# Patient Record
Sex: MALE | Race: WHITE | Employment: OTHER | URBAN - METROPOLITAN AREA
[De-identification: names, ages, dates, MRNs, and addresses within clinical notes are randomized per-mention and may not be internally consistent; named-entity substitution may affect disease eponyms.]

---

## 2017-02-05 ENCOUNTER — APPOINTMENT (OUTPATIENT)
Dept: GENERAL RADIOLOGY | Age: 62
DRG: 310 | End: 2017-02-05
Attending: EMERGENCY MEDICINE
Payer: COMMERCIAL

## 2017-02-05 ENCOUNTER — HOSPITAL ENCOUNTER (INPATIENT)
Age: 62
LOS: 1 days | Discharge: HOME | DRG: 310 | End: 2017-02-07
Attending: EMERGENCY MEDICINE | Admitting: INTERNAL MEDICINE
Payer: COMMERCIAL

## 2017-02-05 DIAGNOSIS — I48.91 ATRIAL FIBRILLATION WITH RVR (HCC): Primary | ICD-10-CM

## 2017-02-05 LAB
ALBUMIN SERPL BCP-MCNC: 3.9 G/DL (ref 3.5–5)
ALBUMIN/GLOB SERPL: 1 {RATIO} (ref 1.1–2.2)
ALP SERPL-CCNC: 106 U/L (ref 45–117)
ALT SERPL-CCNC: 58 U/L (ref 12–78)
ANION GAP BLD CALC-SCNC: 8 MMOL/L (ref 5–15)
AST SERPL W P-5'-P-CCNC: 41 U/L (ref 15–37)
BASOPHILS # BLD AUTO: 0 K/UL (ref 0–0.1)
BASOPHILS # BLD: 0 % (ref 0–1)
BILIRUB SERPL-MCNC: 0.5 MG/DL (ref 0.2–1)
BUN SERPL-MCNC: 15 MG/DL (ref 6–20)
BUN/CREAT SERPL: 16 (ref 12–20)
CALCIUM SERPL-MCNC: 8.6 MG/DL (ref 8.5–10.1)
CHLORIDE SERPL-SCNC: 106 MMOL/L (ref 97–108)
CK SERPL-CCNC: 188 U/L (ref 39–308)
CO2 SERPL-SCNC: 28 MMOL/L (ref 21–32)
CREAT SERPL-MCNC: 0.93 MG/DL (ref 0.7–1.3)
EOSINOPHIL # BLD: 0.2 K/UL (ref 0–0.4)
EOSINOPHIL NFR BLD: 4 % (ref 0–7)
ERYTHROCYTE [DISTWIDTH] IN BLOOD BY AUTOMATED COUNT: 13.5 % (ref 11.5–14.5)
GLOBULIN SER CALC-MCNC: 3.8 G/DL (ref 2–4)
GLUCOSE SERPL-MCNC: 186 MG/DL (ref 65–100)
HCT VFR BLD AUTO: 46.8 % (ref 36.6–50.3)
HGB BLD-MCNC: 16.3 G/DL (ref 12.1–17)
LYMPHOCYTES # BLD AUTO: 46 % (ref 12–49)
LYMPHOCYTES # BLD: 2.5 K/UL (ref 0.8–3.5)
MAGNESIUM SERPL-MCNC: 2.1 MG/DL (ref 1.6–2.4)
MCH RBC QN AUTO: 31.8 PG (ref 26–34)
MCHC RBC AUTO-ENTMCNC: 34.8 G/DL (ref 30–36.5)
MCV RBC AUTO: 91.4 FL (ref 80–99)
MONOCYTES # BLD: 0.3 K/UL (ref 0–1)
MONOCYTES NFR BLD AUTO: 6 % (ref 5–13)
NEUTS SEG # BLD: 2.4 K/UL (ref 1.8–8)
NEUTS SEG NFR BLD AUTO: 44 % (ref 32–75)
PLATELET # BLD AUTO: 177 K/UL (ref 150–400)
POTASSIUM SERPL-SCNC: 4 MMOL/L (ref 3.5–5.1)
PROT SERPL-MCNC: 7.7 G/DL (ref 6.4–8.2)
RBC # BLD AUTO: 5.12 M/UL (ref 4.1–5.7)
SODIUM SERPL-SCNC: 142 MMOL/L (ref 136–145)
TROPONIN I SERPL-MCNC: <0.04 NG/ML
WBC # BLD AUTO: 5.5 K/UL (ref 4.1–11.1)

## 2017-02-05 PROCEDURE — 93005 ELECTROCARDIOGRAM TRACING: CPT

## 2017-02-05 PROCEDURE — 36415 COLL VENOUS BLD VENIPUNCTURE: CPT | Performed by: EMERGENCY MEDICINE

## 2017-02-05 PROCEDURE — 96365 THER/PROPH/DIAG IV INF INIT: CPT

## 2017-02-05 PROCEDURE — 74011250636 HC RX REV CODE- 250/636: Performed by: EMERGENCY MEDICINE

## 2017-02-05 PROCEDURE — 84484 ASSAY OF TROPONIN QUANT: CPT | Performed by: EMERGENCY MEDICINE

## 2017-02-05 PROCEDURE — 77030022643 HC PAD DEFIB AD HRTSTRT PHL -B

## 2017-02-05 PROCEDURE — 85025 COMPLETE CBC W/AUTO DIFF WBC: CPT | Performed by: EMERGENCY MEDICINE

## 2017-02-05 PROCEDURE — 82550 ASSAY OF CK (CPK): CPT | Performed by: EMERGENCY MEDICINE

## 2017-02-05 PROCEDURE — 96367 TX/PROPH/DG ADDL SEQ IV INF: CPT

## 2017-02-05 PROCEDURE — 74011250637 HC RX REV CODE- 250/637: Performed by: INTERNAL MEDICINE

## 2017-02-05 PROCEDURE — 80053 COMPREHEN METABOLIC PANEL: CPT | Performed by: EMERGENCY MEDICINE

## 2017-02-05 PROCEDURE — 99218 HC RM OBSERVATION: CPT

## 2017-02-05 PROCEDURE — 99285 EMERGENCY DEPT VISIT HI MDM: CPT

## 2017-02-05 PROCEDURE — 74011000250 HC RX REV CODE- 250: Performed by: EMERGENCY MEDICINE

## 2017-02-05 PROCEDURE — 96376 TX/PRO/DX INJ SAME DRUG ADON: CPT

## 2017-02-05 PROCEDURE — 74011000258 HC RX REV CODE- 258: Performed by: EMERGENCY MEDICINE

## 2017-02-05 PROCEDURE — 96366 THER/PROPH/DIAG IV INF ADDON: CPT

## 2017-02-05 PROCEDURE — 96368 THER/DIAG CONCURRENT INF: CPT

## 2017-02-05 PROCEDURE — 83735 ASSAY OF MAGNESIUM: CPT | Performed by: EMERGENCY MEDICINE

## 2017-02-05 PROCEDURE — 71010 XR CHEST PORT: CPT

## 2017-02-05 RX ORDER — SIMVASTATIN 10 MG/1
20 TABLET, FILM COATED ORAL
Status: DISCONTINUED | OUTPATIENT
Start: 2017-02-06 | End: 2017-02-07 | Stop reason: HOSPADM

## 2017-02-05 RX ORDER — MAGNESIUM SULFATE HEPTAHYDRATE 40 MG/ML
2 INJECTION, SOLUTION INTRAVENOUS
Status: COMPLETED | OUTPATIENT
Start: 2017-02-05 | End: 2017-02-05

## 2017-02-05 RX ORDER — SODIUM CHLORIDE 0.9 % (FLUSH) 0.9 %
5-10 SYRINGE (ML) INJECTION EVERY 8 HOURS
Status: DISCONTINUED | OUTPATIENT
Start: 2017-02-06 | End: 2017-02-07 | Stop reason: HOSPADM

## 2017-02-05 RX ORDER — ASPIRIN 81 MG/1
81 TABLET ORAL DAILY
Status: DISCONTINUED | OUTPATIENT
Start: 2017-02-06 | End: 2017-02-07 | Stop reason: HOSPADM

## 2017-02-05 RX ORDER — IBUTILIDE FUMARATE 0.1 MG/ML
1 INJECTION, SOLUTION INTRAVENOUS
Status: DISCONTINUED | OUTPATIENT
Start: 2017-02-05 | End: 2017-02-05 | Stop reason: SDUPTHER

## 2017-02-05 RX ORDER — SODIUM CHLORIDE 0.9 % (FLUSH) 0.9 %
5-10 SYRINGE (ML) INJECTION AS NEEDED
Status: DISCONTINUED | OUTPATIENT
Start: 2017-02-05 | End: 2017-02-07 | Stop reason: HOSPADM

## 2017-02-05 RX ORDER — DILTIAZEM HYDROCHLORIDE 5 MG/ML
20 INJECTION INTRAVENOUS
Status: COMPLETED | OUTPATIENT
Start: 2017-02-05 | End: 2017-02-05

## 2017-02-05 RX ADMIN — DILTIAZEM HYDROCHLORIDE 20 MG: 5 INJECTION INTRAVENOUS at 16:31

## 2017-02-05 RX ADMIN — SODIUM CHLORIDE 10 MG/HR: 900 INJECTION, SOLUTION INTRAVENOUS at 16:38

## 2017-02-05 RX ADMIN — Medication 10 ML: at 23:57

## 2017-02-05 RX ADMIN — APIXABAN 5 MG: 5 TABLET, FILM COATED ORAL at 23:57

## 2017-02-05 RX ADMIN — MAGNESIUM SULFATE HEPTAHYDRATE 2 G: 40 INJECTION, SOLUTION INTRAVENOUS at 18:33

## 2017-02-05 RX ADMIN — SODIUM CHLORIDE 1 MG: 9 INJECTION, SOLUTION INTRAVENOUS at 21:46

## 2017-02-05 RX ADMIN — SODIUM CHLORIDE 1 MG: 9 INJECTION, SOLUTION INTRAVENOUS at 20:52

## 2017-02-05 NOTE — ED PROVIDER NOTES
HPI Comments: Nicole Osorio is a 59 y.o. male, pmhx significant for a-fib with ablation x2, GERD, PUD, HTN, who presents ambulatory to the ED c/o sudden onset palpitations with associated SOB x 90 minutes PTA. He states that he had L CP at initial onset of sxs but is no longer experiencing it. Pt reports that his sxs feel like a typical episode of his a-fib. He states that he usually experiences an episode of a-fib once a year, but his most recent episode was 4-5 months ago and it required electrocardioversion to reverse the rhythm, as did the 2 most recent episodes. He endorses taking ASA daily, but is not currently on any other blood thinners. Pt specifically denies nausea or diaphoresis. PCP: Mita Gutierrez MD  Cardiologist: Umair Hutchins MD       Social Hx: -tobacco (former, quit date:10/1/2012), +EtOH Jannifer Proud), -Illicit Drugs   FHx: no pertinent family hx   Medication Allergies: percocet      There are no other complaints, changes, or physical findings at this time. The history is provided by the patient.         Past Medical History:   Diagnosis Date    A-fib [facility])     Arrhythmia      AFIB, ABLATION X2    Arthritis      back    Chronic pain      back    Gastrointestinal disorder     GERD (gastroesophageal reflux disease)     High cholesterol     Hypertension     PUD (peptic ulcer disease)      \"years ago\"    SOB (shortness of breath)      R/T AFIB, NO LONGER HAVING ISSUES       Past Surgical History:   Procedure Laterality Date    Cardioversion external      Cardiac catheterization      Pr cardiac surg procedure unlist  2010, 2011     ,ABLATION     Hx heent  6/6/13     THYROID BIOPSY    Hx cervical fusion  2013    Hx orthopaedic       shoulder surgery on left    Hx orthopaedic       wrist surgery, LEFT    Hx orthopaedic       elbow surgery, RIGHT         Family History:   Problem Relation Age of Onset    Cancer Mother      BREAST    Heart Disease Father     Lung Disease Father        Social History     Social History    Marital status:      Spouse name: N/A    Number of children: N/A    Years of education: N/A     Occupational History    Not on file. Social History Main Topics    Smoking status: Former Smoker     Packs/day: 0.25     Years: 40.00     Quit date: 10/1/2012    Smokeless tobacco: Never Used    Alcohol use Yes      Comment: occassionally 3-4 cans of beer    Drug use: No    Sexual activity: Yes     Other Topics Concern    Not on file     Social History Narrative         ALLERGIES: Percocet [oxycodone-acetaminophen]    Review of Systems   Constitutional: Negative for appetite change, chills, diaphoresis and fever. HENT: Negative for congestion. Eyes: Negative for visual disturbance. Respiratory: Positive for stridor. Negative for cough, shortness of breath and wheezing. Cardiovascular: Positive for chest pain (L sided, not experiencing currently) and palpitations. Negative for leg swelling. Gastrointestinal: Negative for abdominal pain and nausea. Genitourinary: Negative for dysuria, frequency and urgency. Musculoskeletal: Negative for back pain, joint swelling, myalgias and neck stiffness. Skin: Negative for rash. Neurological: Negative for dizziness, syncope, weakness and headaches. Hematological: Negative for adenopathy. Psychiatric/Behavioral: Negative for behavioral problems and dysphoric mood. All other systems reviewed and are negative.     Patient Vitals for the past 12 hrs:   Temp Pulse Resp BP SpO2   02/05/17 2200 - 80 14 128/83 97 %   02/05/17 2130 - 75 15 116/88 96 %   02/05/17 2100 - 78 14 126/62 96 %   02/05/17 2030 - 80 15 116/75 95 %   02/05/17 2014 - (!) 104 12 128/82 95 %   02/05/17 2000 - 80 14 105/79 95 %   02/05/17 1930 - (!) 103 18 109/75 93 %   02/05/17 1900 - 85 14 125/73 95 %   02/05/17 1833 - 93 - 123/73 -   02/05/17 1830 - 93 12 123/73 96 %   02/05/17 1800 - 93 12 119/77 95 %   02/05/17 1730 - (!) 102 14 109/64 95 %   02/05/17 1725 - (!) 104 12 114/61 94 %   02/05/17 1700 - (!) 134 13 - 96 %   02/05/17 1631 - (!) 164 - 121/80 -   02/05/17 1630 - (!) 159 16 121/80 96 %   02/05/17 1604 - - 15 - -   02/05/17 1600 - (!) 151 - 143/78 96 %   02/05/17 1552 - (!) 163 12 - 97 %   02/05/17 1548 - - - 148/81 -   02/05/17 1537 97.7 °F (36.5 °C) (!) 130 16 145/84 96 %       Physical Exam   Constitutional: He is oriented to person, place, and time. He appears well-developed and well-nourished. No distress. HENT:   Head: Normocephalic and atraumatic. Mouth/Throat: Oropharynx is clear and moist.   Eyes: Conjunctivae and EOM are normal. Pupils are equal, round, and reactive to light. No scleral icterus. Neck: Normal range of motion. Neck supple. Cardiovascular: Exam reveals no gallop. No murmur heard. Tachycardic  Irregularly irregular    Pulmonary/Chest: Effort normal. No stridor. No respiratory distress. He has no wheezes. He has no rales. Abdominal: Soft. Bowel sounds are normal. He exhibits no distension and no mass. There is no tenderness. There is no rebound and no guarding. Musculoskeletal: Normal range of motion. He exhibits no edema. Lymphadenopathy:     He has no cervical adenopathy. Neurological: He is alert and oriented to person, place, and time. No cranial nerve deficit. Coordination normal.   Skin: Skin is warm and dry. No rash noted. No erythema. Psychiatric: He has a normal mood and affect. Nursing note and vitals reviewed.        MDM  Number of Diagnoses or Management Options  Diagnosis management comments: DDx: a-fib, ACS, CHF, electrolyte abnormality        Amount and/or Complexity of Data Reviewed  Clinical lab tests: ordered and reviewed  Tests in the radiology section of CPT®: reviewed and ordered  Tests in the medicine section of CPT®: ordered and reviewed  Review and summarize past medical records: yes  Discuss the patient with other providers: yes (Cardiology )  Independent visualization of images, tracings, or specimens: yes    Critical Care  Total time providing critical care: 30-74 minutes      Procedures    EKG interpretation: (Preliminary)  Rhythm: atrial fib with RVR; and irregular. Rate (approx.): 169; Axis: normal; P wave: absent; QRS interval: normal ; ST/T wave: non-specific changes; Other findings: abnormal ekg. 5:35 PM  Pt reevaluated. His HR has come down to the 90's and his BP has come down to 458 systolic after his Cardizem bolus and current continuous bolus. Pt is still symptomatic. Written by Vic Carvalho ED Scribe as dictated by Live Fong MD    EKG interpretation: (Subsequent)  Rhythm: atrial fib; and irregular. Rate (approx.): 96; Axis: normal; P wave: absent; QRS interval: normal ; ST/T wave: non-specific changes; Other findings: abnormal ekg.      5:39 PM  Upon standing to use a urinal, the pt's HR went up to the 160's, however, he did not feel it. Written by Vic Carvalho ED Scribe as dictated by Live Fong MD    CONSULT NOTE:   5:40 PM  Live Fong MD spoke with Dr. Albert Richmond,   Specialty: Cardiology  Discussed pt's hx, disposition, and available diagnostic and imaging results. Reviewed care plans. Consultant recommends 2 doses of Corvert and if he converts he can go home, if not, he would like a call back so he can admit the pt.    Written by Vic Carvalho ED Scribe, as dictated by Live Fong MD.    CRITICAL CARE NOTE :    5:48 PM      IMPENDING DETERIORATION -Cardiovascular    ASSOCIATED RISK FACTORS - Hypotension, Dysrhythmia and Vascular Compromise    MANAGEMENT- Bedside Assessment    INTERPRETATION -  Xrays, ECG, Blood Pressure and Cardiac Output Measures     INTERVENTIONS - hemodynamic mngmt and defibrillation    CASE REVIEW - Medical Sub-Specialist, Nursing and Family    TREATMENT RESPONSE -Stable    PERFORMED BY - Self        NOTES   :      I have spent 45 minutes of critical care time involved in lab review, consultations with specialist, family decision- making, bedside attention and documentation. During this entire length of time I was immediately available to the patient . Delbert Chanel MD    CONSULT NOTE:   10:30 PM  Delbert Chanel MD spoke with Dr. Belinda Rivera,   Specialty: Cardiology  Discussed pt's hx, disposition, and available diagnostic and imaging results. Reviewed care plans. Consultant will admit. Written by Lisa White ED Scribe, as dictated by Delbert Chanel MD.    LABORATORY TESTS:  Recent Results (from the past 12 hour(s))   EKG, 12 LEAD, INITIAL    Collection Time: 02/05/17  3:46 PM   Result Value Ref Range    Ventricular Rate 169 BPM    Atrial Rate 166 BPM    QRS Duration 86 ms    Q-T Interval 268 ms    QTC Calculation (Bezet) 449 ms    Calculated R Axis 39 degrees    Calculated T Axis 69 degrees    Diagnosis       Atrial fibrillation with rapid ventricular response  Nonspecific ST abnormality , probably digitalis effect  Abnormal ECG  When compared with ECG of 24-JUL-2016 11:50,  No significant change was found     CBC WITH AUTOMATED DIFF    Collection Time: 02/05/17  4:00 PM   Result Value Ref Range    WBC 5.5 4.1 - 11.1 K/uL    RBC 5.12 4.10 - 5.70 M/uL    HGB 16.3 12.1 - 17.0 g/dL    HCT 46.8 36.6 - 50.3 %    MCV 91.4 80.0 - 99.0 FL    MCH 31.8 26.0 - 34.0 PG    MCHC 34.8 30.0 - 36.5 g/dL    RDW 13.5 11.5 - 14.5 %    PLATELET 249 436 - 477 K/uL    NEUTROPHILS 44 32 - 75 %    LYMPHOCYTES 46 12 - 49 %    MONOCYTES 6 5 - 13 %    EOSINOPHILS 4 0 - 7 %    BASOPHILS 0 0 - 1 %    ABS. NEUTROPHILS 2.4 1.8 - 8.0 K/UL    ABS. LYMPHOCYTES 2.5 0.8 - 3.5 K/UL    ABS. MONOCYTES 0.3 0.0 - 1.0 K/UL    ABS. EOSINOPHILS 0.2 0.0 - 0.4 K/UL    ABS.  BASOPHILS 0.0 0.0 - 0.1 K/UL   METABOLIC PANEL, COMPREHENSIVE    Collection Time: 02/05/17  4:00 PM   Result Value Ref Range    Sodium 142 136 - 145 mmol/L    Potassium 4.0 3.5 - 5.1 mmol/L    Chloride 106 97 - 108 mmol/L    CO2 28 21 - 32 mmol/L    Anion gap 8 5 - 15 mmol/L    Glucose 186 (H) 65 - 100 mg/dL    BUN 15 6 - 20 MG/DL    Creatinine 0.93 0.70 - 1.30 MG/DL    BUN/Creatinine ratio 16 12 - 20      GFR est AA >60 >60 ml/min/1.73m2    GFR est non-AA >60 >60 ml/min/1.73m2    Calcium 8.6 8.5 - 10.1 MG/DL    Bilirubin, total 0.5 0.2 - 1.0 MG/DL    ALT (SGPT) 58 12 - 78 U/L    AST (SGOT) 41 (H) 15 - 37 U/L    Alk. phosphatase 106 45 - 117 U/L    Protein, total 7.7 6.4 - 8.2 g/dL    Albumin 3.9 3.5 - 5.0 g/dL    Globulin 3.8 2.0 - 4.0 g/dL    A-G Ratio 1.0 (L) 1.1 - 2.2     TROPONIN I    Collection Time: 02/05/17  4:00 PM   Result Value Ref Range    Troponin-I, Qt. <0.04 <0.05 ng/mL   CK W/ REFLX CKMB    Collection Time: 02/05/17  4:00 PM   Result Value Ref Range     39 - 308 U/L   MAGNESIUM    Collection Time: 02/05/17  4:00 PM   Result Value Ref Range    Magnesium 2.1 1.6 - 2.4 mg/dL   EKG, 12 LEAD, SUBSEQUENT    Collection Time: 02/05/17  5:50 PM   Result Value Ref Range    Ventricular Rate 96 BPM    Atrial Rate 108 BPM    QRS Duration 92 ms    Q-T Interval 318 ms    QTC Calculation (Bezet) 401 ms    Calculated R Axis 28 degrees    Calculated T Axis 38 degrees    Diagnosis       Atrial fibrillation  Abnormal ECG  When compared with ECG of 05-FEB-2017 15:46,  MANUAL COMPARISON REQUIRED, DATA IS UNCONFIRMED         IMAGING RESULTS:  CXR Results  (Last 48 hours)               02/05/17 1628  XR CHEST PORT Final result    Impression:  IMPRESSION: No evidence of acute cardiopulmonary process. Narrative:  INDICATION: Atrial fibrillation. Chest pain. COMPARISON: July 24, 2016       FINDINGS: AP portable imaging of the chest performed at 4:19 PM demonstrates a   stable cardiomediastinal silhouette. The lungs are clear bilaterally. No   significant osseous abnormalities are seen. Fusion hardware is present in the   cervical spine.                MEDICATIONS GIVEN:  Medications   dilTIAZem (CARDIZEM) 100 mg in 0.9% sodium chloride (MBP/ADV) 100 mL infusion (0 mg/hr IntraVENous Stopped 2/5/17 2050)   dilTIAZem (CARDIZEM) injection 20 mg (20 mg IntraVENous Given 2/5/17 1631)   magnesium sulfate 2 g/50 ml IVPB (premix or compounded) (0 g IntraVENous IV Completed 2/5/17 1933)   ibutilide (CORVERT) 1 mg in 0.9% sodium chloride infusion (1 mg IntraVENous New Bag 2/5/17 2052)   ibutilide (CORVERT) 1 mg in 0.9% sodium chloride infusion (0 mg IntraVENous IV Completed 2/5/17 2202)       IMPRESSION:  1. Atrial fibrillation with RVR (HCC)        PLAN: Admit to cardiology  10:31 PM  Patient is being admitted to the hospital by Dr. Bishnu Summers.  The results of their tests and reasons for their admission have been discussed with them and/or available family. They convey agreement and understanding for the need to be admitted and for their admission diagnosis. Written by Jeremiah Yoon ED Scribe, as dictated by Laura Golden MD.    This note is prepared by Jose Cruz Minaya acting as scribe for MD Laura Hugo MD : The scribe's documentation has been prepared under my direction and personally reviewed by me in its entirety. I confirm that the note above accurately reflects all work, treatment, procedures, and medical decision making performed by me.

## 2017-02-05 NOTE — IP AVS SNAPSHOT
[address] [facility] 
[phone] Patient: Cruzito Lawson MRN: [id number] MZB:[id number]/[id number] You are allergic to the following Allergen Reactions Percocet (Oxycodone-Acetaminophen) Anxiety Patient takes Tylenol without problems. Recent Documentation Height Weight BMI Smoking Status 1.829 m 125.9 kg 37.64 kg/m2 Former Smoker Emergency Contacts Name Discharge Info Relation Home Work Mobile Gabriela Mcintyre DISCHARGE CAREGIVER [3] Spouse [3] [phone] About your hospitalization You were admitted on:  February 5, 2017 You last received care in the:  [facility] 2 CARDIOPULMONARY CARE You were discharged on:  February 7, 2017 Unit phone number:  [phone] Why you were hospitalized Your primary diagnosis was:  Dyslipidemia Your diagnoses also included:  Atrial Fibrillation (Hcc), A-Fib (Hcc), Encounter For Cardioversion Procedure, Hypertension Providers Seen During Your Hospitalizations Provider Role Specialty Primary office phone Thom Fernández MD Attending Provider Emergency Medicine [phone] Catrachito Jansen MD Attending Provider Cardiology [phone] Your Primary Care Physician (PCP) Primary Care Physician Office Phone Office Fax Mariola Claudio [phone] Follow-up Information Follow up With Details Comments Contact Info MD Melissa Parekhilla [address] 
[phone] Catrachito Jansen MD Schedule an appointment as soon as possible for a visit in 1 week  [address] [place] [address] [facility] 
[phone] Current Discharge Medication List  
  
START taking these medications Dose & Instructions Dispensing Information Comments Morning Noon Evening Bedtime  
 amiodarone 200 mg tablet Commonly known as:  CORDARONE Your next dose is: Today Dose:  200 mg Take 1 Tab by mouth two (2) times a day. Quantity:  60 Tab Refills:  11  
     
   
   
   
  
  
 apixaban 5 mg tablet Commonly known as:  [employer] Your next dose is: Today Dose:  5 mg Take 1 Tab by mouth two (2) times a day. Quantity:  60 Tab Refills:  11 CONTINUE these medications which have NOT CHANGED Dose & Instructions Dispensing Information Comments Morning Noon Evening Bedtime  
 diclofenac EC 75 mg EC tablet Commonly known as:  VOLTAREN Your next dose is: Today Dose:  75 mg Take 75 mg by mouth as needed. Refills:  0  
     
   
   
  
   
  
 dilTIAZem 120 mg SR capsule Commonly known as:  [facility] Your next dose is:  Tomorrow TAKE ONE CAPSULE BY MOUTH EVERY DAY ([address]) Quantity:  30 Cap Refills:  3  
     
  
   
   
   
  
 furosemide 20 mg tablet Commonly known as:  LASIX Your next dose is:  Tomorrow Dose:  20 mg Take 1 Tab by mouth daily as needed. Quantity:  90 Tab Refills:  3  
     
  
   
   
   
  
 simvastatin 20 mg tablet Commonly known as:  ZOCOR Your next dose is: Today TAKE 1 TABLET AT BEDTIME Quantity:  90 Tab Refills:  3 VITAMIN B-12 1,000 mcg tablet Generic drug:  cyanocobalamin Your next dose is:  Tomorrow Dose:  1000 mcg Take 1,000 mcg by mouth daily. Refills:  0 STOP taking these medications   
 aspirin delayed-release 81 mg tablet  
   
  
 propafenone 150 mg tablet Commonly known as:  RYTHMOL Where to Get Your Medications These medications were sent to [address], [address] [place] [address] Phone:  [phone]  
  amiodarone 200 mg tablet apixaban 5 mg tablet Discharge Instructions [address]  [phone] Cardioversion Discharge Instructions Patient ID: Kj Flores [id number] 
72 y.o. 
1955 Date: 2/5/2017 Attending Physician: Beverly Lombardo MD  
 
Admission Diagnoses:  
afib A-fib ([address] Discharge Diagnoses:  
Principal Problem: 
  Dyslipidemia (7/27/2010) Active Problems: 
  Atrial fibrillation ([address] (4/17/2012) A-fib ([address] (2/6/2017) Encounter for cardioversion procedure (2/7/2017) Discharge Condition: Good Cardiology Procedures this Admission:  Cardioversion Disposition: home Reference discharge instructions provided by nursing for diet and activity. Signed: 
Sapna Tai NP 
2/7/2017 12:47 PM 
 
 
Cardioversion: After Your Visit Your Care Instructions Cardioversion is a treatment for an abnormal heartbeat, such as atrial fibrillation. In cardioversion, a brief electric shock is given to the heart to reset its rhythm. The shock comes through metal paddles or patches placed on the outside of your chest. Cardioversion usually restores the heartbeat to normal. 
 
After the procedure, you may have redness, like a sunburn, where the paddles were. Do not drive until the day after a cardioversion. You can eat and drink when you feel ready to. Your doctor may have you take medicines daily to help the heart beat normally and to prevent blood clots. Your doctor may prescribe medicine before and after cardioversion to help keep your heart in a normal rhythm after the procedure. Instead of electric cardioversion, your doctor may try to convert your heart to a normal rhythm using medicine given through a vein. This is usually done in a clinic or hospital. 
 
Follow-up care is a key part of your treatment and safety.  Be sure to make and go to all appointments, and call your doctor if you are having problems. Its also a good idea to know your test results and keep a list of the medicines you take. How can you care for yourself at home? Medicines Take your medicines exactly as prescribed. Call your doctor if you think you are having a problem with your medicine. You may take some of the following medicines: 
Beta-blockers such as propranolol (Inderal), metoprolol (Lopressor), or carvedilol (Coreg). Calcium channel blockers such as diltiazem (Cardizem) or verapamil (Calan). Digoxin (Lanoxin). Antiarrhythmic medicines such as amiodarone (Cordarone), procainamide, or flecainide (Tambocor). You will get more details on the specific medicines your doctor prescribes. If your doctor has given you blood thinners such as warfarin (Coumadin), enoxaparin (Lovenox), or aspirin to prevent blood clots, be sure to: Take your medicine at the same time each day. Talk with your doctor before you make any major changes to your diet or start a weight loss plan. The foods that you eat can affect how well blood thinners work for you. Tell your dentist, pharmacist, and other health professionals that you take blood thinners. Watch for unusual bruising or bleeding, such as blood in urine, maroon or black stools, or bleeding from the nose or gums. Get regular blood tests to check how fast your blood clots, if you are taking Coumadin. Wear medical alert jewelry that says you take blood thinners. You can buy this at most drugstores. Do not take any vitamins, over-the-counter medicines, or herbal products without talking to your doctor first. 
 
Lifestyle changes Do not smoke. Smoking increases your risk of stroke and heart attack. If you need help quitting, talk to your doctor about stop-smoking programs and medicines. These can increase your chances of quitting for good. Limit alcohol to 2 drinks a day for men and 1 drink a day for women. Alcohol may interfere with blood thinners.  It also increases your risk of falls, which can cause bruising and bleeding. Limit or avoid caffeine (coffee, tea, and cola drinks) and other stimulants (decongestants, over-the-counter cold and flu medicines, and illegal substances) if your doctor says to. They can trigger heart problems. If you are taking blood thinners, avoid sports or activities that could lead to injury. Make your home safe and take other measures to reduce your risk of falling. Always wear a seat belt while in a car. Activity If your doctor recommends it, get more exercise. Walking is a good choice. Bit by bit, increase the amount you walk every day. Try for 30 minutes on most days of the week. You also may want to swim, bike, or do other activities. When you exercise, watch for signs that your heart is working too hard. You are pushing too hard if you cannot talk while you are exercising. If you become short of breath or dizzy or have chest pain, sit down and rest immediately. If your doctor has not set you up with a cardiac rehabilitation program, talk to him or her about whether that is right for you. Cardiac rehab includes supervised exercise, help with diet and lifestyle changes, and emotional support. It may reduce your risk of future heart problems. Check your pulse regularly. Place two fingers on the artery at the palm side of your wrist in line with your thumb. If your heartbeat seems uneven or fast, talk to your doctor. When should you call for help? Call 911 anytime you think you may need emergency care. For example, call if: 
You have severe trouble breathing. You cough up pink, foamy mucus and you have trouble breathing. You have symptoms of a heart attack such as: 
Chest pain or pressure. Sweating. Shortness of breath. Nausea or vomiting. Pain that spreads from the chest to the neck, jaw, or one or both shoulders or arms. Dizziness or lightheadedness. A fast or uneven pulse. After calling 911, chew 1 adult-strength aspirin. Wait for an ambulance. Do not try to drive yourself. You have signs of a stroke. These may include: 
Sudden numbness, paralysis, or weakness in your face, arm, or leg, especially on only one side of your body. New problems with walking or balance. Sudden vision changes. Drooling or slurred speech. New problems speaking or understanding simple statements, or feeling confused. A sudden, severe headache that is different from past headaches. You cough up blood. You vomit blood or what looks like coffee grounds. You pass maroon or very bloody stools. You passed out (lost consciousness). Call your doctor now or seek immediate medical care if: 
You have new or increased shortness of breath. You are dizzy or lightheaded, or you feel like you may faint. You have sudden weight gain, such as 3 pounds or more in 2 to 3 days. You have increased swelling in your legs, ankles, or feet. You have new bruises or blood spots under your skin. You have a nosebleed. Your gums bleed when you brush your teeth. You have blood in your urine. Your stools are black and tarlike or have streaks of blood. You have vaginal bleeding when you are not having your period, or heavy period bleeding. Watch closely for changes in your health, and be sure to contact your doctor if you have any problems. Where can you learn more? Go to [employer]. Enter M216 in the search box to learn more about \"Cardioversion: After Your Visit. \" 
 
© 6080-8871 [employer]wise, Incorporated. Care instructions adapted under license by [employer] (which disclaims liability or warranty for this information). This care instruction is for use with your licensed healthcare professional. If you have questions about a medical condition or this instruction, always ask your healthcare professional. Johnathan Menjivar any warranty or liability for your use of this information. Discharge Orders None Introducing \[facility] [place] [facility]\"" & HEALTH SERVICES! Dear Huy Triana: Thank you for requesting a [employer] account. Our records indicate that you already have an active [employer] account. You can access your account anytime at [email or web address] Did you know that you can access your hospital and ER discharge instructions at any time in [employer]? You can also review all of your test results from your hospital stay or ER visit. Additional Information If you have questions, please visit the Frequently Asked Questions section of the [employer] website at [email or web address] Remember, [employer] is NOT to be used for urgent needs. For medical emergencies, dial 911. Now available from your iPhone and Android! General Information Please provide this summary of care documentation to your next provider. Patient Signature:  ____________________________________________________________ Date:  ____________________________________________________________  
  
Marlee Garza Provider Signature:  ____________________________________________________________ Date:  ____________________________________________________________

## 2017-02-05 NOTE — ED NOTES
Per Dr. Abelino Dash, pt to continue receiving Diltiazem while Magnesium infusing. Diltiazem to STOP prior to Corvert infusion. 3 attempts for  second PIV access were unsuccessful; Dr. Abelino Dash notified, and per Dr. Abelino Dash 1 PIV sufficient.

## 2017-02-06 PROBLEM — I48.91 A-FIB (HCC): Status: ACTIVE | Noted: 2017-02-06

## 2017-02-06 LAB
ANION GAP BLD CALC-SCNC: 11 MMOL/L (ref 5–15)
ATRIAL RATE: 108 BPM
ATRIAL RATE: 166 BPM
ATRIAL RATE: 357 BPM
BASOPHILS # BLD AUTO: 0 K/UL (ref 0–0.1)
BASOPHILS # BLD: 0 % (ref 0–1)
BUN SERPL-MCNC: 14 MG/DL (ref 6–20)
BUN/CREAT SERPL: 16 (ref 12–20)
CALCIUM SERPL-MCNC: 8.7 MG/DL (ref 8.5–10.1)
CALCULATED R AXIS, ECG10: 28 DEGREES
CALCULATED R AXIS, ECG10: 39 DEGREES
CALCULATED R AXIS, ECG10: 61 DEGREES
CALCULATED T AXIS, ECG11: 37 DEGREES
CALCULATED T AXIS, ECG11: 38 DEGREES
CALCULATED T AXIS, ECG11: 69 DEGREES
CHLORIDE SERPL-SCNC: 107 MMOL/L (ref 97–108)
CO2 SERPL-SCNC: 24 MMOL/L (ref 21–32)
CREAT SERPL-MCNC: 0.9 MG/DL (ref 0.7–1.3)
DIAGNOSIS, 93000: NORMAL
EOSINOPHIL # BLD: 0.2 K/UL (ref 0–0.4)
EOSINOPHIL NFR BLD: 3 % (ref 0–7)
ERYTHROCYTE [DISTWIDTH] IN BLOOD BY AUTOMATED COUNT: 13.7 % (ref 11.5–14.5)
GLUCOSE SERPL-MCNC: 124 MG/DL (ref 65–100)
HCT VFR BLD AUTO: 48.3 % (ref 36.6–50.3)
HGB BLD-MCNC: 16.3 G/DL (ref 12.1–17)
LYMPHOCYTES # BLD AUTO: 44 % (ref 12–49)
LYMPHOCYTES # BLD: 3.3 K/UL (ref 0.8–3.5)
MAGNESIUM SERPL-MCNC: 2.2 MG/DL (ref 1.6–2.4)
MCH RBC QN AUTO: 30.8 PG (ref 26–34)
MCHC RBC AUTO-ENTMCNC: 33.7 G/DL (ref 30–36.5)
MCV RBC AUTO: 91.1 FL (ref 80–99)
MONOCYTES # BLD: 0.6 K/UL (ref 0–1)
MONOCYTES NFR BLD AUTO: 8 % (ref 5–13)
NEUTS SEG # BLD: 3.4 K/UL (ref 1.8–8)
NEUTS SEG NFR BLD AUTO: 45 % (ref 32–75)
PLATELET # BLD AUTO: 184 K/UL (ref 150–400)
POTASSIUM SERPL-SCNC: 3.8 MMOL/L (ref 3.5–5.1)
Q-T INTERVAL, ECG07: 268 MS
Q-T INTERVAL, ECG07: 318 MS
Q-T INTERVAL, ECG07: 364 MS
QRS DURATION, ECG06: 100 MS
QRS DURATION, ECG06: 86 MS
QRS DURATION, ECG06: 92 MS
QTC CALCULATION (BEZET), ECG08: 401 MS
QTC CALCULATION (BEZET), ECG08: 417 MS
QTC CALCULATION (BEZET), ECG08: 449 MS
RBC # BLD AUTO: 5.3 M/UL (ref 4.1–5.7)
SODIUM SERPL-SCNC: 142 MMOL/L (ref 136–145)
VENTRICULAR RATE, ECG03: 169 BPM
VENTRICULAR RATE, ECG03: 79 BPM
VENTRICULAR RATE, ECG03: 96 BPM
WBC # BLD AUTO: 7.6 K/UL (ref 4.1–11.1)

## 2017-02-06 PROCEDURE — 99152 MOD SED SAME PHYS/QHP 5/>YRS: CPT

## 2017-02-06 PROCEDURE — 74011250637 HC RX REV CODE- 250/637: Performed by: INTERNAL MEDICINE

## 2017-02-06 PROCEDURE — 77010033678 HC OXYGEN DAILY

## 2017-02-06 PROCEDURE — 36415 COLL VENOUS BLD VENIPUNCTURE: CPT | Performed by: INTERNAL MEDICINE

## 2017-02-06 PROCEDURE — 99153 MOD SED SAME PHYS/QHP EA: CPT

## 2017-02-06 PROCEDURE — 93005 ELECTROCARDIOGRAM TRACING: CPT

## 2017-02-06 PROCEDURE — 80048 BASIC METABOLIC PNL TOTAL CA: CPT | Performed by: INTERNAL MEDICINE

## 2017-02-06 PROCEDURE — 74011000258 HC RX REV CODE- 258: Performed by: NURSE PRACTITIONER

## 2017-02-06 PROCEDURE — 74011250636 HC RX REV CODE- 250/636: Performed by: NURSE PRACTITIONER

## 2017-02-06 PROCEDURE — 74011250637 HC RX REV CODE- 250/637: Performed by: NURSE PRACTITIONER

## 2017-02-06 PROCEDURE — 85025 COMPLETE CBC W/AUTO DIFF WBC: CPT | Performed by: INTERNAL MEDICINE

## 2017-02-06 PROCEDURE — 77030018729 HC ELECTRD DEFIB PAD CARD -B

## 2017-02-06 PROCEDURE — 65660000000 HC RM CCU STEPDOWN

## 2017-02-06 PROCEDURE — 74011250636 HC RX REV CODE- 250/636

## 2017-02-06 PROCEDURE — 5A2204Z RESTORATION OF CARDIAC RHYTHM, SINGLE: ICD-10-PCS | Performed by: INTERNAL MEDICINE

## 2017-02-06 PROCEDURE — 83735 ASSAY OF MAGNESIUM: CPT | Performed by: INTERNAL MEDICINE

## 2017-02-06 PROCEDURE — 99218 HC RM OBSERVATION: CPT

## 2017-02-06 PROCEDURE — 92960 CARDIOVERSION ELECTRIC EXT: CPT | Performed by: NURSE PRACTITIONER

## 2017-02-06 RX ORDER — MIDAZOLAM HYDROCHLORIDE 1 MG/ML
.5-2 INJECTION, SOLUTION INTRAMUSCULAR; INTRAVENOUS
Status: DISCONTINUED | OUTPATIENT
Start: 2017-02-06 | End: 2017-02-06 | Stop reason: ALTCHOICE

## 2017-02-06 RX ORDER — FENTANYL CITRATE 50 UG/ML
INJECTION, SOLUTION INTRAMUSCULAR; INTRAVENOUS
Status: COMPLETED
Start: 2017-02-06 | End: 2017-02-06

## 2017-02-06 RX ORDER — MIDAZOLAM HYDROCHLORIDE 1 MG/ML
INJECTION, SOLUTION INTRAMUSCULAR; INTRAVENOUS
Status: COMPLETED
Start: 2017-02-06 | End: 2017-02-06

## 2017-02-06 RX ORDER — FENTANYL CITRATE 50 UG/ML
25-50 INJECTION, SOLUTION INTRAMUSCULAR; INTRAVENOUS
Status: DISCONTINUED | OUTPATIENT
Start: 2017-02-06 | End: 2017-02-06 | Stop reason: ALTCHOICE

## 2017-02-06 RX ORDER — ZOLPIDEM TARTRATE 5 MG/1
5 TABLET ORAL
Status: DISCONTINUED | OUTPATIENT
Start: 2017-02-06 | End: 2017-02-07 | Stop reason: HOSPADM

## 2017-02-06 RX ADMIN — SIMVASTATIN 20 MG: 10 TABLET, FILM COATED ORAL at 21:17

## 2017-02-06 RX ADMIN — Medication 10 ML: at 15:16

## 2017-02-06 RX ADMIN — FENTANYL CITRATE 50 MCG: 50 INJECTION, SOLUTION INTRAMUSCULAR; INTRAVENOUS at 09:11

## 2017-02-06 RX ADMIN — AMIODARONE HYDROCHLORIDE 150 MG: 50 INJECTION, SOLUTION INTRAVENOUS at 16:33

## 2017-02-06 RX ADMIN — MIDAZOLAM HYDROCHLORIDE 1 MG: 1 INJECTION, SOLUTION INTRAMUSCULAR; INTRAVENOUS at 08:47

## 2017-02-06 RX ADMIN — MIDAZOLAM HYDROCHLORIDE 1 MG: 1 INJECTION, SOLUTION INTRAMUSCULAR; INTRAVENOUS at 09:11

## 2017-02-06 RX ADMIN — APIXABAN 5 MG: 5 TABLET, FILM COATED ORAL at 21:16

## 2017-02-06 RX ADMIN — Medication 10 ML: at 19:06

## 2017-02-06 RX ADMIN — Medication 10 ML: at 03:55

## 2017-02-06 RX ADMIN — APIXABAN 5 MG: 5 TABLET, FILM COATED ORAL at 11:53

## 2017-02-06 RX ADMIN — MIDAZOLAM HYDROCHLORIDE 2 MG: 1 INJECTION, SOLUTION INTRAMUSCULAR; INTRAVENOUS at 08:43

## 2017-02-06 RX ADMIN — MIDAZOLAM HYDROCHLORIDE 1 MG: 1 INJECTION, SOLUTION INTRAMUSCULAR; INTRAVENOUS at 09:13

## 2017-02-06 RX ADMIN — AMIODARONE HYDROCHLORIDE 1 MG/MIN: 50 INJECTION, SOLUTION INTRAVENOUS at 19:15

## 2017-02-06 RX ADMIN — FENTANYL CITRATE 50 MCG: 50 INJECTION, SOLUTION INTRAMUSCULAR; INTRAVENOUS at 08:42

## 2017-02-06 RX ADMIN — ASPIRIN 81 MG: 81 TABLET, COATED ORAL at 11:53

## 2017-02-06 RX ADMIN — MIDAZOLAM HYDROCHLORIDE 2 MG: 1 INJECTION, SOLUTION INTRAMUSCULAR; INTRAVENOUS at 08:59

## 2017-02-06 RX ADMIN — Medication 10 ML: at 21:17

## 2017-02-06 RX ADMIN — ZOLPIDEM TARTRATE 5 MG: 5 TABLET, FILM COATED ORAL at 21:16

## 2017-02-06 NOTE — ED NOTES
Pt placed on pads, connected to portable monitor, pt on room 12 monitor x 3. RN at bedside. Code cart at door. Dr. Manuel Morales aware that Corvert to be administered. Diltiazem drip stopped.

## 2017-02-06 NOTE — PROCEDURES
[facility]      [address] [place] [address]  [phone]    Indications and Pre-Procedure Diagnosis:  Kev Alcaraz is a 59 y.o. male with atrial fibrillation is referred for DC cardioversion. Post Procedure Diagnosis  Atrial fibrillation    Cardioversion Procedure  After informed consent was obtained, the patient was brought back to the lab in fasting condition. The presenting rhythm was atrial fibrillation. AP and PA defib pads were placed in the appropriate regions on the chest wall. Patient was given Versed and Fentanyl for conscious sedation per nursing personnel. Continuous pulse oximetry and cuff BP monitoring was done. Once appropriately sedated, the patient was given three 360 J biphasic synchronized shocks that failed to convert him to sinus. The patient was observed until alert and oriented times 3. Total procedure times was 15 minutes. The following procedure related complication occurred: none. The following problems were encountered: none. Findings and Summary: This study demonstrates:  1. Unsuccessful cardioversion    Recommendations:    1. Admit for tikosyn load  2. reattempt cardioversion in 2 days      Thank you for allowing me to participate in this patients care.     Lalitha Posada MD, Herb Ramírez

## 2017-02-06 NOTE — ED NOTES
Pt's second dose of Corvert has finished. Pt remains in Afib. Per Dr. Schilling Tadeo, Diltiazem drip to NOT be started at this time. Dr. Shakir Dwyero to be notified if pt's HR rises to 110-120.

## 2017-02-06 NOTE — PROGRESS NOTES
TRANSFER - IN REPORT:    Verbal report received from Felicitas(name) on [address] [place] [address]  being received from ER(unit) for routine progression of care      Report consisted of patients Situation, Background, Assessment and   Recommendations(SBAR). Information from the following report(s) SBAR, Kardex, Intake/Output, MAR and Recent Results was reviewed with the receiving nurse. Opportunity for questions and clarification was provided. Assessment completed upon patients arrival to unit and care assumed. Primary Nurse Renetta Fuentes RN and Inna Sánchez RN performed a dual skin assessment on this patient No impairment noted  Candido score is 22        [facility] SHIFT NURSING NOTE    Bedside shift change report given to Marco Antonio luna (oncoming nurse) by Jamal Sharma (offgoing nurse). Report included the following information SBAR, Kardex, Intake/Output, MAR and Recent Results.     SHIFT SUMMARY:         Admission Date 2/5/2017   Admission Diagnosis afib   Consults IP CONSULT TO CARDIOLOGY        Consults   [] PT   [] OT   [] Speech   [] Palliative      [] Hospice    [x] Case Management   [] None   Cardiac Monitoring   [x] Yes   [] No     Antibiotics   [] Yes   [x] No   GI Prophylaxis  (Ex: Protonix, Pepcid, etc,.)   [] Yes   [x] No          DVT Prophylaxis   SCDs:             James stockings:         [x] Medication (Ex: Lovenox, Eliquis,  Heparin, etc..)   [] Contraindicated   [] None       Urinary Catheter             LDAs               Peripheral IV 02/05/17 Left Antecubital (Active)   Site Assessment Clean, dry, & intact 2/5/2017 11:51 PM   Phlebitis Assessment 0 2/5/2017 11:51 PM   Infiltration Assessment 0 2/5/2017 11:51 PM   Dressing Status Clean, dry, & intact 2/5/2017 11:51 PM   Dressing Type Transparent 2/5/2017 11:51 PM   Hub Color/Line Status Pink;Capped 2/5/2017 11:51 PM                      I/Os   Intake/Output Summary (Last 24 hours) at 02/05/17 7053  Last data filed at 02/05/17 3352   Gross per 24 hour   Intake 240 ml   Output              600 ml   Net             -360 ml         Activity Level           Diet Active Orders   Diet    DIET NPO      Purposeful Rounding every 1-2 hour? [x] Yes    Herman Score  Total Score: 1   Bed Alarm (If score 3 or >)   [] Yes    [] Refused (See signed refusal form in chart)   Candido Score  Candido Score: 22       Candido Score (if score 14 or less)   [] PMT consult   [] Nutrition consult   [] Wound Care consult      []  Specialty bed         Influenza Vaccine                 Needs prior to discharge:   Home O2 required:    [] Yes   [x] No     If yes, how much O2 required?     Other:             POST-OP SURGICAL VATS   [] Yes   [x] No     Incentive Spirometer:   [] Yes   [] Refused   Coughing and deep breathing:     [] Yes   [] Refused   Oral care:     [] Yes   [] Refused   Understanding (patient education):     [] Yes   [] Refused   Getting out of bed Number times ambulated in hallway past shift:    Number of times OOB to chair past shift:     Head of bed elevation:     [] Yes   [] Refused      Readmission Risk Assessment Tool Score Low Risk            9       Total Score        3 Relationship with PCP    2 Patient Living Status    4 More than 1 Admission in calendar year        Criteria that do not apply:    Patient Length of Stay > 5    Patient Insurance is Medicare, Medicaid or Self Pay    Charlson Comorbidity Score       Expected Length of Stay - - -   Actual Length of Stay 0

## 2017-02-06 NOTE — PROGRESS NOTES
Pt sedated with 100 mcg IV Fentanyl and 7 mg IV Versed for Elective Cardioversion. Pt given 3 synch shocks at 360 joules with pad position changed between first and second shock.  Rhythm remains atrial fibrillation

## 2017-02-06 NOTE — PROGRESS NOTES
Pt arrived to Non-Invasive Lab. Pt identified with 2 patient identifiers. Cardioversion procedure reviewed with patient and questions answered.   ASA score 2 per MD. Mallampati score documented on flowsheet

## 2017-02-06 NOTE — ED NOTES
Per Dr. Farrell Goldmann, after second dose of Corvert if pt hasn't converted to NSR; pt to be placed back on Diltiazem drip.

## 2017-02-06 NOTE — PROGRESS NOTES
Bedside shift change report given to Dani Carrera (oncoming nurse) by Renny Maldonado RN (offgoing nurse). Report included the following information SBAR.

## 2017-02-06 NOTE — PROGRESS NOTES
TRANSFER - OUT REPORT:    Verbal report given to [facility] on [place] Katelin  being transferred back to [facility] for routine progression of care       Report consisted of patients Situation, Background, Assessment and   Recommendations(SBAR). Information from the following report(s) Procedure Summary was reviewed with the receiving nurse. Lines:   Peripheral IV 02/05/17 Left Antecubital (Active)   Site Assessment Clean, dry, & intact 2/6/2017  3:42 AM   Phlebitis Assessment 0 2/6/2017  3:42 AM   Infiltration Assessment 0 2/6/2017  3:42 AM   Dressing Status Clean, dry, & intact 2/6/2017  3:42 AM   Dressing Type Transparent 2/6/2017  3:42 AM   Hub Color/Line Status Pink;Flushed 2/6/2017  3:42 AM        Opportunity for questions and clarification was provided.       Patient transported with:   O2 @ 4 liters

## 2017-02-06 NOTE — H&P
[address] [place] [address] [facility]  [phone]          CARDIOLOGY HISTORY AND PHYSICAL    Date of  Admission: 2/5/2017  3:37 PM     Admission type:Emergency     Subjective:      Jorja Crigler is a 59 y.o. male admitted for afib. Previous hx of 2 PVI afib ablation and multiple cardioversions. [facility] [id number] [facility] went into afib with RVR, noticeable SOB, slight chest discomfort. In ER received IV Dilt with slowing, then Covert to hopefully cardiovert. Patient remained in Afib rate controlled this AM.  No c/o dizziness/lightheadedness. URI 10 days ago, but no other virus, cold, infection. Cardiac risk factors: dyslipidemia, obesity, male gender, hypertension.     Patient Active Problem List    Diagnosis Date Noted    SOB (shortness of breath) 10/02/2010     Priority: 1 - One    Atrial fibrillation with RVR ([address] 07/24/2016    Atrial fibrillation ([address] 04/17/2012    Mixed hyperlipidemia 04/17/2012    Other dyspnea and respiratory abnormality 04/17/2012    Long term (current) use of anticoagulants 03/12/2012    S/P ablation of atrial fibrillation 02/02/2012    Pleural effusion, bilateral 10/05/2010    Sinus bradycardia 10/05/2010    Paroxysmal atrial fibrillation ([address] 07/27/2010    Dyslipidemia 07/27/2010      Ros Pelaez MD  Past Medical History   Diagnosis Date    A-fib [facility])     Arrhythmia      AFIB, ABLATION X2    Arthritis      back    Chronic pain      back    Gastrointestinal disorder     GERD (gastroesophageal reflux disease)     High cholesterol     Hypertension     PUD (peptic ulcer disease)      \"years ago\"    SOB (shortness of breath)      R/T AFIB, NO LONGER HAVING ISSUES      Social History     Social History    Marital status:      Spouse name: N/A    Number of children: N/A    Years of education: N/A     Social History Main Topics    Smoking status: Former Smoker     Packs/day: 0.25     Years: 40.00     Quit date: 10/1/2012    Smokeless tobacco: Never Used    Alcohol use Yes      Comment: occassionally 3-4 cans of beer    Drug use: No    Sexual activity: Yes     Other Topics Concern    None     Social History Narrative     Allergies   Allergen Reactions    Percocet [Oxycodone-Acetaminophen] Anxiety     Patient takes Tylenol without problems.       Family History   Problem Relation Age of Onset    Cancer Mother      BREAST    Heart Disease Father     Lung Disease Father       Current Facility-Administered Medications   Medication Dose Route Frequency    dilTIAZem (CARDIZEM) 100 mg in 0.9% sodium chloride (MBP/ADV) 100 mL infusion  10 mg/hr IntraVENous CONTINUOUS    simvastatin (ZOCOR) tablet 20 mg  20 mg Oral QHS    aspirin delayed-release tablet 81 mg  81 mg Oral DAILY    sodium chloride (NS) flush 5-10 mL  5-10 mL IntraVENous Q8H    sodium chloride (NS) flush 5-10 mL  5-10 mL IntraVENous PRN    apixaban (ELIQUIS) tablet 5 mg  5 mg Oral BID         Review of Symptoms: PTA  Constitutional: negative  Eyes: negative  Ears, nose, mouth, throat, and face: negative  Respiratory: negative  Cardiovascular: negative  Gastrointestinal: negative  Genitourinary:negative  Musculoskeletal:negative  Neurological: negative  Behvioral/Psych: negative  Endocrine: negative     Objective:   Physical Exam:  General Appearance:  obese  male in no acute distress  Chest:   Clear  Cardiovascular:  irr, irr no murmur.   Abdomen:   Soft, non-tender, bowel sounds are active.   Extremities: no peripheral edema  Skin:  Warm and dry.     Visit Vitals    /62    Pulse 78    Temp 98.2 °F (36.8 °C)    Resp 18    Ht 6' (1.829 m)    Wt 125.9 kg (277 lb 9 oz)    SpO2 98%    BMI 37.64 kg/m2       Cardiographics    Telemetry: afib  ECG: afib with RVR  Echocardiogram:     Labs:  Recent Labs      02/06/17   0307  02/05/17   1600   WBC  7.6  5.5   HGB  16.3  16.3   HCT  48.3  46.8   PLT  184  177     Recent Labs      02/06/17   0307  02/05/17 1600   NA  142  142   K  3.8  4.0   CL  107  106   CO2  24  28   GLU  124*  186*   BUN  14  15   CREA  0.90  0.93   CA  8.7  8.6   MG  2.2  2.1   ALB   --   3.9   TBILI   --   0.5   SGOT   --   41*   ALT   --   58       Recent Labs      02/05/17   1600   TROIQ  <0.04          Assessment:       Principal Problem:    Dyslipidemia (7/27/2010)    Active Problems:    Atrial fibrillation ([address] (4/17/2012)         Plan:     Kj Flores is a pleasant gentleman with paroxysmal atrial fibrillation since last night. He is a candidate for a cardioversion. I discussed the risks/benefits/alternatives of the procedure with the patient. Risks include (but are not limited to) bleeding, heart block, infection, cva/mi/tamponade/death. The patient understands and agrees to proceed. Pt seen and examined. Agree with assessment and plan as documented above.     Beverly Lombardo MD, Luh Magallanes

## 2017-02-06 NOTE — ED NOTES
TRANSFER - OUT REPORT:    Verbal report given to Ilir Fernandes RN on [employer]  being transferred to [facility] for routine progression of care       Report consisted of patients Situation, Background, Assessment and   Recommendations(SBAR). Information from the following report(s) SBAR, Kardex, ED Summary, [facility] and Recent Results was reviewed with the receiving nurse. Lines:   Peripheral IV 02/05/17 Left Antecubital (Active)   Site Assessment Clean, dry, & intact 2/5/2017  8:45 PM   Phlebitis Assessment 0 2/5/2017  8:45 PM   Infiltration Assessment 0 2/5/2017  8:45 PM   Dressing Status Clean, dry, & intact 2/5/2017  8:45 PM   Dressing Type Transparent 2/5/2017  8:45 PM        Opportunity for questions and clarification was provided.       Patient transported with:   Monitor

## 2017-02-06 NOTE — ED NOTES
Corvert infusion completed. Pt remains in Afib. Dr. Justine Wyman notified, and verbal orders for second dose of Corvert given.

## 2017-02-06 NOTE — PROGRESS NOTES
Mr. Salazar Cruz failed cardioversion x 3. Initially planned to start on Tikosyn, but cost of medication even with coupon card $150. Discussed with Dr. You Alfaro, will start on amiodarone IV bolus and infusion x 48 hours then plan on cardioversion on Wednesday morning.

## 2017-02-07 ENCOUNTER — TELEPHONE (OUTPATIENT)
Dept: CARDIOLOGY CLINIC | Age: 62
End: 2017-02-07

## 2017-02-07 VITALS
HEART RATE: 76 BPM | SYSTOLIC BLOOD PRESSURE: 112 MMHG | HEIGHT: 72 IN | RESPIRATION RATE: 18 BRPM | TEMPERATURE: 97.4 F | WEIGHT: 277.56 LBS | BODY MASS INDEX: 37.59 KG/M2 | OXYGEN SATURATION: 95 % | DIASTOLIC BLOOD PRESSURE: 82 MMHG

## 2017-02-07 PROBLEM — I10 HYPERTENSION: Status: ACTIVE | Noted: 2017-02-07

## 2017-02-07 PROBLEM — Z01.89 ENCOUNTER FOR CARDIOVERSION PROCEDURE: Status: ACTIVE | Noted: 2017-02-07

## 2017-02-07 LAB
ANION GAP BLD CALC-SCNC: 8 MMOL/L (ref 5–15)
ATRIAL RATE: 127 BPM
BUN SERPL-MCNC: 19 MG/DL (ref 6–20)
BUN/CREAT SERPL: 18 (ref 12–20)
CALCIUM SERPL-MCNC: 8.1 MG/DL (ref 8.5–10.1)
CALCULATED R AXIS, ECG10: 58 DEGREES
CALCULATED T AXIS, ECG11: 47 DEGREES
CHLORIDE SERPL-SCNC: 106 MMOL/L (ref 97–108)
CO2 SERPL-SCNC: 25 MMOL/L (ref 21–32)
CREAT SERPL-MCNC: 1.08 MG/DL (ref 0.7–1.3)
DIAGNOSIS, 93000: NORMAL
GLUCOSE SERPL-MCNC: 121 MG/DL (ref 65–100)
POTASSIUM SERPL-SCNC: 4.2 MMOL/L (ref 3.5–5.1)
Q-T INTERVAL, ECG07: 334 MS
QRS DURATION, ECG06: 98 MS
QTC CALCULATION (BEZET), ECG08: 426 MS
SODIUM SERPL-SCNC: 139 MMOL/L (ref 136–145)
VENTRICULAR RATE, ECG03: 98 BPM

## 2017-02-07 PROCEDURE — 93005 ELECTROCARDIOGRAM TRACING: CPT

## 2017-02-07 PROCEDURE — 74011000258 HC RX REV CODE- 258: Performed by: INTERNAL MEDICINE

## 2017-02-07 PROCEDURE — 36415 COLL VENOUS BLD VENIPUNCTURE: CPT | Performed by: INTERNAL MEDICINE

## 2017-02-07 PROCEDURE — 80048 BASIC METABOLIC PNL TOTAL CA: CPT | Performed by: INTERNAL MEDICINE

## 2017-02-07 PROCEDURE — 74011250637 HC RX REV CODE- 250/637: Performed by: INTERNAL MEDICINE

## 2017-02-07 PROCEDURE — 74011250637 HC RX REV CODE- 250/637: Performed by: NURSE PRACTITIONER

## 2017-02-07 PROCEDURE — 74011250636 HC RX REV CODE- 250/636: Performed by: INTERNAL MEDICINE

## 2017-02-07 RX ORDER — AMIODARONE HYDROCHLORIDE 200 MG/1
200 TABLET ORAL 2 TIMES DAILY
Qty: 60 TAB | Refills: 11 | Status: SHIPPED | OUTPATIENT
Start: 2017-02-07 | End: 2017-05-03 | Stop reason: SDUPTHER

## 2017-02-07 RX ORDER — AMIODARONE HYDROCHLORIDE 200 MG/1
200 TABLET ORAL 2 TIMES DAILY
Status: DISCONTINUED | OUTPATIENT
Start: 2017-02-07 | End: 2017-02-07 | Stop reason: HOSPADM

## 2017-02-07 RX ADMIN — ASPIRIN 81 MG: 81 TABLET, COATED ORAL at 10:43

## 2017-02-07 RX ADMIN — APIXABAN 5 MG: 5 TABLET, FILM COATED ORAL at 10:43

## 2017-02-07 RX ADMIN — AMIODARONE HYDROCHLORIDE 0.5 MG/MIN: 50 INJECTION, SOLUTION INTRAVENOUS at 01:10

## 2017-02-07 RX ADMIN — AMIODARONE HYDROCHLORIDE 200 MG: 200 TABLET ORAL at 13:02

## 2017-02-07 RX ADMIN — Medication 10 ML: at 04:16

## 2017-02-07 NOTE — PROGRESS NOTES
Bedside shift change report given to Juan Allan (oncoming nurse) by Awais Ross RN (offgoing nurse). Report included the following information SBAR.     0930: Pt converted into NSR. Glenna Randhawa NP notified. 1100: Per Glenna Randhawa NP patient needs to walk the halls to make sure he maintains in NSR. Pt and Nurse walked halls together. Pt maintained NSR and a heart rate of 80. Pt did not experience and SOB, dizziness, or weakness. Glenna Randhawa NP notified. 1435: IV and tele removed. Discharge instructions reviewed with pt. Time allotted for questions and answers. Pt stable for discharge at this time.

## 2017-02-07 NOTE — LETTER
NOTIFICATION RETURN TO WORK / SCHOOL 
 
2/8/2017 9:19 AM 
 
Mr. Le Ramsay [address] [address] To Whom It May Concern: 
 
Le Ramsay is currently under the care of [address] [facility]. He will return to work on: Thursday, February 9, 2017. If there are questions or concerns please have the patient contact our office. Sincerely, Halle Nazario NP

## 2017-02-07 NOTE — TELEPHONE ENCOUNTER
Patient was released from hospital today and did not get a return to work note please write one for him to return to work on Thursday.  Patient would like to  tomorrow if he could call when ready  Herb Campos

## 2017-02-07 NOTE — DISCHARGE INSTRUCTIONS
[address] [place] [address]  [phone]       Cardioversion Discharge Instructions    Patient ID:  Le Ramsay  [id number]  89 y.o.  1955    Date: 2/5/2017    Attending Physician: Natalya Hernadez MD     Admission Diagnoses:   afib  A-fib [facility])    Discharge Diagnoses:   Principal Problem:    Dyslipidemia (7/27/2010)    Active Problems:    Atrial fibrillation ([address] (4/17/2012)      A-fib ([address] (2/6/2017)      Encounter for cardioversion procedure (2/7/2017)        Discharge Condition: Good    Cardiology Procedures this Admission:  Cardioversion    Disposition: home    Reference discharge instructions provided by nursing for diet and activity. Signed:  Carlos Luna NP  2/7/2017  12:47 PM      Cardioversion: After Your Visit     Your Care Instructions    Cardioversion is a treatment for an abnormal heartbeat, such as atrial fibrillation. In cardioversion, a brief electric shock is given to the heart to reset its rhythm. The shock comes through metal paddles or patches placed on the outside of your chest. Cardioversion usually restores the heartbeat to normal.    After the procedure, you may have redness, like a sunburn, where the paddles were. Do not drive until the day after a cardioversion. You can eat and drink when you feel ready to. Your doctor may have you take medicines daily to help the heart beat normally and to prevent blood clots. Your doctor may prescribe medicine before and after cardioversion to help keep your heart in a normal rhythm after the procedure. Instead of electric cardioversion, your doctor may try to convert your heart to a normal rhythm using medicine given through a vein. This is usually done in a clinic or hospital.    Follow-up care is a key part of your treatment and safety. Be sure to make and go to all appointments, and call your doctor if you are having problems.  Its also a good idea to know your test results and keep a list of the medicines you take. How can you care for yourself at home? Medicines  Take your medicines exactly as prescribed. Call your doctor if you think you are having a problem with your medicine. You may take some of the following medicines:  Beta-blockers such as propranolol (Inderal), metoprolol (Lopressor), or carvedilol (Coreg). Calcium channel blockers such as diltiazem (Cardizem) or verapamil (Calan). Digoxin (Lanoxin). Antiarrhythmic medicines such as amiodarone (Cordarone), procainamide, or flecainide (Tambocor). You will get more details on the specific medicines your doctor prescribes. If your doctor has given you blood thinners such as warfarin (Coumadin), enoxaparin (Lovenox), or aspirin to prevent blood clots, be sure to: Take your medicine at the same time each day. Talk with your doctor before you make any major changes to your diet or start a weight loss plan. The foods that you eat can affect how well blood thinners work for you. Tell your dentist, pharmacist, and other health professionals that you take blood thinners. Watch for unusual bruising or bleeding, such as blood in urine, maroon or black stools, or bleeding from the nose or gums. Get regular blood tests to check how fast your blood clots, if you are taking Coumadin. Wear medical alert jewelry that says you take blood thinners. You can buy this at most drugstores. Do not take any vitamins, over-the-counter medicines, or herbal products without talking to your doctor first.    Lifestyle changes  Do not smoke. Smoking increases your risk of stroke and heart attack. If you need help quitting, talk to your doctor about stop-smoking programs and medicines. These can increase your chances of quitting for good. Limit alcohol to 2 drinks a day for men and 1 drink a day for women. Alcohol may interfere with blood thinners. It also increases your risk of falls, which can cause bruising and bleeding.   Limit or avoid caffeine (coffee, tea, and cola drinks) and other stimulants (decongestants, over-the-counter cold and flu medicines, and illegal substances) if your doctor says to. They can trigger heart problems. If you are taking blood thinners, avoid sports or activities that could lead to injury. Make your home safe and take other measures to reduce your risk of falling. Always wear a seat belt while in a car. Activity  If your doctor recommends it, get more exercise. Walking is a good choice. Bit by bit, increase the amount you walk every day. Try for 30 minutes on most days of the week. You also may want to swim, bike, or do other activities. When you exercise, watch for signs that your heart is working too hard. You are pushing too hard if you cannot talk while you are exercising. If you become short of breath or dizzy or have chest pain, sit down and rest immediately. If your doctor has not set you up with a cardiac rehabilitation program, talk to him or her about whether that is right for you. Cardiac rehab includes supervised exercise, help with diet and lifestyle changes, and emotional support. It may reduce your risk of future heart problems. Check your pulse regularly. Place two fingers on the artery at the palm side of your wrist in line with your thumb. If your heartbeat seems uneven or fast, talk to your doctor. When should you call for help? Call 911 anytime you think you may need emergency care. For example, call if:  You have severe trouble breathing. You cough up pink, foamy mucus and you have trouble breathing. You have symptoms of a heart attack such as:  Chest pain or pressure. Sweating. Shortness of breath. Nausea or vomiting. Pain that spreads from the chest to the neck, jaw, or one or both shoulders or arms. Dizziness or lightheadedness. A fast or uneven pulse. After calling 911, chew 1 adult-strength aspirin. Wait for an ambulance. Do not try to drive yourself. You have signs of a stroke. These may include:  Sudden numbness, paralysis, or weakness in your face, arm, or leg, especially on only one side of your body. New problems with walking or balance. Sudden vision changes. Drooling or slurred speech. New problems speaking or understanding simple statements, or feeling confused. A sudden, severe headache that is different from past headaches. You cough up blood. You vomit blood or what looks like coffee grounds. You pass maroon or very bloody stools. You passed out (lost consciousness). Call your doctor now or seek immediate medical care if:  You have new or increased shortness of breath. You are dizzy or lightheaded, or you feel like you may faint. You have sudden weight gain, such as 3 pounds or more in 2 to 3 days. You have increased swelling in your legs, ankles, or feet. You have new bruises or blood spots under your skin. You have a nosebleed. Your gums bleed when you brush your teeth. You have blood in your urine. Your stools are black and tarlike or have streaks of blood. You have vaginal bleeding when you are not having your period, or heavy period bleeding. Watch closely for changes in your health, and be sure to contact your doctor if you have any problems. Where can you learn more? Go to [employer]. Enter Z336 in the search box to learn more about \"Cardioversion: After Your Visit. \"    © 6582-0036 [employer]wise, Incorporated. Care instructions adapted under license by [facility] (which disclaims liability or warranty for this information). This care instruction is for use with your licensed healthcare professional. If you have questions about a medical condition or this instruction, always ask your healthcare professional. [place] L[place] any warranty or liability for your use of this information.

## 2017-02-07 NOTE — PROGRESS NOTES
[address] SHIFT NURSING NOTE    Bedside shift change report given to Jamie (oncoming nurse) by [facility] (offgoing nurse). Report included the following information SBAR, Kardex, Intake/Output, MAR and Recent Results. SHIFT SUMMARY:         Admission Date 2/5/2017   Admission Diagnosis afib  A-fib ([address]   Consults IP CONSULT TO CARDIOLOGY        Consults   [] PT   [] OT   [] Speech   [] Palliative      [] Hospice    [x] Case Management   [] None   Cardiac Monitoring   [x] Yes   [] No     Antibiotics   [] Yes   [x] No   GI Prophylaxis  (Ex: Protonix, Pepcid, etc,.)   [] Yes   [x] No          DVT Prophylaxis   SCDs:             James stockings:         [x] Medication (Ex: Lovenox, Eliquis,  Heparin, etc..)   [] Contraindicated   [] None       Urinary Catheter             LDAs               Peripheral IV 02/05/17 Left Antecubital (Active)   Site Assessment Clean, dry, & intact 2/6/2017  8:05 PM   Phlebitis Assessment 0 2/6/2017  8:05 PM   Infiltration Assessment 0 2/6/2017  8:05 PM   Dressing Status Clean, dry, & intact 2/6/2017  8:05 PM   Dressing Type Transparent 2/6/2017  8:05 PM   Hub Color/Line Status Pink;Capped 2/6/2017  8:05 PM       Peripheral IV 02/06/17 Right Arm (Active)   Site Assessment Clean, dry, & intact 2/6/2017  8:05 PM   Phlebitis Assessment 0 2/6/2017  8:05 PM   Infiltration Assessment 0 2/6/2017  8:05 PM   Dressing Status Clean, dry, & intact 2/6/2017  8:05 PM   Dressing Type Transparent 2/6/2017  8:05 PM   Hub Color/Line Status Blue; Infusing 2/6/2017  8:05 PM   Action Taken Other (comment) 2/6/2017  7:07 PM   Alcohol Cap Used No 2/6/2017  7:07 PM                      I/Os   Intake/Output Summary (Last 24 hours) at 02/06/17 2007  Last data filed at 02/06/17 2006   Gross per 24 hour   Intake           2652.5 ml   Output              300 ml   Net           2352.5 ml         Activity Level Activity Level: Up ad tor     Activity Assistance: No assistance needed   Diet Active Orders   Diet    DIET CARDIAC Regular      Purposeful Rounding every 1-2 hour? [x] Yes    Herman Score  Total Score: 1   Bed Alarm (If score 3 or >)   [] Yes    [] Refused (See signed refusal form in chart)   Candido Score  Candido Score: 22       Candido Score (if score 14 or less)   [] PMT consult   [] Nutrition consult   [] Wound Care consult      []  Specialty bed         Influenza Vaccine Received Flu Vaccine for Current Season (usually Sept-March): No    Patient/Guardian Refused (Notify MD): Yes          Needs prior to discharge:   Home O2 required:    [] Yes   [x] No     If yes, how much O2 required?     Other:    Last Bowel Movement Date: 02/06/17        POST-OP SURGICAL VATS   [] Yes   [x] No     Incentive Spirometer:   [] Yes   [] Refused   Coughing and deep breathing:     [] Yes   [] Refused   Oral care:     [] Yes   [] Refused   Understanding (patient education):     [] Yes   [] Refused   Getting out of bed Number times ambulated in hallway past shift:    Number of times OOB to chair past shift:     Head of bed elevation:     [] Yes   [] Refused      Readmission Risk Assessment Tool Score Low Risk            9       Total Score        3 Relationship with PCP    2 Patient Living Status    4 More than 1 Admission in calendar year        Criteria that do not apply:    Patient Length of Stay > 5    Patient Insurance is Medicare, Medicaid or Self Pay    Charlson Comorbidity Score       Expected Length of Stay - - -   Actual Length of Stay 0

## 2017-02-07 NOTE — PROGRESS NOTES
[facility], [address] [place] [address]  [phone]      Cardiology Progress Note      2/7/2017 10:00AM    Admit Date: 2/5/2017    Admit Diagnosis:   afib  A-fib [facility])    Subjective:     Prudy Prophet has no complaints. Attempted cardioversion x 3 yesterday with no success.   Started amiodarone IV, converted to SR this AM.      Visit Vitals    /82 (BP 1 Location: Left arm, BP Patient Position: Sitting)    Pulse 76    Temp 97.4 °F (36.3 °C)    Resp 18    Ht 6' (1.829 m)    Wt 125.9 kg (277 lb 9 oz)    SpO2 95%    BMI 37.64 kg/m2       Current Facility-Administered Medications   Medication Dose Route Frequency    amiodarone (CORDARONE) tablet 200 mg  200 mg Oral BID    amiodarone (CORDARONE) 300 mg in dextrose 5% 250 mL infusion  0.5 mg/min IntraVENous CONTINUOUS    zolpidem (AMBIEN) tablet 5 mg  5 mg Oral QHS PRN    dilTIAZem (CARDIZEM) 100 mg in 0.9% sodium chloride (MBP/ADV) 100 mL infusion  10 mg/hr IntraVENous CONTINUOUS    simvastatin (ZOCOR) tablet 20 mg  20 mg Oral QHS    aspirin delayed-release tablet 81 mg  81 mg Oral DAILY    sodium chloride (NS) flush 5-10 mL  5-10 mL IntraVENous Q8H    sodium chloride (NS) flush 5-10 mL  5-10 mL IntraVENous PRN    apixaban (ELIQUIS) tablet 5 mg  5 mg Oral BID       Objective:      Physical Exam:  General Appearance:  morbidly obese  male in no acute distress  Chest:   Clear  Cardiovascular:  Regular rate and rhythm, no murmur.   Abdomen:   Soft, non-tender, bowel sounds are active.   Extremities: no peripheral edema  Skin:  Warm and dry.     Data Review:   Recent Labs      02/06/17   0307  02/05/17   1600   WBC  7.6  5.5   HGB  16.3  16.3   HCT  48.3  46.8   PLT  184  177     Recent Labs      02/07/17   0309  02/06/17   0307  02/05/17   1600   NA  139  142  142   K  4.2  3.8  4.0   CL  106  107  106   CO2  25  24  28   GLU  121*  124*  186*   BUN  19  14  15   CREA  1.08  0.90  0.93   CA  8.1*  8.7  8.6   MG   --   2.2 2. 1   ALB   --    --   3.9   TBILI   --    --   0.5   SGOT   --    --   41*   ALT   --    --   58       Recent Labs      02/05/17   1600   TROIQ  <0.04         Intake/Output Summary (Last 24 hours) at 02/07/17 1317  Last data filed at 02/07/17 0747   Gross per 24 hour   Intake          4854.59 ml   Output                0 ml   Net          4854.59 ml        Telemetry: SR    Assessment:     Principal Problem:    Dyslipidemia (7/27/2010)    Active Problems:    Atrial fibrillation ([facility] (4/17/2012)      A-fib ([facility] (2/6/2017)      Encounter for cardioversion procedure (2/7/2017)        Plan:     Atrial fib:    · S/p failed cardioversion, initiated IV amiodarone. Overnight cardioverted to SR. · Start amiodarone 200mg BID. · CHADS 2 vasc score 1, but as this patient has failed afib ablations and cardioversion, at higher risk for CVA. Starting Eliquis 5mg BID. · Continue Diltiazem for rate and BP control  · Patient is open for discussion reference Hybrid procedure in future    Follow up with Dr. Albert Richmond within 1-2 weeks      Pt seen and examined. Agree with assessment and plan as documented above.     Zenaida Chand MD, Amairani Freedman

## 2017-02-14 ENCOUNTER — OFFICE VISIT (OUTPATIENT)
Dept: CARDIOLOGY CLINIC | Age: 62
End: 2017-02-14

## 2017-02-14 VITALS
RESPIRATION RATE: 16 BRPM | SYSTOLIC BLOOD PRESSURE: 130 MMHG | OXYGEN SATURATION: 95 % | HEART RATE: 60 BPM | WEIGHT: 276.6 LBS | DIASTOLIC BLOOD PRESSURE: 72 MMHG | HEIGHT: 72 IN | BODY MASS INDEX: 37.46 KG/M2

## 2017-02-14 DIAGNOSIS — I10 ESSENTIAL HYPERTENSION: ICD-10-CM

## 2017-02-14 DIAGNOSIS — I48.20 CHRONIC ATRIAL FIBRILLATION (HCC): Primary | Chronic | ICD-10-CM

## 2017-02-14 DIAGNOSIS — E78.5 DYSLIPIDEMIA: Chronic | ICD-10-CM

## 2017-02-14 RX ORDER — VALACYCLOVIR HYDROCHLORIDE 500 MG/1
TABLET, FILM COATED ORAL
COMMUNITY
Start: 2016-12-19 | End: 2017-06-19

## 2017-02-14 RX ORDER — PROPAFENONE HYDROCHLORIDE 150 MG/1
TABLET, FILM COATED ORAL
COMMUNITY
Start: 2016-12-19 | End: 2017-05-03 | Stop reason: ALTCHOICE

## 2017-02-14 NOTE — PROGRESS NOTES
Subjective:      Diego Garcia is a 59 y.o. male is here for f/u after failed [facility] with initiation of Amiodarone and subsequent cardioversion to SR. The patient denies chest pain/ shortness of breath, orthopnea, PND, LE edema, palpitations, syncope, presyncope or fatigue. Patient Active Problem List    Diagnosis Date Noted    Encounter for cardioversion procedure 02/07/2017    Hypertension 02/07/2017    A-fib ([address] 02/06/2017    Atrial fibrillation with RVR ([address] 07/24/2016    Atrial fibrillation ([address] 04/17/2012    Mixed hyperlipidemia 04/17/2012    Other dyspnea and respiratory abnormality 04/17/2012    Long term (current) use of anticoagulants 03/12/2012    S/P ablation of atrial fibrillation 02/02/2012    Pleural effusion, bilateral 10/05/2010    Sinus bradycardia 10/05/2010    SOB (shortness of breath) 10/02/2010    Paroxysmal atrial fibrillation ([address] 07/27/2010    Dyslipidemia 07/27/2010      Quan Villalta MD  Past Medical History   Diagnosis Date    A-fib [facility])     Arrhythmia      AFIB, ABLATION X2    Arthritis      back    Chronic pain      back    Encounter for cardioversion procedure 2/7/2017    Gastrointestinal disorder     GERD (gastroesophageal reflux disease)     High cholesterol     Hypertension     Hypertension 2/7/2017    PUD (peptic ulcer disease)      \"years ago\"    SOB (shortness of breath)      R/T AFIB, NO LONGER HAVING ISSUES      Past Surgical History   Procedure Laterality Date    Cardioversion external      Cardiac catheterization      Pr cardiac surg procedure unlist  2010, 2011     ,ABLATION     Hx heent  6/6/13     THYROID BIOPSY    Hx cervical fusion  2013    Hx orthopaedic       shoulder surgery on left    Hx orthopaedic       wrist surgery, LEFT    Hx orthopaedic       elbow surgery, RIGHT     Allergies   Allergen Reactions    Percocet [Oxycodone-Acetaminophen] Anxiety     Patient takes Tylenol without problems.       Family History   Problem Relation Age of Onset    Cancer Mother      BREAST    Heart Disease Father     Lung Disease Father     negative for cardiac disease  Social History     Social History    Marital status:      Spouse name: N/A    Number of children: N/A    Years of education: N/A     Social History Main Topics    Smoking status: Former Smoker     Packs/day: 0.25     Years: 40.00     Quit date: 10/1/2012    Smokeless tobacco: Never Used    Alcohol use Yes      Comment: occassionally 3-4 cans of beer    Drug use: No    Sexual activity: Yes     Other Topics Concern    None     Social History Narrative     Current Outpatient Prescriptions   Medication Sig    amiodarone (CORDARONE) 200 mg tablet Take 1 Tab by mouth two (2) times a day.  apixaban (ELIQUIS) 5 mg tablet Take 1 Tab by mouth two (2) times a day.  diclofenac EC (VOLTAREN) 75 mg EC tablet Take 75 mg by mouth as needed.  furosemide (LASIX) 20 mg tablet Take 1 Tab by mouth daily as needed.  cyanocobalamin (VITAMIN B-12) 1,000 mcg tablet Take 1,000 mcg by mouth daily.  simvastatin (ZOCOR) 20 mg tablet TAKE 1 TABLET AT BEDTIME    propafenone (RYTHMOL) 150 mg tablet     valACYclovir (VALTREX) 500 mg tablet     dilTIAZem (TIAZAC) 120 mg SR capsule TAKE ONE CAPSULE BY MOUTH EVERY DAY (GENERIC FOR CARTIA)     No current facility-administered medications for this visit. Vitals:    02/14/17 0846   BP: 130/72   Pulse: 60   Resp: 16   SpO2: 95%   Weight: 276 lb 9.6 oz (125.5 kg)   Height: 6' (1.829 m)       I have reviewed the nurses notes, vitals, problem list, allergy list, medical history, family, social history and medications. Review of Symptoms:    General: Pt denies excessive weight gain or loss. Pt is able to conduct ADL's  HEENT: Denies blurred vision, headaches, epistaxis and difficulty swallowing. Respiratory: Denies shortness of breath, HENRY, wheezing or stridor.   Cardiovascular: Denies precordial pain, palpitations, edema or PND  Gastrointestinal: Denies poor appetite, indigestion, abdominal pain or blood in stool  Urinary: Denies dysuria, pyuria  Musculoskeletal: Denies pain or swelling from muscles or joints  Neurologic: Denies tremor, paresthesias, or sensory motor disturbance  Skin: Denies rash, itching or texture change. Psych: Denies depression      Physical Exam:      General: Well developed, in no acute distress. HEENT: Eyes - PERRL, no jvd  Heart:  Normal S1/S2 negative S3 or S4. Regular, no murmur, gallop or rub.   Respiratory: Clear bilaterally x 4, no wheezing or rales  Abdomen:   Soft, non-tender, bowel sounds are active.   Extremities:  No edema, normal cap refill, no cyanosis. Musculoskeletal: No clubbing  Neuro: A&Ox3, speech clear, gait stable. Skin: Skin color is normal. No rashes or lesions.  Non diaphoretic  Vascular: 2+ pulses symmetric in all extremities    Cardiographics    Ekg: SB, rate 56    Results for orders placed or performed during the hospital encounter of 02/05/17   EKG, 12 LEAD, INITIAL   Result Value Ref Range    Ventricular Rate 98 BPM    Atrial Rate 127 BPM    QRS Duration 98 ms    Q-T Interval 334 ms    QTC Calculation (Bezet) 426 ms    Calculated R Axis 58 degrees    Calculated T Axis 47 degrees    Diagnosis       Atrial fibrillation  Confirmed by Ange Srinivasan ([id number]) on 2/7/2017 10:36:53 AM     Results for orders placed or performed in visit on 08/01/12   HOLTER MONITOR, 24 HOURS    Narrative    ECG Monitor/24 hours, Complete    Reason for Holter Monitor   A-fib    Heartbeat    Slowest 45  Average 58  Fastest  108      Results:   Underlying Rhythm: Sinus bradycardia      Atrial Arrhythmias: premature atrial contractions; occasional             AV Conduction: normal    Ventricular Arrhythmias: premature ventricular contractions;  occasional     ST Segment Analysis:normal     Symptom Correlation:  none    Comment:   Sinus rhythm throughout     Wolf Salazar MD, [facility]                  Lab Results   Component Value Date/Time    WBC 7.6 02/06/2017 03:07 AM    HGB 16.3 02/06/2017 03:07 AM    HCT 48.3 02/06/2017 03:07 AM    PLATELET 269 05/86/4553 03:07 AM    MCV 91.1 02/06/2017 03:07 AM      Lab Results   Component Value Date/Time    Sodium 139 02/07/2017 03:09 AM    Potassium 4.2 02/07/2017 03:09 AM    Chloride 106 02/07/2017 03:09 AM    CO2 25 02/07/2017 03:09 AM    Anion gap 8 02/07/2017 03:09 AM    Glucose 121 02/07/2017 03:09 AM    BUN 19 02/07/2017 03:09 AM    Creatinine 1.08 02/07/2017 03:09 AM    BUN/Creatinine ratio 18 02/07/2017 03:09 AM    GFR est AA >60 02/07/2017 03:09 AM    GFR est non-AA >60 02/07/2017 03:09 AM    Calcium 8.1 02/07/2017 03:09 AM    Bilirubin, total 0.5 02/05/2017 04:00 PM    AST (SGOT) 41 02/05/2017 04:00 PM    Alk. phosphatase 106 02/05/2017 04:00 PM    Protein, total 7.7 02/05/2017 04:00 PM    Albumin 3.9 02/05/2017 04:00 PM    Globulin 3.8 02/05/2017 04:00 PM    A-G Ratio 1.0 02/05/2017 04:00 PM    ALT (SGPT) 58 02/05/2017 04:00 PM         Assessment:     Assessment:        ICD-10-CM ICD-9-CM    1. Chronic atrial fibrillation (HCC) I48.2 427.31 AMB POC EKG ROUTINE W/ 12 LEADS, INTER & REP      propafenone (RYTHMOL) 150 mg tablet      valACYclovir (VALTREX) 500 mg tablet      CTA CHEST W WO CONT   2. Dyslipidemia E78.5 272.4 AMB POC EKG ROUTINE W/ 12 LEADS, INTER & REP      propafenone (RYTHMOL) 150 mg tablet      valACYclovir (VALTREX) 500 mg tablet      CTA CHEST W WO CONT   3. Essential hypertension I10 401.9 AMB POC EKG ROUTINE W/ 12 LEADS, INTER & REP      propafenone (RYTHMOL) 150 mg tablet      valACYclovir (VALTREX) 500 mg tablet      CTA CHEST W WO CONT     Orders Placed This Encounter    CTA CHEST W WO CONT     Standing Status:   Future     Standing Expiration Date:   3/14/2018     Order Specific Question:   Is Patient Allergic to Contrast Dye?      Answer:   No    AMB POC EKG ROUTINE W/ 12 LEADS, INTER & REP     Order Specific Question:   Reason for Exam:     Answer:   Routine    propafenone (RYTHMOL) 150 mg tablet    valACYclovir (VALTREX) 500 mg tablet        Plan:   Mr Emelyn Mccain is here today for f/u after failed [facility] with initiation of Amiodarone and subsequent cardioversion to SR. He remains SB today and is without cardiac complaints. BP is normotensive. He stopped taking Diltiazem after pharmacist told him about interaction with Amiodarone. He wishes to pursue a hybrid afib ablation for his symptomatic persistent atrial fibrillation. I will refer for consultation regarding hybrid afib ablation. Continue medical management for afib, dyslipidemia and htn. Thank you for allowing me to participate in Dudley Steven 's care.     Zenaida Chand MD, [facility]

## 2017-02-17 NOTE — DISCHARGE SUMMARY
[id number] [phone]     Cardiology Discharge Summary     Patient ID:  Jorja Crigler  [id number]  89 y.o.  1955    Admit Date: 2/5/2017    Discharge Date: 2/17/2017     Admitting Physician: Alina Phelan MD     Discharge Physician: Alina Phelan MD    Admission Diagnoses:   afib;A-fib [facility])    Discharge Diagnoses:   Principal Problem:    Dyslipidemia (7/27/2010)    Active Problems:    Atrial fibrillation ([address] (4/17/2012)      A-fib ([address] (2/6/2017)      Encounter for cardioversion procedure (2/7/2017)      Hypertension (2/7/2017)        Discharge Condition: Good    Cardiology Procedures this Admission:  SVETA/Cardioversion    Patient Active Problem List    Diagnosis Date Noted    Encounter for cardioversion procedure 02/07/2017    Hypertension 02/07/2017    A-fib ([address] 02/06/2017    Atrial fibrillation with RVR ([address] 07/24/2016    Atrial fibrillation ([address] 04/17/2012    Mixed hyperlipidemia 04/17/2012    Other dyspnea and respiratory abnormality 04/17/2012    Long term (current) use of anticoagulants 03/12/2012    S/P ablation of atrial fibrillation 02/02/2012    Pleural effusion, bilateral 10/05/2010    Sinus bradycardia 10/05/2010    SOB (shortness of breath) 10/02/2010    Paroxysmal atrial fibrillation ([address] 07/27/2010    Dyslipidemia 07/27/2010      Past Medical History   Diagnosis Date    A-fib ([address]     Arrhythmia      AFIB, ABLATION X2    Arthritis      back    Chronic pain      back    Encounter for cardioversion procedure 2/7/2017    Gastrointestinal disorder     GERD (gastroesophageal reflux disease)     High cholesterol     Hypertension     Hypertension 2/7/2017    PUD (peptic ulcer disease)      \"years ago\"    SOB (shortness of breath)      R/T AFIB, NO LONGER HAVING ISSUES      Social History     Social History    Marital status:      Spouse name: N/A    Number of children: N/A    Years of education: N/A Social History Main Topics    Smoking status: Former Smoker     Packs/day: 0.25     Years: 40.00     Quit date: 10/1/2012    Smokeless tobacco: Never Used    Alcohol use Yes      Comment: occassionally 3-4 cans of beer    Drug use: No    Sexual activity: Yes     Other Topics Concern    None     Social History Narrative     Allergies   Allergen Reactions    Percocet [Oxycodone-Acetaminophen] Anxiety     Patient takes Tylenol without problems. Family History   Problem Relation Age of Onset    Cancer Mother      BREAST    Heart Disease Father     Lung Disease Father         Hospital Course: Mr Shannan Davey presented with sob and was found to be in afib with rvr. He had a cardioversion but that was not successful. He was loaded with iv amiodarone and converted to sinus rhythm overnight. He was discharged to home on oral amiodarone and oac. Consults: None    Visit Vitals    /82 (BP 1 Location: Left arm, BP Patient Position: Sitting)    Pulse 76    Temp 97.4 °F (36.3 °C)    Resp 18    Ht 6' (1.829 m)    Wt 277 lb 9 oz (125.9 kg)    SpO2 95%    BMI 37.64 kg/m2       Physical Exam  Abdomen: soft, non-tender. Bowel sounds normal.   Extremities: extremities normal, no edema, pulses   Heart: regular rate and rhythm, no murmur, S3/JVD  Lungs: clear to auscultation bilaterally  Neck: supple, symmetrical, trachea midline,   Neurologic: Grossly normal    Labs: No results for input(s): WBC, HGB, HCT, PLT, HGBEXT, HCTEXT, PLTEXT in the last 72 hours. No results for input(s): NA, K, CL, CO2, GLU, BUN, CREA, CA, MG, PHOS, ALB, TBIL, TBILI, SGOT, ALT, INR in the last 72 hours. No lab exists for component: INREXT    No results for input(s): TROIQ, CPK, CKMB in the last 72 hours.     EKG: afib with rvr      Disposition: home    Patient Instructions:   Discharge Medication List as of 2/7/2017  2:06 PM      START taking these medications    Details   amiodarone (CORDARONE) 200 mg tablet Take 1 Tab by mouth two (2) times a day., Normal, Disp-60 Tab, R-11      apixaban (ELIQUIS) 5 mg tablet Take 1 Tab by mouth two (2) times a day., Normal, Disp-60 Tab, R-11         CONTINUE these medications which have NOT CHANGED    Details   diclofenac EC (VOLTAREN) 75 mg EC tablet Take 75 mg by mouth as needed., Historical Med      furosemide (LASIX) 20 mg tablet Take 1 Tab by mouth daily as needed., Normal, Disp-90 Tab, R-3      cyanocobalamin (VITAMIN B-12) 1,000 mcg tablet Take 1,000 mcg by mouth daily. , Historical Med      simvastatin (ZOCOR) 20 mg tablet TAKE 1 TABLET AT BEDTIME, Normal, Disp-90 Tab, R-3      dilTIAZem (TIAZAC) 120 mg SR capsule TAKE ONE CAPSULE BY MOUTH EVERY DAY (GENERIC FOR CARTIA), Normal, Disp-30 Cap, R-3         STOP taking these medications       propafenone (RYTHMOL) 150 mg tablet Comments:   Reason for Stopping:         aspirin delayed-release 81 mg tablet Comments:   Reason for Stopping:               Referenced discharge instructions provided by nursing for diet and activity.     Follow up with: Dr You Alfaro in one week    Signed:  Erika Sterling MD  2/17/2017  9:32 AM

## 2017-05-03 ENCOUNTER — OFFICE VISIT (OUTPATIENT)
Dept: CARDIOLOGY CLINIC | Age: 62
End: 2017-05-03

## 2017-05-03 VITALS
HEART RATE: 54 BPM | RESPIRATION RATE: 16 BRPM | BODY MASS INDEX: 35.91 KG/M2 | SYSTOLIC BLOOD PRESSURE: 130 MMHG | WEIGHT: 265.1 LBS | DIASTOLIC BLOOD PRESSURE: 70 MMHG | OXYGEN SATURATION: 94 % | HEIGHT: 72 IN

## 2017-05-03 DIAGNOSIS — Z86.79 S/P ABLATION OF ATRIAL FIBRILLATION: ICD-10-CM

## 2017-05-03 DIAGNOSIS — R53.83 FATIGUE, UNSPECIFIED TYPE: ICD-10-CM

## 2017-05-03 DIAGNOSIS — I48.0 PAROXYSMAL ATRIAL FIBRILLATION (HCC): Primary | ICD-10-CM

## 2017-05-03 DIAGNOSIS — Z98.890 S/P ABLATION OF ATRIAL FIBRILLATION: ICD-10-CM

## 2017-05-03 DIAGNOSIS — R00.1 SINUS BRADYCARDIA: ICD-10-CM

## 2017-05-03 DIAGNOSIS — R26.9 GAIT DISTURBANCE: ICD-10-CM

## 2017-05-03 DIAGNOSIS — Z79.01 LONG TERM (CURRENT) USE OF ANTICOAGULANTS: ICD-10-CM

## 2017-05-03 DIAGNOSIS — E78.2 MIXED HYPERLIPIDEMIA: ICD-10-CM

## 2017-05-03 RX ORDER — AMIODARONE HYDROCHLORIDE 200 MG/1
200 TABLET ORAL DAILY
Qty: 30 TAB | Refills: 12
Start: 2017-05-03 | End: 2017-08-29

## 2017-05-03 NOTE — MR AVS SNAPSHOT
Visit Information Date & Time Provider Department Dept. Phone Encounter #  
 5/3/2017  9:00 AM Imani Robles MD [facility] [phone] [id number] Upcoming Health Maintenance Date Due Hepatitis C Screening 1955 DTaP/Tdap/Td series (1 - Tdap) 6/29/1974 FOBT Q 1 YEAR AGE 50-75 6/29/2003 ZOSTER VACCINE AGE 60> 6/29/2013 INFLUENZA AGE 9 TO ADULT 8/1/2017 Allergies as of 5/3/2017  Review Complete On: 5/3/2017 By: Humera Trinidad LPN Severity Noted Reaction Type Reactions Percocet [Oxycodone-acetaminophen] Medium 10/02/2010   Side Effect Anxiety Patient takes Tylenol without problems. Current Immunizations  Reviewed on 12/31/2015 Name Date Influenza Vaccine Split 2/8/2017 Not reviewed this visit You Were Diagnosed With   
  
 Codes Comments Paroxysmal atrial fibrillation (HCC)    -  Primary ICD-10-CM: I48.0 ICD-9-CM: 427.31 Mixed hyperlipidemia     ICD-10-CM: E78.2 ICD-9-CM: 272.2 Sinus bradycardia     ICD-10-CM: R00.1 ICD-9-CM: 427.89 S/P ablation of atrial fibrillation     ICD-10-CM: Z98.890, Z86.79 
ICD-9-CM: V45.89 Long term (current) use of anticoagulants     ICD-10-CM: Z79.01 
ICD-9-CM: V58.61 Gait disturbance     ICD-10-CM: R26.9 ICD-9-CM: 724. 2 Fatigue, unspecified type     ICD-10-CM: R53.83 ICD-9-CM: 780.79 Vitals BP Pulse Resp Height(growth percentile) Weight(growth percentile) SpO2  
 130/70 (BP 1 Location: Right arm, BP Patient Position: Sitting) (!) 54 16 6' (1.829 m) 265 lb 1.6 oz (120.2 kg) 94% BMI Smoking Status 35.95 kg/m2 Former Smoker Vitals History BMI and BSA Data Body Mass Index Body Surface Area 35.95 kg/m 2 2.47 m 2 Preferred Pharmacy Pharmacy Name Phone [facility] [address] [place] [address] [phone] Your Updated Medication List  
  
   
 This list is accurate as of: 5/3/17 10:02 AM.  Always use your most recent med list.  
  
  
  
  
 amiodarone 200 mg tablet Commonly known as:  CORDARONE Take 1 Tab by mouth daily. Lowered 05/03/17  
  
 apixaban 5 mg tablet Commonly known as:  Genny Leander Take 1 Tab by mouth two (2) times a day. diclofenac EC 75 mg EC tablet Commonly known as:  VOLTAREN Take 75 mg by mouth as needed. furosemide 20 mg tablet Commonly known as:  LASIX Take 1 Tab by mouth daily as needed. simvastatin 20 mg tablet Commonly known as:  ZOCOR  
TAKE 1 TABLET AT BEDTIME  
  
 valACYclovir 500 mg tablet Commonly known as:  VALTREX  
  
 VITAMIN B-12 1,000 mcg tablet Generic drug:  cyanocobalamin Take 1,000 mcg by mouth daily. We Performed the Following AMB POC EKG ROUTINE W/ 12 LEADS, INTER & REP [23096 CPT(R)] CBC W/O DIFF [66303 CPT(R)] THYROID PANEL W/TSH [16459 CPT(R)] Introducing [facility]! Dear Grace Tomas: Thank you for requesting a eSpace account. Our records indicate that you already have an active eSpace account. You can access your account anytime at [email or web address] Did you know that you can access your hospital and ER discharge instructions at any time in eSpace? You can also review all of your test results from your hospital stay or ER visit. Additional Information If you have questions, please visit the Frequently Asked Questions section of the eSpace website at [email or web address] Remember, eSpace is NOT to be used for urgent needs. For medical emergencies, dial 911. Now available from your iPhone and Android! Please provide this summary of care documentation to your next provider. Your primary care clinician is listed as Fabby Taylor. If you have any questions after today's visit, please call [phone].

## 2017-05-03 NOTE — PROGRESS NOTES
NAME:  Beecher Favre   :   1955   MRN:   [id number]   PCP:  Noemí Bearden MD           Subjective: The patient is a 59y.o. year old male  who returns for a routine follow-up. Since the last visit, patient reports no palpitations or atrial fibrillation. He complains of fatigue despite sleeping well at night. He reports feeling better on Rythmol and dilt. 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  Was asked to lose 40 lbs prior to his hybrid ablation at [facility]. He is actively trying to lose weight and is down 10 lbs. Past Medical History:   Diagnosis Date    A-fib [facility])     Arrhythmia     AFIB, ABLATION X2    Arthritis     back    Chronic pain     back    Encounter for cardioversion procedure 2017    Gastrointestinal disorder     GERD (gastroesophageal reflux disease)     High cholesterol     Hypertension     Hypertension 2017    PUD (peptic ulcer disease)     \"years ago\"    SOB (shortness of breath)     R/T AFIB, NO LONGER HAVING ISSUES       Social History   Substance Use Topics    Smoking status: Former Smoker     Packs/day: 0.25     Years: 40.00     Quit date: 10/1/2012    Smokeless tobacco: Never Used    Alcohol use Yes      Comment: occassionally 3-4 cans of beer      Family History   Problem Relation Age of Onset    Cancer Mother      BREAST    Heart Disease Father     Lung Disease Father         Review of Systems  Constitutional: Negative for fever, chills, and diaphoresis. Respiratory: Negative for cough, hemoptysis, sputum production, shortness of breath and wheezing. Cardiovascular: Negative for chest pain, palpitations, orthopnea, claudication, leg swelling and PND. Gastrointestinal: Negative for heartburn, nausea, vomiting, blood in stool and melena. Genitourinary: Negative for dysuria and flank pain. Musculoskeletal: Negative for joint pain and back pain. Skin: Negative for rash.    Neurological: Negative for focal weakness, seizures, loss of consciousness, weakness and headaches. Endo/Heme/Allergies: Does not bruise/bleed easily. Psychiatric/Behavioral: Negative for memory loss. The patient does not have insomnia. Objective:       Vitals:    05/03/17 0907 05/03/17 0908   BP: 124/70 130/70   Pulse: (!) 54    Resp: 16    SpO2: 94%    Weight: 265 lb 1.6 oz (120.2 kg)    Height: 6' (1.829 m)     Body mass index is 35.95 kg/(m^2). General PE    Gen: NAD     Mental Status - Alert. General Appearance - Not in acute distress. Neck - no JVD     Chest and Lung Exam     Inspection: Accessory muscles - No use of accessory muscles in breathing. Auscultation:   Breath sounds: - Normal.     Cardiovascular   Inspection: Jugular vein - Bilateral - Inspection Normal.   Palpation/Percussion:   Apical Impulse: - Normal.   Auscultation: Rhythm - Regular. Heart Sounds - S1 WNL and S2 WNL. No S3 or S4. Murmurs & Other Heart Sounds: Auscultation of the heart reveals - No Murmurs. Peripheral Vascular   Upper Extremity: Inspection - Bilateral - No Cyanotic nailbeds or Digital clubbing. Lower Extremity:   Palpation: Edema - Bilateral - No edema. Abdomen: Soft, non-tender, bowel sounds are active. Neuro: A&O times 3, CN and motor grossly WNL      Data Review:     EKG -  Sinus  Bradycardia   Low voltage in limb leads. Ventricular rate 53      Allergies reviewed  Allergies   Allergen Reactions    Percocet [Oxycodone-Acetaminophen] Anxiety     Patient takes Tylenol without problems. Medications reviewed  Current Outpatient Prescriptions   Medication Sig    amiodarone (CORDARONE) 200 mg tablet Take 1 Tab by mouth daily. Lowered 05/03/17    apixaban (ELIQUIS) 5 mg tablet Take 1 Tab by mouth two (2) times a day.  diclofenac EC (VOLTAREN) 75 mg EC tablet Take 75 mg by mouth as needed.  furosemide (LASIX) 20 mg tablet Take 1 Tab by mouth daily as needed.     cyanocobalamin (VITAMIN B-12) 1,000 mcg tablet Take 1,000 mcg by mouth daily.  simvastatin (ZOCOR) 20 mg tablet TAKE 1 TABLET AT BEDTIME    valACYclovir (VALTREX) 500 mg tablet      No current facility-administered medications for this visit. Assessment:       ICD-10-CM ICD-9-CM    1. Paroxysmal atrial fibrillation (HCC) I48.0 427.31 AMB POC EKG ROUTINE W/ 12 LEADS, INTER & REP      THYROID PANEL W/TSH      CBC W/O DIFF   2. Mixed hyperlipidemia E78.2 272.2 THYROID PANEL W/TSH      CBC W/O DIFF   3. Sinus bradycardia R00.1 427.89 THYROID PANEL W/TSH      CBC W/O DIFF   4. S/P ablation of atrial fibrillation Z98.890 V45.89 THYROID PANEL W/TSH    Z86.79  CBC W/O DIFF   5. Long term (current) use of anticoagulants Z79.01 V58.61 THYROID PANEL W/TSH      CBC W/O DIFF   6. Gait disturbance R26.9 781.2 THYROID PANEL W/TSH      CBC W/O DIFF   7. Fatigue, unspecified type R53.83 780.79 THYROID PANEL W/TSH      CBC W/O DIFF        Orders Placed This Encounter    THYROID PANEL W/TSH    CBC W/O DIFF    AMB POC EKG ROUTINE W/ 12 LEADS, INTER & REP     Order Specific Question:   Reason for Exam:     Answer:   routine    amiodarone (CORDARONE) 200 mg tablet     Sig: Take 1 Tab by mouth daily. Lowered 05/03/17     Dispense:  30 Tab     Refill:  12       Patient Active Problem List   Diagnosis Code    Paroxysmal atrial fibrillation (HCC) I48.0    Dyslipidemia E78.5    SOB (shortness of breath) R06.02    Pleural effusion, bilateral J90    Sinus bradycardia R00.1    S/P ablation of atrial fibrillation Z98.890, Z86.79    Long term (current) use of anticoagulants Z79.01    Atrial fibrillation (HCC) I48.91    Mixed hyperlipidemia E78.2    Other dyspnea and respiratory abnormality R06.09, R09.89    Atrial fibrillation with RVR (HCC) I48.91    A-fib (HCC) I48.91    Encounter for cardioversion procedure Z01.89    Hypertension I10       Plan:     Patient presents for follow up. He remains in sinus rhythm.  However due to ataxia and fatigue, will lower his amiodarone to 200mg daily. Continue other medications and continue with weight loss. Will check thyroid and CBC. Discuss with EP - is he a  Candidate for [employer]. Venu Elmore, [address] Cardiology    5/3/2017         Agree with note as outlined by  NP. I confirm findings in history and physical exam. No additional findings noted. Agree with plan as outlined above.      Silvia Robbins MD

## 2017-05-04 ENCOUNTER — TELEPHONE (OUTPATIENT)
Dept: CARDIOLOGY CLINIC | Age: 62
End: 2017-05-04

## 2017-05-04 LAB
ERYTHROCYTE [DISTWIDTH] IN BLOOD BY AUTOMATED COUNT: 14.2 % (ref 12.3–15.4)
FT4I SERPL CALC-MCNC: 2.3 (ref 1.2–4.9)
HCT VFR BLD AUTO: 46.8 % (ref 37.5–51)
HGB BLD-MCNC: 15.8 G/DL (ref 12.6–17.7)
MCH RBC QN AUTO: 31.4 PG (ref 26.6–33)
MCHC RBC AUTO-ENTMCNC: 33.8 G/DL (ref 31.5–35.7)
MCV RBC AUTO: 93 FL (ref 79–97)
PLATELET # BLD AUTO: 175 X10E3/UL (ref 150–379)
RBC # BLD AUTO: 5.03 X10E6/UL (ref 4.14–5.8)
T3RU NFR SERPL: 37 % (ref 24–39)
T4 SERPL-MCNC: 6.2 UG/DL (ref 4.5–12)
TSH SERPL DL<=0.005 MIU/L-ACNC: 1.75 UIU/ML (ref 0.45–4.5)
WBC # BLD AUTO: 5.1 X10E3/UL (ref 3.4–10.8)

## 2017-05-04 NOTE — TELEPHONE ENCOUNTER
----- Message from ROSALINDA Mckay sent at 5/4/2017  9:51 AM EDT -----  His thyroid levels are normal as is his CBC. pls notify. Thanks.

## 2017-06-01 ENCOUNTER — OFFICE VISIT (OUTPATIENT)
Dept: CARDIOLOGY CLINIC | Age: 62
End: 2017-06-01

## 2017-06-01 VITALS
HEIGHT: 73 IN | OXYGEN SATURATION: 96 % | HEART RATE: 62 BPM | SYSTOLIC BLOOD PRESSURE: 110 MMHG | BODY MASS INDEX: 34.06 KG/M2 | RESPIRATION RATE: 16 BRPM | DIASTOLIC BLOOD PRESSURE: 70 MMHG | WEIGHT: 257 LBS

## 2017-06-01 DIAGNOSIS — I48.19 PERSISTENT ATRIAL FIBRILLATION (HCC): ICD-10-CM

## 2017-06-01 DIAGNOSIS — I15.9 SECONDARY HYPERTENSION: Primary | ICD-10-CM

## 2017-06-01 RX ORDER — DICLOFENAC SODIUM 75 MG/1
TABLET, DELAYED RELEASE ORAL
COMMUNITY
End: 2017-06-01 | Stop reason: SDUPTHER

## 2017-06-01 RX ORDER — SIMVASTATIN 20 MG/1
TABLET, FILM COATED ORAL
COMMUNITY
End: 2017-06-01 | Stop reason: SDUPTHER

## 2017-06-01 RX ORDER — VALACYCLOVIR HYDROCHLORIDE 500 MG/1
TABLET, FILM COATED ORAL
COMMUNITY
End: 2017-06-01 | Stop reason: SDUPTHER

## 2017-06-01 RX ORDER — AMIODARONE HYDROCHLORIDE 200 MG/1
TABLET ORAL
COMMUNITY
End: 2017-06-01 | Stop reason: SDUPTHER

## 2017-06-01 RX ORDER — FUROSEMIDE 20 MG/1
TABLET ORAL
COMMUNITY
End: 2017-06-01 | Stop reason: SDUPTHER

## 2017-06-01 NOTE — PROGRESS NOTES
Franchesca Ken is a 59 y.o. male  Chief Complaint   Patient presents with    Follow-up     Denies  chest pain or SOB

## 2017-06-01 NOTE — PROGRESS NOTES
Subjective:      Mary Cristina is a 59 y.o. male is here for follow up of AF. He reported ataxia and fatigue last month and Amiodarone was decreased to 200mg daily. He reports improvement in these symptoms since his dose was decreased. He was seen at [facility] for hybrid work up and has decided not to proceed with this at this time. He has been working on weight loss and reports a 20lb weight loss. The patient denies chest pain/ shortness of breath, orthopnea, PND, LE edema, palpitations, syncope, presyncope.         Patient Active Problem List    Diagnosis Date Noted    Encounter for cardioversion procedure 02/07/2017    Hypertension 02/07/2017    A-fib ([facility] 02/06/2017    Atrial fibrillation with RVR ([facility] 07/24/2016    [facility] 04/17/2012    Mixed hyperlipidemia 04/17/2012    Other dyspnea and respiratory abnormality 04/17/2012    Long term (current) use of anticoagulants 03/12/2012    S/P ablation of atrial fibrillation 02/02/2012    Pleural effusion, bilateral 10/05/2010    Sinus bradycardia 10/05/2010    SOB (shortness of breath) 10/02/2010    Paroxysmal atrial fibrillation ([facility] 07/27/2010    Dyslipidemia 07/27/2010      Fatemeh Glass MD  Past Medical History:   Diagnosis Date    A-fib [facility])     Arrhythmia     AFIB, ABLATION X2    Arthritis     back    Chronic pain     back    Encounter for cardioversion procedure 2/7/2017    Gastrointestinal disorder     GERD (gastroesophageal reflux disease)     High cholesterol     Hypertension     Hypertension 2/7/2017    PUD (peptic ulcer disease)     \"years ago\"    SOB (shortness of breath)     R/T AFIB, NO LONGER HAVING ISSUES      Past Surgical History:   Procedure Laterality Date    CARDIAC CATHETERIZATION      CARDIAC SURG PROCEDURE UNLIST  2010, 2011    ,ABLATION     CARDIOVERSION EXTERNAL      HX CERVICAL FUSION  2013    HX HEENT  6/6/13    THYROID BIOPSY    HX ORTHOPAEDIC      shoulder surgery on left    HX ORTHOPAEDIC      wrist surgery, LEFT    HX ORTHOPAEDIC      elbow surgery, RIGHT     Allergies   Allergen Reactions    Percocet [Oxycodone-Acetaminophen] Anxiety     Patient takes Tylenol without problems. Family History   Problem Relation Age of Onset    Cancer Mother      BREAST    Heart Disease Father     Lung Disease Father     negative for cardiac disease  Social History     Social History    Marital status:      Spouse name: N/A    Number of children: N/A    Years of education: N/A     Social History Main Topics    Smoking status: Former Smoker     Packs/day: 0.25     Years: 40.00     Quit date: 10/1/2012    Smokeless tobacco: Never Used    Alcohol use Yes      Comment: occassionally 3-4 cans of beer    Drug use: No    Sexual activity: Yes     Other Topics Concern    None     Social History Narrative     Current Outpatient Prescriptions   Medication Sig    amiodarone (CORDARONE) 200 mg tablet Take 1 Tab by mouth daily. Lowered 05/03/17    valACYclovir (VALTREX) 500 mg tablet     apixaban (ELIQUIS) 5 mg tablet Take 1 Tab by mouth two (2) times a day.  diclofenac EC (VOLTAREN) 75 mg EC tablet Take 75 mg by mouth as needed.  furosemide (LASIX) 20 mg tablet Take 1 Tab by mouth daily as needed.  cyanocobalamin (VITAMIN B-12) 1,000 mcg tablet Take 1,000 mcg by mouth daily.  simvastatin (ZOCOR) 20 mg tablet TAKE 1 TABLET AT BEDTIME     No current facility-administered medications for this visit. Vitals:    06/01/17 0910 06/01/17 0923   BP: 108/72 110/70   Pulse: 62    Resp: 16    SpO2: 96%    Weight: 257 lb (116.6 kg)    Height: 6' 1\" (1.854 m)        I have reviewed the nurses notes, vitals, problem list, allergy list, medical history, family, social history and medications. Review of Symptoms:    General: +fatigue, Pt denies excessive weight gain or loss.  Pt is able to conduct ADL's  HEENT: Denies blurred vision, headaches, epistaxis and difficulty swallowing. Respiratory: Denies shortness of breath, HENRY, wheezing or stridor. Cardiovascular: Denies precordial pain, palpitations, edema or PND  Gastrointestinal: Denies poor appetite, indigestion, abdominal pain or blood in stool  Urinary: Denies dysuria, pyuria  Musculoskeletal: Denies pain or swelling from muscles or joints  Neurologic: Denies tremor, paresthesias, or sensory motor disturbance  Skin: Denies rash, itching or texture change. Psych: Denies depression      Physical Exam:      General: Well developed, in no acute distress. HEENT: Eyes - PERRL, no jvd  Heart:  Normal S1/S2 negative S3 or S4. Regular, no murmur, gallop or rub.   Respiratory: Clear bilaterally x 4, no wheezing or rales  Abdomen:   Soft, non-tender, bowel sounds are active.   Extremities:  No edema, normal cap refill, no cyanosis. Musculoskeletal: No clubbing  Neuro: A&Ox3, speech clear, gait stable. Skin: Skin color is normal. No rashes or lesions.  Non diaphoretic  Vascular: 2+ pulses symmetric in all extremities    Cardiographics    Ekg: sinus bradycardia       Results for orders placed or performed during the hospital encounter of 02/05/17   EKG, 12 LEAD, INITIAL   Result Value Ref Range    Ventricular Rate 98 BPM    Atrial Rate 127 BPM    QRS Duration 98 ms    Q-T Interval 334 ms    QTC Calculation (Bezet) 426 ms    Calculated R Axis 58 degrees    Calculated T Axis 47 degrees    Diagnosis       Atrial fibrillation  Confirmed by Hank Oneill ([id number]) on 2/7/2017 10:36:53 AM     Results for orders placed or performed in visit on 08/01/12   HOLTER MONITOR, 24 HOURS    Narrative    ECG Monitor/24 hours, Complete    Reason for Holter Monitor   A-fib    Heartbeat    Slowest 45  Average 58  Fastest  108      Results:   Underlying Rhythm: Sinus bradycardia      Atrial Arrhythmias: premature atrial contractions; occasional             AV Conduction: normal    Ventricular Arrhythmias: premature ventricular contractions;  occasional ST Segment Analysis:normal     Symptom Correlation:  none    Comment:   Sinus rhythm throughout     Nasim Garcia MD, [facility]                  Lab Results   Component Value Date/Time    WBC 5.1 05/03/2017 10:19 AM    HGB 15.8 05/03/2017 10:19 AM    HCT 46.8 05/03/2017 10:19 AM    PLATELET 073 58/27/3399 10:19 AM    MCV 93 05/03/2017 10:19 AM      Lab Results   Component Value Date/Time    Sodium 139 02/07/2017 03:09 AM    Potassium 4.2 02/07/2017 03:09 AM    Chloride 106 02/07/2017 03:09 AM    CO2 25 02/07/2017 03:09 AM    Anion gap 8 02/07/2017 03:09 AM    Glucose 121 02/07/2017 03:09 AM    BUN 19 02/07/2017 03:09 AM    Creatinine 1.08 02/07/2017 03:09 AM    BUN/Creatinine ratio 18 02/07/2017 03:09 AM    GFR est AA >60 02/07/2017 03:09 AM    GFR est non-AA >60 02/07/2017 03:09 AM    Calcium 8.1 02/07/2017 03:09 AM    Bilirubin, total 0.5 02/05/2017 04:00 PM    AST (SGOT) 41 02/05/2017 04:00 PM    Alk. phosphatase 106 02/05/2017 04:00 PM    Protein, total 7.7 02/05/2017 04:00 PM    Albumin 3.9 02/05/2017 04:00 PM    Globulin 3.8 02/05/2017 04:00 PM    A-G Ratio 1.0 02/05/2017 04:00 PM    ALT (SGPT) 58 02/05/2017 04:00 PM         Assessment:     Assessment:        ICD-10-CM ICD-9-CM    1. Secondary hypertension I15.9 405.99 AMB POC EKG ROUTINE W/ 12 LEADS, INTER & REP     Orders Placed This Encounter    AMB POC EKG ROUTINE W/ 12 LEADS, INTER & REP     Order Specific Question:   Reason for Exam:     Answer:   routine    DISCONTD: amiodarone (CORDARONE) 200 mg tablet     Sig: Take 200 mg by mouth daily.  DISCONTD: apixaban (ELIQUIS) 5 mg tablet     Sig: Take 5 mg by mouth 2 times daily.  DISCONTD: diclofenac EC (VOLTAREN) 75 mg EC tablet     Sig: Take 75 mg by mouth 2 times daily.  DISCONTD: furosemide (LASIX) 20 mg tablet     Sig: Take 20 mg by mouth 2 times daily.  DISCONTD: simvastatin (ZOCOR) 20 mg tablet     Sig: Take 20 mg by mouth nightly.     DISCONTD: valACYclovir (VALTREX) 500 mg tablet     Sig: Take 500 mg by mouth 2 times daily as needed.  DISCONTD: cyanocobalamin, vitamin B-12, 1,000 mcg/mL drop     Sig: Take by mouth. Plan:   Mr. Mari Lamar is here for follow up of AF. EKG today demonstrates sinus bradycardia. He was having ataxia last month due to side effects of Amiodarone, these have resolved with decreased dose of Amiodarone. He is a candidate for Tikosyn loading but his insurance does not cover it. Continue Amiodarone and will set him up to discuss hybrid option further with Dr. Jony Tobin. He has tried flecainide, propafenone and sotalol in the past with no success. He has had two pvis and still has AF. He is a candidate for a hybrid afib ablation. I discussed the risks/benefits/alternatives of the procedure with the patient. Risks include (but are not limited to) bleeding, heart block, infection, cva/mi/tamponade/esophageal perforation/pv stenosis/death. The patient understands that there is a 0-5% major complication rate and agrees to proceed. Thank you for this interesting consultation. Follow up with Dr. Marielena Cooney after he sees Dr. Jony oTbin. Continue medical management for HTN. Thank you for allowing me to participate in [facility] 's care.     STANLEY Uribe MD, Jud Lam

## 2017-06-01 NOTE — MR AVS SNAPSHOT
Visit Information Date & Time Provider Department Dept. Phone Encounter #  
 6/1/2017  9:30 AM Alana Courser Maria Del Rosario [place], [address] [place] [address] [facility] [phone] [id number] Your Appointments 11/29/2017  9:00 AM  
6 MONTH with 1700 MD [facility] Yum[facility] Bravo) Appt Note: 6 month per Dr. Marii Xiao,  
 [address]  
[phone] [address] Upcoming Health Maintenance Date Due Hepatitis C Screening 1955 DTaP/Tdap/Td series (1 - Tdap) 6/29/1974 FOBT Q 1 YEAR AGE 50-75 6/29/2003 ZOSTER VACCINE AGE 60> 6/29/2013 INFLUENZA AGE 9 TO ADULT 8/1/2017 Allergies as of 6/1/2017  Review Complete On: 6/1/2017 By: Usha Vasquez NP Severity Noted Reaction Type Reactions Percocet [Oxycodone-acetaminophen] Medium 10/02/2010   Side Effect Anxiety Patient takes Tylenol without problems. Current Immunizations  Reviewed on 12/31/2015 Name Date Influenza Vaccine Split 2/8/2017 Not reviewed this visit You Were Diagnosed With   
  
 Codes Comments Secondary hypertension    -  Primary ICD-10-CM: I15.9 ICD-9-CM: 405.99 Vitals BP Pulse Resp Height(growth percentile) Weight(growth percentile) SpO2  
 110/70 (BP 1 Location: Left arm, BP Patient Position: Sitting) 62 16 6' 1\" (1.854 m) 257 lb (116.6 kg) 96% BMI Smoking Status 33.91 kg/m2 Former Smoker Vitals History BMI and BSA Data Body Mass Index Body Surface Area  
 33.91 kg/m 2 2.45 m 2 Preferred Pharmacy Pharmacy Name Phone Yoni Crump, [facility] [phone] Your Updated Medication List  
  
   
This list is accurate as of: 6/1/17 10:10 AM.  Always use your most recent med list.  
  
  
  
  
 amiodarone 200 mg tablet Commonly known as:  CORDARONE  
 Take 1 Tab by mouth daily. Lowered 05/03/17  
  
 apixaban 5 mg tablet Commonly known as:  Radha Edmonds Take 1 Tab by mouth two (2) times a day. diclofenac EC 75 mg EC tablet Commonly known as:  VOLTAREN Take 75 mg by mouth as needed. furosemide 20 mg tablet Commonly known as:  LASIX Take 1 Tab by mouth daily as needed. simvastatin 20 mg tablet Commonly known as:  ZOCOR  
TAKE 1 TABLET AT BEDTIME  
  
 valACYclovir 500 mg tablet Commonly known as:  VALTREX  
  
 VITAMIN B-12 1,000 mcg tablet Generic drug:  cyanocobalamin Take 1,000 mcg by mouth daily. We Performed the Following AMB POC EKG ROUTINE W/ 12 LEADS, INTER & REP [55145 CPT(R)] Introducing [facility] SERVICES! Dear Nahun Bates: Thank you for requesting a 360Guan[employer] account. Our records indicate that you already have an active [email or web address] account. You can access your account anytime at [email or web address] Did you know that you can access your hospital and ER discharge instructions at any time in [email or web address]? You can also review all of your test results from your hospital stay or ER visit. Additional Information If you have questions, please visit the Frequently Asked Questions section of the 360Guan[employer] website at [email or web address] Remember, 360Guanxi is NOT to be used for urgent needs. For medical emergencies, dial 911. Now available from your iPhone and Android! Please provide this summary of care documentation to your next provider. Your primary care clinician is listed as Belen Paulino. If you have any questions after today's visit, please call [phone].

## 2017-06-19 ENCOUNTER — OFFICE VISIT (OUTPATIENT)
Dept: CARDIOTHORACIC SURGERY | Age: 62
End: 2017-06-19

## 2017-06-19 VITALS
HEIGHT: 73 IN | TEMPERATURE: 97.8 F | RESPIRATION RATE: 16 BRPM | HEART RATE: 57 BPM | OXYGEN SATURATION: 94 % | WEIGHT: 250.5 LBS | BODY MASS INDEX: 33.2 KG/M2 | SYSTOLIC BLOOD PRESSURE: 118 MMHG | DIASTOLIC BLOOD PRESSURE: 68 MMHG

## 2017-06-19 DIAGNOSIS — I48.0 PAROXYSMAL ATRIAL FIBRILLATION (HCC): Primary | ICD-10-CM

## 2017-06-19 NOTE — MR AVS SNAPSHOT
Visit Information Date & Time Provider Department Dept. Phone Encounter #  
 6/19/2017  3:00 PM Zachary Stoner MD [facility] [phone] [id number] Your Appointments 11/29/2017  9:00 AM  
6 MONTH with MD Ruben Liu [facility] Appt Note: 6 month per Dr. Gloria Webster,  
 [address] [place] [address]  
[phone] [address] [place] [address] Upcoming Health Maintenance Date Due Hepatitis C Screening 1955 DTaP/Tdap/Td series (1 - Tdap) 6/29/1974 FOBT Q 1 YEAR AGE 50-75 6/29/2003 ZOSTER VACCINE AGE 60> 6/29/2013 INFLUENZA AGE 9 TO ADULT 8/1/2017 Allergies as of 6/19/2017  Review Complete On: 6/19/2017 By: Jono Gray NP Severity Noted Reaction Type Reactions Percocet [Oxycodone-acetaminophen] Medium 10/02/2010   Side Effect Anxiety Patient takes Tylenol without problems. Current Immunizations  Reviewed on 12/31/2015 Name Date Influenza Vaccine Split 2/8/2017 Not reviewed this visit You Were Diagnosed With   
  
 Codes Comments Paroxysmal atrial fibrillation (HCC)    -  Primary ICD-10-CM: I48.0 ICD-9-CM: 427.31 Vitals BP Pulse Temp Resp Height(growth percentile) Weight(growth percentile)  
 118/68 (BP 1 Location: Left arm, BP Patient Position: Sitting) (!) 57 97.8 °F (36.6 °C) (Oral) 16 6' 1\" (1.854 m) 250 lb 8 oz (113.6 kg) SpO2 BMI Smoking Status 94% 33.05 kg/m2 Former Smoker Vitals History BMI and BSA Data Body Mass Index Body Surface Area 33.05 kg/m 2 2.42 m 2 Preferred Pharmacy Pharmacy Name Phone Yoni Crump, [facility] [place] [phone] Your Updated Medication List  
  
   
This list is accurate as of: 6/19/17  3:57 PM.  Always use your most recent med list.  
  
  
  
  
 amiodarone 200 mg tablet Commonly known as:  CORDARONE Take 1 Tab by mouth daily. Lowered 05/03/17  
  
 apixaban 5 mg tablet Commonly known as:  Cecilia Seat Take 1 Tab by mouth two (2) times a day. diclofenac EC 75 mg EC tablet Commonly known as:  VOLTAREN Take 75 mg by mouth as needed. furosemide 20 mg tablet Commonly known as:  LASIX Take 1 Tab by mouth daily as needed. simvastatin 20 mg tablet Commonly known as:  ZOCOR  
TAKE 1 TABLET AT BEDTIME  
  
 VITAMIN B COMP WITH VIT C NO.6 PO Take 1 Tab by mouth daily. VITAMIN B-12 1,000 mcg tablet Generic drug:  cyanocobalamin Take 1,000 mcg by mouth daily. To-Do List   
 07/28/2017 10:30 AM  
  Appointment with [address] [place] [address] at [address] [place] [address] ([phone]) NPO AFTER MIDNIGHT! ROUTINE CASES:  Please arrive 2 hour prior to your scheduled appointment time. If your scheduled appointment is for 0730, 0800, 0815, please arrive by 0645. NON ROUTINE CASES:  PATIENTS WHO REQUIRE LABS, X-RAY, EKG, or MEDS:  PLEASE ARRIVE 3 HOURS PRIOR TO YOUR SCHEDULED APPOINTMENT. If you require hydration prior to your procedure, PLEASE ARRIVE 4 HOURS PRIOR TO YOUR APPOINTMENT  **** IT IS THE OFFICE SCHEDULARS RESPONSBILITY TO NOTIFY THE CATH LAB SCHEDULAR IF THE PATIENT WILL REQUIRE ANY ADDITIONAL TIME FOR PREP FROM ROUTINE CASE ***** Introducing [facility] & HEALTH SERVICES! Dear Joy Valverde: Thank you for requesting a ThousandEyes account. Our records indicate that you already have an active ThousandEyes account. You can access your account anytime at [email or web address] Did you know that you can access your hospital and ER discharge instructions at any time in ThousandEyes? You can also review all of your test results from your hospital stay or ER visit. Additional Information If you have questions, please visit the Frequently Asked Questions section of the New Relic website at [email or web address] Remember, [employer] Rel[employer] is NOT to be used for urgent needs. For medical emergencies, dial 911. Now available from your iPhone and Android! Please provide this summary of care documentation to your next provider. Your primary care clinician is listed as Hedy Hunt. If you have any questions after today's visit, please call [phone].

## 2017-06-19 NOTE — PROGRESS NOTES
CSS   Consult    Subjective:      Aristeo Epps is a 59 y.o.  male who was referred for cardiac evaluation by  [facility]. Pt with PMH of paroxysmal a-fib, benign thyroid nodules (denies hypo- or hyperthyroidism), high cholesterol, HTN, GERD, arthritis and chronic back pain. Pt reports about an 18 year history of PAF, has had multiple ablation procedures, cardioversions and different medications to treat without success. Pt reports he was told all medical options have been exhausted and that he needs surgery to help with his a-fib. Pt reports he can feel when a-fib coming on, symptoms associated with this are SOB, palpitations, PND and edema. He was recently placed on amiodarone twice daily but had ataxia and dose decreased to once a day, symptoms resolved. He continues to have significant fatigue which he attributes to his lower HR since starting amiodarone. Of note, he was referred to a surgeon at [facility] but was told to lose 40 pounds which angered him. He has lost about 20 pounds recently due to diet change with his wife since she was diagnosed with diabetes. He has not had any recent cardiac cath or echo, previous reports below. He denies any dizziness, syncopal episodes, CP, orthopnea or claudication. Cardiac Testing    Cardiac catheterization 8/21/15:  VENTRICLES: Global left ventricular function was normal. EF calculated by  contrast ventriculography was 60 %. CORONARY CIRCULATION: The coronary circulation is right dominant. Left  main: Normal. LAD: Normal. 1st diagonal: Normal. Circumflex: Normal. 1st  obtuse marginal: Normal. RCA: Normal.    ECHO 8/16/16:  LEFT VENTRICLE: Size was normal. Systolic function was normal. Ejection  fraction was estimated in the range of 55 % to 60 %. Suboptimal  endocardial visualization limits wall motion analysis. Wall thickness was  mildly increased. DOPPLER: The study was not technically sufficient to  allow evaluation of LV diastolic function.     RIGHT VENTRICLE: The size was normal. Systolic function was normal.    LEFT ATRIUM: Size was normal.    ATRIAL SEPTUM: The atrial septum appeared intact. RIGHT ATRIUM: Size was normal.    MITRAL VALVE: Normal valve structure. DOPPLER: There was no regurgitation. AORTIC VALVE: Normal valve structure. DOPPLER: Transaortic velocity was  within the normal range. There was no stenosis. There was no regurgitation. TRICUSPID VALVE: Normal valve structure. DOPPLER: There was no significant  regurgitation. Pulmonary artery systolic pressure was within the normal  range. PULMONIC VALVE: Not well visualized, but normal Doppler findings. AORTA: The root exhibited normal size. PERICARDIUM: There was no pericardial effusion. Past Medical History:   Diagnosis Date    A-fib [facility])     Arthritis     back    Chronic pain     back    Encounter for cardioversion procedure 2/7/2017    GERD (gastroesophageal reflux disease)     High cholesterol     Hypertension 2/7/2017    Multiple thyroid nodules     biopsied and benign    PUD (peptic ulcer disease)     \"years ago\"     Past Surgical History:   Procedure Laterality Date    CARDIAC CATHETERIZATION      CARDIAC SURG PROCEDURE UNLIST  2010, 2011    ,ABLATION     CARDIOVERSION EXTERNAL      HX CERVICAL FUSION  2013    HX HEENT  6/6/13    THYROID BIOPSY    HX ORTHOPAEDIC      shoulder surgery on left    HX ORTHOPAEDIC      wrist surgery, LEFT    HX ORTHOPAEDIC      elbow surgery, RIGHT      Social History   Substance Use Topics    Smoking status: Former Smoker     Packs/day: 0.25     Years: 40.00     Quit date: 10/1/2012    Smokeless tobacco: Never Used    Alcohol use Yes      Comment: occassionally 3-4 cans of beer      Family History   Problem Relation Age of Onset    Cancer Mother      BREAST    Heart Disease Father     Lung Disease Father      Prior to Admission medications    Medication Sig Start Date End Date Taking?  Authorizing Provider   VITAMIN B COMP AND VIT C NO.6 (VITAMIN B COMP WITH VIT C NO.6 PO) Take 1 Tab by mouth daily. Yes Historical Provider   amiodarone (CORDARONE) 200 mg tablet Take 1 Tab by mouth daily. Lowered 05/03/17 5/3/17  Yes ROSALINDA Stone   apixaban (ELIQUIS) 5 mg tablet Take 1 Tab by mouth two (2) times a day. 2/7/17  Yes Kev Patel NP   diclofenac EC (VOLTAREN) 75 mg EC tablet Take 75 mg by mouth as needed. 10/17/16  Yes Historical Provider   furosemide (LASIX) 20 mg tablet Take 1 Tab by mouth daily as needed. 9/1/16  Yes Nicolasa Ramirez NP   cyanocobalamin (VITAMIN B-12) 1,000 mcg tablet Take 1,000 mcg by mouth daily. Yes Historical Provider   simvastatin (ZOCOR) 20 mg tablet TAKE 1 TABLET AT BEDTIME 5/21/13  Yes Randa Cohen MD       Allergies   Allergen Reactions    Percocet [Oxycodone-Acetaminophen] Anxiety     Patient takes Tylenol without problems. Review of Systems:   Consititutional: Denies fever or chills. Eyes:  Denies vision problems (cataracts). +Wears glasses  ENT:  Denies hearing or swallowing difficulty. CV: Denies CP, claudication  Resp: Denies productive cough. : Denies dialysis or kidney problems. GI: Denies ulcers, esophageal strictures, liver problems. M/S: Denies joint or bone problems, or implanted artificial hardware. Skin: Denies varicose veins, edema. +intermittent edema when in afib  Neuro: Denies strokes, or TIAs. Psych: Denies anxiety or depression. Endocrine: Denies thyroid problems or diabetes. Heme/Lymphatic: Denies easy bruising or lymphedema.      Objective:     Physical Exam:    Visit Vitals    /68 (BP 1 Location: Left arm, BP Patient Position: Sitting)    Pulse (!) 57    Temp 97.8 °F (36.6 °C) (Oral)    Resp 16    Ht 6' 1\" (1.854 m)    Wt 250 lb 8 oz (113.6 kg)    SpO2 94%    BMI 33.05 kg/m2     General appearance: alert, cooperative, no distress, appears stated age  Head: Normocephalic, without obvious abnormality, atraumatic  Eyes: conjunctivae/corneas clear. PERRL, EOM's intact. Neck: supple, symmetrical, trachea midline, no adenopathy, thyroid: not enlarged, symmetric, no tenderness/mass/nodules, no carotid bruit and no JVD  Lungs: clear to auscultation bilaterally  Heart: regular rate and rhythm, S1, S2 normal, no murmur, click, rub or gallop  Abdomen: soft, non-tender. Bowel sounds normal. No masses,  no organomegaly  Extremities: extremities normal, atraumatic, no cyanosis or edema  Skin: Skin color, texture, turgor normal. No rashes or lesions  Neurologic: Grossly normal    Assessment:     Atrial fibrillation    Plan:   1. PAF: Dr. Jonatan Hutchinson to discuss timing of surgery, will need repeat cardiac testing most likely. Pt is on Eliquis and will need to stop this at least 5 days in advance    2. Hyperlipidemia: cont statin therapy    3. HTN: on lower side with amio per pt report.      4. Chronic back pain: on diclofenac, will need to stop prior to surgery    Signed By: Aminta Almanza NP     June 19, 2017

## 2017-06-19 NOTE — PROGRESS NOTES
Pt seen and examined  Echo and hx reviewed and discussed with pt and wife  Has long standing AF with symx and failed endo ablations twice  Discussed indications and risks of trans pericardial ablation  Will schedule MRI and plan to proceed

## 2017-06-27 DIAGNOSIS — Z98.890 S/P ABLATION OF ATRIAL FIBRILLATION: Primary | ICD-10-CM

## 2017-06-27 DIAGNOSIS — I48.0 PAROXYSMAL ATRIAL FIBRILLATION (HCC): ICD-10-CM

## 2017-06-27 DIAGNOSIS — Z86.79 S/P ABLATION OF ATRIAL FIBRILLATION: Primary | ICD-10-CM

## 2017-07-20 ENCOUNTER — ANESTHESIA EVENT (OUTPATIENT)
Dept: CARDIOTHORACIC SURGERY | Age: 62
DRG: 274 | End: 2017-07-20
Payer: COMMERCIAL

## 2017-07-20 ENCOUNTER — HOSPITAL ENCOUNTER (OUTPATIENT)
Dept: PREADMISSION TESTING | Age: 62
Discharge: HOME | End: 2017-07-20
Payer: COMMERCIAL

## 2017-07-20 ENCOUNTER — HOSPITAL ENCOUNTER (OUTPATIENT)
Dept: CT IMAGING | Age: 62
Discharge: HOME | End: 2017-07-20
Attending: NURSE PRACTITIONER
Payer: COMMERCIAL

## 2017-07-20 VITALS
DIASTOLIC BLOOD PRESSURE: 66 MMHG | BODY MASS INDEX: 33.86 KG/M2 | RESPIRATION RATE: 18 BRPM | HEIGHT: 72 IN | WEIGHT: 250 LBS | OXYGEN SATURATION: 98 % | SYSTOLIC BLOOD PRESSURE: 149 MMHG | TEMPERATURE: 98.1 F | HEART RATE: 55 BPM

## 2017-07-20 DIAGNOSIS — Z98.890 S/P ABLATION OF ATRIAL FIBRILLATION: ICD-10-CM

## 2017-07-20 DIAGNOSIS — I48.0 PAROXYSMAL ATRIAL FIBRILLATION (HCC): Primary | ICD-10-CM

## 2017-07-20 DIAGNOSIS — Z86.79 S/P ABLATION OF ATRIAL FIBRILLATION: ICD-10-CM

## 2017-07-20 DIAGNOSIS — I48.0 PAROXYSMAL ATRIAL FIBRILLATION (HCC): ICD-10-CM

## 2017-07-20 LAB
ALBUMIN SERPL BCP-MCNC: 4 G/DL (ref 3.5–5)
ALBUMIN/GLOB SERPL: 1.1 {RATIO} (ref 1.1–2.2)
ALP SERPL-CCNC: 88 U/L (ref 45–117)
ALT SERPL-CCNC: 44 U/L (ref 12–78)
ANION GAP BLD CALC-SCNC: 8 MMOL/L (ref 5–15)
APPEARANCE UR: CLEAR
APTT PPP: 28.1 SEC (ref 22.1–32.5)
ARTERIAL PATENCY WRIST A: YES
AST SERPL W P-5'-P-CCNC: 25 U/L (ref 15–37)
BACTERIA URNS QL MICRO: NEGATIVE /HPF
BASE DEFICIT BLDA-SCNC: 2.3 MMOL/L
BASOPHILS # BLD AUTO: 0 K/UL (ref 0–0.1)
BASOPHILS # BLD: 1 % (ref 0–1)
BDY SITE: ABNORMAL
BILIRUB SERPL-MCNC: 0.7 MG/DL (ref 0.2–1)
BILIRUB UR QL: NEGATIVE
BUN SERPL-MCNC: 24 MG/DL (ref 6–20)
BUN/CREAT SERPL: 26 (ref 12–20)
CALCIUM SERPL-MCNC: 8.9 MG/DL (ref 8.5–10.1)
CHLORIDE SERPL-SCNC: 102 MMOL/L (ref 97–108)
CO2 SERPL-SCNC: 26 MMOL/L (ref 21–32)
COLOR UR: ABNORMAL
CREAT BLD-MCNC: 1 MG/DL (ref 0.6–1.3)
CREAT SERPL-MCNC: 0.94 MG/DL (ref 0.7–1.3)
EOSINOPHIL # BLD: 0.2 K/UL (ref 0–0.4)
EOSINOPHIL NFR BLD: 4 % (ref 0–7)
EPITH CASTS URNS QL MICRO: ABNORMAL /LPF
ERYTHROCYTE [DISTWIDTH] IN BLOOD BY AUTOMATED COUNT: 13.9 % (ref 11.5–14.5)
EST. AVERAGE GLUCOSE BLD GHB EST-MCNC: 103 MG/DL
GLOBULIN SER CALC-MCNC: 3.7 G/DL (ref 2–4)
GLUCOSE SERPL-MCNC: 85 MG/DL (ref 65–100)
GLUCOSE UR STRIP.AUTO-MCNC: NEGATIVE MG/DL
HBA1C MFR BLD: 5.2 % (ref 4.2–6.3)
HCO3 BLDA-SCNC: 21 MMOL/L (ref 22–26)
HCT VFR BLD AUTO: 48.9 % (ref 36.6–50.3)
HGB BLD-MCNC: 16.2 G/DL (ref 12.1–17)
HGB UR QL STRIP: NEGATIVE
HYALINE CASTS URNS QL MICRO: ABNORMAL /LPF (ref 0–5)
INR PPP: 1.1 (ref 0.9–1.1)
KETONES UR QL STRIP.AUTO: NEGATIVE MG/DL
LEUKOCYTE ESTERASE UR QL STRIP.AUTO: NEGATIVE
LYMPHOCYTES # BLD AUTO: 42 % (ref 12–49)
LYMPHOCYTES # BLD: 2.4 K/UL (ref 0.8–3.5)
MCH RBC QN AUTO: 31.3 PG (ref 26–34)
MCHC RBC AUTO-ENTMCNC: 33.1 G/DL (ref 30–36.5)
MCV RBC AUTO: 94.6 FL (ref 80–99)
MONOCYTES # BLD: 0.2 K/UL (ref 0–1)
MONOCYTES NFR BLD AUTO: 3 % (ref 5–13)
NEUTS SEG # BLD: 2.9 K/UL (ref 1.8–8)
NEUTS SEG NFR BLD AUTO: 50 % (ref 32–75)
NITRITE UR QL STRIP.AUTO: NEGATIVE
PCO2 BLDA: 32 MMHG (ref 35–45)
PH BLDA: 7.44 [PH] (ref 7.35–7.45)
PH UR STRIP: 5 [PH] (ref 5–8)
PLATELET # BLD AUTO: 167 K/UL (ref 150–400)
PO2 BLDA: 142 MMHG (ref 80–100)
POTASSIUM SERPL-SCNC: 4.2 MMOL/L (ref 3.5–5.1)
PROT SERPL-MCNC: 7.7 G/DL (ref 6.4–8.2)
PROT UR STRIP-MCNC: NEGATIVE MG/DL
PROTHROMBIN TIME: 10.8 SEC (ref 9–11.1)
RBC # BLD AUTO: 5.17 M/UL (ref 4.1–5.7)
RBC #/AREA URNS HPF: ABNORMAL /HPF (ref 0–5)
SAO2 % BLD: 99 % (ref 92–97)
SAO2% DEVICE SAO2% SENSOR NAME: ABNORMAL
SODIUM SERPL-SCNC: 136 MMOL/L (ref 136–145)
SP GR UR REFRACTOMETRY: >1.03 (ref 1–1.03)
SPECIMEN SITE: ABNORMAL
THERAPEUTIC RANGE,PTTT: NORMAL SECS (ref 58–77)
UA: UC IF INDICATED,UAUC: ABNORMAL
UROBILINOGEN UR QL STRIP.AUTO: 0.2 EU/DL (ref 0.2–1)
WBC # BLD AUTO: 5.7 K/UL (ref 4.1–11.1)
WBC URNS QL MICRO: ABNORMAL /HPF (ref 0–4)

## 2017-07-20 PROCEDURE — 74011636320 HC RX REV CODE- 636/320: Performed by: NURSE PRACTITIONER

## 2017-07-20 PROCEDURE — 82565 ASSAY OF CREATININE: CPT

## 2017-07-20 PROCEDURE — 74011250636 HC RX REV CODE- 250/636: Performed by: NURSE PRACTITIONER

## 2017-07-20 PROCEDURE — 71275 CT ANGIOGRAPHY CHEST: CPT

## 2017-07-20 RX ORDER — SODIUM CHLORIDE 9 MG/ML
50 INJECTION, SOLUTION INTRAVENOUS
Status: COMPLETED | OUTPATIENT
Start: 2017-07-20 | End: 2017-07-20

## 2017-07-20 RX ORDER — SODIUM CHLORIDE 0.9 % (FLUSH) 0.9 %
10 SYRINGE (ML) INJECTION
Status: COMPLETED | OUTPATIENT
Start: 2017-07-20 | End: 2017-07-20

## 2017-07-20 RX ORDER — CHLORHEXIDINE GLUCONATE 1.2 MG/ML
15 RINSE ORAL 2 TIMES DAILY
Qty: 1 BOTTLE | Refills: 0 | Status: SHIPPED | OUTPATIENT
Start: 2017-07-24 | End: 2017-07-31

## 2017-07-20 RX ORDER — MUPIROCIN 20 MG/G
OINTMENT TOPICAL 2 TIMES DAILY
Qty: 22 G | Refills: 0 | Status: SHIPPED | OUTPATIENT
Start: 2017-07-24 | End: 2017-07-31

## 2017-07-20 RX ORDER — PYRIDOXINE HCL (VITAMIN B6) 100 MG
100 TABLET ORAL DAILY
Status: ON HOLD | COMMUNITY
End: 2018-10-19

## 2017-07-20 RX ADMIN — SODIUM CHLORIDE 50 ML/HR: 900 INJECTION, SOLUTION INTRAVENOUS at 14:03

## 2017-07-20 RX ADMIN — IOPAMIDOL 100 ML: 755 INJECTION, SOLUTION INTRAVENOUS at 14:03

## 2017-07-20 RX ADMIN — Medication 10 ML: at 14:03

## 2017-07-20 NOTE — PROGRESS NOTES
Anesthesia  Consult note     Anesthesia consult requested by the surgeon for:  suitability of patient for General anesthesia for  minimal  Invasive surgery   contraindications for  One lung ventilation  Contraindication for SVETA  Suitability for invasive lines    Subjective:      Patient:  Jordan Jamesr     Procedure: Procedure(s):  TRANSPERICARDIAL HYBRID ABLATION WITH FLUORO       Allergies   Allergen Reactions    Percocet [Oxycodone-Acetaminophen] Anxiety     Patient takes Tylenol without problems. No current facility-administered medications for this encounter. Current Outpatient Prescriptions   Medication Sig    [START ON 7/24/2017] mupirocin (BACTROBAN) 2 % ointment by Both Nostrils route two (2) times a day for 7 days.  [START ON 7/24/2017] chlorhexidine (PERIDEX) 0.12 % solution Take 15 mL by mouth two (2) times a day for 7 days. Rising and spit    pyridoxine, vitamin B6, (VITAMIN B-6) 100 mg tablet Take 100 mg by mouth daily.  amiodarone (CORDARONE) 200 mg tablet Take 1 Tab by mouth daily. Lowered 05/03/17 (Patient taking differently: Take 200 mg by mouth every evening. Lowered 05/03/17)    apixaban (ELIQUIS) 5 mg tablet Take 1 Tab by mouth two (2) times a day.  diclofenac EC (VOLTAREN) 75 mg EC tablet Take 75 mg by mouth two (2) times a day.  furosemide (LASIX) 20 mg tablet Take 1 Tab by mouth daily as needed.  cyanocobalamin (VITAMIN B-12) 1,000 mcg tablet Take 1,000 mcg by mouth daily.     simvastatin (ZOCOR) 20 mg tablet TAKE 1 TABLET AT BEDTIME       Past Medical History:   Diagnosis Date    A-fib ([facility] [address]     Arthritis     back    Chronic pain     back    Encounter for cardioversion procedure 2/7/2017    GERD (gastroesophageal reflux disease)     High cholesterol     Hypertension 02/07/2017    patient denies hx of HTN    Multiple thyroid nodules     biopsied and benign    PUD (peptic ulcer disease)     \"years ago\"       Past Surgical History:   Procedure Laterality Date    CARDIAC CATHETERIZATION      CARDIAC SURG PROCEDURE UNLIST  ,     ,ABLATION     CARDIOVERSION EXTERNAL      HX CERVICAL FUSION      HX HEENT  13    THYROID BIOPSY    HX ORTHOPAEDIC      shoulder surgery on left    HX ORTHOPAEDIC      wrist surgery, LEFT    HX ORTHOPAEDIC      elbow surgery, RIGHT       Social History   Substance Use Topics    Smoking status: Former Smoker     Packs/day: 0.25     Years: 40.00     Quit date: 10/1/2012    Smokeless tobacco: Never Used    Alcohol use Yes      Comment: occassionally 3-4 cans of beer         Anesthetic Problems: None in the past   patient denies any problems with swallowing, esophageal stricture, upper GI bleed, GI surgery. No contraindications for SVETA. Objective:     Physical Exam:    No data found. Temp (24hrs), Av.7 °C (98.1 °F), Min:36.7 °C (98.1 °F), Max:36.7 °C (98.1 °F)      Airway Class: 3  Heart-  Regular rate and rhythm,   s1 s2 heard   Lungs- Clear bilateral on ascultation  Abd- Soft, non tender no organomegaly  Limbs- Normal  Neuro- Normal    Labs:   Recent Results (from the past 24 hour(s))   POC CREATININE    Collection Time: 17  1:44 PM   Result Value Ref Range    Creatinine (POC) 1.0 0.6 - 1.3 MG/DL    GFRAA, POC >60 >60 ml/min/1.73m2    GFRNA, POC >60 >60 ml/min/1.73m2         Assessment:       Active Problems:    * No active hospital problems. *      ASA4    Plan/Recommendations/Medical Decision Making:       Anesthetic Plan: GETA with invasive monitoring, post op ventilation and SVETA monitoring  Risks and benefits of the anesthetic plan discussed and agreed upon by the patient.       no contraindications  For one lung ventilation, large bore lines, SVETA  Signed By: Adela Cassidy MD  Date:           2017  Time:          3:13 PM

## 2017-07-20 NOTE — PERIOP NOTES
1500 - Dr. Miley Islas in room to speak with patient. Ilia 25, PA in room to speak with patient. 1600 - Respiratory in room to obtain ABGs.

## 2017-07-20 NOTE — H&P
Cardiac Surgery Specialist History and Physical      Subjective:       Johnna Polo is a 59 y.o.  male who was referred for cardiac evaluation by Dr. Edilberto aTi. Pt with PMH of paroxysmal a-fib, benign thyroid nodules (denies hypo- or hyperthyroidism), high cholesterol, HTN, GERD, arthritis and chronic back pain. Pt reports about an 18 year history of PAF, has had multiple ablation procedures, cardioversions and different medications to treat without success. Pt reports he was told all medical options have been exhausted and that he needs surgery to help with his a-fib. Pt reports he can feel when a-fib coming on, symptoms associated with this are SOB, palpitations, PND and edema. He was recently placed on amiodarone twice daily but had ataxia and dose decreased to once a day, symptoms resolved. He continues to have significant fatigue which he attributes to his lower HR since starting amiodarone. Of note, he was referred to a surgeon at [facility] but was told to lose 40 pounds which angered him. He has lost about 20 pounds recently due to diet change with his wife since she was diagnosed with diabetes. He has not had any recent cardiac cath or echo, previous reports below. He denies any dizziness, syncopal episodes, CP, orthopnea or claudication.      Cardiac Testing     Cardiac catheterization 8/21/15:  VENTRICLES: Global left ventricular function was normal. EF calculated by  contrast ventriculography was 60 %. CORONARY CIRCULATION: The coronary circulation is right dominant. Left  main: Normal. LAD: Normal. 1st diagonal: Normal. Circumflex: Normal. 1st  obtuse marginal: Normal. RCA: Normal.     ECHO 8/16/16:  LEFT VENTRICLE: Size was normal. Systolic function was normal. Ejection  fraction was estimated in the range of 55 % to 60 %. Suboptimal  endocardial visualization limits wall motion analysis. Wall thickness was  mildly increased.  DOPPLER: The study was not technically sufficient to  allow evaluation of LV diastolic function. RIGHT VENTRICLE: The size was normal. Systolic function was normal.    LEFT ATRIUM: Size was normal.    ATRIAL SEPTUM: The atrial septum appeared intact. RIGHT ATRIUM: Size was normal.    MITRAL VALVE: Normal valve structure. DOPPLER: There was no regurgitation. AORTIC VALVE: Normal valve structure. DOPPLER: Transaortic velocity was  within the normal range. There was no stenosis. There was no regurgitation. TRICUSPID VALVE: Normal valve structure. DOPPLER: There was no significant  regurgitation. Pulmonary artery systolic pressure was within the normal  range. PULMONIC VALVE: Not well visualized, but normal Doppler findings. AORTA: The root exhibited normal size.     PERICARDIUM: There was no pericardial effusion.          Past Medical History:   Diagnosis Date    A-fib ([facility] [address]      Arthritis       back    Chronic pain       back    Encounter for cardioversion procedure 2/7/2017    GERD (gastroesophageal reflux disease)      High cholesterol      Hypertension 2/7/2017    Multiple thyroid nodules       biopsied and benign    PUD (peptic ulcer disease)       \"years ago\"            Past Surgical History:   Procedure Laterality Date    CARDIAC CATHETERIZATION        CARDIAC SURG PROCEDURE UNLIST   2010, 2011     ,ABLATION     CARDIOVERSION EXTERNAL        HX CERVICAL FUSION   2013    HX HEENT   6/6/13     THYROID BIOPSY    HX ORTHOPAEDIC         shoulder surgery on left    HX ORTHOPAEDIC         wrist surgery, LEFT    HX ORTHOPAEDIC         elbow surgery, RIGHT              Social History   Substance Use Topics    Smoking status: Former Smoker       Packs/day: 0.25       Years: 40.00       Quit date: 10/1/2012    Smokeless tobacco: Never Used    Alcohol use Yes          Comment: occassionally 3-4 cans of beer             Family History   Problem Relation Age of Onset    Cancer Mother         BREAST    Heart Disease Father      Lung Disease Father                Prior to Admission medications    Medication Sig Start Date End Date Taking? Authorizing Provider   VITAMIN B COMP AND VIT C NO.6 (VITAMIN B COMP WITH VIT C NO.6 PO) Take 1 Tab by mouth daily.     Yes Historical Provider   amiodarone (CORDARONE) 200 mg tablet Take 1 Tab by mouth daily. Lowered 05/03/17 5/3/17   Yes ROSALINDA Stone   apixaban (ELIQUIS) 5 mg tablet Take 1 Tab by mouth two (2) times a day. 2/7/17   Yes Sonu Jorgensen NP   diclofenac EC (VOLTAREN) 75 mg EC tablet Take 75 mg by mouth as needed. 10/17/16   Yes Historical Provider   furosemide (LASIX) 20 mg tablet Take 1 Tab by mouth daily as needed. 9/1/16   Yes Chad Castillo NP   cyanocobalamin (VITAMIN B-12) 1,000 mcg tablet Take 1,000 mcg by mouth daily.     Yes Historical Provider   simvastatin (ZOCOR) 20 mg tablet TAKE 1 TABLET AT BEDTIME 5/21/13   Yes Randa Silverman MD             Allergies   Allergen Reactions    Percocet [Oxycodone-Acetaminophen] Anxiety       Patient takes Tylenol without problems.      Review of Systems:   Consititutional: Denies fever or chills. Eyes:  Denies vision problems (cataracts). +Wears glasses  ENT:  Denies hearing or swallowing difficulty. CV: Denies CP, claudication  Resp: Denies productive cough. : Denies dialysis or kidney problems. GI: Denies ulcers, esophageal strictures, liver problems. M/S: Denies joint or bone problems, or implanted artificial hardware. Skin: Denies varicose veins, edema. +intermittent edema when in afib  Neuro: Denies strokes, or TIAs. Psych: Denies anxiety or depression. Endocrine: Denies thyroid problems or diabetes.   Heme/Lymphatic: Denies easy bruising or lymphedema.      Objective:      Physical Exam:         Visit Vitals    /68 (BP 1 Location: Left arm, BP Patient Position: Sitting)    Pulse (!) 57    Temp 97.8 °F (36.6 °C) (Oral)    Resp 16    Ht 6' 1\" (1.854 m)    Wt 250 lb 8 oz (113.6 kg)    SpO2 94%    BMI 33.05 kg/m2    General appearance: alert, cooperative, no distress, appears stated age  Head: Normocephalic, without obvious abnormality, atraumatic  Eyes: conjunctivae/corneas clear. PERRL, EOM's intact. Neck: supple, symmetrical, trachea midline, no adenopathy, thyroid: not enlarged, symmetric, no tenderness/mass/nodules, no carotid bruit and no JVD  Lungs: clear to auscultation bilaterally  Heart: regular rate and rhythm, S1, S2 normal, no murmur, click, rub or gallop  Abdomen: soft, non-tender. Bowel sounds normal. No masses,  no organomegaly  Extremities: extremities normal, atraumatic, no cyanosis or edema  Skin: Skin color, texture, turgor normal. No rashes or lesions  Neurologic: Grossly normal     Assessment:      Atrial fibrillation     Plan:      1. Eliquis last dose was Saturday before admission    2. Hyperlipidemia: cont statin therapy     3.  HTN: on lower side with amio per pt report.      4. Stop diclofenac

## 2017-07-20 NOTE — PERIOP NOTES
[facility]  Preoperative Instructions        Surgery Date 7/28/2017          Time of Arrival 5:30 a.m.    1. On the day of your surgery, please report to the Surgical Services Registration Desk and sign in at your designated time. The Surgery Center is located to the right of the Emergency Room. 2. You must have someone with you to drive you home. You should not drive a car for 24 hours following surgery. Please make arrangements for a friend or family member to stay with you for the first 24 hours after your surgery. 3. Do not have anything to eat or drink (including water, gum, mints, coffee, juice) after midnight 7/27/2017. This may not apply to medications prescribed by your physician. Please note special instructions, if applicable. If you are currently taking Plavix, Coumadin, or other blood-thinning agents, contact your surgeon for instructions. 4. We recommend you do not drink any alcoholic beverages for 24 hours before and after your surgery. 5. Stop all Aspirin, non-steroidal anti-inflammatory drugs (i.e. Advil, Aleve), vitamins, and supplements as directed by your surgeon's office. ? **If you are currently taking Plavix, Coumadin, or other blood-thinning agents, contact your surgeon for instructions. **    6. Wear comfortable clothes. Wear glasses instead of contacts. Do not bring any money or jewelry. Please bring picture ID, insurance card, and any prearranged co-payment or hospital payment. Do not wear make-up, particularly mascara the morning of your surgery. Do not wear nail polish, particularly if you are having foot /hand surgery. Wear your hair loose or down, no ponytails, buns, eugene pins or clips. All body piercings must be removed. Please shower with antibacterial soap for three consecutive days before and on the morning of surgery, but do not apply any lotions, powders or deodorants after the shower on the day of surgery.  Please use a fresh towels after each shower. Please sleep in clean clothes and change bed linens the night before surgery. Please do not shave for 48 hours prior to surgery. Shaving of the face is acceptable. 7. You should understand that if you do not follow these instructions your surgery may be cancelled. If your physical condition changes (I.e. fever, cold or flu) please contact your surgeon as soon as possible. 8. It is important that you be on time. If a situation occurs where you may be late, please call [phone] (OR Holding Area). 9. If you have any questions and or problems, please call [phone] (Pre-admission Testing). 10. Your surgery time may be subject to change. You will receive a phone call the evening prior if your time changes. 11.  If having outpatient surgery, you must have someone to drive you here, stay with you during the duration of your stay, and to drive you home at time of discharge. Special Instructions:    Last dose of Eliquis 7/24/2017. STOP diclofenac now. START mupirocin ointment and chlorhexidine mouth wash 7/27/2017. I understand a pre-operative phone call will be made to verify my surgery time. In the event that I am not available, I give permission for a message to be left on my answering service and/or with another person?  YES      Contact # [phone]         ___________________      __________   _________    (Signature of Patient)             (Witness)                (Date and Time)

## 2017-07-21 LAB
BACTERIA SPEC CULT: NORMAL
BACTERIA SPEC CULT: NORMAL
SERVICE CMNT-IMP: NORMAL

## 2017-07-21 RX ORDER — SODIUM CHLORIDE, SODIUM LACTATE, POTASSIUM CHLORIDE, CALCIUM CHLORIDE 600; 310; 30; 20 MG/100ML; MG/100ML; MG/100ML; MG/100ML
25 INJECTION, SOLUTION INTRAVENOUS CONTINUOUS
Status: CANCELLED | OUTPATIENT
Start: 2017-07-28

## 2017-07-25 RX ORDER — SODIUM CHLORIDE 9 MG/ML
250 INJECTION, SOLUTION INTRAVENOUS AS NEEDED
Status: CANCELLED | OUTPATIENT
Start: 2017-07-25

## 2017-07-27 LAB
ABO + RH BLD: NORMAL
BLOOD GROUP ANTIBODIES SERPL: NORMAL
SPECIMEN EXP DATE BLD: NORMAL

## 2017-07-28 ENCOUNTER — ANESTHESIA (OUTPATIENT)
Dept: CARDIOTHORACIC SURGERY | Age: 62
DRG: 274 | End: 2017-07-28
Payer: COMMERCIAL

## 2017-08-09 ENCOUNTER — OFFICE VISIT (OUTPATIENT)
Dept: CARDIOLOGY CLINIC | Age: 62
End: 2017-08-09

## 2017-08-09 VITALS
OXYGEN SATURATION: 97 % | BODY MASS INDEX: 34.34 KG/M2 | DIASTOLIC BLOOD PRESSURE: 80 MMHG | SYSTOLIC BLOOD PRESSURE: 150 MMHG | WEIGHT: 253.5 LBS | HEART RATE: 52 BPM | RESPIRATION RATE: 18 BRPM | HEIGHT: 72 IN

## 2017-08-09 DIAGNOSIS — I10 ESSENTIAL HYPERTENSION: Primary | ICD-10-CM

## 2017-08-09 NOTE — PROGRESS NOTES
NAME:  Anca Mccray   :   1955   MRN:   [id number]   PCP:  Jose Vidal MD           Subjective: The patient is a 57y.o. year old male  who returns for a problem based visit. Pt states he had \"weird dreams\" and woke up not feeling well. He took a shower and did not feel well with dizziness, fatigue and weakness. He tried to go to work but did not feel well and turned around. Had bp check at pharmacy with 150s/80s and pulse in 60s. He likens his symptoms to how he felt prior to lowering his dose of amiodarone in May. After lowering to every day, he felt very well. He is scheduled for surgery with CTS at the end of the month. Past Medical History:   Diagnosis Date    [facility]fib [facility]     Arthritis     back    Chronic pain     back    Encounter for cardioversion procedure 2017    GERD (gastroesophageal reflux disease)     High cholesterol     Hypertension 2017    patient denies hx of HTN    Multiple thyroid nodules     biopsied and benign    PUD (peptic ulcer disease)     \"years ago\"       Social History   Substance Use Topics    Smoking status: Former Smoker     Packs/day: 0.25     Years: 40.00     Quit date: 10/1/2012    Smokeless tobacco: Never Used    Alcohol use Yes      Comment: occassionally 3-4 cans of beer      Family History   Problem Relation Age of Onset    Cancer Mother      BREAST    Heart Disease Father     Lung Disease Father         Review of Systems  Constitutional: Negative for fever, chills, and diaphoresis. Respiratory: Negative for cough, hemoptysis, sputum production, shortness of breath and wheezing. Cardiovascular: Negative for chest pain, palpitations, orthopnea, claudication, leg swelling and PND. Gastrointestinal: Negative for heartburn, nausea, vomiting, blood in stool and melena. Genitourinary: Negative for dysuria and flank pain. Musculoskeletal: Negative for joint pain and back pain. Skin: Negative for rash. Neurological: Negative for focal weakness, seizures, loss of consciousness, weakness and headaches. Endo/Heme/Allergies: Does not bruise/bleed easily. Psychiatric/Behavioral: Negative for memory loss. The patient does not have insomnia. Objective:       Vitals:    08/09/17 1106 08/09/17 1118 08/09/17 1120   BP: 140/78 146/76 150/80   Pulse: (!) 52     Resp: 18     SpO2: 97%     Weight: 253 lb 8 oz (115 kg)     Height: 6' (1.829 m)      Body mass index is 34.38 kg/(m^2). General PE    Gen: NAD     Mental Status - Alert. General Appearance - Not in acute distress. Neck - no JVD     Chest and Lung Exam     Inspection: Accessory muscles - No use of accessory muscles in breathing. Auscultation:   Breath sounds: - Normal.     Cardiovascular   Inspection: Jugular vein - Bilateral - Inspection Normal.   Palpation/Percussion:   Apical Impulse: - Normal.   Auscultation: Rhythm - Regular. Heart Sounds - S1 WNL and S2 WNL. No S3 or S4. Murmurs & Other Heart Sounds: Auscultation of the heart reveals - No Murmurs. Peripheral Vascular   Upper Extremity: Inspection - Bilateral - No Cyanotic nailbeds or Digital clubbing. Lower Extremity:   Palpation: Edema - Bilateral - No edema. Abdomen: Soft, non-tender, bowel sounds are active. Neuro: A&O times 3, CN and motor grossly WNL      Data Review:     EKG -  Sinus  Bradycardia   Low voltage with rightward P-axis and rotation -possible pulmonary disease. Allergies reviewed  Allergies   Allergen Reactions    Percocet [Oxycodone-Acetaminophen] Anxiety     Patient takes Tylenol without problems. Medications reviewed  Current Outpatient Prescriptions   Medication Sig    pyridoxine, vitamin B6, (VITAMIN B-6) 100 mg tablet Take 100 mg by mouth daily.  amiodarone (CORDARONE) 200 mg tablet Take 1 Tab by mouth daily. Lowered 05/03/17 (Patient taking differently: Take 200 mg by mouth every evening.  Lowered 05/03/17)    apixaban (ELIQUIS) 5 mg tablet Take 1 Tab by mouth two (2) times a day.  diclofenac EC (VOLTAREN) 75 mg EC tablet Take 75 mg by mouth two (2) times a day.  furosemide (LASIX) 20 mg tablet Take 1 Tab by mouth daily as needed.  cyanocobalamin (VITAMIN B-12) 1,000 mcg tablet Take 1,000 mcg by mouth daily.  simvastatin (ZOCOR) 20 mg tablet TAKE 1 TABLET AT BEDTIME     No current facility-administered medications for this visit. Assessment:       ICD-10-CM ICD-9-CM    1. Essential hypertension I10 401.9 AMB POC EKG ROUTINE W/ 12 LEADS, INTER & REP        Orders Placed This Encounter    AMB POC EKG ROUTINE W/ 12 LEADS, INTER & REP     Order Specific Question:   Reason for Exam:     Answer:   routine       Patient Active Problem List   Diagnosis Code    Paroxysmal atrial fibrillation (HCC) I48.0    Dyslipidemia E78.5    SOB (shortness of breath) R06.02    Pleural effusion, bilateral J90    Sinus bradycardia R00.1    S/P ablation of atrial fibrillation Z98.890, Z86.79    Long term (current) use of anticoagulants Z79.01    Mixed hyperlipidemia E78.2    Other dyspnea and respiratory abnormality R06.09, R09.89    Encounter for cardioversion procedure Z01.89    Hypertension I10       Plan:     Patient presents for follow up, he remains in rhythm, sinus bradycardia. He complains of ataxia, fatigue and dizziness. Discussed lowering his amiodarone to 100mg daily and the risk of PAF prior to ablation. May take PRN lasix today for hand swelling and elevated bp. Hal Woodward, [address] [facility]    8/9/2017         Agree with note as outlined by  NP. I confirm findings in history and physical exam. No additional findings noted. Agree with plan as outlined above.      Liz Alonso MD

## 2017-08-21 ENCOUNTER — HOSPITAL ENCOUNTER (OUTPATIENT)
Dept: PREADMISSION TESTING | Age: 62
Discharge: HOME | End: 2017-08-21
Attending: THORACIC SURGERY (CARDIOTHORACIC VASCULAR SURGERY)
Payer: COMMERCIAL

## 2017-08-21 PROCEDURE — 86923 COMPATIBILITY TEST ELECTRIC: CPT | Performed by: THORACIC SURGERY (CARDIOTHORACIC VASCULAR SURGERY)

## 2017-08-21 PROCEDURE — 36415 COLL VENOUS BLD VENIPUNCTURE: CPT | Performed by: THORACIC SURGERY (CARDIOTHORACIC VASCULAR SURGERY)

## 2017-08-21 PROCEDURE — 86900 BLOOD TYPING SEROLOGIC ABO: CPT | Performed by: THORACIC SURGERY (CARDIOTHORACIC VASCULAR SURGERY)

## 2017-08-21 NOTE — PERIOP NOTES
Pt reports no changes in medical history or medications since he was here for PAT appt on 07/20/2017. Pt reports that he has stopped his eliquis on Saturday 08/19/2017. He verbalizes understanding of instructions. Consents signed and typed and cross done.

## 2017-08-21 NOTE — PERIOP NOTES
[facility]  Preoperative Instructions        Surgery Date 08/25/2017          Time of Arrival 0530 am Contact # [phone]    1. On the day of your surgery, please report to the Surgical Services Registration Desk and sign in at your designated time. The Surgery Center is located to the right of the Emergency Room. 2. You must have someone with you to drive you home. You should not drive a car for 24 hours following surgery. Please make arrangements for a friend or family member to stay with you for the first 24 hours after your surgery. 3. Do not have anything to eat or drink (including water, gum, mints, coffee, juice) after midnight ?? .? This may not apply to medications prescribed by your physician. ?(Please note below the special instructions with medications to take the morning of your procedure.)    4. We recommend you do not drink any alcoholic beverages for 24 hours before and after your surgery. 5. Stop all Aspirin, non-steroidal anti-inflammatory drugs (i.e. Advil, Aleve), vitamins, and supplements?as directed by your surgeon's office. ? **If you are currently taking Plavix, Coumadin, or other blood-thinning agents, contact your surgeon for instructions. **    6. Wear comfortable clothes. Wear glasses instead of contacts. Do not bring any money or jewelry. Please bring picture ID, insurance card, and any prearranged co-payment or hospital payment. Do not wear make-up, particularly mascara the morning of your surgery. Do not wear nail polish, particularly if you are having foot /hand surgery. Wear your hair loose or down, no ponytails, buns, eugene pins or clips. All body piercings must be removed. Please shower with antibacterial soap for three consecutive days before and on the morning of surgery, but do not apply any lotions, powders or deodorants after the shower on the day of surgery. Please use a fresh towels after each shower.  Please sleep in clean clothes and change bed linens the night before surgery. Please do not shave for 48 hours prior to surgery. Shaving of the face is acceptable. 7. You should understand that if you do not follow these instructions your surgery may be cancelled. If your physical condition changes (I.e. fever, cold or flu) please contact your surgeon as soon as possible. 8. It is important that you be on time. If a situation occurs where you may be late, please call [phone] (OR Holding Area). 9. If you have any questions and or problems, please call [phone] (Pre-admission Testing). 10. Your surgery time may be subject to change. You will receive a phone call the evening prior if your time changes. 11.  If having outpatient surgery, you must have someone to drive you here, stay with you during the duration of your stay, and to drive you home at time of discharge. Special Instructions:    MEDICATIONS TO TAKE THE MORNING OF SURGERY WITH A SIP OF WATER:Use bactroban ointment and peridex mouth wash the day of surgery and bring with you      I understand a pre-operative phone call will be made to verify my surgery time. In the event that I am not available, I give permission for a message to be left on my answering service and/or with another person?   Yes       ___________________      __________   _________    (Signature of Patient)             (Witness)                (Date and Time)

## 2017-08-24 RX ORDER — NITROGLYCERIN 20 MG/100ML
5-200 INJECTION INTRAVENOUS
Status: DISCONTINUED | OUTPATIENT
Start: 2017-08-25 | End: 2017-08-25

## 2017-08-24 RX ORDER — METHYLPREDNISOLONE SODIUM SUCCINATE 125 MG/2ML
125 INJECTION, POWDER, LYOPHILIZED, FOR SOLUTION INTRAMUSCULAR; INTRAVENOUS ONCE
Status: COMPLETED | OUTPATIENT
Start: 2017-08-25 | End: 2017-08-25

## 2017-08-25 ENCOUNTER — HOSPITAL ENCOUNTER (OUTPATIENT)
Dept: NON INVASIVE DIAGNOSTICS | Age: 62
Discharge: HOME | DRG: 274 | End: 2017-08-25
Payer: COMMERCIAL

## 2017-08-25 ENCOUNTER — APPOINTMENT (OUTPATIENT)
Dept: GENERAL RADIOLOGY | Age: 62
DRG: 274 | End: 2017-08-25
Attending: THORACIC SURGERY (CARDIOTHORACIC VASCULAR SURGERY)
Payer: COMMERCIAL

## 2017-08-25 ENCOUNTER — HOSPITAL ENCOUNTER (INPATIENT)
Age: 62
LOS: 4 days | Discharge: HOME | DRG: 274 | End: 2017-08-29
Attending: THORACIC SURGERY (CARDIOTHORACIC VASCULAR SURGERY) | Admitting: THORACIC SURGERY (CARDIOTHORACIC VASCULAR SURGERY)
Payer: COMMERCIAL

## 2017-08-25 PROBLEM — I48.91 ATRIAL FIBRILLATION (HCC): Status: ACTIVE | Noted: 2017-08-25

## 2017-08-25 LAB
ANION GAP SERPL CALC-SCNC: 7 MMOL/L (ref 5–15)
APTT PPP: 30.4 SEC (ref 22.1–32.5)
BASOPHILS # BLD: 0 K/UL (ref 0–0.1)
BASOPHILS NFR BLD: 0 % (ref 0–1)
BUN SERPL-MCNC: 21 MG/DL (ref 6–20)
BUN/CREAT SERPL: 23 (ref 12–20)
CA-I BLD-SCNC: 1.08 MMOL/L (ref 1.13–1.32)
CALCIUM SERPL-MCNC: 7.4 MG/DL (ref 8.5–10.1)
CHLORIDE SERPL-SCNC: 108 MMOL/L (ref 97–108)
CO2 SERPL-SCNC: 24 MMOL/L (ref 21–32)
CREAT SERPL-MCNC: 0.92 MG/DL (ref 0.7–1.3)
EOSINOPHIL # BLD: 0 K/UL (ref 0–0.4)
EOSINOPHIL NFR BLD: 0 % (ref 0–7)
ERYTHROCYTE [DISTWIDTH] IN BLOOD BY AUTOMATED COUNT: 13.6 % (ref 11.5–14.5)
GLUCOSE BLD STRIP.AUTO-MCNC: 102 MG/DL (ref 65–100)
GLUCOSE SERPL-MCNC: 170 MG/DL (ref 65–100)
HCT VFR BLD AUTO: 41.5 % (ref 36.6–50.3)
HGB BLD-MCNC: 14.1 G/DL (ref 12.1–17)
INR BLD: 1.1 (ref 0.9–1.2)
INR PPP: 1.1 (ref 0.9–1.1)
LYMPHOCYTES # BLD: 0.9 K/UL (ref 0.8–3.5)
LYMPHOCYTES NFR BLD: 8 % (ref 12–49)
MAGNESIUM SERPL-MCNC: 2.2 MG/DL (ref 1.6–2.4)
MCH RBC QN AUTO: 31.3 PG (ref 26–34)
MCHC RBC AUTO-ENTMCNC: 34 G/DL (ref 30–36.5)
MCV RBC AUTO: 92 FL (ref 80–99)
MONOCYTES # BLD: 0.2 K/UL (ref 0–1)
MONOCYTES NFR BLD: 2 % (ref 5–13)
NEUTS SEG # BLD: 10.7 K/UL (ref 1.8–8)
NEUTS SEG NFR BLD: 90 % (ref 32–75)
PLATELET # BLD AUTO: 156 K/UL (ref 150–400)
POTASSIUM SERPL-SCNC: 3.8 MMOL/L (ref 3.5–5.1)
PROTHROMBIN TIME: 11.3 SEC (ref 9–11.1)
RBC # BLD AUTO: 4.51 M/UL (ref 4.1–5.7)
SERVICE CMNT-IMP: ABNORMAL
SODIUM SERPL-SCNC: 139 MMOL/L (ref 136–145)
THERAPEUTIC RANGE,PTTT: NORMAL SECS (ref 58–77)
WBC # BLD AUTO: 11.9 K/UL (ref 4.1–11.1)

## 2017-08-25 PROCEDURE — 82330 ASSAY OF CALCIUM: CPT | Performed by: PHYSICIAN ASSISTANT

## 2017-08-25 PROCEDURE — 77030021678 HC GLIDESCP STAT DISP VERT -B: Performed by: NURSE ANESTHETIST, CERTIFIED REGISTERED

## 2017-08-25 PROCEDURE — 74011250637 HC RX REV CODE- 250/637: Performed by: PHYSICIAN ASSISTANT

## 2017-08-25 PROCEDURE — 74011000250 HC RX REV CODE- 250

## 2017-08-25 PROCEDURE — 85610 PROTHROMBIN TIME: CPT | Performed by: PHYSICIAN ASSISTANT

## 2017-08-25 PROCEDURE — 77030018846 HC SOL IRR STRL H20 ICUM -A: Performed by: THORACIC SURGERY (CARDIOTHORACIC VASCULAR SURGERY)

## 2017-08-25 PROCEDURE — C1766 INTRO/SHEATH,STRBLE,NON-PEEL: HCPCS

## 2017-08-25 PROCEDURE — 77030020061 HC IV BLD WRMR ADMIN SET 3M -B: Performed by: NURSE ANESTHETIST, CERTIFIED REGISTERED

## 2017-08-25 PROCEDURE — 77030005401 HC CATH RAD ARRO -A: Performed by: NURSE ANESTHETIST, CERTIFIED REGISTERED

## 2017-08-25 PROCEDURE — 93613 INTRACARDIAC EPHYS 3D MAPG: CPT

## 2017-08-25 PROCEDURE — 85730 THROMBOPLASTIN TIME PARTIAL: CPT | Performed by: PHYSICIAN ASSISTANT

## 2017-08-25 PROCEDURE — 77030011264 HC ELECTRD BLD EXT COVD -A: Performed by: THORACIC SURGERY (CARDIOTHORACIC VASCULAR SURGERY)

## 2017-08-25 PROCEDURE — 77030018547 HC SUT ETHBND1 J&J -B: Performed by: THORACIC SURGERY (CARDIOTHORACIC VASCULAR SURGERY)

## 2017-08-25 PROCEDURE — 77030020256 HC SOL INJ NACL 0.9%  500ML: Performed by: THORACIC SURGERY (CARDIOTHORACIC VASCULAR SURGERY)

## 2017-08-25 PROCEDURE — 05HN33Z INSERTION OF INFUSION DEVICE INTO LEFT INTERNAL JUGULAR VEIN, PERCUTANEOUS APPROACH: ICD-10-PCS | Performed by: ANESTHESIOLOGY

## 2017-08-25 PROCEDURE — 74011000258 HC RX REV CODE- 258: Performed by: THORACIC SURGERY (CARDIOTHORACIC VASCULAR SURGERY)

## 2017-08-25 PROCEDURE — 77030011640 HC PAD GRND REM COVD -A: Performed by: THORACIC SURGERY (CARDIOTHORACIC VASCULAR SURGERY)

## 2017-08-25 PROCEDURE — 93656 COMPRE EP EVAL ABLTJ ATR FIB: CPT

## 2017-08-25 PROCEDURE — 76010000110 HC CV SURG 3 TO 3.5 HR: Performed by: THORACIC SURGERY (CARDIOTHORACIC VASCULAR SURGERY)

## 2017-08-25 PROCEDURE — 77030020506 HC NDL TRNSPTL NRG BAYL -F

## 2017-08-25 PROCEDURE — 77030010796: Performed by: THORACIC SURGERY (CARDIOTHORACIC VASCULAR SURGERY)

## 2017-08-25 PROCEDURE — 77030029065 HC DRSG HEMO QCLOT ZMED -B

## 2017-08-25 PROCEDURE — 74011250636 HC RX REV CODE- 250/636: Performed by: NURSE PRACTITIONER

## 2017-08-25 PROCEDURE — 77030034848: Performed by: THORACIC SURGERY (CARDIOTHORACIC VASCULAR SURGERY)

## 2017-08-25 PROCEDURE — 74011250636 HC RX REV CODE- 250/636

## 2017-08-25 PROCEDURE — C1894 INTRO/SHEATH, NON-LASER: HCPCS

## 2017-08-25 PROCEDURE — 77030010507 HC ADH SKN DERMBND J&J -B: Performed by: THORACIC SURGERY (CARDIOTHORACIC VASCULAR SURGERY)

## 2017-08-25 PROCEDURE — 74011250636 HC RX REV CODE- 250/636: Performed by: INTERNAL MEDICINE

## 2017-08-25 PROCEDURE — 77030013797 HC KT TRNSDUC PRSSR EDWD -A: Performed by: NURSE ANESTHETIST, CERTIFIED REGISTERED

## 2017-08-25 PROCEDURE — 74011250636 HC RX REV CODE- 250/636: Performed by: THORACIC SURGERY (CARDIOTHORACIC VASCULAR SURGERY)

## 2017-08-25 PROCEDURE — 77030008684 HC TU ET CUF COVD -B: Performed by: NURSE ANESTHETIST, CERTIFIED REGISTERED

## 2017-08-25 PROCEDURE — 85025 COMPLETE CBC W/AUTO DIFF WBC: CPT | Performed by: PHYSICIAN ASSISTANT

## 2017-08-25 PROCEDURE — 77030011256 HC DRSG MEPILEX <16IN NO BORD MOLN -A: Performed by: THORACIC SURGERY (CARDIOTHORACIC VASCULAR SURGERY)

## 2017-08-25 PROCEDURE — 77030019908 HC STETH ESOPH SIMS -A: Performed by: NURSE ANESTHETIST, CERTIFIED REGISTERED

## 2017-08-25 PROCEDURE — 77030002933 HC SUT MCRYL J&J -A: Performed by: THORACIC SURGERY (CARDIOTHORACIC VASCULAR SURGERY)

## 2017-08-25 PROCEDURE — 77030010880 HC CBL EP SUPRME STJU -C

## 2017-08-25 PROCEDURE — 4A133J1 MONITORING OF ARTERIAL PULSE, PERIPHERAL, PERCUTANEOUS APPROACH: ICD-10-PCS | Performed by: ANESTHESIOLOGY

## 2017-08-25 PROCEDURE — 77030016151 HC PROTCTR LNS DFOG COVD -B: Performed by: THORACIC SURGERY (CARDIOTHORACIC VASCULAR SURGERY)

## 2017-08-25 PROCEDURE — 77030013797 HC KT TRNSDUC PRSSR EDWD -A

## 2017-08-25 PROCEDURE — 77030027107 HC PTCH EXT REF CARTO3 J&J -F

## 2017-08-25 PROCEDURE — 82803 BLOOD GASES ANY COMBINATION: CPT | Performed by: PHYSICIAN ASSISTANT

## 2017-08-25 PROCEDURE — 65620000000 HC RM CCU GENERAL

## 2017-08-25 PROCEDURE — 77030018835 HC SOL IRR LR ICUM -A: Performed by: THORACIC SURGERY (CARDIOTHORACIC VASCULAR SURGERY)

## 2017-08-25 PROCEDURE — 77030026438 HC STYL ET INTUB CARD -A: Performed by: NURSE ANESTHETIST, CERTIFIED REGISTERED

## 2017-08-25 PROCEDURE — 77030031139 HC SUT VCRL2 J&J -A: Performed by: THORACIC SURGERY (CARDIOTHORACIC VASCULAR SURGERY)

## 2017-08-25 PROCEDURE — 77030034404 HC DEV ABLAT CARD EPISENSE ATRC -K3: Performed by: THORACIC SURGERY (CARDIOTHORACIC VASCULAR SURGERY)

## 2017-08-25 PROCEDURE — B24BZZ4 ULTRASONOGRAPHY OF HEART WITH AORTA, TRANSESOPHAGEAL: ICD-10-PCS | Performed by: ANESTHESIOLOGY

## 2017-08-25 PROCEDURE — 4A133B1 MONITORING OF ARTERIAL PRESSURE, PERIPHERAL, PERCUTANEOUS APPROACH: ICD-10-PCS | Performed by: ANESTHESIOLOGY

## 2017-08-25 PROCEDURE — 83735 ASSAY OF MAGNESIUM: CPT | Performed by: THORACIC SURGERY (CARDIOTHORACIC VASCULAR SURGERY)

## 2017-08-25 PROCEDURE — 77030018729 HC ELECTRD DEFIB PAD CARD -B: Performed by: THORACIC SURGERY (CARDIOTHORACIC VASCULAR SURGERY)

## 2017-08-25 PROCEDURE — C1730 CATH, EP, 19 OR FEW ELECT: HCPCS

## 2017-08-25 PROCEDURE — C1893 INTRO/SHEATH, FIXED,NON-PEEL: HCPCS

## 2017-08-25 PROCEDURE — B544ZZA ULTRASONOGRAPHY OF LEFT JUGULAR VEINS, GUIDANCE: ICD-10-PCS | Performed by: ANESTHESIOLOGY

## 2017-08-25 PROCEDURE — C1759 CATH, INTRA ECHOCARDIOGRAPHY: HCPCS

## 2017-08-25 PROCEDURE — 02583ZZ DESTRUCTION OF CONDUCTION MECHANISM, PERCUTANEOUS APPROACH: ICD-10-PCS | Performed by: INTERNAL MEDICINE

## 2017-08-25 PROCEDURE — 77030011244 HC DRN WND HUBLS J&J -B: Performed by: THORACIC SURGERY (CARDIOTHORACIC VASCULAR SURGERY)

## 2017-08-25 PROCEDURE — 36415 COLL VENOUS BLD VENIPUNCTURE: CPT | Performed by: PHYSICIAN ASSISTANT

## 2017-08-25 PROCEDURE — 02K83ZZ MAP CONDUCTION MECHANISM, PERCUTANEOUS APPROACH: ICD-10-PCS | Performed by: INTERNAL MEDICINE

## 2017-08-25 PROCEDURE — 76060000037 HC ANESTHESIA 3 TO 3.5 HR: Performed by: THORACIC SURGERY (CARDIOTHORACIC VASCULAR SURGERY)

## 2017-08-25 PROCEDURE — 77030013567 HC DRN WND RESERV BARD -A: Performed by: THORACIC SURGERY (CARDIOTHORACIC VASCULAR SURGERY)

## 2017-08-25 PROCEDURE — 4A0234Z MEASUREMENT OF CARDIAC ELECTRICAL ACTIVITY, PERCUTANEOUS APPROACH: ICD-10-PCS | Performed by: INTERNAL MEDICINE

## 2017-08-25 PROCEDURE — 76000 FLUOROSCOPY <1 HR PHYS/QHP: CPT

## 2017-08-25 PROCEDURE — 77030011640 HC PAD GRND REM COVD -A

## 2017-08-25 PROCEDURE — 77030008771 HC TU NG SALEM SUMP -A: Performed by: NURSE ANESTHETIST, CERTIFIED REGISTERED

## 2017-08-25 PROCEDURE — 77030002966 HC SUT PDS J&J -A: Performed by: THORACIC SURGERY (CARDIOTHORACIC VASCULAR SURGERY)

## 2017-08-25 PROCEDURE — 85610 PROTHROMBIN TIME: CPT

## 2017-08-25 PROCEDURE — C1732 CATH, EP, DIAG/ABL, 3D/VECT: HCPCS

## 2017-08-25 PROCEDURE — 4A023FZ MEASUREMENT OF CARDIAC RHYTHM, PERCUTANEOUS APPROACH: ICD-10-PCS | Performed by: INTERNAL MEDICINE

## 2017-08-25 PROCEDURE — 77030035291 HC TBNG PMP SMARTABLATE J&J -B

## 2017-08-25 PROCEDURE — 77030018843 HC SOL IRR SOD CL 9% SLSH BAXT -B: Performed by: THORACIC SURGERY (CARDIOTHORACIC VASCULAR SURGERY)

## 2017-08-25 PROCEDURE — 80048 BASIC METABOLIC PNL TOTAL CA: CPT | Performed by: PHYSICIAN ASSISTANT

## 2017-08-25 PROCEDURE — 93657 TX L/R ATRIAL FIB ADDL: CPT

## 2017-08-25 PROCEDURE — 74011250636 HC RX REV CODE- 250/636: Performed by: PHYSICIAN ASSISTANT

## 2017-08-25 PROCEDURE — 82962 GLUCOSE BLOOD TEST: CPT

## 2017-08-25 PROCEDURE — 71010 XR CHEST PORT: CPT

## 2017-08-25 PROCEDURE — 77030037878 HC DRSG MEPILEX >48IN BORD MOLN -B

## 2017-08-25 PROCEDURE — 93005 ELECTROCARDIOGRAM TRACING: CPT

## 2017-08-25 PROCEDURE — C1731 CATH, EP, 20 OR MORE ELEC: HCPCS

## 2017-08-25 PROCEDURE — 03HY32Z INSERTION OF MONITORING DEVICE INTO UPPER ARTERY, PERCUTANEOUS APPROACH: ICD-10-PCS | Performed by: ANESTHESIOLOGY

## 2017-08-25 PROCEDURE — 77030020263 HC SOL INJ SOD CL0.9% LFCR 1000ML: Performed by: THORACIC SURGERY (CARDIOTHORACIC VASCULAR SURGERY)

## 2017-08-25 RX ORDER — ONDANSETRON 2 MG/ML
INJECTION INTRAMUSCULAR; INTRAVENOUS AS NEEDED
Status: DISCONTINUED | OUTPATIENT
Start: 2017-08-25 | End: 2017-08-25 | Stop reason: HOSPADM

## 2017-08-25 RX ORDER — NALOXONE HYDROCHLORIDE 0.4 MG/ML
0.4 INJECTION, SOLUTION INTRAMUSCULAR; INTRAVENOUS; SUBCUTANEOUS
Status: DISCONTINUED | OUTPATIENT
Start: 2017-08-25 | End: 2017-08-29 | Stop reason: HOSPADM

## 2017-08-25 RX ORDER — HYDROMORPHONE HYDROCHLORIDE 2 MG/ML
INJECTION, SOLUTION INTRAMUSCULAR; INTRAVENOUS; SUBCUTANEOUS AS NEEDED
Status: DISCONTINUED | OUTPATIENT
Start: 2017-08-25 | End: 2017-08-25 | Stop reason: HOSPADM

## 2017-08-25 RX ORDER — PROTAMINE SULFATE 10 MG/ML
25-100 INJECTION, SOLUTION INTRAVENOUS
Status: DISCONTINUED | OUTPATIENT
Start: 2017-08-25 | End: 2017-08-25

## 2017-08-25 RX ORDER — MIDAZOLAM HYDROCHLORIDE 1 MG/ML
1-5 INJECTION, SOLUTION INTRAMUSCULAR; INTRAVENOUS
Status: DISCONTINUED | OUTPATIENT
Start: 2017-08-25 | End: 2017-08-25

## 2017-08-25 RX ORDER — SUCCINYLCHOLINE CHLORIDE 20 MG/ML
INJECTION INTRAMUSCULAR; INTRAVENOUS AS NEEDED
Status: DISCONTINUED | OUTPATIENT
Start: 2017-08-25 | End: 2017-08-25 | Stop reason: HOSPADM

## 2017-08-25 RX ORDER — SODIUM CHLORIDE 0.9 % (FLUSH) 0.9 %
5-10 SYRINGE (ML) INJECTION AS NEEDED
Status: DISCONTINUED | OUTPATIENT
Start: 2017-08-25 | End: 2017-08-26 | Stop reason: SDUPTHER

## 2017-08-25 RX ORDER — METHYLPREDNISOLONE 4 MG/1
TABLET ORAL
Qty: 1 DOSE PACK | Refills: 0 | Status: SHIPPED | OUTPATIENT
Start: 2017-08-25 | End: 2017-08-25

## 2017-08-25 RX ORDER — SODIUM CHLORIDE 9 MG/ML
250 INJECTION, SOLUTION INTRAVENOUS AS NEEDED
Status: DISCONTINUED | OUTPATIENT
Start: 2017-08-25 | End: 2017-08-25

## 2017-08-25 RX ORDER — FENTANYL CITRATE 50 UG/ML
INJECTION, SOLUTION INTRAMUSCULAR; INTRAVENOUS AS NEEDED
Status: DISCONTINUED | OUTPATIENT
Start: 2017-08-25 | End: 2017-08-25 | Stop reason: HOSPADM

## 2017-08-25 RX ORDER — CEFAZOLIN SODIUM IN 0.9 % NACL 2 G/100 ML
2 PLASTIC BAG, INJECTION (ML) INTRAVENOUS ONCE
Status: COMPLETED | OUTPATIENT
Start: 2017-08-25 | End: 2017-08-25

## 2017-08-25 RX ORDER — OXYCODONE HYDROCHLORIDE 5 MG/1
5-10 TABLET ORAL
Status: DISCONTINUED | OUTPATIENT
Start: 2017-08-25 | End: 2017-08-29 | Stop reason: HOSPADM

## 2017-08-25 RX ORDER — MUPIROCIN 20 MG/G
OINTMENT TOPICAL 2 TIMES DAILY
Status: DISCONTINUED | OUTPATIENT
Start: 2017-08-25 | End: 2017-08-29 | Stop reason: HOSPADM

## 2017-08-25 RX ORDER — HEPARIN SODIUM 1000 [USP'U]/ML
INJECTION, SOLUTION INTRAVENOUS; SUBCUTANEOUS
Status: DISCONTINUED
Start: 2017-08-25 | End: 2017-08-26

## 2017-08-25 RX ORDER — ACETAMINOPHEN 10 MG/ML
INJECTION, SOLUTION INTRAVENOUS AS NEEDED
Status: DISCONTINUED | OUTPATIENT
Start: 2017-08-25 | End: 2017-08-25 | Stop reason: HOSPADM

## 2017-08-25 RX ORDER — SODIUM CHLORIDE, SODIUM LACTATE, POTASSIUM CHLORIDE, CALCIUM CHLORIDE 600; 310; 30; 20 MG/100ML; MG/100ML; MG/100ML; MG/100ML
25 INJECTION, SOLUTION INTRAVENOUS CONTINUOUS
Status: DISCONTINUED | OUTPATIENT
Start: 2017-08-25 | End: 2017-08-25

## 2017-08-25 RX ORDER — MORPHINE SULFATE 10 MG/ML
1-2 INJECTION, SOLUTION INTRAMUSCULAR; INTRAVENOUS
Status: DISCONTINUED | OUTPATIENT
Start: 2017-08-25 | End: 2017-08-29 | Stop reason: HOSPADM

## 2017-08-25 RX ORDER — HEPARIN SODIUM 1000 [USP'U]/ML
INJECTION, SOLUTION INTRAVENOUS; SUBCUTANEOUS AS NEEDED
Status: DISCONTINUED | OUTPATIENT
Start: 2017-08-25 | End: 2017-08-25 | Stop reason: HOSPADM

## 2017-08-25 RX ORDER — PROTAMINE SULFATE 10 MG/ML
INJECTION, SOLUTION INTRAVENOUS
Status: DISCONTINUED
Start: 2017-08-25 | End: 2017-08-29 | Stop reason: HOSPADM

## 2017-08-25 RX ORDER — GLYCOPYRROLATE 0.2 MG/ML
INJECTION INTRAMUSCULAR; INTRAVENOUS AS NEEDED
Status: DISCONTINUED | OUTPATIENT
Start: 2017-08-25 | End: 2017-08-25 | Stop reason: HOSPADM

## 2017-08-25 RX ORDER — NEOSTIGMINE METHYLSULFATE 1 MG/ML
INJECTION INTRAVENOUS AS NEEDED
Status: DISCONTINUED | OUTPATIENT
Start: 2017-08-25 | End: 2017-08-25 | Stop reason: HOSPADM

## 2017-08-25 RX ORDER — ALBUMIN HUMAN 50 G/1000ML
12.5 SOLUTION INTRAVENOUS
Status: DISCONTINUED | OUTPATIENT
Start: 2017-08-25 | End: 2017-08-29 | Stop reason: HOSPADM

## 2017-08-25 RX ORDER — OXYCODONE HYDROCHLORIDE 5 MG/1
5-10 TABLET ORAL
Qty: 30 TAB | Refills: 0 | Status: SHIPPED | OUTPATIENT
Start: 2017-08-25 | End: 2017-09-07

## 2017-08-25 RX ORDER — SODIUM CHLORIDE 0.9 % (FLUSH) 0.9 %
5-10 SYRINGE (ML) INJECTION EVERY 8 HOURS
Status: DISCONTINUED | OUTPATIENT
Start: 2017-08-25 | End: 2017-08-25

## 2017-08-25 RX ORDER — PROPOFOL 10 MG/ML
INJECTION, EMULSION INTRAVENOUS AS NEEDED
Status: DISCONTINUED | OUTPATIENT
Start: 2017-08-25 | End: 2017-08-25 | Stop reason: HOSPADM

## 2017-08-25 RX ORDER — HEPARIN SODIUM 10000 [USP'U]/100ML
INJECTION, SOLUTION INTRAVENOUS
Status: DISCONTINUED
Start: 2017-08-25 | End: 2017-08-26

## 2017-08-25 RX ORDER — MIDAZOLAM HYDROCHLORIDE 1 MG/ML
1-2 INJECTION, SOLUTION INTRAMUSCULAR; INTRAVENOUS
Status: DISCONTINUED | OUTPATIENT
Start: 2017-08-25 | End: 2017-08-29 | Stop reason: HOSPADM

## 2017-08-25 RX ORDER — ALBUTEROL SULFATE 0.83 MG/ML
2.5 SOLUTION RESPIRATORY (INHALATION)
Status: DISCONTINUED | OUTPATIENT
Start: 2017-08-25 | End: 2017-08-29 | Stop reason: HOSPADM

## 2017-08-25 RX ORDER — ONDANSETRON 2 MG/ML
4 INJECTION INTRAMUSCULAR; INTRAVENOUS
Status: DISCONTINUED | OUTPATIENT
Start: 2017-08-25 | End: 2017-08-29 | Stop reason: HOSPADM

## 2017-08-25 RX ORDER — SODIUM CHLORIDE 0.9 % (FLUSH) 0.9 %
5-10 SYRINGE (ML) INJECTION EVERY 8 HOURS
Status: DISCONTINUED | OUTPATIENT
Start: 2017-08-25 | End: 2017-08-26 | Stop reason: SDUPTHER

## 2017-08-25 RX ORDER — SODIUM CHLORIDE 0.9 % (FLUSH) 0.9 %
5-10 SYRINGE (ML) INJECTION AS NEEDED
Status: DISCONTINUED | OUTPATIENT
Start: 2017-08-25 | End: 2017-08-29 | Stop reason: HOSPADM

## 2017-08-25 RX ORDER — DOBUTAMINE HYDROCHLORIDE 200 MG/100ML
INJECTION INTRAVENOUS
Status: DISCONTINUED
Start: 2017-08-25 | End: 2017-08-29 | Stop reason: HOSPADM

## 2017-08-25 RX ORDER — ROCURONIUM BROMIDE 10 MG/ML
INJECTION, SOLUTION INTRAVENOUS AS NEEDED
Status: DISCONTINUED | OUTPATIENT
Start: 2017-08-25 | End: 2017-08-25 | Stop reason: HOSPADM

## 2017-08-25 RX ORDER — SODIUM CHLORIDE 0.9 % (FLUSH) 0.9 %
5-10 SYRINGE (ML) INJECTION AS NEEDED
Status: DISCONTINUED | OUTPATIENT
Start: 2017-08-25 | End: 2017-08-25

## 2017-08-25 RX ORDER — VECURONIUM BROMIDE FOR INJECTION 1 MG/ML
INJECTION, POWDER, LYOPHILIZED, FOR SOLUTION INTRAVENOUS AS NEEDED
Status: DISCONTINUED | OUTPATIENT
Start: 2017-08-25 | End: 2017-08-25 | Stop reason: HOSPADM

## 2017-08-25 RX ORDER — DOBUTAMINE HYDROCHLORIDE 200 MG/100ML
2.5-1 INJECTION INTRAVENOUS
Status: DISCONTINUED | OUTPATIENT
Start: 2017-08-25 | End: 2017-08-25

## 2017-08-25 RX ORDER — ACETAMINOPHEN 325 MG/1
650 TABLET ORAL
Status: DISCONTINUED | OUTPATIENT
Start: 2017-08-25 | End: 2017-08-29 | Stop reason: HOSPADM

## 2017-08-25 RX ORDER — MIDAZOLAM HYDROCHLORIDE 1 MG/ML
INJECTION, SOLUTION INTRAMUSCULAR; INTRAVENOUS AS NEEDED
Status: DISCONTINUED | OUTPATIENT
Start: 2017-08-25 | End: 2017-08-25 | Stop reason: HOSPADM

## 2017-08-25 RX ORDER — HEPARIN SODIUM 200 [USP'U]/100ML
INJECTION, SOLUTION INTRAVENOUS
Status: DISCONTINUED
Start: 2017-08-25 | End: 2017-08-26

## 2017-08-25 RX ORDER — AMIODARONE HYDROCHLORIDE 200 MG/1
200 TABLET ORAL EVERY EVENING
Status: DISCONTINUED | OUTPATIENT
Start: 2017-08-25 | End: 2017-08-28

## 2017-08-25 RX ORDER — HEPARIN SODIUM 1000 [USP'U]/ML
30000 INJECTION, SOLUTION INTRAVENOUS; SUBCUTANEOUS ONCE
Status: ACTIVE | OUTPATIENT
Start: 2017-08-25 | End: 2017-08-25

## 2017-08-25 RX ORDER — METHYLPREDNISOLONE 4 MG/1
TABLET ORAL
Qty: 1 DOSE PACK | Refills: 0 | Status: SHIPPED | OUTPATIENT
Start: 2017-08-25 | End: 2017-09-07

## 2017-08-25 RX ORDER — HEPARIN SODIUM 10000 [USP'U]/100ML
12-25 INJECTION, SOLUTION INTRAVENOUS
Status: DISCONTINUED | OUTPATIENT
Start: 2017-08-25 | End: 2017-08-25

## 2017-08-25 RX ORDER — SODIUM CHLORIDE 0.9 % (FLUSH) 0.9 %
5-10 SYRINGE (ML) INJECTION EVERY 8 HOURS
Status: DISCONTINUED | OUTPATIENT
Start: 2017-08-25 | End: 2017-08-29 | Stop reason: HOSPADM

## 2017-08-25 RX ORDER — DOCUSATE SODIUM 100 MG/1
100 CAPSULE, LIQUID FILLED ORAL 2 TIMES DAILY
Status: DISCONTINUED | OUTPATIENT
Start: 2017-08-25 | End: 2017-08-29 | Stop reason: HOSPADM

## 2017-08-25 RX ORDER — CEFAZOLIN SODIUM IN 0.9 % NACL 2 G/100 ML
2 PLASTIC BAG, INJECTION (ML) INTRAVENOUS EVERY 6 HOURS
Status: DISPENSED | OUTPATIENT
Start: 2017-08-25 | End: 2017-08-26

## 2017-08-25 RX ORDER — FENTANYL CITRATE 50 UG/ML
12.5-5 INJECTION, SOLUTION INTRAMUSCULAR; INTRAVENOUS
Status: DISCONTINUED | OUTPATIENT
Start: 2017-08-25 | End: 2017-08-25

## 2017-08-25 RX ORDER — HEPARIN SODIUM 1000 [USP'U]/ML
INJECTION, SOLUTION INTRAVENOUS; SUBCUTANEOUS
Status: DISCONTINUED | OUTPATIENT
Start: 2017-08-25 | End: 2017-08-25 | Stop reason: HOSPADM

## 2017-08-25 RX ORDER — HEPARIN SODIUM 200 [USP'U]/100ML
1500 INJECTION, SOLUTION INTRAVENOUS ONCE
Status: COMPLETED | OUTPATIENT
Start: 2017-08-25 | End: 2017-08-28

## 2017-08-25 RX ORDER — SODIUM CHLORIDE 9 MG/ML
INJECTION, SOLUTION INTRAVENOUS
Status: DISCONTINUED | OUTPATIENT
Start: 2017-08-25 | End: 2017-08-25 | Stop reason: HOSPADM

## 2017-08-25 RX ADMIN — MIDAZOLAM HYDROCHLORIDE 1 MG: 1 INJECTION, SOLUTION INTRAMUSCULAR; INTRAVENOUS at 07:07

## 2017-08-25 RX ADMIN — ROCURONIUM BROMIDE 30 MG: 10 INJECTION, SOLUTION INTRAVENOUS at 07:46

## 2017-08-25 RX ADMIN — CEFAZOLIN 2 G: 10 INJECTION, POWDER, FOR SOLUTION INTRAVENOUS; PARENTERAL at 20:56

## 2017-08-25 RX ADMIN — SODIUM CHLORIDE: 9 INJECTION, SOLUTION INTRAVENOUS at 12:23

## 2017-08-25 RX ADMIN — SODIUM CHLORIDE: 9 INJECTION, SOLUTION INTRAVENOUS at 07:34

## 2017-08-25 RX ADMIN — VECURONIUM BROMIDE FOR INJECTION 3 MG: 1 INJECTION, POWDER, LYOPHILIZED, FOR SOLUTION INTRAVENOUS at 09:02

## 2017-08-25 RX ADMIN — DOCUSATE SODIUM 100 MG: 100 CAPSULE, LIQUID FILLED ORAL at 17:39

## 2017-08-25 RX ADMIN — Medication 10 ML: at 23:20

## 2017-08-25 RX ADMIN — MORPHINE SULFATE 2 MG: 10 INJECTION INTRAMUSCULAR; INTRAVENOUS; SUBCUTANEOUS at 14:15

## 2017-08-25 RX ADMIN — HEPARIN SODIUM 3000 UNITS: 200 INJECTION, SOLUTION INTRAVENOUS at 11:36

## 2017-08-25 RX ADMIN — HEPARIN SODIUM 5000 UNITS/HR: 1000 INJECTION, SOLUTION INTRAVENOUS; SUBCUTANEOUS at 11:30

## 2017-08-25 RX ADMIN — HYDROMORPHONE HYDROCHLORIDE 0.4 MG: 2 INJECTION, SOLUTION INTRAMUSCULAR; INTRAVENOUS; SUBCUTANEOUS at 12:20

## 2017-08-25 RX ADMIN — HEPARIN SODIUM 7000 UNITS: 1000 INJECTION, SOLUTION INTRAVENOUS; SUBCUTANEOUS at 11:51

## 2017-08-25 RX ADMIN — FENTANYL CITRATE 100 MCG: 50 INJECTION, SOLUTION INTRAMUSCULAR; INTRAVENOUS at 07:40

## 2017-08-25 RX ADMIN — NEOSTIGMINE METHYLSULFATE 3 MG: 1 INJECTION INTRAVENOUS at 12:17

## 2017-08-25 RX ADMIN — MORPHINE SULFATE 2 MG: 10 INJECTION INTRAMUSCULAR; INTRAVENOUS; SUBCUTANEOUS at 16:40

## 2017-08-25 RX ADMIN — MIDAZOLAM HYDROCHLORIDE 2 MG: 1 INJECTION, SOLUTION INTRAMUSCULAR; INTRAVENOUS at 07:05

## 2017-08-25 RX ADMIN — SUCCINYLCHOLINE CHLORIDE 200 MG: 20 INJECTION INTRAMUSCULAR; INTRAVENOUS at 07:40

## 2017-08-25 RX ADMIN — Medication 10 ML: at 16:41

## 2017-08-25 RX ADMIN — POTASSIUM CHLORIDE 20 MEQ: 149 INJECTION, SOLUTION, CONCENTRATE INTRAVENOUS at 20:57

## 2017-08-25 RX ADMIN — ROCURONIUM BROMIDE 20 MG: 10 INJECTION, SOLUTION INTRAVENOUS at 07:58

## 2017-08-25 RX ADMIN — PROPOFOL 200 MG: 10 INJECTION, EMULSION INTRAVENOUS at 07:40

## 2017-08-25 RX ADMIN — DOBUTAMINE HYDROCHLORIDE 20 MCG/KG/MIN: 200 INJECTION INTRAVENOUS at 12:13

## 2017-08-25 RX ADMIN — AMIODARONE HYDROCHLORIDE 200 MG: 200 TABLET ORAL at 17:39

## 2017-08-25 RX ADMIN — ONDANSETRON 4 MG: 2 INJECTION INTRAMUSCULAR; INTRAVENOUS at 12:18

## 2017-08-25 RX ADMIN — GLYCOPYRROLATE 0.4 MG: 0.2 INJECTION INTRAMUSCULAR; INTRAVENOUS at 12:16

## 2017-08-25 RX ADMIN — VECURONIUM BROMIDE FOR INJECTION 2 MG: 1 INJECTION, POWDER, LYOPHILIZED, FOR SOLUTION INTRAVENOUS at 09:29

## 2017-08-25 RX ADMIN — FENTANYL CITRATE 50 MCG: 50 INJECTION, SOLUTION INTRAMUSCULAR; INTRAVENOUS at 08:12

## 2017-08-25 RX ADMIN — OXYCODONE HYDROCHLORIDE 10 MG: 5 TABLET ORAL at 20:56

## 2017-08-25 RX ADMIN — OXYCODONE HYDROCHLORIDE 10 MG: 5 TABLET ORAL at 17:39

## 2017-08-25 RX ADMIN — VECURONIUM BROMIDE FOR INJECTION 2 MG: 1 INJECTION, POWDER, LYOPHILIZED, FOR SOLUTION INTRAVENOUS at 10:30

## 2017-08-25 RX ADMIN — CEFAZOLIN 2 G: 10 INJECTION, POWDER, FOR SOLUTION INTRAVENOUS; PARENTERAL at 15:16

## 2017-08-25 RX ADMIN — ACETAMINOPHEN 1000 MG: 10 INJECTION, SOLUTION INTRAVENOUS at 11:23

## 2017-08-25 RX ADMIN — SODIUM CHLORIDE, POTASSIUM CHLORIDE, SODIUM LACTATE AND CALCIUM CHLORIDE: 600; 310; 30; 20 INJECTION, SOLUTION INTRAVENOUS at 06:51

## 2017-08-25 RX ADMIN — METHYLPREDNISOLONE SODIUM SUCCINATE 125 MG: 125 INJECTION, POWDER, LYOPHILIZED, FOR SOLUTION INTRAMUSCULAR; INTRAVENOUS at 07:53

## 2017-08-25 RX ADMIN — ROCURONIUM BROMIDE 5 MG: 10 INJECTION, SOLUTION INTRAVENOUS at 07:40

## 2017-08-25 RX ADMIN — METHYLPREDNISOLONE SODIUM SUCCINATE 125 MG: 125 INJECTION, POWDER, FOR SOLUTION INTRAMUSCULAR; INTRAVENOUS at 15:16

## 2017-08-25 RX ADMIN — HEPARIN SODIUM 15000 UNITS: 1000 INJECTION, SOLUTION INTRAVENOUS; SUBCUTANEOUS at 11:30

## 2017-08-25 RX ADMIN — PROTAMINE SULFATE 100 MG: 10 INJECTION, SOLUTION INTRAVENOUS at 12:15

## 2017-08-25 RX ADMIN — OXYCODONE HYDROCHLORIDE 10 MG: 5 TABLET ORAL at 14:11

## 2017-08-25 RX ADMIN — Medication 10 ML: at 23:21

## 2017-08-25 RX ADMIN — CEFAZOLIN 2 G: 10 INJECTION, POWDER, FOR SOLUTION INTRAVENOUS; PARENTERAL at 07:51

## 2017-08-25 RX ADMIN — VECURONIUM BROMIDE FOR INJECTION 2 MG: 1 INJECTION, POWDER, LYOPHILIZED, FOR SOLUTION INTRAVENOUS at 09:46

## 2017-08-25 RX ADMIN — METHYLPREDNISOLONE SODIUM SUCCINATE 125 MG: 125 INJECTION, POWDER, FOR SOLUTION INTRAMUSCULAR; INTRAVENOUS at 23:20

## 2017-08-25 RX ADMIN — FENTANYL CITRATE 100 MCG: 50 INJECTION, SOLUTION INTRAMUSCULAR; INTRAVENOUS at 07:05

## 2017-08-25 RX ADMIN — VECURONIUM BROMIDE FOR INJECTION 3 MG: 1 INJECTION, POWDER, LYOPHILIZED, FOR SOLUTION INTRAVENOUS at 08:47

## 2017-08-25 NOTE — PROGRESS NOTES
8/25/2017    INTENSIVIST PROGRESS NOTE:     Patient seen and evaluated   56 yo male hx of afib  Today s/p transpericardial hybrid ablation  Resting comfortable in ccu post procedure  C/O post op pain  No acute distress     Visit Vitals    BP 99/52 (BP 1 Location: Left arm, BP Patient Position: At rest)    Pulse (!) 59    Temp 97.6 °F (36.4 °C)    Resp 9    Ht 6' (1.829 m)    Wt 111.8 kg (246 lb 7.6 oz)    SpO2 96%    BMI 33.43 kg/m2       General: no distress  CV: RRR  Lungs: clear    Labs reviewed    A/P:  O2  Pain control  CCU monitoring  Will assist on disposition planning, transfer to floor in am  Lester Price MD

## 2017-08-25 NOTE — PROGRESS NOTES
PT consult received and chart reviewed. Per current protocol will f/u tomorrow for PT evaluation s/p procedure. Any Barnes PT, DPT.

## 2017-08-25 NOTE — PROCEDURES
[address] [place] [address]  [phone]    Indications and Pre-Procedure Diagnosis:  Heike Paredes is a 57 y.o. male with persistent AF is referred for electr-physiologic evaluation and intervention. Post Procedure Diagnosis    Atrial fibrillation    Atrial Fibrillation Ablation Summary  Informed consent was obtained. The patient was brought to the EP lab post epicardial AF ablation where OR SVETA demonstrated no thrombus in the left atrial appendage. All vascular access sites were prepped and draped in the usual sterile fashion and the Seldinger technique was used to catherize the RFV and LFV with multi-polar electrode catheters, which were placed in the appropriate intra-cardiac sites under fluoroscopic guidance (see catheter list). Right and left atrial pacing and recording, His bundle recording, and right ventricular pacing and recording were performed. Continuous pulse oximetry and cuff BP monitoring were performed. During the procedure, the patient received Versed, Fentanyl and Propofol for sedation per anesthesia personnel. Transeptal Puncture  A transeptal atrial puncture was performed using fluoroscopic and intracardiac ultrasound guidance. An SL1 sheath was dragged from the SVC along the septum until a sudden leftward displacement was observed. Engagement of the Fossa Ovalis was confirmed by the interatrial tenting seen under intracardiac ultrasound guidance. The Bruna Leap NRG needle was advanced into the left atrium and its positioned confirmed with contrast injection. The dilator and sheath were advanced carefully over the needle into the body of the left atrium and the dilator/needle removed. An Agilis sheath was exchanged over the wire for the SL1 sheath.  No pericardial effusion was appreciated on intracardiac ultrasound so a bolus injection of heparin 100 units/kg was administered, with a continuous heparin gtt of 5000 units/hr so that the activated clotting time maintained was between 300 and 400 seconds with additional boluses q 30 minutes as necessary. A 3D shell representing the left atrium and pulmonary veins was constructed with the electroanatomic mapping system. A Lasso mapping catheter was advanced into the left atrium through the Agilis sheath and a map of the body of the LA and the pulmonary veins was created. Pulmonary vein isolation was confirmed with exit and entrance block. A SmartStoritzch F/J curve 8Fr/3.5mm catheter was then advanced into the left atrium. Additional Focus  The RF catheter was drawn up to the superior aspect of the LUPV and across the roof the superior aspect of the RUPV. Additional Focus  The RF catheter was used to perform anterior line from the LUPV anterior to the TRAE and down to the mitral isthmus. Additional Focus  The RF catheter was used to perform anterior RF line from the RSPV across to MV. Radiofrequency energy was applied with a target power of 25-35W. Local sites were targeted until the local electrogram amplitude decreased to <0.15mv or by >50%. Autonomic manipulation with Dobutamine @ 20 mcg/min was performed during this procedure. Post ablation, rapid atrial pacing to 240 msec, on and off dobutamine, failed to induce any atrial arrhythmias. At the end of the procedure, all catheters were removed, heparin reversed, groin sheaths pulled and hemostasis achieved. The patient left the laboratory in a stable condition. Fluoroscopy and procedure times were 12 and 75 minutes respectively. Estimated blood loss: <10 ml. Sharp counts: correct. Specimen (s) collected: none. The procedure related complication occurred: none. The following problems were encountered: none. Conduction intervals (ms)    A-A A-H H-V P-R QRS Q-T R-R V-V  1230 126 69 194  1227    AV akanksha conduction    VA Block when pacing at 600 ms    Findings and Summary    This study demonstrates:  1. PVI of all 4 PVs  2.  Additional RFA focus of roof line  3. Additional RFA focus of anterior line from LUPV to the MV  4. Additional RFA focus of anterior line from the RUPV to the MV  5. No further inducible atrial arrhythmias on dobutamine with rapid atrial pacing    Recommendation:  1. 934 [facility]    Thank you for allowing me to participate in this patients care.     Lefty Stevens MD, Amanda Cruz

## 2017-08-25 NOTE — PROGRESS NOTES
[id number] Pt arrived from cath lab on monitor and 6LPM O2 face mask. Pt drowsy, but oriented. Pt bathed and gown changed. Repositioned for comfort. Pt c/o pain in chest and back 10/10. Medicated with roxicodone and morphine.    Primary Nurse Gaston Amado RN and Giovani Kyle RN performed a dual skin assessment on this patient No impairment noted  Candido score is 19

## 2017-08-25 NOTE — OP NOTES
ATTENDING SURGEON: Caleb Pina MD    ASSISTANT:  Christiano  PREOPERATIVE DIAGNOSIS:  Longstanding persistent atrial fibrillation. POSTOPERATIVE DIAGNOSIS:  Longstanding persistent atrial fibrillation. PROCEDURES:  1. Transesophageal echocardiography. 2.  Convergent endoscopic epicardial ablation without cardiopulmonary bypass. FINDINGS:  Preoperative transesophageal echocardiography was completed. There was  no evidence of intraatrial thrombus. Fluoroscopy confirmed a mid esophageal placement of a temperature probe directly posterior to the left atrium. PROCEDURE:  Transmural isolation lines were performed on the entire posterior left atrial wall. COMPLICATIONS:  None. EBL: minimal     Anaesthesia: General     Specimens: none    PROCEDURE:  After the induction of general endotracheal anesthesia, the patient underwent placement of a radial arterial line and central venous catheter. The radiofrequency ablation and grounding electrocautery pads were placed in the posterior torso. The patient's chest, abdomen and lower extremities were prepped and draped in sterile fashion. Correct position of the esophageal temperature probe was confirmed using fluoroscopy. A 2 cm midline incision was made 1 fingerbreadth inferior to the tip of the xiphoid process. The pericardium was entered and no adhesions encountered . A 12 mm Deisi trocar was placed in the midline incision. A 10 mm scope was exchanged for a 5 mm 0-degree rigid scope and the pericardial well was visually explored with no evidence of pericardial adhesions. The coronary sinus, left inferior pulmonary vein and right inferior pulmonary vein were identified. Sequential superior and inferior transmural ablation lines were performed across the posterior left atrium. The esophageal temperature was continuously monitored to prevent viscus injury.   The caudal aspect of the left inferior pulmonary vein was identified and the cannula was rotated counterclockwise, accessed in the anterior surface of the left pulmonary vein. Transmural ablation of the anterior left superior and inferior pulmonary veins was performed. The drain was brought out through the left 5 mm trocar incision. The port incisions were closed in multiple layers with interrupted 2-0 Ethibond figure-of-eight sutures reapproximating the peritoneum and fascia, a subdermal 2-0 Vicryl and 4-0 Monocryl subcuticular stitch reinforced with Histoacryl glue. The patient remained intubated for the electrophysiology portion of the case with stable hemodynamics.   Transported to the EP lab for further intervention

## 2017-08-25 NOTE — ANESTHESIA PROCEDURE NOTES
Central Line Placement    Start time: 8/25/2017 7:12 AM  End time: 8/25/2017 7:27 AM  Performed by: Sravanthi Hickey  Authorized by: Nay LINN     Indications: vascular access, central pressure monitoring and need for vasopressors  Preanesthetic Checklist: patient identified, risks and benefits discussed, anesthesia consent, site marked, patient being monitored and timeout performed      Pre-procedure: All elements of maximal sterile barrier technique followed?  Yes    Maximal barrier precautions followed, 2% Chlorhexidine for cutaneous antisepsis, Hand hygiene performed prior to catheter insertion and Ultrasound guidance    Sterile Ultrasound Technique followed?: Yes          Procedure:   Prep:  Chlorhexidine  Location:  Internal jugular  Orientation:  Left  Patient position:  Trendelenburg  Catheter type:  Quad lumen  Catheter size:  8.5 Fr  Catheter length:  16 cm  Number of attempts:  1  Successful placement: Yes      Assessment:   Post-procedure:  Catheter secured and sterile dressing with CHG applied  Assessment:  Blood return through all ports and free fluid flow  Insertion:  Uncomplicated  Patient tolerance:  Patient tolerated the procedure well with no immediate complications

## 2017-08-25 NOTE — ANESTHESIA POSTPROCEDURE EVALUATION
Post-Anesthesia Evaluation and Assessment    Patient: Ed Cid MRN: [id number]  SSN: [id number]    YOB: 1955  Age: 57 y.o. Sex: male       Cardiovascular Function/Vital Signs  Visit Vitals    /62    Pulse (!) 59    Temp 36.4 °C (97.6 °F)    Resp 13    Ht 6' (1.829 m)    Wt 111.8 kg (246 lb 7.6 oz)    SpO2 98%    BMI 33.43 kg/m2       Patient is status post general anesthesia for Procedure(s):  TRANSPERICARDIAL HYBRID ABLATION WITH FLUORO . Nausea/Vomiting: None    Postoperative hydration reviewed and adequate. Pain:  Pain Scale 1: Numeric (0 - 10) (08/25/17 1413)  Pain Intensity 1: 10 (08/25/17 1413)   Managed    Neurological Status:   Neuro (WDL): Exceptions to WDL (08/25/17 0700)  Neuro  Neurologic State: Alert (08/25/17 0700)  Orientation Level: Oriented X4 (08/25/17 0700)  Cognition: Appropriate decision making; Appropriate for age attention/concentration; Appropriate safety awareness (08/25/17 0700)  Speech: Clear (08/25/17 0700)  LUE Motor Response: Purposeful;Numbness (08/25/17 0700)  LLE Motor Response: Purposeful (08/25/17 0700)  RUE Motor Response: Purposeful (08/25/17 0700)  RLE Motor Response: Purposeful (08/25/17 0700)   At baseline    Mental Status and Level of Consciousness: Arousable    Pulmonary Status:   O2 Device: Nasal cannula (08/25/17 1300)   Adequate oxygenation and airway patent    Complications related to anesthesia: None    Post-anesthesia assessment completed.  No concerns    Signed By: Jae Bennett MD     August 25, 2017

## 2017-08-25 NOTE — ANESTHESIA PREPROCEDURE EVALUATION
Anesthetic History   No history of anesthetic complications            Review of Systems / Medical History  Patient summary reviewed, nursing notes reviewed and pertinent labs reviewed    Pulmonary  Within defined limits                 Neuro/Psych   Within defined limits           Cardiovascular    Hypertension        Dysrhythmias : atrial fibrillation      Exercise tolerance: >4 METS     GI/Hepatic/Renal     GERD      PUD     Endo/Other      Hypothyroidism  Obesity     Other Findings              Physical Exam    Airway  Mallampati: III  TM Distance: 4 - 6 cm  Neck ROM: decreased range of motion   Mouth opening: Normal     Cardiovascular    Rhythm: regular  Rate: normal         Dental    Dentition: Lower dentition intact and Upper dentition intact     Pulmonary  Breath sounds clear to auscultation               Abdominal  GI exam deferred       Other Findings            Anesthetic Plan    ASA: 3  Anesthesia type: general    Monitoring Plan: Arterial line, CVP and SVETA    Post procedure ventilation   Induction: Intravenous  Anesthetic plan and risks discussed with: Patient

## 2017-08-25 NOTE — PERIOP NOTES
06:26= PT/INR sample retrieved from right pointer finger. INR 1.1.    07:06= timeout performed with Dr Raghu Mcfadden. Labs drawn from new IV in left hand and sent to lab.

## 2017-08-25 NOTE — PERIOP NOTES
TRANSFER - OUT REPORT:    Verbal report given to Thais Prado ) on [address] [place] [address]  being transferred to EP lab for routine progression of care       Report consisted of patients Situation, Background, Assessment and   Recommendations(SBAR). Information from the following report(s) OR Summary and Procedure Summary was reviewed with the receiving nurse. Opportunity for questions and clarification was provided.       Patient transported with:   Monitor  Registered Nurse   CRNA   Perfusion

## 2017-08-25 NOTE — ANESTHESIA PROCEDURE NOTES
Arterial Line Placement    Start time: 8/25/2017 7:17 AM  End time: 8/25/2017 7:21 AM  Performed by: Joao Cruz  Authorized by: Kaylee LINN     Pre-Procedure  Indications:  Arterial pressure monitoring  Preanesthetic Checklist: patient identified, risks and benefits discussed, anesthesia consent, site marked, patient being monitored, timeout performed and patient being monitored      Procedure:   Prep:  Chlorhexidine  Seldinger Technique?: Yes    Orientation:  Right  Location:  Radial artery  Catheter size:  20 G  Number of attempts:  1  Cont Cardiac Output Sensor: No      Assessment:   Post-procedure:  Line secured and sterile dressing applied  Patient Tolerance:  Patient tolerated the procedure well with no immediate complications

## 2017-08-25 NOTE — BRIEF OP NOTE
BRIEF OP NOTE HYBRID  Pre-Op Diagnosis: AFIB    Post-Op Diagnosis: AFIB     Procedure: TRANSPERICARDIAL HYBRID ABLATION    Surgeon: Tana Shipley MD    Assistant(s):  Paola Lakhani PA-C,    Anesthesia: General     Estimated Blood Loss: 5cc    Drain:  1 nicola drains    Findings: Atrial Fib

## 2017-08-25 NOTE — DISCHARGE INSTRUCTIONS
No strenuous activity  Walk daily  Call for any reason but especially for increased pain or shortness of breath  Wash incision daily, may shower, starting Sunday  Appointment 9/5/17 at 2:45 PM at [facility]

## 2017-08-25 NOTE — IP AVS SNAPSHOT
[address] [facility] 
[phone] Patient: Leena Moran MRN: [id number] AAA:4/48/6136 You are allergic to the following Allergen Reactions Percocet (Oxycodone-Acetaminophen) Anxiety Patient takes Tylenol without problems. Recent Documentation Height Weight BMI Smoking Status 1.829 m 113.4 kg 33.91 kg/m2 Former Smoker Unresulted Labs Order Current Status BLOOD GAS, ARTERIAL Preliminary result Emergency Contacts Name Discharge Info Relation Home Work Mobile Gabriela Mcintyre DISCHARGE CAREGIVER [3] Spouse [3] [phone] About your hospitalization You were admitted on:  August 25, 2017 You last received care in the:  [facility] 2 PROGRESSIVE CARE You were discharged on:  August 29, 2017 Unit phone number:  [phone] Why you were hospitalized Your primary diagnosis was:  Not on File Your diagnoses also included:  Atrial Fibrillation (Hcc), Non Morbid Obesity Due To Excess Calories Providers Seen During Your Hospitalizations Provider Role Specialty Primary office phone Imelda Mazariegos MD Attending Provider Cardiothoracic Surgery [phone] Your Primary Care Physician (PCP) Primary Care Physician Office Phone Office Fax [facility] [phone] Follow-up Information Follow up With Details Comments Contact Info Jaskaran Guillermo MD Go on 9/12/2017 PCP - follow up at 9:30 AM  [facility] [address] [facility] 
[phone] [facility] Go on 9/5/2017 Cardiothoracic surgery - follow up at 2:45 PM with Dr. Mendez Casillas [facility] [address] [address] [address]   [address] [address] [phone] Estela Jacob NP Go on 9/5/2017 Cardiology - follow up at 1:15PM [address] [address] [facility] [address] [address] [phone] Your Appointments Tuesday September 05, 2017  1:15 PM EDT HOSPITAL FOLLOW-UP with [facility]) [address] [facility]  
[phone] Tuesday September 05, 2017  2:45 PM EDT  
POST OP with Diya Martínez MD  
[facility] - [facility]) [place] [address] [address] [phone] Current Discharge Medication List  
  
START taking these medications Dose & Instructions Dispensing Information Comments Morning Noon Evening Bedtime  
 dilTIAZem 30 mg tablet Commonly known as:  CARDIZEM Your last dose was: Your next dose is:    
   
   
 Dose:  60 mg Take 2 Tabs by mouth once as needed for up to 1 dose. Diltiazem 60 mg by mouth as needed for pulse rate above 120/min, may repeat every six hours. Quantity:  60 Tab Refills:  1  
     
   
   
   
  
 methylPREDNISolone 4 mg tablet Commonly known as:  Flo Mendiola Your last dose was: Your next dose is: As directed. Quantity:  1 Dose Pack Refills:  0  
     
   
   
   
  
 oxyCODONE IR 5 mg immediate release tablet Commonly known as:  Romie Stanton Your last dose was: Your next dose is:    
   
   
 Dose:  5-10 mg Take 1-2 Tabs by mouth every three (3) hours as needed. Max Daily Amount: 80 mg.  
 Quantity:  30 Tab Refills:  0 CONTINUE these medications which have NOT CHANGED Dose & Instructions Dispensing Information Comments Morning Noon Evening Bedtime  
 apixaban 5 mg tablet Commonly known as:  Que Laura Your last dose was: Your next dose is:    
   
   
 Dose:  5 mg Take 1 Tab by mouth two (2) times a day. Quantity:  60 Tab Refills:  11  
     
   
   
   
  
 diclofenac EC 75 mg EC tablet Commonly known as:  VOLTAREN Your last dose was: Your next dose is:    
   
   
 Dose:  75 mg Take 75 mg by mouth two (2) times a day. Refills:  0  
     
   
   
   
  
 furosemide 20 mg tablet Commonly known as:  LASIX Your last dose was: Your next dose is:    
   
   
 Dose:  20 mg Take 1 Tab by mouth daily as needed. Quantity:  90 Tab Refills:  3  
     
   
   
   
  
 simvastatin 20 mg tablet Commonly known as:  ZOCOR Your last dose was: Your next dose is: TAKE 1 TABLET AT BEDTIME Quantity:  90 Tab Refills:  3 VITAMIN B-12 1,000 mcg tablet Generic drug:  cyanocobalamin Your last dose was: Your next dose is:    
   
   
 Dose:  1000 mcg Take 1,000 mcg by mouth daily. Refills:  0  
     
   
   
   
  
 VITAMIN B-6 100 mg tablet Generic drug:  pyridoxine (vitamin B6) Your last dose was: Your next dose is:    
   
   
 Dose:  100 mg Take 100 mg by mouth daily. Refills:  0 STOP taking these medications   
 amiodarone 200 mg tablet Commonly known as:  CORDARONE Where to Get Your Medications Information on where to get these meds will be given to you by the nurse or doctor. ! Ask your nurse or doctor about these medications  
  dilTIAZem 30 mg tablet  
 methylPREDNISolone 4 mg tablet  
 oxyCODONE IR 5 mg immediate release tablet Discharge Instructions No strenuous activity Walk daily Call for any reason but especially for increased pain or shortness of breath Wash incision daily, may shower, starting Sunday Appointment 9/5/17 at 2:45 PM at [address] [facility] Discharge Orders None Introducing [facility]! Dear Gabby Li: Thank you for requesting a [employer] account. Our records indicate that you already have an active [employer] account.   You can access your account anytime at [email or web address] Did you know that you can access your hospital and ER discharge instructions at any time in [employer]? You can also review all of your test results from your hospital stay or ER visit. Additional Information If you have questions, please visit the Frequently Asked Questions section of the [employer] website at [email or web address] Remember, [employer] is NOT to be used for urgent needs. For medical emergencies, dial 911. Now available from your iPhone and Android! General Information Please provide this summary of care documentation to your next provider. Patient Signature:  ____________________________________________________________ Date:  ____________________________________________________________  
  
[facility] Provider Signature:  ____________________________________________________________ Date:  ____________________________________________________________

## 2017-08-25 NOTE — PROGRESS NOTES
Pharmacy Monitoring for Apixaban    Pharmacy review for this 57 y.o. male ordered Apixaban for atrial fibrillation  Major Interacting Medications: NA  Platelet Inhibitors: NA    Recent Labs      08/25/17   1341  08/25/17   0627   INR  1.1  1.1   APTT  30.4   --    HGB  14.1   --    PLT  156   --    CREA  0.92   --    BUN  21*   --        Child Winkler Score, if applicable (Avoid if level C): NA    Impression/Plan:   Age <80, Wt >60, SCr <1.5, continue with apixaban 5 mg BID. Ordered every other day CBC/BMP per protocol.      Thanks  Verena Juarez, PHARMD

## 2017-08-25 NOTE — PROGRESS NOTES
1900 Bedside verbal report received from Leroy 66, RN    2000 Assessment complete. Patient alert and oriented, resting comfortably in bed, c/o chest discomfort. Lung sounds are clear. Bowel sounds are active. Left IJ in place with Normal Saline going at [facility]. Franklin in place draining clear yellow urine. CLARENCE drain to mid substernal. Bilateral groin incisions. Right wrist art-line. Pulses are palpable. Call bell within reach. Will continue to monitor. 0000 Reassessment complete. No changes from previous. 0400 Reassessment complete. No changes from previous. 1813 Dr. Rudolm Duverney in to see patient. CLARENCE drain pulled. Tolerated well.

## 2017-08-26 ENCOUNTER — APPOINTMENT (OUTPATIENT)
Dept: GENERAL RADIOLOGY | Age: 62
DRG: 274 | End: 2017-08-26
Attending: THORACIC SURGERY (CARDIOTHORACIC VASCULAR SURGERY)
Payer: COMMERCIAL

## 2017-08-26 LAB
ANION GAP SERPL CALC-SCNC: 7 MMOL/L (ref 5–15)
ATRIAL RATE: 60 BPM
BASOPHILS # BLD: 0 K/UL (ref 0–0.1)
BASOPHILS NFR BLD: 0 % (ref 0–1)
BUN SERPL-MCNC: 15 MG/DL (ref 6–20)
BUN/CREAT SERPL: 16 (ref 12–20)
CALCIUM SERPL-MCNC: 7.6 MG/DL (ref 8.5–10.1)
CALCULATED P AXIS, ECG09: 78 DEGREES
CALCULATED R AXIS, ECG10: 83 DEGREES
CALCULATED T AXIS, ECG11: 40 DEGREES
CHLORIDE SERPL-SCNC: 105 MMOL/L (ref 97–108)
CO2 SERPL-SCNC: 26 MMOL/L (ref 21–32)
CREAT SERPL-MCNC: 0.91 MG/DL (ref 0.7–1.3)
DIAGNOSIS, 93000: NORMAL
EOSINOPHIL # BLD: 0 K/UL (ref 0–0.4)
EOSINOPHIL NFR BLD: 0 % (ref 0–7)
ERYTHROCYTE [DISTWIDTH] IN BLOOD BY AUTOMATED COUNT: 13.8 % (ref 11.5–14.5)
GLUCOSE SERPL-MCNC: 150 MG/DL (ref 65–100)
HCT VFR BLD AUTO: 42.5 % (ref 36.6–50.3)
HGB BLD-MCNC: 14.6 G/DL (ref 12.1–17)
LYMPHOCYTES # BLD: 0.9 K/UL (ref 0.8–3.5)
LYMPHOCYTES NFR BLD: 7 % (ref 12–49)
MCH RBC QN AUTO: 31.9 PG (ref 26–34)
MCHC RBC AUTO-ENTMCNC: 34.4 G/DL (ref 30–36.5)
MCV RBC AUTO: 92.8 FL (ref 80–99)
MONOCYTES # BLD: 0.3 K/UL (ref 0–1)
MONOCYTES NFR BLD: 2 % (ref 5–13)
NEUTS SEG # BLD: 10.5 K/UL (ref 1.8–8)
NEUTS SEG NFR BLD: 91 % (ref 32–75)
P-R INTERVAL, ECG05: 202 MS
PLATELET # BLD AUTO: 156 K/UL (ref 150–400)
POTASSIUM SERPL-SCNC: 4 MMOL/L (ref 3.5–5.1)
Q-T INTERVAL, ECG07: 508 MS
QRS DURATION, ECG06: 116 MS
QTC CALCULATION (BEZET), ECG08: 508 MS
RBC # BLD AUTO: 4.58 M/UL (ref 4.1–5.7)
SODIUM SERPL-SCNC: 138 MMOL/L (ref 136–145)
VENTRICULAR RATE, ECG03: 60 BPM
WBC # BLD AUTO: 11.6 K/UL (ref 4.1–11.1)

## 2017-08-26 PROCEDURE — 77010033678 HC OXYGEN DAILY

## 2017-08-26 PROCEDURE — G8978 MOBILITY CURRENT STATUS: HCPCS

## 2017-08-26 PROCEDURE — 97116 GAIT TRAINING THERAPY: CPT

## 2017-08-26 PROCEDURE — 74011250637 HC RX REV CODE- 250/637: Performed by: THORACIC SURGERY (CARDIOTHORACIC VASCULAR SURGERY)

## 2017-08-26 PROCEDURE — 80048 BASIC METABOLIC PNL TOTAL CA: CPT | Performed by: PHYSICIAN ASSISTANT

## 2017-08-26 PROCEDURE — G8979 MOBILITY GOAL STATUS: HCPCS

## 2017-08-26 PROCEDURE — 36415 COLL VENOUS BLD VENIPUNCTURE: CPT | Performed by: PHYSICIAN ASSISTANT

## 2017-08-26 PROCEDURE — 85025 COMPLETE CBC W/AUTO DIFF WBC: CPT | Performed by: PHYSICIAN ASSISTANT

## 2017-08-26 PROCEDURE — 77030027138 HC INCENT SPIROMETER -A

## 2017-08-26 PROCEDURE — 74011250636 HC RX REV CODE- 250/636: Performed by: PHYSICIAN ASSISTANT

## 2017-08-26 PROCEDURE — 71010 XR CHEST PORT: CPT

## 2017-08-26 PROCEDURE — 65660000000 HC RM CCU STEPDOWN

## 2017-08-26 PROCEDURE — 74011250637 HC RX REV CODE- 250/637: Performed by: PHYSICIAN ASSISTANT

## 2017-08-26 PROCEDURE — 97162 PT EVAL MOD COMPLEX 30 MIN: CPT

## 2017-08-26 RX ORDER — METOPROLOL TARTRATE 25 MG/1
12.5 TABLET, FILM COATED ORAL 2 TIMES DAILY
Status: DISCONTINUED | OUTPATIENT
Start: 2017-08-26 | End: 2017-08-27

## 2017-08-26 RX ADMIN — APIXABAN 5 MG: 5 TABLET, FILM COATED ORAL at 17:50

## 2017-08-26 RX ADMIN — METHYLPREDNISOLONE SODIUM SUCCINATE 125 MG: 125 INJECTION, POWDER, FOR SOLUTION INTRAMUSCULAR; INTRAVENOUS at 14:40

## 2017-08-26 RX ADMIN — CEFAZOLIN 2 G: 10 INJECTION, POWDER, FOR SOLUTION INTRAVENOUS; PARENTERAL at 10:08

## 2017-08-26 RX ADMIN — DOCUSATE SODIUM 100 MG: 100 CAPSULE, LIQUID FILLED ORAL at 17:49

## 2017-08-26 RX ADMIN — OXYCODONE HYDROCHLORIDE 10 MG: 5 TABLET ORAL at 03:42

## 2017-08-26 RX ADMIN — AMIODARONE HYDROCHLORIDE 200 MG: 200 TABLET ORAL at 17:50

## 2017-08-26 RX ADMIN — OXYCODONE HYDROCHLORIDE 10 MG: 5 TABLET ORAL at 18:45

## 2017-08-26 RX ADMIN — OXYCODONE HYDROCHLORIDE 10 MG: 5 TABLET ORAL at 11:21

## 2017-08-26 RX ADMIN — METOPROLOL TARTRATE 12.5 MG: 25 TABLET ORAL at 17:50

## 2017-08-26 RX ADMIN — METHYLPREDNISOLONE SODIUM SUCCINATE 125 MG: 125 INJECTION, POWDER, FOR SOLUTION INTRAMUSCULAR; INTRAVENOUS at 06:35

## 2017-08-26 RX ADMIN — MUPIROCIN: 20 OINTMENT TOPICAL at 08:18

## 2017-08-26 RX ADMIN — OXYCODONE HYDROCHLORIDE 10 MG: 5 TABLET ORAL at 00:45

## 2017-08-26 RX ADMIN — OXYCODONE HYDROCHLORIDE 10 MG: 5 TABLET ORAL at 14:40

## 2017-08-26 RX ADMIN — OXYCODONE HYDROCHLORIDE 10 MG: 5 TABLET ORAL at 21:44

## 2017-08-26 RX ADMIN — APIXABAN 5 MG: 5 TABLET, FILM COATED ORAL at 10:06

## 2017-08-26 RX ADMIN — Medication 10 ML: at 14:40

## 2017-08-26 RX ADMIN — CEFAZOLIN 2 G: 10 INJECTION, POWDER, FOR SOLUTION INTRAVENOUS; PARENTERAL at 03:42

## 2017-08-26 RX ADMIN — Medication 10 ML: at 06:35

## 2017-08-26 RX ADMIN — MUPIROCIN: 20 OINTMENT TOPICAL at 10:06

## 2017-08-26 RX ADMIN — OXYCODONE HYDROCHLORIDE 10 MG: 5 TABLET ORAL at 08:12

## 2017-08-26 RX ADMIN — DOCUSATE SODIUM 100 MG: 100 CAPSULE, LIQUID FILLED ORAL at 10:06

## 2017-08-26 RX ADMIN — MUPIROCIN: 20 OINTMENT TOPICAL at 18:28

## 2017-08-26 RX ADMIN — METOPROLOL TARTRATE 12.5 MG: 25 TABLET ORAL at 10:06

## 2017-08-26 RX ADMIN — METHYLPREDNISOLONE SODIUM SUCCINATE 125 MG: 125 INJECTION, POWDER, FOR SOLUTION INTRAMUSCULAR; INTRAVENOUS at 21:43

## 2017-08-26 RX ADMIN — CEFAZOLIN 2 G: 10 INJECTION, POWDER, FOR SOLUTION INTRAVENOUS; PARENTERAL at 16:04

## 2017-08-26 RX ADMIN — Medication 10 ML: at 21:44

## 2017-08-26 NOTE — PROGRESS NOTES
PT note:    9:05:Orders received and acknowledged. Chart reviewed and spoke with nursing. Patient currently transferring out of ICU to 2264. Will follow up for PT evaluation. 11:15: Spoke with nursing. Nursing just returned patient back to bed and has to remove central line. Will defer PT evaluation and follow up in PM as able.      Marcus Mulligan, PT, DPT

## 2017-08-26 NOTE — PROGRESS NOTES
0745- removed a-line. Cath tip intact. Held pressure for 15mins and applied pressure dressing. Franklin d/c per orders before transfer to PCU.     0900- bedside report given to PCU nurse. Pt transferred per recliner with hospital meds. Left IJ saline locked. Pt not orthostatic. Glasses and cellphone given to wife at bedside.

## 2017-08-26 NOTE — PROGRESS NOTES
1200- Patient back to bed. Cental line discontinued. Pressure held until hemostasis achieved. Patient tolerated well. Covered with 4 x 4  and Tegaderm. 1342- Patient up to bathroom able to void 300 cc clear yellow urine. Patient back to chair for lunch. Patient educated on continued IS use. 1936-Bedside shift change report given to Danya DAI (oncoming nurse) by Jj Haro RN/King SUHAS (offgoing nurse). Report included the following information SBAR, Kardex, Intake/Output, MAR and Recent Results.

## 2017-08-26 NOTE — PROCEDURES
Sheldon Joyce, [address]   TRANSESOPHAGEAL ECHO       Name:  Aida Paez   MR#:  [id number]   :  1955   Account #:  [de-identified]        Date of Adm:  2017       PROCEDURE PERFORMED: Diagnostic intraoperative   transesophageal echo. DEMOGRAPHIC INFORMATION: The patient is a 64-year-old   gentleman who presents to Dr. Kristel Quintana for a transpericardial   hybrid atrial fibrillation ablation. The transesophageal echo exam was   requested to rule out left atrial appendage thrombus. Multiplane transesophageal echo probe was easily placed following the   induction of general endotracheal anesthesia. The echocardiographic   modalities employed include 2-dimensional transesophageal echo,   color flow Doppler, and pulse wave Doppler. ECHOCARDIOGRAM EXAMINATION:     AORTA: The ascending aorta, aortic arch and descending aorta are   normal in size. There is no evidence of dissection or significant   atheromatous plaques. VALVES:   AORTIC VALVE: The aortic annulus is normal. There is no stenosis. There is trace aortic insufficiency with normal leaflet morphology and   motion. MITRAL VALVE: The mitral annulus is normal. There is no stenosis. There is trace mitral regurgitation with normal leaflet morphology and   motion. TRICUSPID VALVE: The tricuspid annulus is normal. There is no   stenosis. There is mild tricuspid regurgitation with normal leaflet   morphology and motion. ATRIA: The right and left atrium are both normal in size. There is no   evidence of smoke, thrombus or tumor. The left atrial appendage is   free of clot. The interatrial septum is normal.     VENTRICLES:   Interventricular septum is normal.   RIGHT VENTRICLE: The right ventricular cavity size is normal. There   is no hypertrophy or thrombus, and there is normal right ventricular   systolic function.    LEFT VENTRICLE: The left ventricular cavity size is normal. There is   no hypertrophy or thrombus, and there is normal left ventricular systolic   function. Ejection fraction is approximately 55% to 60%. REGIONAL FUNCTION: There are no regional wall motion   abnormalities.  Pericardium is normal. Pleura are normal.        MD J LUIS Delgado / SP   D:  08/25/2017   16:43   T:  08/26/2017   07:22   Job #:  [id number]

## 2017-08-26 NOTE — PROGRESS NOTES
[facility] FLOOR Progress Note    Admit Date: 2017  POD: 1 Day Post-Op      Procedure:  Procedure(s):  TRANSPERICARDIAL HYBRID ABLATION WITH FLUORO     Subjective:     No issues overnight     Objective:     Blood pressure 101/50, pulse (!) 58, temperature 97.8 °F (36.6 °C), resp. rate 8, height 6' (1.829 m), weight 246 lb 7.6 oz (111.8 kg), SpO2 94 %. Temp (24hrs), Av.9 °F (36.6 °C), Min:97.6 °F (36.4 °C), Max:98.3 °F (36.8 °C)        Oxygen:    CXR    Medications reviewed    Admission Weight: Last Weight   Weight: 246 lb 7.6 oz (111.8 kg) Weight: 246 lb 7.6 oz (111.8 kg)     Intake / Output / Drain:  Current Shift: 1901 -  0700  In: 100 [I.V.:100]  Out: -   Last 24 hrs.:  07 -  1900  In: 2540 [P.O.:340; I.V.:2200]  Out: 980 [Urine:740; Drains:90]    EXAM:  Visit Vitals    /50    Pulse (!) 58    Temp 97.8 °F (36.6 °C)    Resp 8    Ht 6' (1.829 m)    Wt 246 lb 7.6 oz (111.8 kg)    SpO2 94%    BMI 33.43 kg/m2     General:  Alert, cooperative. Lungs:   Clear to auscultation bilaterally. Heart:  Regular rate and rhythm, S1, S2 normal, no murmur, click, rub or gallop. Abdomen:   Soft, non-tender. Bowel sounds normal. No masses,  No organomegaly. Incision: Clean, dry and intact.      Labs:  Recent Results (from the past 24 hour(s))   POC INR    Collection Time: 17  6:27 AM   Result Value Ref Range    INR (POC) 1.1 <1.2     MAGNESIUM    Collection Time: 17  7:04 AM   Result Value Ref Range    Magnesium 2.2 1.6 - 2.4 mg/dL   GLUCOSE, POC    Collection Time: 17  7:24 AM   Result Value Ref Range    Glucose (POC) 102 (H) 65 - 100 mg/dL    Performed by Alan Hercules    EKG, 12 LEAD, INITIAL    Collection Time: 17  1:24 PM   Result Value Ref Range    Ventricular Rate 60 BPM    Atrial Rate 60 BPM    P-R Interval 202 ms    QRS Duration 116 ms    Q-T Interval 508 ms    QTC Calculation (Bezet) 508 ms    Calculated P Axis 78 degrees    Calculated R Axis 83 degrees    Calculated T Axis 40 degrees    Diagnosis       Normal sinus rhythm  Low voltage QRS  Nonspecific T wave abnormality  Prolonged QT  Abnormal ECG  When compared with ECG of 07-FEB-2017 03:06,  Sinus rhythm has replaced Atrial fibrillation  Vent. rate has decreased BY  38 BPM  Questionable change in QRS duration     CBC WITH AUTOMATED DIFF    Collection Time: 08/25/17  1:41 PM   Result Value Ref Range    WBC 11.9 (H) 4.1 - 11.1 K/uL    RBC 4.51 4.10 - 5.70 M/uL    HGB 14.1 12.1 - 17.0 g/dL    HCT 41.5 36.6 - 50.3 %    MCV 92.0 80.0 - 99.0 FL    MCH 31.3 26.0 - 34.0 PG    MCHC 34.0 30.0 - 36.5 g/dL    RDW 13.6 11.5 - 14.5 %    PLATELET 842 754 - 135 K/uL    NEUTROPHILS 90 (H) 32 - 75 %    LYMPHOCYTES 8 (L) 12 - 49 %    MONOCYTES 2 (L) 5 - 13 %    EOSINOPHILS 0 0 - 7 %    BASOPHILS 0 0 - 1 %    ABS. NEUTROPHILS 10.7 (H) 1.8 - 8.0 K/UL    ABS. LYMPHOCYTES 0.9 0.8 - 3.5 K/UL    ABS. MONOCYTES 0.2 0.0 - 1.0 K/UL    ABS. EOSINOPHILS 0.0 0.0 - 0.4 K/UL    ABS.  BASOPHILS 0.0 0.0 - 0.1 K/UL   PTT    Collection Time: 08/25/17  1:41 PM   Result Value Ref Range    aPTT 30.4 22.1 - 32.5 sec    aPTT, therapeutic range     58.0 - 77.0 SECS   PROTHROMBIN TIME + INR    Collection Time: 08/25/17  1:41 PM   Result Value Ref Range    INR 1.1 0.9 - 1.1      Prothrombin time 11.3 (H) 9.0 - 90.4 sec   METABOLIC PANEL, BASIC    Collection Time: 08/25/17  1:41 PM   Result Value Ref Range    Sodium 139 136 - 145 mmol/L    Potassium 3.8 3.5 - 5.1 mmol/L    Chloride 108 97 - 108 mmol/L    CO2 24 21 - 32 mmol/L    Anion gap 7 5 - 15 mmol/L    Glucose 170 (H) 65 - 100 mg/dL    BUN 21 (H) 6 - 20 MG/DL    Creatinine 0.92 0.70 - 1.30 MG/DL    BUN/Creatinine ratio 23 (H) 12 - 20      GFR est AA >60 >60 ml/min/1.73m2    GFR est non-AA >60 >60 ml/min/1.73m2    Calcium 7.4 (L) 8.5 - 10.1 MG/DL   BLOOD GAS, ARTERIAL    Collection Time: 08/25/17  1:45 PM   Result Value Ref Range    pH 7.30 (L) 7.35 - 7.45      PCO2 41 35.0 - 45.0 mmHg    PO2 110 (H) 80 - 100 mmHg    O2 SAT PENDING %    BICARBONATE 20 (L) 22 - 26 mmol/L    BASE DEFICIT 6.4 mmol/L    O2 METHOD NASAL O2      O2 FLOW RATE 6.00 L/min    Sample source ARTERIAL      SITE DRAWN FROM ARTERIAL LINE      KARLOS'S TEST N/A     IONIZED CALCIUM - IN-HOUSE    Collection Time: 08/25/17  1:45 PM   Result Value Ref Range    Calcium, ionized 1.08 (L) 1.13 - 1.32 mmol/L   CBC WITH AUTOMATED DIFF    Collection Time: 08/26/17  4:30 AM   Result Value Ref Range    WBC 11.6 (H) 4.1 - 11.1 K/uL    RBC 4.58 4.10 - 5.70 M/uL    HGB 14.6 12.1 - 17.0 g/dL    HCT 42.5 36.6 - 50.3 %    MCV 92.8 80.0 - 99.0 FL    MCH 31.9 26.0 - 34.0 PG    MCHC 34.4 30.0 - 36.5 g/dL    RDW 13.8 11.5 - 14.5 %    PLATELET 877 610 - 979 K/uL    NEUTROPHILS 91 (H) 32 - 75 %    LYMPHOCYTES 7 (L) 12 - 49 %    MONOCYTES 2 (L) 5 - 13 %    EOSINOPHILS 0 0 - 7 %    BASOPHILS 0 0 - 1 %    ABS. NEUTROPHILS 10.5 (H) 1.8 - 8.0 K/UL    ABS. LYMPHOCYTES 0.9 0.8 - 3.5 K/UL    ABS. MONOCYTES 0.3 0.0 - 1.0 K/UL    ABS. EOSINOPHILS 0.0 0.0 - 0.4 K/UL    ABS.  BASOPHILS 0.0 0.0 - 0.1 K/UL     A/P    Floor today  Pull CLARENCE     Signed By: Miranda Rice MD

## 2017-08-26 NOTE — PROGRESS NOTES
Cardiology Progress Note      8/26/2017 9:20 AM    Admit Date: 8/25/2017    Admit Diagnosis: ATRIAL FIBRILATION ; Atrial fibrillation (HCC)      Subjective:     Ankita Alyssa c/o pain.  S/p hybrid ablation, in sinus rythm    Visit Vitals    /60    Pulse 61    Temp 98.6 °F (37 °C)    Resp 14    Ht 6' (1.829 m)    Wt 246 lb 7.6 oz (111.8 kg)    SpO2 96%    BMI 33.43 kg/m2       Current Facility-Administered Medications   Medication Dose Route Frequency    metoprolol tartrate (LOPRESSOR) tablet 12.5 mg  12.5 mg Oral BID    apixaban (ELIQUIS) tablet 5 mg  5 mg Oral BID    amiodarone (CORDARONE) tablet 200 mg  200 mg Oral QPM    albumin human 5% (BUMINATE) solution 12.5 g  12.5 g IntraVENous Q1H PRN    sodium chloride (NS) flush 5-10 mL  5-10 mL IntraVENous Q8H    sodium chloride (NS) flush 5-10 mL  5-10 mL IntraVENous PRN    acetaminophen (TYLENOL) tablet 650 mg  650 mg Oral Q4H PRN    morphine injection 1-2 mg  1-2 mg IntraVENous Q2H PRN    naloxone (NARCAN) injection 0.4 mg  0.4 mg IntraVENous EVERY 2 MINUTES AS NEEDED    mupirocin (BACTROBAN) 2 % ointment   Both Nostrils BID    ceFAZolin (ANCEF) 2g in 100 ml 0.9% NS IVPB  2 g IntraVENous Q6H    niCARdipine (CARDENE) 25 mg in 0.9% sodium chloride 250 mL infusion  5-15 mg/hr IntraVENous TITRATE    ondansetron (ZOFRAN) injection 4 mg  4 mg IntraVENous Q4H PRN    albuterol (PROVENTIL VENTOLIN) nebulizer solution 2.5 mg  2.5 mg Nebulization Q4H PRN    midazolam (VERSED) injection 1-2 mg  1-2 mg IntraVENous Q1H PRN    meperidine (DEMEROL) injection 25 mg  25 mg IntraVENous Q3H PRN    docusate sodium (COLACE) capsule 100 mg  100 mg Oral BID    ELECTROLYTE REPLACEMENT PROTOCOL  1 Each Other PRN    oxyCODONE IR (ROXICODONE) tablet 5-10 mg  5-10 mg Oral Q3H PRN    methylPREDNISolone (PF) (SOLU-MEDROL) injection 125 mg  125 mg IntraVENous Q8H    sodium chloride (NS) flush 5-10 mL  5-10 mL IntraVENous Q8H    sodium chloride (NS) flush 5-10 mL  5-10 mL IntraVENous PRN     Facility-Administered Medications Ordered in Other Encounters   Medication Dose Route Frequency    protamine 10 mg/mL injection        DOBUTamine (DOBUTREX) 500 mg/250 mL (2,000 mcg/mL) infusion             Objective:      Physical Exam:  Visit Vitals    /60    Pulse 61    Temp 98.6 °F (37 °C)    Resp 14    Ht 6' (1.829 m)    Wt 246 lb 7.6 oz (111.8 kg)    SpO2 96%    BMI 33.43 kg/m2     General Appearance:  Well developed, well nourished,alert and oriented x 3, and individual in no acute distress. Ears/Nose/Mouth/Throat:   Hearing grossly normal.         Neck: Supple. Chest:   Lungs clear to auscultation bilaterally. Cardiovascular:  Regular rate and rhythm, S1, S2 normal, no murmur. Abdomen:   Soft, non-tender, bowel sounds are active. Extremities: No edema bilaterally. Skin: Warm and dry. Data Review:   Labs:  Recent Results (from the past 24 hour(s))   EKG, 12 LEAD, INITIAL    Collection Time: 08/25/17  1:24 PM   Result Value Ref Range    Ventricular Rate 60 BPM    Atrial Rate 60 BPM    P-R Interval 202 ms    QRS Duration 116 ms    Q-T Interval 508 ms    QTC Calculation (Bezet) 508 ms    Calculated P Axis 78 degrees    Calculated R Axis 83 degrees    Calculated T Axis 40 degrees    Diagnosis       Normal sinus rhythm  Low voltage QRS  Nonspecific T wave abnormality  Prolonged QT  Abnormal ECG  When compared with ECG of 07-FEB-2017 03:06,  Sinus rhythm has replaced Atrial fibrillation  Vent.  rate has decreased BY  38 BPM  Questionable change in QRS duration     CBC WITH AUTOMATED DIFF    Collection Time: 08/25/17  1:41 PM   Result Value Ref Range    WBC 11.9 (H) 4.1 - 11.1 K/uL    RBC 4.51 4.10 - 5.70 M/uL    HGB 14.1 12.1 - 17.0 g/dL    HCT 41.5 36.6 - 50.3 %    MCV 92.0 80.0 - 99.0 FL    MCH 31.3 26.0 - 34.0 PG    MCHC 34.0 30.0 - 36.5 g/dL    RDW 13.6 11.5 - 14.5 %    PLATELET 471 042 - 750 K/uL    NEUTROPHILS 90 (H) 32 - 75 % LYMPHOCYTES 8 (L) 12 - 49 %    MONOCYTES 2 (L) 5 - 13 %    EOSINOPHILS 0 0 - 7 %    BASOPHILS 0 0 - 1 %    ABS. NEUTROPHILS 10.7 (H) 1.8 - 8.0 K/UL    ABS. LYMPHOCYTES 0.9 0.8 - 3.5 K/UL    ABS. MONOCYTES 0.2 0.0 - 1.0 K/UL    ABS. EOSINOPHILS 0.0 0.0 - 0.4 K/UL    ABS.  BASOPHILS 0.0 0.0 - 0.1 K/UL   PTT    Collection Time: 08/25/17  1:41 PM   Result Value Ref Range    aPTT 30.4 22.1 - 32.5 sec    aPTT, therapeutic range     58.0 - 77.0 SECS   PROTHROMBIN TIME + INR    Collection Time: 08/25/17  1:41 PM   Result Value Ref Range    INR 1.1 0.9 - 1.1      Prothrombin time 11.3 (H) 9.0 - 57.2 sec   METABOLIC PANEL, BASIC    Collection Time: 08/25/17  1:41 PM   Result Value Ref Range    Sodium 139 136 - 145 mmol/L    Potassium 3.8 3.5 - 5.1 mmol/L    Chloride 108 97 - 108 mmol/L    CO2 24 21 - 32 mmol/L    Anion gap 7 5 - 15 mmol/L    Glucose 170 (H) 65 - 100 mg/dL    BUN 21 (H) 6 - 20 MG/DL    Creatinine 0.92 0.70 - 1.30 MG/DL    BUN/Creatinine ratio 23 (H) 12 - 20      GFR est AA >60 >60 ml/min/1.73m2    GFR est non-AA >60 >60 ml/min/1.73m2    Calcium 7.4 (L) 8.5 - 10.1 MG/DL   BLOOD GAS, ARTERIAL    Collection Time: 08/25/17  1:45 PM   Result Value Ref Range    pH 7.30 (L) 7.35 - 7.45      PCO2 41 35.0 - 45.0 mmHg    PO2 110 (H) 80 - 100 mmHg    O2 SAT PENDING %    BICARBONATE 20 (L) 22 - 26 mmol/L    BASE DEFICIT 6.4 mmol/L    O2 METHOD NASAL O2      O2 FLOW RATE 6.00 L/min    Sample source ARTERIAL      SITE DRAWN FROM ARTERIAL LINE      KARLOS'S TEST N/A     IONIZED CALCIUM - IN-HOUSE    Collection Time: 08/25/17  1:45 PM   Result Value Ref Range    Calcium, ionized 1.08 (L) 1.13 - 1.32 mmol/L   CBC WITH AUTOMATED DIFF    Collection Time: 08/26/17  4:30 AM   Result Value Ref Range    WBC 11.6 (H) 4.1 - 11.1 K/uL    RBC 4.58 4.10 - 5.70 M/uL    HGB 14.6 12.1 - 17.0 g/dL    HCT 42.5 36.6 - 50.3 %    MCV 92.8 80.0 - 99.0 FL    MCH 31.9 26.0 - 34.0 PG    MCHC 34.4 30.0 - 36.5 g/dL    RDW 13.8 11.5 - 14.5 %    PLATELET 156 150 - 400 K/uL    NEUTROPHILS 91 (H) 32 - 75 %    LYMPHOCYTES 7 (L) 12 - 49 %    MONOCYTES 2 (L) 5 - 13 %    EOSINOPHILS 0 0 - 7 %    BASOPHILS 0 0 - 1 %    ABS. NEUTROPHILS 10.5 (H) 1.8 - 8.0 K/UL    ABS. LYMPHOCYTES 0.9 0.8 - 3.5 K/UL    ABS. MONOCYTES 0.3 0.0 - 1.0 K/UL    ABS. EOSINOPHILS 0.0 0.0 - 0.4 K/UL    ABS. BASOPHILS 0.0 0.0 - 0.1 K/UL   METABOLIC PANEL, BASIC    Collection Time: 08/26/17  4:30 AM   Result Value Ref Range    Sodium 138 136 - 145 mmol/L    Potassium 4.0 3.5 - 5.1 mmol/L    Chloride 105 97 - 108 mmol/L    CO2 26 21 - 32 mmol/L    Anion gap 7 5 - 15 mmol/L    Glucose 150 (H) 65 - 100 mg/dL    BUN 15 6 - 20 MG/DL    Creatinine 0.91 0.70 - 1.30 MG/DL    BUN/Creatinine ratio 16 12 - 20      GFR est AA >60 >60 ml/min/1.73m2    GFR est non-AA >60 >60 ml/min/1.73m2    Calcium 7.6 (L) 8.5 - 10.1 MG/DL       Telemetry: normal sinus rhythm      Assessment:     Active Problems:    Atrial fibrillation (HCC) (8/25/2017)        Plan:     S/p hybrid ablation. Maintaining sinus rhythm    Back on eliquis.     Saad Hong MD

## 2017-08-26 NOTE — PROGRESS NOTES
Problem: Mobility Impaired (Adult and Pediatric)  Goal: *Acute Goals and Plan of Care (Insert Text)  Physical Therapy Goals  Initiated 8/26/2017  1. Patient will move from supine to sit and sit to supine , scoot up and down and roll side to side in bed with independence within 7 day(s). 2. Patient will transfer from bed to chair and chair to bed with independence using the least restrictive device within 7 day(s). 3. Patient will perform sit to stand with independence within 7 day(s). 4. Patient will ambulate with independence for 200 feet with the least restrictive device within 7 day(s). 5. Patient will ascend/descend 4 stairs with one sided handrail(s) with modified independence within 7 day(s). PHYSICAL THERAPY EVALUATION  Patient: Kyleigh Nguyen (20 y.o. male)  Date: 8/26/2017  Primary Diagnosis: ATRIAL FIBRILATION   Atrial fibrillation (HCC)  Procedure(s) (LRB):  TRANSPERICARDIAL HYBRID ABLATION WITH FLUORO  (N/A) 1 Day Post-Op   Precautions:   Sternal      ASSESSMENT :  Based on the objective data described below, the patient presents with decreased strength, decreased functional mobility, and mildly unsteady gait s/p ablation POD 1. PTA patient lives with his wife. He is independent with all aspects of functional mobility and ADLs. He is fairly active at home and works. Currently, patient required SBA for sit <> stand. Patient reports he was advised to not use his arms with transfers. Patient ambulated 200 feet with CGA without AD. Patient negotiated 4 stairs with CGA with RHR with no concerns. Patient with mildly unsteady gait with altered arm swing although with good cabrera. Patient left sitting in the chair at the conclusion of PT evaluation. Patient is close to baseline and reports he may be discharging tomorrow. Patient will likely not need PT services at discharge. Patient will benefit from skilled intervention to address the above impairments.   Patients rehabilitation potential is considered to be Good  Factors which may influence rehabilitation potential include:   [ ]         None noted  [ ]         Mental ability/status  [X]         Medical condition  [ ]         Home/family situation and support systems  [ ]         Safety awareness  [ ]         Pain tolerance/management  [ ]         Other:        PLAN :  Recommendations and Planned Interventions:  [X]           Bed Mobility Training             [ ]    Neuromuscular Re-Education  [X]           Transfer Training                   [ ]    Orthotic/Prosthetic Training  [X]           Gait Training                         [ ]    Modalities  [X]           Therapeutic Exercises           [ ]    Edema Management/Control  [X]           Therapeutic Activities            [X]    Patient and Family Training/Education  [ ]           Other (comment):     Frequency/Duration: Patient will be followed by physical therapy  5 times a week to address goals. Discharge Recommendations: None  Further Equipment Recommendations for Discharge: none       SUBJECTIVE:   Patient stated This is my first time really walking.       OBJECTIVE DATA SUMMARY:   HISTORY:    Past Medical History:   Diagnosis Date    A-fib ([facility] [address]      Arthritis       back    Chronic pain       back    Encounter for cardioversion procedure 2/7/2017    GERD (gastroesophageal reflux disease)      High cholesterol      Hypertension 02/07/2017     patient denies hx of HTN    Multiple thyroid nodules       biopsied and benign    PUD (peptic ulcer disease)       \"years ago\"     Past Surgical History:   Procedure Laterality Date    CARDIAC CATHETERIZATION        CARDIAC SURG PROCEDURE UNLIST   2010, 2011     ,ABLATION     CARDIOVERSION EXTERNAL        HX CERVICAL FUSION   2013    HX HEENT   6/6/13     THYROID BIOPSY    HX ORTHOPAEDIC         shoulder surgery on left    HX ORTHOPAEDIC         wrist surgery, LEFT    HX ORTHOPAEDIC         elbow surgery, RIGHT     Prior Level of Function/Home Situation: patient lives with his wife. He is independent with all aspects of functional mobility and ADLs. He is fairly active at home and works. Personal factors and/or comorbidities impacting plan of care: ablation     Home Situation  Home Environment: Private residence  # Steps to Enter: 4  Rails to Enter: Yes  Hand Rails : Right  One/Two Story Residence: Two story, live on 1st floor  Living Alone: No  Support Systems: Spouse/Significant Other/Partner  Patient Expects to be Discharged to[de-identified] Private residence  Current DME Used/Available at Home: None  Tub or Shower Type: Tub/Shower combination     EXAMINATION/PRESENTATION/DECISION MAKING:   Critical Behavior:  Neurologic State: Alert  Orientation Level: Oriented X4  Cognition: Follows commands     Hearing: Auditory  Auditory Impairment: None  Hearing Aids/Status: Does not own  Skin:  intact  Edema: none  Range Of Motion:  AROM: Within functional limits           PROM: Within functional limits           Strength:    Strength: Within functional limits                    Tone & Sensation:   Tone: Normal              Sensation: Intact               Coordination:  Coordination: Within functional limits  Vision:      Functional Mobility:  Bed Mobility:              Transfers:  Sit to Stand: Independent  Stand to Sit: Independent        Bed to Chair: Independent              Balance:   Sitting: Intact  Standing: Intact; Without support  Ambulation/Gait Training:  Distance (ft): 200 Feet (ft)  Assistive Device: Gait belt  Ambulation - Level of Assistance: Contact guard assistance;Stand-by asssistance     Gait Description (WDL): Exceptions to WDL  Gait Abnormalities: Decreased step clearance        Base of Support: Widened     Speed/Leyla: Pace decreased (<100 feet/min)  Step Length: Left shortened;Right shortened                                           Stairs:  Number of Stairs Trained: 4  Stairs - Level of Assistance: Contact guard assistance Rail Use: Right      Therapeutic Exercises:         Functional Measure:  Tinetti test:      Sitting Balance: 1  Arises: 2  Attempts to Rise: 2  Immediate Standing Balance: 2  Standing Balance: 2  Nudged: 2  Eyes Closed: 1  Turn 360 Degrees - Continuous/Discontinuous: 1  Turn 360 Degrees - Steady/Unsteady: 1  Sitting Down: 2  Balance Score: 16  Indication of Gait: 1  R Step Length/Height: 1  L Step Length/Height: 1  R Foot Clearance: 1  L Foot Clearance: 1  Step Symmetry: 1  Step Continuity: 1  Path: 2  Trunk: 2  Walking Time: 1  Gait Score: 12  Total Score: 28         Tinetti Test and G-code impairment scale:  Percentage of Impairment CH     0%    CI     1-19% CJ     20-39% CK     40-59% CL     60-79% CM     80-99% CN      100%   Tinetti  Score 0-28 28 23-27 17-22 12-16 6-11 1-5 0          Tinetti Tool Score Risk of Falls  <19 = High Fall Risk  19-24 = Moderate Fall Risk  25-28 = Low Fall Risk  Tinetti ME. Performance-Oriented Assessment of Mobility Problems in Elderly Patients. [facility] 66; [id number]. (Scoring Description: PT Bulletin Feb. 10, 1993)     Older adults: Felipe Mcconnell et al, 2009; n = 1000 [facility] elderly evaluated with ABC, GRIFFIN, ADL, and IADL)  · Mean GRIFFIN score for males aged 69-68 years = 26.21(3.40)  · Mean GRIFFIN score for females age 69-68 years = 25.16(4.30)  · Mean GRIFFIN score for males over 80 years = 23.29(6.02)  · Mean GRIFFIN score for females over 80 years = 17.20(8.32)            G codes: In compliance with CMSs Claims Based Outcome Reporting, the following G-code set was chosen for this patient based on their primary functional limitation being treated: The outcome measure chosen to determine the severity of the functional limitation was the Tinetti with a score of 28/28 which was correlated with the impairment scale.       · Mobility - Walking and Moving Around:               - CURRENT STATUS:    CH - 0% impaired, limited or restricted               - GOAL STATUS:           CH - 0% impaired, limited or restricted               - D/C STATUS:                       ---------------To be determined---------------      Physical Therapy Evaluation Charge Determination   History Examination Presentation Decision-Making   MEDIUM  Complexity : 1-2 comorbidities / personal factors will impact the outcome/ POC  MEDIUM Complexity : 3 Standardized tests and measures addressing body structure, function, activity limitation and / or participation in recreation  MEDIUM Complexity : Evolving with changing characteristics  Other outcome measures Tinetti  LOW       Based on the above components, the patient evaluation is determined to be of the following complexity level: LOW      Pain:  Pain Scale 1: Numeric (0 - 10)  Pain Intensity 1: 5  Pain Location 1: Incisional     Pain Description 1: Aching  Pain Intervention(s) 1: Medication (see MAR)  Activity Tolerance:   VSS on RA. Please refer to the flowsheet for vital signs taken during this treatment. After treatment:   [X]         Patient left in no apparent distress sitting up in chair  [ ]         Patient left in no apparent distress in bed  [X]         Call bell left within reach  [X]         Nursing notified  [X]         Caregiver present  [ ]         Bed alarm activated      COMMUNICATION/EDUCATION:   The patients plan of care was discussed with: Registered Nurse and . [X]         Fall prevention education was provided and the patient/caregiver indicated understanding. [X]         Patient/family have participated as able in goal setting and plan of care. [X]         Patient/family agree to work toward stated goals and plan of care. [ ]         Patient understands intent and goals of therapy, but is neutral about his/her participation. [ ]         Patient is unable to participate in goal setting and plan of care.      Thank you for this referral.  Gwen Pichardo, PT, DPT   Time Calculation: 24 mins

## 2017-08-26 NOTE — PROGRESS NOTES
Problem: Falls - Risk of  Goal: *Absence of Falls  Document Herman Fall Risk and appropriate interventions in the flowsheet.    Fall Risk Interventions:  Mobility Interventions: Bed/chair exit alarm, Patient to call before getting OOB           Medication Interventions: Bed/chair exit alarm, Patient to call before getting OOB, Teach patient to arise slowly     Elimination Interventions: Call light in reach, Patient to call for help with toileting needs

## 2017-08-26 NOTE — PROGRESS NOTES
TRANSFER - IN REPORT:    Verbal report received from Elizabeth) on [address] [place] [address]  being received from CCU(unit) for routine post - op      Report consisted of patients Situation, Background, Assessment and   Recommendations(SBAR). Information from the following report(s) SBAR, Kardex, OR Summary, Procedure Summary and Cardiac Rhythm NSR was reviewed with the receiving nurse. Opportunity for questions and clarification was provided. Assessment completed upon patients arrival to unit and care assumed. Shift Summary:   1518  Primary Nurse Jaja Rhodes and SUHAS Garduno performed a dual skin assessment on this patient Impairment noted- midline sternum; clean, dry, and intact. see wound doc flow sheet  1000 Pt assessed and sitting in chair. 1100 Pt back to bed. Family concerned about SpO2 levels dropping below 90 when pt is sleeping. Added 2L O2 to comfort pt while sleeping  1400 Pt back up working with PT and tolerating activity well. On room air tolerating well. Sitting up in chair when finished with PT.  1902 Bedside shift change report given to Nikita Mock RN (oncoming nurse) by Nayana Raymond RN and SUHAS Garduno (offgoing nurse). Report included the following information SBAR, Kardex, OR Summary, Intake/Output, Recent Results and Cardiac Rhythm BSR. Admission Date 8/25/2017   Admission Diagnosis ATRIAL FIBRILATION   Atrial fibrillation ([address]   Consults None        Consults   []PT   []OT   []Speech   []Case Management      [] Palliative      Cardiac Monitoring Order   []Yes   []No     IV drips   []Yes    Drip:                            Dose:  Drip:                            Dose:  Drip:                            Dose:   []No     GI Prophylaxis   []Yes   []No         DVT Prophylaxis   SCDs:             James stockings:         [] Medication   []Contraindicated   []None      Activity Level Activity Level: Bed Rest     Activity Assistance: Complete care   Purposeful Rounding every 1-2 hour? []Yes   Ayala Score  Total Score: 2   Bed Alarm (If score 3 or >)   []Yes   [] Refused (See signed refusal form in chart)   Candido Score  Candido Score: 19   Candido Score (if score 14 or less)   []PMT consult   []Wound Care consult      []Specialty bed   [] Nutrition consult          Needs prior to discharge:   Home O2 required:    []Yes   []No    If yes, how much O2 required? Other:    Last Bowel Movement: Last Bowel Movement Date: 08/24/17      Influenza Vaccine          Pneumonia Vaccine           Diet Active Orders   Diet    DIET CARDIAC Regular; No Conc.  Sweets    DIET NPO With Sips of Clear Fluids      LDAs           Quad Lumen 08/25/17 Left Internal jugular (Active)   Central Line Being Utilized Yes 8/25/2017  8:00 PM   Criteria for Appropriate Use Hemodynamically unstable, requiring monitoring lines, vasopressors, or volume resuscitation 8/25/2017  8:00 PM   Site Assessment Clean, dry, & intact 8/25/2017  8:00 PM   Infiltration Assessment 0 8/25/2017  8:00 PM   Affected Extremity/Extremities Color distal to insertion site pink (or appropriate for race) 8/25/2017  8:00 PM   Date of Last Dressing Change 08/25/17 8/25/2017  8:00 PM   Dressing Status Clean, dry, & intact 8/25/2017  8:00 PM   Dressing Type Transparent 8/25/2017  8:00 PM   Action Taken Open ports on tubing capped 8/25/2017  8:00 PM   Proximal Hub Color/Line Status Infusing 8/25/2017  8:00 PM   Positive Blood Return (Medial Site) Yes 8/25/2017  8:00 PM   Medial 1 Hub Color/Line Status Capped 8/25/2017  8:00 PM   Positive Blood Return (Lateral Site) Yes 8/25/2017  8:00 PM   Medial 2 Hub Color/Line Status Capped 8/25/2017  8:00 PM   Positive Blood Return (Site #3) Yes 8/25/2017  8:00 PM   Distal Hub Color/Line Status Capped 8/25/2017  8:00 PM   Positive Blood Return (Site #4) Yes 8/25/2017  8:00 PM   Alcohol Cap Used Yes 8/25/2017  8:00 PM        Peripheral IV 08/25/17 Left (Active)   Site Assessment Clean, dry, & intact 8/25/2017  8:00 PM Phlebitis Assessment 0 8/25/2017  8:00 PM   Infiltration Assessment 0 8/25/2017  8:00 PM   Dressing Status Clean, dry, & intact 8/25/2017  8:00 PM   Dressing Type Transparent 8/25/2017  8:00 PM   Hub Color/Line Status Patent;Pink 8/25/2017  8:00 PM   Action Taken Open ports on tubing capped 8/25/2017  8:00 PM   Alcohol Cap Used Yes 8/25/2017  8:00 PM          Jonatan Drain #1 08/25/17 Mid Chest (Active)   Site Assessment Clean, dry, & intact 8/25/2017  8:00 PM   Dressing Status Clean, dry, & intact 8/25/2017  8:00 PM   Drainage Description Sanguinous 8/25/2017  8:00 PM   Jonatan Drain Airleak No 8/25/2017  8:00 PM   Status Patent; Charged 8/25/2017  8:00 PM   Output (ml) 35 ml 8/26/2017  5:51 AM                Urinary Catheter [REMOVED] Urinary Catheter 08/25/17 [place] - Temperature-Criteria for Appropriate Use: Surgical procedure    Intake & Output   Date 08/25/17 0700 - 08/26/17 0659 08/26/17 0700 - 08/27/17 0659   Shift 3129-2635 1118-3873 24 Hour Total 5221-5882 6974-7329 24 Hour Total   I  N  T  A  K  E   P.O. 340  340         P. O. 340  340       I.V.  (mL/kg/hr) 2200  (1.6) 100  (0.1) 2300  (0.9) 100  100      Volume (lactated Ringers infusion) 800  800         Volume (0.9% sodium chloride infusion) 1300  1300         Volume (ceFAZolin (ANCEF) 2g in 100 ml 0.9% NS IVPB) 100 100 200 100  100    Shift Total  (mL/kg) 2540  (22.7) 100  (0.9) 2640  (23.6) 100  (0.9)  100  (0.9)   O  U  T  P  U  T   Urine  (mL/kg/hr) 740  (0.6) 905  (0.7) 1645  (0.6) 150  150      Urine Output 100  100         Urine Output (mL) ([REMOVED] Urinary Catheter 08/25/17 [place] - Temperature)  150  150    Drains 90 35 125         Output (ml) (Jonatan Drain #1 08/25/17 Mid Chest) 90 35 125       Stool            Stool Occurrence(s) 0 x  0 x       Blood 150  150         Estimated Blood Loss 150  150       Shift Total  (mL/kg) 980  (8.8) 940  (8.4) 1920  (17.2) 150  (1.3)  150  (1.3)   NET 1566 -740 720 -50  -50   Weight (kg) 111.8 111.8 111.8 111.8 111.8 111.8         Readmission Risk Assessment Tool Score Low Risk            9       Total Score        3 Has Seen PCP in Last 6 Months (Yes=3, No=0)    2 . Living with Significant Other. Assisted Living. LTAC. SNF. or   Rehab    4 IP Visits Last 12 Months (1-3=4, 4=9, >4=11)        Criteria that do not apply:    Patient Length of Stay (>5 days = 3)    Pt.  Coverage (Medicare=5 , Medicaid, or Self-Pay=4)    Charlson Comorbidity Score (Age + Comorbid Conditions)       Expected Length of Stay - - -   Actual Length of Stay 1

## 2017-08-26 NOTE — PROGRESS NOTES
Problem: Falls - Risk of  Goal: *Absence of Falls  Document Herman Fall Risk and appropriate interventions in the flowsheet.    Outcome: Progressing Towards Goal  Fall Risk Interventions:  Mobility Interventions: Bed/chair exit alarm, Patient to call before getting OOB           Medication Interventions: Bed/chair exit alarm, Patient to call before getting OOB, Teach patient to arise slowly     Elimination Interventions: Call light in reach, Patient to call for help with toileting needs

## 2017-08-26 NOTE — PROGRESS NOTES
8/26/2017    INTENSIVIST PROGRESS NOTE:     Patient seen and evaluated   56 yo male hx of afib  Now s/p transpericardial hybrid ablation  Resting comfortably in ccu post procedure    Visit Vitals    BP 99/47    Pulse (!) 58    Temp 98.1 °F (36.7 °C)    Resp 10    Ht 6' (1.829 m)    Wt 111.8 kg (246 lb 7.6 oz)    SpO2 95%    BMI 33.43 kg/m2       General: no distress  CV: RRR  Lungs: clear    Labs reviewed  chest X-ray reviewed    A/P:  O2  Postop care  Pain control  Agree with transfer to floor  Devan Hinojosa MD

## 2017-08-27 ENCOUNTER — APPOINTMENT (OUTPATIENT)
Dept: GENERAL RADIOLOGY | Age: 62
DRG: 274 | End: 2017-08-27
Attending: INTERNAL MEDICINE
Payer: COMMERCIAL

## 2017-08-27 PROCEDURE — 71010 XR CHEST PORT: CPT

## 2017-08-27 PROCEDURE — 74011250636 HC RX REV CODE- 250/636: Performed by: PHYSICIAN ASSISTANT

## 2017-08-27 PROCEDURE — 65660000000 HC RM CCU STEPDOWN

## 2017-08-27 PROCEDURE — 74011250637 HC RX REV CODE- 250/637: Performed by: PHYSICIAN ASSISTANT

## 2017-08-27 RX ADMIN — APIXABAN 5 MG: 5 TABLET, FILM COATED ORAL at 08:43

## 2017-08-27 RX ADMIN — OXYCODONE HYDROCHLORIDE 10 MG: 5 TABLET ORAL at 03:51

## 2017-08-27 RX ADMIN — MUPIROCIN: 20 OINTMENT TOPICAL at 08:44

## 2017-08-27 RX ADMIN — OXYCODONE HYDROCHLORIDE 10 MG: 5 TABLET ORAL at 00:49

## 2017-08-27 RX ADMIN — OXYCODONE HYDROCHLORIDE 10 MG: 5 TABLET ORAL at 17:07

## 2017-08-27 RX ADMIN — OXYCODONE HYDROCHLORIDE 10 MG: 5 TABLET ORAL at 11:10

## 2017-08-27 RX ADMIN — APIXABAN 5 MG: 5 TABLET, FILM COATED ORAL at 17:04

## 2017-08-27 RX ADMIN — OXYCODONE HYDROCHLORIDE 10 MG: 5 TABLET ORAL at 21:00

## 2017-08-27 RX ADMIN — METHYLPREDNISOLONE SODIUM SUCCINATE 125 MG: 125 INJECTION, POWDER, FOR SOLUTION INTRAMUSCULAR; INTRAVENOUS at 13:57

## 2017-08-27 RX ADMIN — AMIODARONE HYDROCHLORIDE 200 MG: 200 TABLET ORAL at 17:05

## 2017-08-27 RX ADMIN — Medication 10 ML: at 05:59

## 2017-08-27 RX ADMIN — DOCUSATE SODIUM 100 MG: 100 CAPSULE, LIQUID FILLED ORAL at 17:04

## 2017-08-27 RX ADMIN — DOCUSATE SODIUM 100 MG: 100 CAPSULE, LIQUID FILLED ORAL at 08:44

## 2017-08-27 RX ADMIN — METHYLPREDNISOLONE SODIUM SUCCINATE 125 MG: 125 INJECTION, POWDER, FOR SOLUTION INTRAMUSCULAR; INTRAVENOUS at 05:59

## 2017-08-27 RX ADMIN — Medication 10 ML: at 21:00

## 2017-08-27 RX ADMIN — METHYLPREDNISOLONE SODIUM SUCCINATE 125 MG: 125 INJECTION, POWDER, FOR SOLUTION INTRAMUSCULAR; INTRAVENOUS at 21:00

## 2017-08-27 NOTE — PROGRESS NOTES
Cardiology Progress Note      8/27/2017 12:11 PM    Admit Date: 8/25/2017    Admit Diagnosis: ATRIAL FIBRILATION ; Atrial fibrillation ([address]      Subjective:     Ana Beckett c/o SOB and attributes this to betablocker.  Had similar symptoms with metoprolol in the past.    Visit Vitals    /72 (BP 1 Location: Left arm, BP Patient Position: At rest;Sitting)    Pulse (!) 55    Temp 97.9 °F (36.6 °C)    Resp 18    Ht 6' (1.829 m)    Wt 253 lb 15.5 oz (115.2 kg)    SpO2 95%    BMI 34.44 kg/m2       Current Facility-Administered Medications   Medication Dose Route Frequency    metoprolol tartrate (LOPRESSOR) tablet 12.5 mg  12.5 mg Oral BID    apixaban (ELIQUIS) tablet 5 mg  5 mg Oral BID    amiodarone (CORDARONE) tablet 200 mg  200 mg Oral QPM    albumin human 5% (BUMINATE) solution 12.5 g  12.5 g IntraVENous Q1H PRN    acetaminophen (TYLENOL) tablet 650 mg  650 mg Oral Q4H PRN    morphine injection 1-2 mg  1-2 mg IntraVENous Q2H PRN    naloxone (NARCAN) injection 0.4 mg  0.4 mg IntraVENous EVERY 2 MINUTES AS NEEDED    mupirocin (BACTROBAN) 2 % ointment   Both Nostrils BID    niCARdipine (CARDENE) 25 mg in 0.9% sodium chloride 250 mL infusion  5-15 mg/hr IntraVENous TITRATE    ondansetron (ZOFRAN) injection 4 mg  4 mg IntraVENous Q4H PRN    albuterol (PROVENTIL VENTOLIN) nebulizer solution 2.5 mg  2.5 mg Nebulization Q4H PRN    midazolam (VERSED) injection 1-2 mg  1-2 mg IntraVENous Q1H PRN    docusate sodium (COLACE) capsule 100 mg  100 mg Oral BID    ELECTROLYTE REPLACEMENT PROTOCOL  1 Each Other PRN    oxyCODONE IR (ROXICODONE) tablet 5-10 mg  5-10 mg Oral Q3H PRN    methylPREDNISolone (PF) (SOLU-MEDROL) injection 125 mg  125 mg IntraVENous Q8H    sodium chloride (NS) flush 5-10 mL  5-10 mL IntraVENous Q8H    sodium chloride (NS) flush 5-10 mL  5-10 mL IntraVENous PRN     Facility-Administered Medications Ordered in Other Encounters   Medication Dose Route Frequency    protamine 10 mg/mL injection        DOBUTamine (DOBUTREX) 500 mg/250 mL (2,000 mcg/mL) infusion             Objective:      Physical Exam:  Visit Vitals    /84 (BP 1 Location: Left arm, BP Patient Position: Post activity)    Pulse 60    Temp 97.7 °F (36.5 °C)    Resp 18    Ht 6' (1.829 m)    Wt 253 lb 15.5 oz (115.2 kg)    SpO2 (!) 17%    BMI 34.44 kg/m2     General Appearance:  Well developed, well nourished,alert and oriented x 3, and individual in no acute distress. Ears/Nose/Mouth/Throat:   Hearing grossly normal.         Neck: Supple. Chest:   Lungs clear to auscultation bilaterally. Cardiovascular:  Regular rate and rhythm, S1, S2 normal, no murmur. Abdomen:   Soft, non-tender, bowel sounds are active. Extremities: No edema bilaterally. Skin: Warm and dry. Data Review:   Labs:  No results found for this or any previous visit (from the past 24 hour(s)). Telemetry: normal sinus rhythm      Assessment:     Active Problems:    Atrial fibrillation (HCC) (8/25/2017)        Plan:     D/c metoprolol per patient request.    Monitor for tachycardia. Consider bystolic.       Aurelia Colon MD

## 2017-08-27 NOTE — PROGRESS NOTES
Bedside shift change report given to Sarkis Guthrie RN (oncoming nurse) by   Raven Cruz (offgoing nurse). Report included the following information SBAR, Kardex, ED Summary, OR Summary, Procedure Summary, Intake/Output, MAR and Accordion.

## 2017-08-27 NOTE — PROGRESS NOTES
[facility] FLOOR Progress Note    Admit Date: 2017  POD: 2 Days Post-Op      Procedure:  Procedure(s):  TRANSPERICARDIAL HYBRID ABLATION WITH FLUORO     Subjective:     Still on oxygen     Objective:     Blood pressure 138/76, pulse (!) 55, temperature 98.5 °F (36.9 °C), resp. rate 16, height 6' (1.829 m), weight 253 lb 15.5 oz (115.2 kg), SpO2 96 %. Temp (24hrs), Av.1 °F (36.7 °C), Min:97.6 °F (36.4 °C), Max:98.6 °F (37 °C)            Admission Weight: Last Weight   Weight: 246 lb 7.6 oz (111.8 kg) Weight: 253 lb 15.5 oz (115.2 kg)     Intake / Output / Drain:  Current Shift:    Last 24 hrs.:  1901 -  0700  In: 2593 [P.O.:870; I.V.:300]  Out: 2490 [Urine:2455; Drains:35]    EXAM:  Visit Vitals    /76    Pulse (!) 55    Temp 98.5 °F (36.9 °C)    Resp 16    Ht 6' (1.829 m)    Wt 253 lb 15.5 oz (115.2 kg)    SpO2 96%    BMI 34.44 kg/m2     General:  Alert, cooperative. Lungs:   Clear to auscultation bilaterally. Heart:  Regular rate and rhythm, S1, S2 normal, no murmur, click, rub or gallop. Abdomen:   Soft, non-tender. Bowel sounds normal. No masses,  No organomegaly. Incision: Clean, dry and intact. Labs:No results found for this or any previous visit (from the past 24 hour(s)).     A/P  Wean oxygen     Signed By: Lonny Ramos MD

## 2017-08-28 ENCOUNTER — APPOINTMENT (OUTPATIENT)
Dept: CT IMAGING | Age: 62
DRG: 274 | End: 2017-08-28
Attending: NURSE PRACTITIONER
Payer: COMMERCIAL

## 2017-08-28 PROBLEM — E66.09 NON MORBID OBESITY DUE TO EXCESS CALORIES: Status: ACTIVE | Noted: 2017-08-28

## 2017-08-28 PROCEDURE — 93308 TTE F-UP OR LMTD: CPT

## 2017-08-28 PROCEDURE — 97116 GAIT TRAINING THERAPY: CPT

## 2017-08-28 PROCEDURE — 74011250636 HC RX REV CODE- 250/636: Performed by: THORACIC SURGERY (CARDIOTHORACIC VASCULAR SURGERY)

## 2017-08-28 PROCEDURE — 74011000250 HC RX REV CODE- 250: Performed by: THORACIC SURGERY (CARDIOTHORACIC VASCULAR SURGERY)

## 2017-08-28 PROCEDURE — 74011250637 HC RX REV CODE- 250/637: Performed by: PHYSICIAN ASSISTANT

## 2017-08-28 PROCEDURE — 71275 CT ANGIOGRAPHY CHEST: CPT

## 2017-08-28 PROCEDURE — 65660000000 HC RM CCU STEPDOWN

## 2017-08-28 PROCEDURE — 74011250636 HC RX REV CODE- 250/636: Performed by: PHYSICIAN ASSISTANT

## 2017-08-28 PROCEDURE — 74011636320 HC RX REV CODE- 636/320: Performed by: THORACIC SURGERY (CARDIOTHORACIC VASCULAR SURGERY)

## 2017-08-28 RX ORDER — SODIUM CHLORIDE 9 MG/ML
50 INJECTION, SOLUTION INTRAVENOUS
Status: COMPLETED | OUTPATIENT
Start: 2017-08-28 | End: 2017-08-28

## 2017-08-28 RX ORDER — SODIUM CHLORIDE 0.9 % (FLUSH) 0.9 %
10 SYRINGE (ML) INJECTION
Status: COMPLETED | OUTPATIENT
Start: 2017-08-28 | End: 2017-08-28

## 2017-08-28 RX ORDER — DILTIAZEM HYDROCHLORIDE 5 MG/ML
10 INJECTION INTRAVENOUS ONCE
Status: COMPLETED | OUTPATIENT
Start: 2017-08-28 | End: 2017-08-28

## 2017-08-28 RX ADMIN — ACETAMINOPHEN 650 MG: 325 TABLET ORAL at 18:13

## 2017-08-28 RX ADMIN — Medication 10 ML: at 21:19

## 2017-08-28 RX ADMIN — Medication 10 ML: at 12:52

## 2017-08-28 RX ADMIN — DEXTROSE MONOHYDRATE 10 MG/HR: 50 INJECTION, SOLUTION INTRAVENOUS at 18:05

## 2017-08-28 RX ADMIN — OXYCODONE HYDROCHLORIDE 10 MG: 5 TABLET ORAL at 00:31

## 2017-08-28 RX ADMIN — SODIUM CHLORIDE 50 ML/HR: 900 INJECTION, SOLUTION INTRAVENOUS at 13:42

## 2017-08-28 RX ADMIN — APIXABAN 5 MG: 5 TABLET, FILM COATED ORAL at 17:21

## 2017-08-28 RX ADMIN — Medication 10 ML: at 06:17

## 2017-08-28 RX ADMIN — DEXTROSE MONOHYDRATE 5 MG/HR: 50 INJECTION, SOLUTION INTRAVENOUS at 21:30

## 2017-08-28 RX ADMIN — MUPIROCIN: 20 OINTMENT TOPICAL at 17:22

## 2017-08-28 RX ADMIN — DOCUSATE SODIUM 100 MG: 100 CAPSULE, LIQUID FILLED ORAL at 08:43

## 2017-08-28 RX ADMIN — DOCUSATE SODIUM 100 MG: 100 CAPSULE, LIQUID FILLED ORAL at 17:21

## 2017-08-28 RX ADMIN — METHYLPREDNISOLONE SODIUM SUCCINATE 125 MG: 125 INJECTION, POWDER, FOR SOLUTION INTRAMUSCULAR; INTRAVENOUS at 06:16

## 2017-08-28 RX ADMIN — MUPIROCIN: 20 OINTMENT TOPICAL at 08:42

## 2017-08-28 RX ADMIN — Medication 10 ML: at 13:43

## 2017-08-28 RX ADMIN — METHYLPREDNISOLONE SODIUM SUCCINATE 125 MG: 125 INJECTION, POWDER, FOR SOLUTION INTRAMUSCULAR; INTRAVENOUS at 15:10

## 2017-08-28 RX ADMIN — IOPAMIDOL 100 ML: 755 INJECTION, SOLUTION INTRAVENOUS at 13:41

## 2017-08-28 RX ADMIN — Medication 10 ML: at 15:11

## 2017-08-28 RX ADMIN — OXYCODONE HYDROCHLORIDE 10 MG: 5 TABLET ORAL at 21:33

## 2017-08-28 RX ADMIN — DILTIAZEM HYDROCHLORIDE 10 MG: 5 INJECTION INTRAVENOUS at 17:50

## 2017-08-28 RX ADMIN — APIXABAN 5 MG: 5 TABLET, FILM COATED ORAL at 08:43

## 2017-08-28 RX ADMIN — METHYLPREDNISOLONE SODIUM SUCCINATE 125 MG: 125 INJECTION, POWDER, FOR SOLUTION INTRAMUSCULAR; INTRAVENOUS at 21:19

## 2017-08-28 NOTE — PROGRESS NOTES
Patient arrived in CT at 10:45AM without proper IV access. Patient had a 20g Left Hand IV access, but for this particular CT scan (angio study), we need a 20g or better in the [facility]. Salena Smith (floor RN) is aware and will call CT as soon as patient has proper access and CT will sent for patient promptly.

## 2017-08-28 NOTE — PROGRESS NOTES
Problem: Falls - Risk of  Goal: *Absence of Falls  Document Herman Fall Risk and appropriate interventions in the flowsheet.    Outcome: Progressing Towards Goal  Fall Risk Interventions:  Mobility Interventions: Communicate number of staff needed for ambulation/transfer, Patient to call before getting OOB           Medication Interventions: Patient to call before getting OOB, Teach patient to arise slowly     Elimination Interventions: Call light in reach, Patient to call for help with toileting needs, Toilet paper/wipes in reach

## 2017-08-28 NOTE — PROGRESS NOTES
Walked a lot yesterday but does not feel so well today-- dyspnea. He thinks beginning metoprolol affected his breathing as it has in past.    Color good. Pink face. Periphery is warm. Jugulars not visible but he has thick neck. Pulses full. Sinus carlee 57/min. No rales. No rhonchi. Not coughing. Afebrile. BP elevated today somewhat. Repeat echo and CTPA pending, but I expect both to be negative.

## 2017-08-28 NOTE — PROGRESS NOTES
Pt is up in chair and earlier was witness walking down the lee with steady gait without assist.  Screened pt for OT services and pt reports being at baseline for ADLS. Will sign off an no formal assessment needed.

## 2017-08-28 NOTE — CDMP QUERY
Patient is noted to have a BMI of 34. Please clarify if this patient is:     =>Morbidly obese (BMI ³ 40)  =>Obese (BMI 30 - 39.9)  =>Overweight (BMI 25 - 29.9)  =>Other explanation of clinical findings  =>Unable to determine (no explanation for clinical findings)    Presentation: 6', 253 lbs = BMI 34    REFERENCE:  The [address] [place] [address] has issued a statement indicating that, \"Individuals who are overweight, obese, or morbidly obese are at an increased risk for certain medical conditions when compared to persons of normal weight. Therefore, these conditions are always clinically significant and reportable when documented by the provider. Please clarify and document your clinical opinion in the progress notes and discharge summary, including the definitive and or presumptive diagnosis, (suspected or probable), related to the above clinical findings. Please include clinical findings supporting your diagnosis.    Peng Rausch

## 2017-08-28 NOTE — PROGRESS NOTES
Patient off floor for CT. Will follow up later/as able for PT treatment.    Stephanie Yates, PT, DPT

## 2017-08-28 NOTE — PROGRESS NOTES
PCU SHIFT NURSING NOTE      Bedside shift change report given to Patsy Lopez RN (oncoming nurse) by Mechelle Parham RN (offgoing nurse). Report included the following information SBAR, Kardex and Recent Results. Shift Summary:     2100:  Pt sitting in chair with his eyes open. Alert and oriented x4. Advises of pain level 3/10 to surgical site at chest.  VSS. Lung fields clear throughout. Non-pitting edema noted to right hand and right instep. Pt ambulated to bathroom while RN in room. Safety precautions in place. Will continue to monitor. 0300:  Pt lying in bed with his eyes closed. No signs of distress noted. VSS. Safety precautions in place. Will continue to monitor. Admission Date 8/25/2017   Admission Diagnosis ATRIAL FIBRILATION   Atrial fibrillation ([facility]   Consults None        Consults   []PT   []OT   []Speech   []Case Management      [] Palliative      Cardiac Monitoring Order   [x]Yes   []No     IV drips   []Yes    Drip:                            Dose:  Drip:                            Dose:  Drip:                            Dose:   [x]No     GI Prophylaxis   []Yes   []No         DVT Prophylaxis   SCDs:             James stockings:         [] Medication   []Contraindicated   []None      Activity Level Activity Level: Bath Room Privileges     Activity Assistance: Partial (one person)   Purposeful Rounding every 1-2 hour? [x]Yes   Ayala Score  Total Score: 1   Bed Alarm (If score 3 or >)   []Yes   [] Refused (See signed refusal form in chart)   Candido Score  Candido Score: 18   Candido Score (if score 14 or less)   []PMT consult   []Wound Care consult      []Specialty bed   [] Nutrition consult          Needs prior to discharge:   Home O2 required:    []Yes   [x]No    If yes, how much O2 required?     Other:    Last Bowel Movement: Last Bowel Movement Date: 08/24/17      Influenza Vaccine Received Flu Vaccine for Current Season (usually Sept-March): Not Flu Season        Pneumonia Vaccine Diet Active Orders   Diet    DIET CARDIAC Regular; No Conc. Sweets      LDAs               Peripheral IV 08/25/17 Left (Active)   Site Assessment Clean, dry, & intact 8/27/2017  3:46 PM   Phlebitis Assessment 0 8/27/2017  3:46 PM   Infiltration Assessment 0 8/27/2017  3:46 PM   Dressing Status Clean, dry, & intact 8/27/2017  3:46 PM   Dressing Type Transparent 8/27/2017  3:46 PM   Hub Color/Line Status Pink 8/27/2017  3:46 PM   Action Taken Other (comment) 8/26/2017  3:59 PM   Alcohol Cap Used Yes 8/26/2017  3:59 PM                      Urinary Catheter [REMOVED] Urinary Catheter 08/25/17 [place] - Temperature-Criteria for Appropriate Use: Surgical procedure    Intake & Output   Date 08/26/17 1900 - 08/27/17 0659 08/27/17 0700 - 08/28/17 0659   Shift 6243-1319 24 Hour Total 6392-0149 0817-7313 24 Hour Total   I  N  T  A  K  E   P.O.  870 720  720      P. O.  870 720  720    I.V.  (mL/kg/hr)  200         Volume (ceFAZolin (ANCEF) 2g in 100 ml 0.9% NS IVPB)  200       Shift Total  (mL/kg)  1070  (9.3) 720  (6.3)  720  (6.3)   O  U  T  P  U  T   Urine  (mL/kg/hr) 850 1550 1000  (0.7)  1000      Urine Voided 850 1400 1000  1000      Urine Output (mL) ([REMOVED] Urinary Catheter 08/25/17 [place] - Temperature)  150       Stool           Stool Occurrence(s) 200 x 200 x 1 x  1 x    Shift Total  (mL/kg) 850  (7.4) 1550  (13.5) 1000  (8.7)  1000  (8.7)   NET -850 -480 -280  -280   Weight (kg) 115.2 115.2 115.2 115.2 115.2         Readmission Risk Assessment Tool Score Low Risk            9       Total Score        3 Has Seen PCP in Last 6 Months (Yes=3, No=0)    2 . Living with Significant Other. Assisted Living. LTAC. SNF. or   Rehab    4 IP Visits Last 12 Months (1-3=4, 4=9, >4=11)        Criteria that do not apply:    Patient Length of Stay (>5 days = 3)    Pt.  Coverage (Medicare=5 , Medicaid, or Self-Pay=4)    Charlson Comorbidity Score (Age + Comorbid Conditions)       Expected Length of Stay - - -   Actual Length of Stay 2

## 2017-08-28 NOTE — PROGRESS NOTES
Cardiology Progress Note            [address] [place] [address], [address] [facility]  [phone]    8/28/2017 10:28 AM    Admit Date: 8/25/2017    Admit Diagnosis: ATRIAL FIBRILATION ; Atrial fibrillation (HCC)    Subjective:     DeWitt Kras Maple   Complains of sob.     Visit Vitals    BP (!) 166/91    Pulse (!) 56    Temp 97.5 °F (36.4 °C)    Resp 16    Ht 6' (1.829 m)    Wt 254 lb 13.6 oz (115.6 kg)    SpO2 94%    BMI 34.56 kg/m2     Current Facility-Administered Medications   Medication Dose Route Frequency    sodium chloride (NS) flush 10 mL  10 mL IntraVENous RAD ONCE    iopamidol (ISOVUE-370) 76 % injection 100 mL  100 mL IntraVENous RAD ONCE    0.9% sodium chloride infusion  50 mL/hr IntraVENous RAD ONCE    apixaban (ELIQUIS) tablet 5 mg  5 mg Oral BID    amiodarone (CORDARONE) tablet 200 mg  200 mg Oral QPM    albumin human 5% (BUMINATE) solution 12.5 g  12.5 g IntraVENous Q1H PRN    acetaminophen (TYLENOL) tablet 650 mg  650 mg Oral Q4H PRN    morphine injection 1-2 mg  1-2 mg IntraVENous Q2H PRN    naloxone (NARCAN) injection 0.4 mg  0.4 mg IntraVENous EVERY 2 MINUTES AS NEEDED    mupirocin (BACTROBAN) 2 % ointment   Both Nostrils BID    niCARdipine (CARDENE) 25 mg in 0.9% sodium chloride 250 mL infusion  5-15 mg/hr IntraVENous TITRATE    ondansetron (ZOFRAN) injection 4 mg  4 mg IntraVENous Q4H PRN    albuterol (PROVENTIL VENTOLIN) nebulizer solution 2.5 mg  2.5 mg Nebulization Q4H PRN    midazolam (VERSED) injection 1-2 mg  1-2 mg IntraVENous Q1H PRN    docusate sodium (COLACE) capsule 100 mg  100 mg Oral BID    ELECTROLYTE REPLACEMENT PROTOCOL  1 Each Other PRN    oxyCODONE IR (ROXICODONE) tablet 5-10 mg  5-10 mg Oral Q3H PRN    methylPREDNISolone (PF) (SOLU-MEDROL) injection 125 mg  125 mg IntraVENous Q8H    sodium chloride (NS) flush 5-10 mL  5-10 mL IntraVENous Q8H    sodium chloride (NS) flush 5-10 mL  5-10 mL IntraVENous PRN     Facility-Administered Medications Ordered in Other Encounters   Medication Dose Route Frequency    protamine 10 mg/mL injection        DOBUTamine (DOBUTREX) 500 mg/250 mL (2,000 mcg/mL) infusion             Objective:      Visit Vitals    BP (!) 166/91    Pulse (!) 56    Temp 97.5 °F (36.4 °C)    Resp 16    Ht 6' (1.829 m)    Wt 254 lb 13.6 oz (115.6 kg)    SpO2 94%    BMI 34.56 kg/m2       Physical Exam:  Abdomen: soft, non-tender  Extremities: extremities normal  Heart: regular rate and rhythm  Lungs: clear to auscultation bilaterally  Pulses: 2+ and symmetric    Data Review:   Labs:    Recent Labs      08/26/17   0430  08/25/17   1341   WBC  11.6*  11.9*   HGB  14.6  14.1   HCT  42.5  41.5   PLT  156  156     Recent Labs      08/26/17   0430  08/25/17   1341   NA  138  139   K  4.0  3.8   CL  105  108   CO2  26  24   GLU  150*  170*   BUN  15  21*   CREA  0.91  0.92   CA  7.6*  7.4*   INR   --   1.1       No results for input(s): TROIQ, CPK, CKMB in the last 72 hours. Intake/Output Summary (Last 24 hours) at 08/28/17 1028  Last data filed at 08/28/17 0616   Gross per 24 hour   Intake              480 ml   Output             2050 ml   Net            -1570 ml        Telemetry: nsr    Assessment:     Active Problems:    Atrial fibrillation ([address] (8/25/2017)        Plan:     Durga Mcnair is having worsening sob. He is using his incentive spirometer and is ambulating. Will obtain a cta to r/o PV stenosis. Will also obtain an echo to assess lv fcn/pericardial effusion.  Jazmyn Hollingsworth MD, [facility]    8/28/2017

## 2017-08-28 NOTE — PROGRESS NOTES
Problem: Mobility Impaired (Adult and Pediatric)  Goal: *Acute Goals and Plan of Care (Insert Text)  Physical Therapy Goals  Initiated 8/26/2017  1. Patient will move from supine to sit and sit to supine , scoot up and down and roll side to side in bed with independence within 7 day(s). 2. Patient will transfer from bed to chair and chair to bed with independence using the least restrictive device within 7 day(s). 3. Patient will perform sit to stand with independence within 7 day(s). 4. Patient will ambulate with independence for 200 feet with the least restrictive device within 7 day(s). 5. Patient will ascend/descend 4 stairs with one sided handrail(s) with modified independence within 7 day(s). PHYSICAL THERAPY TREATMENT/DISCHARGE  Patient: Pierce Casey (44 y.o. male)  Date: 8/28/2017  Diagnosis: ATRIAL FIBRILATION   Atrial fibrillation (HCC) <principal problem not specified>  Procedure(s) (LRB):  TRANSPERICARDIAL HYBRID ABLATION WITH FLUORO  (N/A) 3 Days Post-Op  Precautions: Sternal      ASSESSMENT:  Patient returned from CT scan and agreeable to walk. Patient ambulated 300 ft without AD with steady gait and without fatigue/SOB. Patient declined need for additional stair training today. Patient demonstrates safe mobility and is cleared for discharge from PT standpoint; PT orders completed. Recommend continued ambulation with nursing assist while patient remains in hospital.   Progression toward goals:  [X]      Improving appropriately and progressing toward goals  [ ]      Improving slowly and progressing toward goals  [ ]      Not making progress toward goals and plan of care will be adjusted       PLAN:  Patient will be discharged from physical therapy at this time.   Rationale for discharge:  [X] Goals Achieved  [ ] Monica Countess  [ ] Patient not participating in therapy  [ ] Other:  Discharge Recommendations:  None  Further Equipment Recommendations for Discharge:  none       SUBJECTIVE: Patient stated I'm hoping to go home today if the CT was okay.       OBJECTIVE DATA SUMMARY:   Critical Behavior:  Neurologic State: Alert  Orientation Level: Appropriate for age  Cognition: Appropriate decision making     Functional Mobility Training:  Bed Mobility:     Supine to Sit:  (NT - in chair upon arrival and returned to chair)              Transfers:  Sit to Stand: Independent  Stand to Sit: Independent                             Balance:  Sitting: Intact  Standing: Intact; Without support  Ambulation/Gait Training:  Distance (ft): 300 Feet (ft)  Assistive Device: Gait belt  Ambulation - Level of Assistance: Supervision                 Pain:  Pain Scale 1: Numeric (0 - 10)  Pain Intensity 1: 0              Activity Tolerance:   VSS on room air  Please refer to the flowsheet for vital signs taken during this treatment.   After treatment:   [X] Patient left in no apparent distress sitting up in chair  [ ] Patient left in no apparent distress in bed  [X] Call bell left within reach  [X] Nursing notified  [ ] Caregiver present  [ ] Bed alarm activated      COMMUNICATION/COLLABORATION:   The patients plan of care was discussed with: Registered Nurse     Amos Nathan, PT   Time Calculation: 15 mins

## 2017-08-29 VITALS
OXYGEN SATURATION: 92 % | RESPIRATION RATE: 18 BRPM | TEMPERATURE: 97.9 F | BODY MASS INDEX: 33.86 KG/M2 | SYSTOLIC BLOOD PRESSURE: 138 MMHG | HEIGHT: 72 IN | WEIGHT: 250 LBS | DIASTOLIC BLOOD PRESSURE: 79 MMHG | HEART RATE: 55 BPM

## 2017-08-29 LAB
ABO + RH BLD: NORMAL
BLD PROD TYP BPU: NORMAL
BLD PROD TYP BPU: NORMAL
BLOOD GROUP ANTIBODIES SERPL: NORMAL
BPU ID: NORMAL
BPU ID: NORMAL
CROSSMATCH RESULT,%XM: NORMAL
CROSSMATCH RESULT,%XM: NORMAL
SPECIMEN EXP DATE BLD: NORMAL
STATUS OF UNIT,%ST: NORMAL
STATUS OF UNIT,%ST: NORMAL
UNIT DIVISION, %UDIV: 0
UNIT DIVISION, %UDIV: 0

## 2017-08-29 PROCEDURE — 74011250637 HC RX REV CODE- 250/637: Performed by: PHYSICIAN ASSISTANT

## 2017-08-29 PROCEDURE — 74011250636 HC RX REV CODE- 250/636: Performed by: PHYSICIAN ASSISTANT

## 2017-08-29 RX ORDER — DILTIAZEM HYDROCHLORIDE 30 MG/1
60 TABLET, FILM COATED ORAL
Qty: 60 TAB | Refills: 1 | Status: SHIPPED | OUTPATIENT
Start: 2017-08-29 | End: 2017-09-14

## 2017-08-29 RX ADMIN — METHYLPREDNISOLONE SODIUM SUCCINATE 125 MG: 125 INJECTION, POWDER, FOR SOLUTION INTRAMUSCULAR; INTRAVENOUS at 06:05

## 2017-08-29 RX ADMIN — APIXABAN 5 MG: 5 TABLET, FILM COATED ORAL at 08:34

## 2017-08-29 RX ADMIN — Medication 10 ML: at 06:06

## 2017-08-29 RX ADMIN — DOCUSATE SODIUM 100 MG: 100 CAPSULE, LIQUID FILLED ORAL at 08:34

## 2017-08-29 NOTE — PROGRESS NOTES
Cardiology Progress Note            [address] [address]  [phone]    8/29/2017 12:28 PM    Admit Date: 8/25/2017    Admit Diagnosis: ATRIAL FIBRILATION ; Atrial fibrillation (HCC)    Subjective:     Tonny Pena   Had a bout of af overnight. Converted to sinus with dilt.     Visit Vitals    /79 (BP 1 Location: Right arm)    Pulse (!) 55    Temp 97.9 °F (36.6 °C)    Resp 18    Ht 6' (1.829 m)    Wt 250 lb (113.4 kg)    SpO2 92%    BMI 33.91 kg/m2     Current Facility-Administered Medications   Medication Dose Route Frequency    dilTIAZem (CARDIZEM) 100 mg in dextrose 5% (MBP/ADV) 100 mL infusion  0-15 mg/hr IntraVENous TITRATE    apixaban (ELIQUIS) tablet 5 mg  5 mg Oral BID    albumin human 5% (BUMINATE) solution 12.5 g  12.5 g IntraVENous Q1H PRN    acetaminophen (TYLENOL) tablet 650 mg  650 mg Oral Q4H PRN    morphine injection 1-2 mg  1-2 mg IntraVENous Q2H PRN    naloxone (NARCAN) injection 0.4 mg  0.4 mg IntraVENous EVERY 2 MINUTES AS NEEDED    mupirocin (BACTROBAN) 2 % ointment   Both Nostrils BID    niCARdipine (CARDENE) 25 mg in 0.9% sodium chloride 250 mL infusion  5-15 mg/hr IntraVENous TITRATE    ondansetron (ZOFRAN) injection 4 mg  4 mg IntraVENous Q4H PRN    albuterol (PROVENTIL VENTOLIN) nebulizer solution 2.5 mg  2.5 mg Nebulization Q4H PRN    midazolam (VERSED) injection 1-2 mg  1-2 mg IntraVENous Q1H PRN    docusate sodium (COLACE) capsule 100 mg  100 mg Oral BID    ELECTROLYTE REPLACEMENT PROTOCOL  1 Each Other PRN    oxyCODONE IR (ROXICODONE) tablet 5-10 mg  5-10 mg Oral Q3H PRN    methylPREDNISolone (PF) (SOLU-MEDROL) injection 125 mg  125 mg IntraVENous Q8H    sodium chloride (NS) flush 5-10 mL  5-10 mL IntraVENous Q8H    sodium chloride (NS) flush 5-10 mL  5-10 mL IntraVENous PRN         Objective:      Visit Vitals    /79 (BP 1 Location: Right arm)    Pulse (!) 55    Temp 97.9 °F (36.6 °C)    Resp 18    Ht 6' (1.829 m)    Wt 250 lb (113.4 kg)    SpO2 92%    BMI 33.91 kg/m2       Physical Exam:  Abdomen: soft, non-tender  Extremities: extremities normal  Heart: regular rate and rhythm  Lungs: clear to auscultation bilaterally  Pulses: 2+ and symmetric    Data Review:   Labs:    No results for input(s): WBC, HGB, HCT, PLT, HGBEXT, HCTEXT, PLTEXT in the last 72 hours. No results for input(s): NA, K, CL, CO2, GLU, BUN, CREA, CA, MG, PHOS, ALB, TBIL, TBILI, SGOT, ALT, INR in the last 72 hours. No lab exists for component:  BNP, INREXT    No results for input(s): TROIQ, CPK, CKMB in the last 72 hours. Intake/Output Summary (Last 24 hours) at 08/29/17 1228  Last data filed at 08/28/17 2242   Gross per 24 hour   Intake              240 ml   Output              200 ml   Net               40 ml        Telemetry: nsr    Assessment:     Active Problems:    Atrial fibrillation ([address] (8/25/2017)      Non morbid obesity due to excess calories (8/28/2017)        Plan:     Nicki Cortez is back in sinus. [facility] for [employer]. Cont oac. Cont dilt. Dc amio. F/u in one week.      Reta Lombard, MD, [facility]    8/29/2017

## 2017-08-29 NOTE — PROGRESS NOTES
PCU SHIFT NURSING NOTE      Bedside and Verbal shift change report given to Deepa Espinoza, RN and Dao Chaney, RN (oncoming nurse) by Sibyl Boast, RN (offgoing nurse). Report included the following information SBAR, Kardex, Procedure Summary, Intake/Output, MAR, Recent Results, Med Rec Status, Cardiac Rhythm A-Fib and Alarm Parameters . Shift Summary:       Admission Date 8/25/2017   Admission Diagnosis ATRIAL FIBRILATION   Atrial fibrillation ([facility] [address]   Consults None        Consults   []PT   []OT   []Speech   []Case Management      [] Palliative      Cardiac Monitoring Order   []Yes   []No     IV drips   []Yes    Drip:                            Dose:  Drip:                            Dose:  Drip:                            Dose:   []No     GI Prophylaxis   []Yes   []No         DVT Prophylaxis   SCDs:             James stockings:         [] Medication   []Contraindicated   []None      Activity Level Activity Level: Up ad tor     Activity Assistance: No assistance needed   Purposeful Rounding every 1-2 hour? []Yes   Ayala Score  Total Score: 1   Bed Alarm (If score 3 or >)   []Yes   [] Refused (See signed refusal form in chart)   Candido Score  Candido Score: 18   Candido Score (if score 14 or less)   []PMT consult   []Wound Care consult      []Specialty bed   [] Nutrition consult          Needs prior to discharge:   Home O2 required:    []Yes   []No    If yes, how much O2 required? Other:    Last Bowel Movement: Last Bowel Movement Date: 08/27/17      Influenza Vaccine Received Flu Vaccine for Current Season (usually Sept-March): Not Flu Season        Pneumonia Vaccine           Diet Active Orders   Diet    DIET CARDIAC Regular; No Conc.  Sweets      LDAs               Peripheral IV 08/25/17 Left (Active)   Site Assessment Clean, dry, & intact 8/28/2017  3:05 PM   Phlebitis Assessment 0 8/28/2017  3:05 PM   Infiltration Assessment 0 8/28/2017  3:05 PM   Dressing Status Clean, dry, & intact 8/28/2017  3:05 PM   Dressing Type Transparent 8/28/2017  3:05 PM   Hub Color/Line Status Pink 8/28/2017  3:05 PM   Action Taken Other (comment) 8/26/2017  3:59 PM   Alcohol Cap Used Yes 8/26/2017  3:59 PM       Peripheral IV 08/28/17 Left Antecubital (Active)   Site Assessment Clean, dry, & intact 8/28/2017 12:53 PM   Phlebitis Assessment 0 8/28/2017 12:53 PM   Infiltration Assessment 0 8/28/2017 12:53 PM   Dressing Status Clean, dry, & intact;New;Occlusive 8/28/2017 12:53 PM   Dressing Type Transparent;Tape 8/28/2017 12:53 PM   Hub Color/Line Status Pink;Flushed;Capped; Patent 8/28/2017 12:53 PM   Action Taken Other (comment) 8/28/2017 12:53 PM                      Urinary Catheter [REMOVED] Urinary Catheter 08/25/17 [place] - Temperature-Criteria for Appropriate Use: Surgical procedure    Intake & Output   Date 08/27/17 1900 - 08/28/17 0659 08/28/17 0700 - 08/29/17 0659   Shift 3747-5249 24 Hour Total 6349-7301 9356-2478 24 Hour Total   I  N  T  A  K  E   P. O.  720 480  480      P. O.  720 480  480    Shift Total  (mL/kg)  720  (6.2) 480  (4.2)  480  (4.2)   O  U  T  P  U  T   Urine  (mL/kg/hr) 1350 2350 200  (0.1)  200      Urine Voided 1350 2350 200  200    Stool           Stool Occurrence(s)  1 x       Shift Total  (mL/kg) 1350  (11.7) 2350  (20.3) 200  (1.7)  200  (1.7)   NET -1350 -1630 280  280   Weight (kg) 115.6 115.6 115.6 115.6 115.6         Readmission Risk Assessment Tool Score Low Risk            9       Total Score        3 Has Seen PCP in Last 6 Months (Yes=3, No=0)    2 . Living with Significant Other. Assisted Living. LTAC. SNF. or   Rehab    4 IP Visits Last 12 Months (1-3=4, 4=9, >4=11)        Criteria that do not apply:    Patient Length of Stay (>5 days = 3)    Pt.  Coverage (Medicare=5 , Medicaid, or Self-Pay=4)    Charlson Comorbidity Score (Age + Comorbid Conditions)       Expected Length of Stay 2d 16h   Actual Length of Stay 3

## 2017-08-29 NOTE — PROGRESS NOTES
Cm met with patient to discuss discharge planning. Patient is sitting up in chair on room air. Patient confirmed name and  as patient identifiers. Patient confirmed demographics. Preferred pharmacy is [facility]. Wife will provide transportation at time of discharge. Follow up appointments to be schedule - patient informed. Care Management Interventions  PCP Verified by CM: Yes  Mode of Transport at Discharge:  Other (see comment) (family)  Discharge Durable Medical Equipment: No  Physical Therapy Consult: Yes  Occupational Therapy Consult: Yes  Current Support Network: Lives with Spouse  Confirm Follow Up Transport: Family  Plan discussed with Pt/Family/Caregiver: Yes  Discharge Location  Discharge Placement: Josue Chavez RN CM  Ext [phone]

## 2017-08-29 NOTE — PROGRESS NOTES
Feels better. No dyspnea now. CT chest unremarkable yesterday. Echo also okay. Sinus rhythm now but episodes of atrial fib with ventricular response about 120/min. Lungs sound clear. Periphery is warm. Color good. Jugulars not visible. Home.   No amio per Dr. Meche Quezada.

## 2017-08-29 NOTE — PROGRESS NOTES
PCU SHIFT NURSING NOTE      Bedside shift change report given to Jeferson/Byron PACE (oncoming nurse) by Kranthi (offgoing nurse). Report included the following information SBAR, Kardex, Procedure Summary, Intake/Output, MAR and Recent Results. Shift Summary:     1242 - Reviewed discharge instructions with Pt. Verbalized understanding. Copy of discharge instructions, AVS and printed prescriptions provided in discharge booklet. Follow up appointments were made for patient by CM. All questions were appropriate to content and were answered to pt's satisfaction. Telemetry and all IV's were removed. Pt assisted in getting dressed. Voices no c/o at this time. 1250 - Pt taken to exit via wheelchair with volunteer services. Admission Date 8/25/2017   Admission Diagnosis ATRIAL FIBRILATION   Atrial fibrillation ([facility] [address]   Consults None        Consults   []PT   []OT   []Speech   []Case Management      [] Palliative      Cardiac Monitoring Order   []Yes   []No     IV drips   []Yes    Drip:                            Dose:  Drip:                            Dose:  Drip:                            Dose:   []No     GI Prophylaxis   []Yes   []No         DVT Prophylaxis   SCDs:             James stockings:         [] Medication   []Contraindicated   []None      Activity Level Activity Level: Up ad tor     Activity Assistance: No assistance needed   Purposeful Rounding every 1-2 hour? []Yes   Ayala Score  Total Score: 1   Bed Alarm (If score 3 or >)   []Yes   [] Refused (See signed refusal form in chart)   Candido Score  Candido Score: 18   Candiod Score (if score 14 or less)   []PMT consult   []Wound Care consult      []Specialty bed   [] Nutrition consult          Needs prior to discharge:   Home O2 required:    []Yes   []No    If yes, how much O2 required?     Other:    Last Bowel Movement: Last Bowel Movement Date: 08/27/17      Influenza Vaccine Received Flu Vaccine for Current Season (usually Sept-March): Not Flu Season        Pneumonia Vaccine           Diet Active Orders   Diet    DIET CARDIAC Regular; No Conc. Sweets      LDAs               Peripheral IV 08/25/17 Left (Active)   Site Assessment Clean, dry, & intact 8/29/2017  3:00 AM   Phlebitis Assessment 0 8/29/2017  3:00 AM   Infiltration Assessment 0 8/29/2017  3:00 AM   Dressing Status Clean, dry, & intact 8/29/2017  3:00 AM   Dressing Type Transparent;Tape 8/29/2017  3:00 AM   Hub Color/Line Status Pink;Capped;Flushed;Patent 8/29/2017  3:00 AM   Action Taken Other (comment) 8/26/2017  3:59 PM   Alcohol Cap Used Yes 8/28/2017  7:15 PM                      Urinary Catheter [REMOVED] Urinary Catheter 08/25/17 [place] - Temperature-Criteria for Appropriate Use: Surgical procedure    Intake & Output   Date 08/28/17 0700 - 08/29/17 0659 08/29/17 0700 - 08/30/17 0659   Shift 1450-0500 2378-0808 24 Hour Total 7502-1368 0063-1013 24 Hour Total   I  N  T  A  K  E   P.O. 480  480         P. O. 480  480       Shift Total  (mL/kg) 480  (4.2)  480  (4.2)      O  U  T  P  U  T   Urine  (mL/kg/hr) 200  (0.1) 200  (0.1) 400  (0.1)         Urine Voided 200 200 400       Shift Total  (mL/kg) 200  (1.7) 200  (1.8) 400  (3.5)       -200 80      Weight (kg) 115.6 113.4 113.4 113.4 113.4 113.4         Readmission Risk Assessment Tool Score Low Risk            9       Total Score        3 Has Seen PCP in Last 6 Months (Yes=3, No=0)    2 . Living with Significant Other. Assisted Living. LTAC. SNF. or   Rehab    4 IP Visits Last 12 Months (1-3=4, 4=9, >4=11)        Criteria that do not apply:    Patient Length of Stay (>5 days = 3)    Pt.  Coverage (Medicare=5 , Medicaid, or Self-Pay=4)    Charlson Comorbidity Score (Age + Comorbid Conditions)       Expected Length of Stay 2d 16h   Actual Length of Stay 4

## 2017-08-29 NOTE — PROGRESS NOTES
Problem: Falls - Risk of  Goal: *Absence of Falls  Document Herman Fall Risk and appropriate interventions in the flowsheet.    Outcome: Progressing Towards Goal  Fall Risk Interventions:  Mobility Interventions: Communicate number of staff needed for ambulation/transfer, OT consult for ADLs, PT Consult for mobility concerns, PT Consult for assist device competence           Medication Interventions: Assess postural VS orthostatic hypotension, Evaluate medications/consider consulting pharmacy, Teach patient to arise slowly     Elimination Interventions: Call light in reach, Patient to call for help with toileting needs, Toilet paper/wipes in reach, Urinal in reach

## 2017-08-29 NOTE — ADT AUTH CERT NOTES
Atrial Fibrillation - Care Day 3 (8/27/2017) by Donita Alu        Review Status Review Entered       Completed 8/28/2017       Details              Care Day: 3 Care Date: 8/27/2017 Level of Care: Step Down       Guideline Day 2        Clinical Status       ( ) * Hemodynamic stability       8/28/2017 12:11 PM EDT by Kary Singer         97.9-126/72-55-18-95% 2L NC              ( ) * Sinus rhythm or acceptable ventricular rate       (X) * No evidence of myocardial ischemia       ( ) * Anticoagulation regimen for next level of care established or not necessary       ( ) * Discharge plans and education understood              Activity       ( ) * Ambulatory       8/28/2017 12:11 PM EDT by Kary Singer         out of bed with assistance                     Routes       (X) * Oral hydration, diet, and medications       8/28/2017 12:11 PM EDT by Kary Singer         cardiac diet                     Interventions       (X) Cardiac monitoring       8/28/2017 12:11 PM EDT by Kary Singer         hemodynamic monitoring                     Medications       (X) Possible medication for rhythm or rate control                                   * Milestone              Additional Notes       POD: 2 Days Post-Op                 Procedure:  Procedure(s):       TRANSPERICARDIAL HYBRID ABLATION WITH FLUORO                c/o SOB       Assessment:               Active Problems:         Atrial fibrillation ([address] (8/25/2017)                Plan:               D/c metoprolol per patient request.               Monitor for tachycardia.  Consider bystolic.              CXR - No pulmonary edema              PT/OT eval and treat       incentive spirometry       chest tube care       eliquis po x2       solu Medrol iv x3       oxycodone po x5       amiodarone po x1

## 2017-08-30 ENCOUNTER — HOSPITAL ENCOUNTER (EMERGENCY)
Age: 62
Discharge: HOME | End: 2017-08-30
Attending: EMERGENCY MEDICINE
Payer: COMMERCIAL

## 2017-08-30 ENCOUNTER — TELEPHONE (OUTPATIENT)
Dept: CARDIOLOGY CLINIC | Age: 62
End: 2017-08-30

## 2017-08-30 VITALS
OXYGEN SATURATION: 92 % | SYSTOLIC BLOOD PRESSURE: 103 MMHG | HEART RATE: 101 BPM | DIASTOLIC BLOOD PRESSURE: 73 MMHG | RESPIRATION RATE: 17 BRPM

## 2017-08-30 DIAGNOSIS — R00.2 PALPITATIONS: ICD-10-CM

## 2017-08-30 DIAGNOSIS — I48.0 PAROXYSMAL ATRIAL FIBRILLATION (HCC): Primary | ICD-10-CM

## 2017-08-30 LAB
ALBUMIN SERPL-MCNC: 2.8 G/DL (ref 3.5–5)
ALBUMIN/GLOB SERPL: 0.8 {RATIO} (ref 1.1–2.2)
ALP SERPL-CCNC: 76 U/L (ref 45–117)
ALT SERPL-CCNC: 37 U/L (ref 12–78)
ANION GAP SERPL CALC-SCNC: 6 MMOL/L (ref 5–15)
APPEARANCE UR: CLEAR
AST SERPL-CCNC: 20 U/L (ref 15–37)
ATRIAL RATE: 108 BPM
ATRIAL RATE: 111 BPM
BACTERIA URNS QL MICRO: NEGATIVE /HPF
BASOPHILS # BLD: 0 K/UL (ref 0–0.1)
BASOPHILS NFR BLD: 0 % (ref 0–1)
BILIRUB SERPL-MCNC: 0.8 MG/DL (ref 0.2–1)
BILIRUB UR QL: NEGATIVE
BUN SERPL-MCNC: 18 MG/DL (ref 6–20)
BUN/CREAT SERPL: 20 (ref 12–20)
CALCIUM SERPL-MCNC: 7.5 MG/DL (ref 8.5–10.1)
CALCULATED P AXIS, ECG09: 70 DEGREES
CALCULATED R AXIS, ECG10: 19 DEGREES
CALCULATED R AXIS, ECG10: 35 DEGREES
CALCULATED T AXIS, ECG11: -34 DEGREES
CALCULATED T AXIS, ECG11: -80 DEGREES
CHLORIDE SERPL-SCNC: 99 MMOL/L (ref 97–108)
CK MB CFR SERPL CALC: 2.1 % (ref 0–2.5)
CK MB SERPL-MCNC: 2 NG/ML (ref 5–25)
CK SERPL-CCNC: 95 U/L (ref 39–308)
CO2 SERPL-SCNC: 32 MMOL/L (ref 21–32)
COLOR UR: NORMAL
CREAT SERPL-MCNC: 0.9 MG/DL (ref 0.7–1.3)
DIAGNOSIS, 93000: NORMAL
DIAGNOSIS, 93000: NORMAL
EOSINOPHIL # BLD: 0 K/UL (ref 0–0.4)
EOSINOPHIL NFR BLD: 0 % (ref 0–7)
EPITH CASTS URNS QL MICRO: NORMAL /LPF
ERYTHROCYTE [DISTWIDTH] IN BLOOD BY AUTOMATED COUNT: 13.2 % (ref 11.5–14.5)
GLOBULIN SER CALC-MCNC: 3.4 G/DL (ref 2–4)
GLUCOSE SERPL-MCNC: 129 MG/DL (ref 65–100)
GLUCOSE UR STRIP.AUTO-MCNC: NEGATIVE MG/DL
HCT VFR BLD AUTO: 48.3 % (ref 36.6–50.3)
HGB BLD-MCNC: 16.9 G/DL (ref 12.1–17)
HGB UR QL STRIP: NEGATIVE
HYALINE CASTS URNS QL MICRO: NORMAL /LPF (ref 0–5)
KETONES UR QL STRIP.AUTO: NEGATIVE MG/DL
LEUKOCYTE ESTERASE UR QL STRIP.AUTO: NEGATIVE
LYMPHOCYTES # BLD: 2.3 K/UL (ref 0.8–3.5)
LYMPHOCYTES NFR BLD: 19 % (ref 12–49)
MAGNESIUM SERPL-MCNC: 2.2 MG/DL (ref 1.6–2.4)
MCH RBC QN AUTO: 31.6 PG (ref 26–34)
MCHC RBC AUTO-ENTMCNC: 35 G/DL (ref 30–36.5)
MCV RBC AUTO: 90.4 FL (ref 80–99)
MONOCYTES # BLD: 0.9 K/UL (ref 0–1)
MONOCYTES NFR BLD: 7 % (ref 5–13)
NEUTS SEG # BLD: 9.3 K/UL (ref 1.8–8)
NEUTS SEG NFR BLD: 74 % (ref 32–75)
NITRITE UR QL STRIP.AUTO: NEGATIVE
P-R INTERVAL, ECG05: 300 MS
PH UR STRIP: 7 [PH] (ref 5–8)
PLATELET # BLD AUTO: 175 K/UL (ref 150–400)
POTASSIUM SERPL-SCNC: 3.4 MMOL/L (ref 3.5–5.1)
PROT SERPL-MCNC: 6.2 G/DL (ref 6.4–8.2)
PROT UR STRIP-MCNC: NEGATIVE MG/DL
Q-T INTERVAL, ECG07: 372 MS
Q-T INTERVAL, ECG07: 386 MS
QRS DURATION, ECG06: 96 MS
QRS DURATION, ECG06: 98 MS
QTC CALCULATION (BEZET), ECG08: 500 MS
QTC CALCULATION (BEZET), ECG08: 517 MS
RBC # BLD AUTO: 5.34 M/UL (ref 4.1–5.7)
RBC #/AREA URNS HPF: NORMAL /HPF (ref 0–5)
SODIUM SERPL-SCNC: 137 MMOL/L (ref 136–145)
SP GR UR REFRACTOMETRY: 1.02 (ref 1–1.03)
TROPONIN I SERPL-MCNC: 0.14 NG/ML
UA: UC IF INDICATED,UAUC: NORMAL
UROBILINOGEN UR QL STRIP.AUTO: 0.2 EU/DL (ref 0.2–1)
VENTRICULAR RATE, ECG03: 108 BPM
VENTRICULAR RATE, ECG03: 109 BPM
WBC # BLD AUTO: 12.5 K/UL (ref 4.1–11.1)
WBC URNS QL MICRO: NORMAL /HPF (ref 0–4)

## 2017-08-30 PROCEDURE — 83735 ASSAY OF MAGNESIUM: CPT | Performed by: EMERGENCY MEDICINE

## 2017-08-30 PROCEDURE — 82550 ASSAY OF CK (CPK): CPT | Performed by: EMERGENCY MEDICINE

## 2017-08-30 PROCEDURE — 84484 ASSAY OF TROPONIN QUANT: CPT | Performed by: EMERGENCY MEDICINE

## 2017-08-30 PROCEDURE — 80053 COMPREHEN METABOLIC PANEL: CPT | Performed by: EMERGENCY MEDICINE

## 2017-08-30 PROCEDURE — 85025 COMPLETE CBC W/AUTO DIFF WBC: CPT | Performed by: EMERGENCY MEDICINE

## 2017-08-30 PROCEDURE — 81001 URINALYSIS AUTO W/SCOPE: CPT | Performed by: EMERGENCY MEDICINE

## 2017-08-30 PROCEDURE — 94762 N-INVAS EAR/PLS OXIMTRY CONT: CPT

## 2017-08-30 PROCEDURE — 93005 ELECTROCARDIOGRAM TRACING: CPT

## 2017-08-30 PROCEDURE — 36415 COLL VENOUS BLD VENIPUNCTURE: CPT | Performed by: EMERGENCY MEDICINE

## 2017-08-30 PROCEDURE — 99285 EMERGENCY DEPT VISIT HI MDM: CPT

## 2017-08-30 RX ORDER — DILTIAZEM HYDROCHLORIDE 120 MG/1
120 CAPSULE, EXTENDED RELEASE ORAL DAILY
Qty: 30 CAP | Refills: 0 | Status: SHIPPED | OUTPATIENT
Start: 2017-08-30 | End: 2017-10-04 | Stop reason: SDUPTHER

## 2017-08-30 NOTE — ED NOTES
Dr Jassi Jaime reviewed discharge instructions with the patient and family. The patient and family verbalized understanding. Pt is AAOx4, respirations unlabored and regular, skin warm and dry. Steady balanced gait noted. NAD noted. VSS.

## 2017-08-30 NOTE — ED PROVIDER NOTES
HPI Comments: Brinda Kayser is a 57 y.o. male with PMHX significant for HTN and hypercholesterolemia who presents ambulatory with spouse to ED c/o intermittent episodes of palpitations, along with associated shortness of breath since yesterday, and he did not sleep last night as he was urinating frequently. He took Diltiazem 60mg at 2:00 AM today for heart rate that was bouncing between 60 and 116 on home monitor. Pt states his symptoms, including polyuria, are similar to previous A-fib. Per medical records, pt has a history of paroxysmal A-fib x 18 years s/p recent transpericardial hybrid ablation and atrial ablation on 8/25/17 by Aleida Mojica/Jose G. Pt was discharged yesterday, and was feeling better and asymptomatic at that time. Pt has history of multiple ablation procedures and cardioversions that were unsuccessful. During his hospitalization he had a normal cardiac cath with EF 60%, normal echo, was taken off Amiodarone and Metoprolol, and he had Diltiazem added PRN for pulse greater than 120. He is on Eliquis. Pt states his normal heart rate is in the 50's. Pt denies any fever. Cardiology: Dr Idris Goetz  Surgery: Dr Haily Frye  PCP:  Danette Avila MD    Social Hx:  tobacco use (former), EtOH use (occasional)    There are no other complaints, changes or physical findings at this time. The history is provided by the patient. No  was used.         Past Medical History:   Diagnosis Date    A-fib [facility])     Arthritis     back    Chronic pain     back    Encounter for cardioversion procedure 2/7/2017    GERD (gastroesophageal reflux disease)     High cholesterol     Hypertension 02/07/2017    patient denies hx of HTN    Multiple thyroid nodules     biopsied and benign    PUD (peptic ulcer disease)     \"years ago\"       Past Surgical History:   Procedure Laterality Date    CARDIAC CATHETERIZATION      CARDIAC SURG PROCEDURE UNLIST  2010, 2011    ,ABLATION     CARDIOVERSION EXTERNAL      HX CERVICAL FUSION  2013    HX HEENT  6/6/13    THYROID BIOPSY    HX ORTHOPAEDIC      shoulder surgery on left    HX ORTHOPAEDIC      wrist surgery, LEFT    HX ORTHOPAEDIC      elbow surgery, RIGHT         Family History:   Problem Relation Age of Onset    Cancer Mother      BREAST    Heart Disease Father     Lung Disease Father        Social History     Social History    Marital status:      Spouse name: N/A    Number of children: N/A    Years of education: N/A     Occupational History    Not on file. Social History Main Topics    Smoking status: Former Smoker     Packs/day: 0.25     Years: 40.00     Quit date: 10/1/2012    Smokeless tobacco: Never Used    Alcohol use Yes      Comment: occassionally 3-4 cans of beer    Drug use: No    Sexual activity: Yes     Other Topics Concern    Not on file     Social History Narrative         ALLERGIES: Percocet [oxycodone-acetaminophen]    Review of Systems   Constitutional: Negative for chills, diaphoresis, fever and unexpected weight change. HENT: Negative for rhinorrhea and sore throat. Eyes: Negative for pain. Respiratory: Positive for shortness of breath. Negative for wheezing and stridor. Cardiovascular: Positive for palpitations. Negative for chest pain and leg swelling. Gastrointestinal: Negative for abdominal pain, blood in stool, diarrhea, nausea and vomiting. Endocrine: Positive for polyuria. Genitourinary: Negative for difficulty urinating, dysuria and flank pain. Musculoskeletal: Negative for back pain and neck stiffness. Skin: Negative for rash. Neurological: Negative for seizures, syncope, weakness, light-headedness and headaches. Psychiatric/Behavioral: Negative for confusion.        Patient Vitals for the past 12 hrs:   Pulse Resp BP SpO2   08/30/17 1130 (!) 110 13 126/88 95 %   08/30/17 1115 (!) 109 15 130/86 93 %   08/30/17 1100 (!) 109 15 132/87 95 %         Physical Exam Constitutional: He is oriented to person, place, and time. He appears well-developed and well-nourished. No distress. HENT:   Nose: Nose normal.   Mouth/Throat: Oropharynx is clear and moist. No oropharyngeal exudate. Eyes: Conjunctivae and EOM are normal. Pupils are equal, round, and reactive to light. Right eye exhibits no discharge. Left eye exhibits no discharge. No scleral icterus. Neck: Normal range of motion. Neck supple. No JVD present. Cardiovascular: Regular rhythm, normal heart sounds and intact distal pulses. Tachycardia present. No murmur heard. Pulmonary/Chest: Effort normal and breath sounds normal. No stridor. No respiratory distress. He has no wheezes. He has no rales. Abdominal: Soft. Bowel sounds are normal. He exhibits no distension. There is no tenderness. There is no rebound and no guarding. Musculoskeletal: He exhibits no edema or tenderness. Neurological: He is alert and oriented to person, place, and time. Skin: Skin is warm and dry. He is not diaphoretic. Well healing 4cm surgical scar just below the xyphoid, no erythema or drainage    Psychiatric: He has a normal mood and affect. Nursing note and vitals reviewed. MDM  Number of Diagnoses or Management Options  Palpitations:   Paroxysmal atrial fibrillation [facility]):   Diagnosis management comments: History of refractory PAF s/p recent internal and external atrial ablation, here with symptoms of A-fib. Workup here unremarkable. No episodes of A-fib captured on telemetry during duration of ED stay. Discussed with Dr Blaine Gordon and will proceed with his recommendations. Pt stable for outpatient follow up.         Amount and/or Complexity of Data Reviewed  Clinical lab tests: reviewed and ordered  Tests in the radiology section of CPT®: reviewed and ordered  Tests in the medicine section of CPT®: ordered and reviewed  Review and summarize past medical records: yes  Discuss the patient with other providers: yes (Cardiology)    Patient Progress  Patient progress: stable    ED Course       Procedures     Pulse Oximetry Analysis - Normal 95% on room air    Cardiac Monitor:   Rate: 110   Rhythm: accelerated junctional rhythm      EKG interpretation: (Preliminary) 10:51 AM  Rhythm: accelerated junctional rhythm. Rate (approx.): 109; Axis: normal; QRS interval: normal ; ST/T wave: non-specific abnormality. Written by [facility]. Jeferson Turner, ED Scribe, as dictated by Denis Nova MD.    EKG interpretation: (Subsequent) 12:52 PM  Rhythm: Sinus tachycardia with 1st degree AV block . Rate (approx.): 108; Axis: normal; OK Interval: 300 ms; QRS interval: normal ; ST/T wave: non-specific ST and T wave abnormality. Written by [facility]. Jeferson Turner, ED Scribe, as dictated by Denis Nova MD.    Progress Note  1:07 PM    Pt states he had  a short self limited episode of palpitations and shortness of breath. pt's family was watching the monitor and states that the pt's heart rate was in the 90's to 100's. On re-evaluation the pt states the episode has since resolved. Consult Note  1:32 PM  Denis Nova MD spoke with Dr. Eric Sharif,   Specialty: Cardiology  Discussed pt's hx, disposition, and available diagnostic and imaging results. Reviewed care plans. Consultant recommends plan as outlined: start Diltiazem 120mg ER daily, and follow up in clinic. LABORATORY TESTS:  Recent Results (from the past 12 hour(s))   EKG, 12 LEAD, INITIAL    Collection Time: 08/30/17 10:51 AM   Result Value Ref Range    Ventricular Rate 109 BPM    Atrial Rate 111 BPM    QRS Duration 98 ms    Q-T Interval 372 ms    QTC Calculation (Bezet) 500 ms    Calculated R Axis 35 degrees    Calculated T Axis -34 degrees    Diagnosis       Sinus tachycardia  Nonspecific ST and T wave abnormality  When compared with ECG of 25-AUG-2017 13:24,  Vent.  rate has increased BY  49 BPM  Nonspecific T wave abnormality now evident in Lateral leads  Confirmed by Sammy Garay Mariehailey ([id number]) on 8/30/2017 12:11:09 PM     CBC WITH AUTOMATED DIFF    Collection Time: 08/30/17 11:25 AM   Result Value Ref Range    WBC 12.5 (H) 4.1 - 11.1 K/uL    RBC 5.34 4.10 - 5.70 M/uL    HGB 16.9 12.1 - 17.0 g/dL    HCT 48.3 36.6 - 50.3 %    MCV 90.4 80.0 - 99.0 FL    MCH 31.6 26.0 - 34.0 PG    MCHC 35.0 30.0 - 36.5 g/dL    RDW 13.2 11.5 - 14.5 %    PLATELET 220 762 - 907 K/uL    NEUTROPHILS 74 32 - 75 %    LYMPHOCYTES 19 12 - 49 %    MONOCYTES 7 5 - 13 %    EOSINOPHILS 0 0 - 7 %    BASOPHILS 0 0 - 1 %    ABS. NEUTROPHILS 9.3 (H) 1.8 - 8.0 K/UL    ABS. LYMPHOCYTES 2.3 0.8 - 3.5 K/UL    ABS. MONOCYTES 0.9 0.0 - 1.0 K/UL    ABS. EOSINOPHILS 0.0 0.0 - 0.4 K/UL    ABS. BASOPHILS 0.0 0.0 - 0.1 K/UL   CK W/ CKMB & INDEX    Collection Time: 08/30/17 11:25 AM   Result Value Ref Range    CK 95 39 - 308 U/L    CK - MB 2.0 <3.6 NG/ML    CK-MB Index 2.1 0 - 2.5     METABOLIC PANEL, COMPREHENSIVE    Collection Time: 08/30/17 11:25 AM   Result Value Ref Range    Sodium 137 136 - 145 mmol/L    Potassium 3.4 (L) 3.5 - 5.1 mmol/L    Chloride 99 97 - 108 mmol/L    CO2 32 21 - 32 mmol/L    Anion gap 6 5 - 15 mmol/L    Glucose 129 (H) 65 - 100 mg/dL    BUN 18 6 - 20 MG/DL    Creatinine 0.90 0.70 - 1.30 MG/DL    BUN/Creatinine ratio 20 12 - 20      GFR est AA >60 >60 ml/min/1.73m2    GFR est non-AA >60 >60 ml/min/1.73m2    Calcium 7.5 (L) 8.5 - 10.1 MG/DL    Bilirubin, total 0.8 0.2 - 1.0 MG/DL    ALT (SGPT) 37 12 - 78 U/L    AST (SGOT) 20 15 - 37 U/L    Alk.  phosphatase 76 45 - 117 U/L    Protein, total 6.2 (L) 6.4 - 8.2 g/dL    Albumin 2.8 (L) 3.5 - 5.0 g/dL    Globulin 3.4 2.0 - 4.0 g/dL    A-G Ratio 0.8 (L) 1.1 - 2.2     MAGNESIUM    Collection Time: 08/30/17 11:25 AM   Result Value Ref Range    Magnesium 2.2 1.6 - 2.4 mg/dL   TROPONIN I    Collection Time: 08/30/17 11:25 AM   Result Value Ref Range    Troponin-I, Qt. 0.14 (H) <0.05 ng/mL   URINALYSIS W/ REFLEX CULTURE    Collection Time: 08/30/17 11:59 AM   Result Value Ref Range    Color YELLOW/STRAW      Appearance CLEAR CLEAR      Specific gravity 1.017 1.003 - 1.030      pH (UA) 7.0 5.0 - 8.0      Protein NEGATIVE  NEG mg/dL    Glucose NEGATIVE  NEG mg/dL    Ketone NEGATIVE  NEG mg/dL    Bilirubin NEGATIVE  NEG      Blood NEGATIVE  NEG      Urobilinogen 0.2 0.2 - 1.0 EU/dL    Nitrites NEGATIVE  NEG      Leukocyte Esterase NEGATIVE  NEG      WBC 0-4 0 - 4 /hpf    RBC 0-5 0 - 5 /hpf    Epithelial cells FEW FEW /lpf    Bacteria NEGATIVE  NEG /hpf    UA:UC IF INDICATED CULTURE NOT INDICATED BY UA RESULT CNI      Hyaline cast 0-2 0 - 5 /lpf   EKG, 12 LEAD, SUBSEQUENT    Collection Time: 08/30/17 12:52 PM   Result Value Ref Range    Ventricular Rate 108 BPM    Atrial Rate 108 BPM    P-R Interval 300 ms    QRS Duration 96 ms    Q-T Interval 386 ms    QTC Calculation (Bezet) 517 ms    Calculated P Axis 70 degrees    Calculated R Axis 19 degrees    Calculated T Axis -80 degrees    Diagnosis       Sinus tachycardia with 1st degree AV block  Nonspecific ST and T wave abnormality  Abnormal ECG  When compared with ECG of 30-AUG-2017 10:51,  GA interval has increased         IMPRESSION:  1. Paroxysmal atrial fibrillation (HCC)    2. Palpitations        PLAN:  1. Current Discharge Medication List      START taking these medications    Details   dilTIAZem XR (DILACOR XR) 120 mg XR capsule Take 1 Cap by mouth daily. Qty: 30 Cap, Refills: 0         CONTINUE these medications which have NOT CHANGED    Details   dilTIAZem (CARDIZEM) 30 mg tablet Take 2 Tabs by mouth once as needed for up to 1 dose. Diltiazem 60 mg by mouth as needed for pulse rate above 120/min, may repeat every six hours. Qty: 60 Tab, Refills: 1           2.    Follow-up Information     Follow up With Details Comments Contact Info    Tuan Rodarte MD Call As needed [address] [facility]  [phone]      Cheryn Jeans, MD Call in 1 day  [address]  [address]       \[facility]\"" EMERGENCY DEPT  As needed, If symptoms worsen [address] [place] [address]  [phone]        Return to ED if worse       DISCHARGE NOTE  1:51 PM  The patient has been re-evaluated and is ready for discharge. Reviewed available results with patient. Counseled pt on diagnosis and care plan. Pt has expressed understanding, and all questions have been answered. Pt agrees with plan and agrees to follow up as recommended, or return to the ED if their symptoms worsen. Discharge instructions have been provided and explained to the pt, along with reasons to return to the ED. Attestations: This note is prepared by Maria Campoverde. Sawyer Ham, acting as Scribe for Bernadine Haddad MD.    Bernadine Haddad MD: The scribe's documentation has been prepared under my direction and personally reviewed by me in its entirety. I confirm that the note above accurately reflects all work, treatment, procedures, and medical decision making performed by me.

## 2017-08-30 NOTE — DISCHARGE INSTRUCTIONS
Atrial Fibrillation: Care Instructions  Your Care Instructions    Atrial fibrillation is an irregular and often fast heartbeat. Treating this condition is important for several reasons. It can cause blood clots, which can travel from your heart to your brain and cause a stroke. If you have a fast heartbeat, you may feel lightheaded, dizzy, and weak. An irregular heartbeat can also increase your risk for heart failure. Atrial fibrillation is often the result of another heart condition, such as high blood pressure or coronary artery disease. Making changes to improve your heart condition will help you stay healthy and active. Follow-up care is a key part of your treatment and safety. Be sure to make and go to all appointments, and call your doctor if you are having problems. It's also a good idea to know your test results and keep a list of the medicines you take. How can you care for yourself at home? Medicines  · Take your medicines exactly as prescribed. Call your doctor if you think you are having a problem with your medicine. You will get more details on the specific medicines your doctor prescribes. · If your doctor has given you a blood thinner to prevent a stroke, be sure you get instructions about how to take your medicine safely. Blood thinners can cause serious bleeding problems. · Do not take any vitamins, over-the-counter drugs, or herbal products without talking to your doctor first.  Lifestyle changes  · Do not smoke. Smoking can increase your chance of a stroke and heart attack. If you need help quitting, talk to your doctor about stop-smoking programs and medicines. These can increase your chances of quitting for good. · Eat a heart-healthy diet. · Stay at a healthy weight. Lose weight if you need to. · Limit alcohol to 2 drinks a day for men and 1 drink a day for women. Too much alcohol can cause health problems. · Avoid colds and flu. Get a pneumococcal vaccine shot.  If you have had one before, ask your doctor whether you need another dose. Get a flu shot every year. If you must be around people with colds or flu, wash your hands often. Activity  · If your doctor recommends it, get more exercise. Walking is a good choice. Bit by bit, increase the amount you walk every day. Try for at least 30 minutes on most days of the week. You also may want to swim, bike, or do other activities. Your doctor may suggest that you join a cardiac rehabilitation program so that you can have help increasing your physical activity safely. · Start light exercise if your doctor says it is okay. Even a small amount will help you get stronger, have more energy, and manage stress. Walking is an easy way to get exercise. Start out by walking a little more than you did in the hospital. Gradually increase the amount you walk. · When you exercise, watch for signs that your heart is working too hard. You are pushing too hard if you cannot talk while you are exercising. If you become short of breath or dizzy or have chest pain, sit down and rest immediately. · Check your pulse regularly. Place two fingers on the artery at the palm side of your wrist, in line with your thumb. If your heartbeat seems uneven or fast, talk to your doctor. When should you call for help? Call 911 anytime you think you may need emergency care. For example, call if:  · You have symptoms of a heart attack. These may include:  ¨ Chest pain or pressure, or a strange feeling in the chest.  ¨ Sweating. ¨ Shortness of breath. ¨ Nausea or vomiting. ¨ Pain, pressure, or a strange feeling in the back, neck, jaw, or upper belly or in one or both shoulders or arms. ¨ Lightheadedness or sudden weakness. ¨ A fast or irregular heartbeat. After you call 911, the  may tell you to chew 1 adult-strength or 2 to 4 low-dose aspirin. Wait for an ambulance. Do not try to drive yourself. · You have symptoms of a stroke.  These may include:  ¨ Sudden numbness, tingling, weakness, or loss of movement in your face, arm, or leg, especially on only one side of your body. ¨ Sudden vision changes. ¨ Sudden trouble speaking. ¨ Sudden confusion or trouble understanding simple statements. ¨ Sudden problems with walking or balance. ¨ A sudden, severe headache that is different from past headaches. · You passed out (lost consciousness). Call your doctor now or seek immediate medical care if:  · You have new or increased shortness of breath. · You feel dizzy or lightheaded, or you feel like you may faint. · Your heart rate becomes irregular. · You can feel your heart flutter in your chest or skip heartbeats. Tell your doctor if these symptoms are new or worse. Watch closely for changes in your health, and be sure to contact your doctor if you have any problems. Where can you learn more? Go to http://neo-boris.info/. Enter U020 in the search box to learn more about \"Atrial Fibrillation: Care Instructions. \"  Current as of: September 21, 2016  Content Version: 11.3  © 0865-0592 [employer]. Care instructions adapted under license by [employer] (which disclaims liability or warranty for this information). If you have questions about a medical condition or this instruction, always ask your healthcare professional. [address] any warranty or liability for your use of this information. Electrophysiology Study and Catheter Ablation: What to Expect at [address] had an electrophysiology study for a problem with your heartbeat. You may also have had a catheter ablation to try to correct the problem. You may have swelling, bruising, or a small lump around the site where the catheters went into your body. These should go away in 3 to 4 weeks. Do not exercise hard or lift anything heavy for a week.  You may be able to go back to work and to your normal routine in 1 or 2 days.  This care sheet gives you a general idea about how long it will take for you to recover. But each person recovers at a different pace. Follow the steps below to get better as quickly as possible. How can you care for yourself at home? Activity  · For 1 week, do not lift anything that would make you strain. This may include heavy grocery bags and milk containers, a heavy briefcase or backpack, cat litter or dog food bags, a vacuum , or a child. · For 1 week, do not exercise hard or do any activity that could strain your blood vessels or the site where the catheters went into your body. · Ask your doctor when it is okay to have sex. · You may shower 24 to 48 hours after the procedure, if your doctor okays it. Pat the incision dry. Do not take a bath for 1 week, or until your doctor tells you it is okay. Diet  · You can eat your normal diet. If your stomach is upset, try bland, low-fat foods like plain rice, broiled chicken, toast, and yogurt. · Drink plenty of fluids (unless your doctor tells you not to). Medicines  · Your doctor will tell you if and when you can restart your medicines. He or she will also give you instructions about taking any new medicines. · If you take blood thinners, such as warfarin (Coumadin), clopidogrel (Plavix), or aspirin, be sure to talk to your doctor. He or she will tell you if and when to start taking those medicines again. Make sure that you understand exactly what your doctor wants you to do. · Ask your doctor if you can take acetaminophen (Tylenol) for pain. Do not take aspirin for 3 days, unless your doctor says it is okay. · Check with your doctor before you take aspirin or anti-inflammatory medicines to reduce pain and swelling. These include ibuprofen (Advil, Motrin) and naproxen (Aleve). · Make sure you know which heart medicines to continue and which ones to stop. Ask your doctor if you are not sure.   Catheter site care  · You can remove your bandages the day after the procedure. Follow-up care is a key part of your treatment and safety. Be sure to make and go to all appointments, and call your doctor if you are having problems. It's also a good idea to know your test results and keep a list of the medicines you take. When should you call for help? Call 911 anytime you think you may need emergency care. For example, call if:  · You passed out (lost consciousness). · You have severe trouble breathing. · You have sudden chest pain and shortness of breath, or you cough up blood. · You have a lot of bleeding from your catheter site. Call your doctor now or seek immediate medical care if:  · You have a fever over 100°F.  · You are bleeding from one of the areas where a catheter was put in.  · You have a fast-growing, painful lump at the catheter site. · You have painful swelling, or bruising larger than a credit card where a catheter was put in.  · You have signs of infection, such as:  ¨ Increased pain, swelling, warmth, or redness. ¨ Red streaks leading from the catheter site. ¨ Pus draining from the catheter site. ¨ A fever. · Your arm or leg is numb or hurts. · Your feet are very cold. · Your heart rhythm problem comes back. · You are dizzy or lightheaded, or you feel like you may faint. Watch closely for any changes in your health, and be sure to contact your doctor if:  · You have questions about the results of your procedure or your treatment plan. Where can you learn more? Go to http://neo-boris.info/. Enter [phone] in the search box to learn more about \"Electrophysiology Study and Catheter Ablation: What to Expect at Home. \"  Current as of: April 3, 2017  Content Version: 11.3  © 0554-7935 [employer]. Care instructions adapted under license by [employer] (which disclaims liability or warranty for this information).  If you have questions about a medical condition or this instruction, always ask your healthcare professional. [place] [address] any warranty or liability for your use of this information.

## 2017-08-30 NOTE — DISCHARGE SUMMARY
[facility] CSS Discharge Summary     Patient ID:  Svitlana Church  [id number]  54 y.o.  1955    Admit date: 8/25/2017    Discharge date: 8/28/2017     Discharge to: HOME    Admitting Physician: Ekaterina Elder MD     Referring Cardiologist:  Deion Alberto    PCP:  Shakir Omer MD    Active Problems:    Atrial fibrillation [facility]) (8/25/2017)      Non morbid obesity due to excess calories (8/28/2017)       Procedure(s):  TRANSPERICARDIAL HYBRID ABLATION WITH FLUORO      Discharge Medications:   Discharge Medication List as of 8/29/2017 12:34 PM      START taking these medications    Details   dilTIAZem (CARDIZEM) 30 mg tablet Take 2 Tabs by mouth once as needed for up to 1 dose. Diltiazem 60 mg by mouth as needed for pulse rate above 120/min, may repeat every six hours. , Print, Disp-60 Tab, R-1      oxyCODONE IR (ROXICODONE) 5 mg immediate release tablet Take 1-2 Tabs by mouth every three (3) hours as needed. Max Daily Amount: 80 mg., Print, Disp-30 Tab, R-0         CONTINUE these medications which have CHANGED    Details   methylPREDNISolone (MEDROL DOSEPACK) 4 mg tablet As directed. , Print, Disp-1 Dose Pack, R-0         CONTINUE these medications which have NOT CHANGED    Details   pyridoxine, vitamin B6, (VITAMIN B-6) 100 mg tablet Take 100 mg by mouth daily. , Historical Med      apixaban (ELIQUIS) 5 mg tablet Take 1 Tab by mouth two (2) times a day., Normal, Disp-60 Tab, R-11      diclofenac EC (VOLTAREN) 75 mg EC tablet Take 75 mg by mouth two (2) times a day., Historical Med      furosemide (LASIX) 20 mg tablet Take 1 Tab by mouth daily as needed., Normal, Disp-90 Tab, R-3      cyanocobalamin (VITAMIN B-12) 1,000 mcg tablet Take 1,000 mcg by mouth daily. , Historical Med      simvastatin (ZOCOR) 20 mg tablet TAKE 1 TABLET AT BEDTIME, Normal, Disp-90 Tab, R-3         STOP taking these medications       amiodarone (CORDARONE) 200 mg tablet Comments:   Reason for Stopping:               Hospital Course: Evi Rincon Willard is a 59 y.o.  male who was referred for cardiac evaluation by Dr. Bina Cortes. Pt with PMH of paroxysmal a-fib, benign thyroid nodules (denies hypo- or hyperthyroidism), high cholesterol, HTN, GERD, arthritis and chronic back pain. Pt reports about an 18 year history of PAF, has had multiple ablation procedures, cardioversions and different medications to treat without success. Pt reports he was told all medical options have been exhausted and that he needs surgery to help with his a-fib. Pt reports he can feel when a-fib coming on, symptoms associated with this are SOB, palpitations, PND and edema. He was recently placed on amiodarone twice daily but had ataxia and dose decreased to once a day, symptoms resolved. He continues to have significant fatigue which he attributes to his lower HR since starting amiodarone. Of note, he was referred to a surgeon at [facility] but was told to lose 40 pounds which angered him. He has lost about 20 pounds recently due to diet change with his wife since she was diagnosed with diabetes. He has not had any recent cardiac cath or echo, previous reports below. He denies any dizziness, syncopal episodes, CP, orthopnea or claudication.       Cardiac Testing      Cardiac catheterization 8/21/15:  VENTRICLES: Global left ventricular function was normal. EF calculated by  contrast ventriculography was 60 %. CORONARY CIRCULATION: The coronary circulation is right dominant. Left  main: Normal. LAD: Normal. 1st diagonal: Normal. Circumflex: Normal. 1st  obtuse marginal: Normal. RCA: Normal.      ECHO 8/16/16:  LEFT VENTRICLE: Size was normal. Systolic function was normal. Ejection  fraction was estimated in the range of 55 % to 60 %. Suboptimal  endocardial visualization limits wall motion analysis. Wall thickness was  mildly increased. DOPPLER: The study was not technically sufficient to  allow evaluation of LV diastolic function.     RIGHT VENTRICLE: The size was normal. Systolic function was normal.    LEFT ATRIUM: Size was normal.    ATRIAL SEPTUM: The atrial septum appeared intact. RIGHT ATRIUM: Size was normal.    MITRAL VALVE: Normal valve structure. DOPPLER: There was no regurgitation. AORTIC VALVE: Normal valve structure. DOPPLER: Transaortic velocity was  within the normal range. There was no stenosis. There was no regurgitation. TRICUSPID VALVE: Normal valve structure. DOPPLER: There was no significant  regurgitation. Pulmonary artery systolic pressure was within the normal  range. PULMONIC VALVE: Not well visualized, but normal Doppler findings. AORTA: The root exhibited normal size. PERICARDIUM: There was no pericardial effusion. Convergent endoscopic epicardial ablation without cardiopulmonary bypass.      POD#1No issues overnight   POD#2Still on oxygen   POD#3Walked a lot yesterday but does not feel so well today-- dyspnea. He thinks beginning metoprolol affected his breathing as it has in past.     Color good. Pink face. Periphery is warm. Jugulars not visible but he has thick neck. Pulses full. Sinus carlee 57/min. No rales. No rhonchi. Not coughing. Afebrile. BP elevated today somewhat.     Repeat echo and CTPA pending, but I expect both to be negative. POD#4Feels better. No dyspnea now.     CT chest unremarkable yesterday. Echo also okay.     Sinus rhythm now but episodes of atrial fib with ventricular response about 120/min.     Lungs sound clear. Periphery is warm. Color good. Jugulars not visible.     Home. No amio per Dr. Telma Mosley.      Labs: No results for input(s): WBC, HGB, HCT, CREA, INR, HGBEXT, HCTEXT in the last 72 hours. No lab exists for component: INREXT    CXR Results  (Last 48 hours)    None            Patient Instructions: See Discharge Instructions  Patient was given a separate list of discharge instructions and prescriptions.  At this time, all questions were answered and discharge medications were reviewed. The patient has a follow up appointment in one week.       Signed:  JEROME Ramon  8/30/2017  8:54 AM

## 2017-08-30 NOTE — TELEPHONE ENCOUNTER
Received a call from patient - he states that the surgeon and NP are telling him this is normal but he doesn't agree and he is driving to the ED while talking to me. Advised that this is his decision.

## 2017-08-30 NOTE — TELEPHONE ENCOUNTER
Continue with plans as he was given at d/c. Take Diltiazem 60mg every 6 hours as needed for HR above 120. He will have some irregular heart beats/ectopy after a procedure and it isnt considered abnormal. He is healing, has inflammation and the heart will be more apt to beat irregularly, not necessarily a long term problem.  Keep plans for one week f/u appt

## 2017-08-30 NOTE — TELEPHONE ENCOUNTER
Hybrid ablation done on Friday 8/25/17, had a recurrence in the hospital on Monday, IV meds given to convert. Pt was given Cardizem to take for HR >120bpm.   Diltiazem 60mg taken at 0200 this morning with no relief per patient. Pt states his HR feels irregular but not rapid at this time. 0825, HR  98, /93    Please advise.

## 2017-08-30 NOTE — ED NOTES
Received pt to exam room, resting on stretcher in position of comfort, call bell given to pt, accompanied by family; pt states he had a \"hybrid ablation\" on Friday, went into afib on Monday and received diltizem, states he was discharged home yesterday, reports feeling a skipped heartbeat at 2 am followed by SOB, took additional 60mg of diltizem at 2am per d/c, presently pt denies pain or palpations, c/o fatigue and slightly sob

## 2017-09-05 ENCOUNTER — HOSPITAL ENCOUNTER (OUTPATIENT)
Dept: NON INVASIVE DIAGNOSTICS | Age: 62
Discharge: HOME | End: 2017-09-05
Attending: THORACIC SURGERY (CARDIOTHORACIC VASCULAR SURGERY)
Payer: COMMERCIAL

## 2017-09-05 ENCOUNTER — OFFICE VISIT (OUTPATIENT)
Dept: CARDIOTHORACIC SURGERY | Age: 62
End: 2017-09-05

## 2017-09-05 DIAGNOSIS — I48.91 ATRIAL FIBRILLATION, UNSPECIFIED TYPE (HCC): Primary | ICD-10-CM

## 2017-09-05 PROCEDURE — 93306 TTE W/DOPPLER COMPLETE: CPT

## 2017-09-05 NOTE — MR AVS SNAPSHOT
Visit Information Date & Time Provider Department Dept. Phone Encounter #  
 9/5/2017  2:45 PM Inocente Cary MD [facility] [phone] [id number] Your Appointments 9/7/2017  1:15 PM  
HOSPITAL FOLLOW-UP with Alejo Anne NP [facility]) Appt Note: Per [place] @ [facility]; Per [facility]  
[phone] [address] [facility]  
  
    
 11/29/2017  9:00 AM  
6 MONTH with [address], MD  
[facility]) Appt Note: 6 month per Dr. Dar Valles,  
 [address] [facility]  
[phone] [address] [facility] Upcoming Health Maintenance Date Due Hepatitis C Screening 1955 DTaP/Tdap/Td series (1 - Tdap) 6/29/1974 FOBT Q 1 YEAR AGE 50-75 6/29/2003 ZOSTER VACCINE AGE 60> 4/29/2013 INFLUENZA AGE 9 TO ADULT 8/1/2017 Allergies as of 9/5/2017  Review Complete On: 8/30/2017 By: Earlene Crain RN Severity Noted Reaction Type Reactions Percocet [Oxycodone-acetaminophen] Medium 10/02/2010   Side Effect Anxiety Patient takes Tylenol without problems. Current Immunizations  Reviewed on 12/31/2015 Name Date Influenza Vaccine Split 2/8/2017 Not reviewed this visit You Were Diagnosed With   
  
 Codes Comments [facility]    -  Primary ICD-10-CM: I48.91 
ICD-9-CM: 427.31 Vitals Smoking Status Former Smoker Preferred Pharmacy Pharmacy Name Phone [facility] [phone] Your Updated Medication List  
  
   
This list is accurate as of: 9/5/17  4:22 PM.  Always use your most recent med list.  
  
  
  
  
 apixaban 5 mg tablet Commonly known as:  Cardenas Julisa Take 1 Tab by mouth two (2) times a day. diclofenac EC 75 mg EC tablet Commonly known as:  VOLTAREN Take 75 mg by mouth two (2) times a day. * dilTIAZem 30 mg tablet Commonly known as:  CARDIZEM Take 2 Tabs by mouth once as needed for up to 1 dose. Diltiazem 60 mg by mouth as needed for pulse rate above 120/min, may repeat every six hours. * dilTIAZem  mg XR capsule Commonly known as:  DILACOR XR Take 1 Cap by mouth daily. furosemide 20 mg tablet Commonly known as:  LASIX Take 1 Tab by mouth daily as needed. methylPREDNISolone 4 mg tablet Commonly known as:  Dairl Isaías As directed. oxyCODONE IR 5 mg immediate release tablet Commonly known as:  Vasu Zoe Take 1-2 Tabs by mouth every three (3) hours as needed. Max Daily Amount: 80 mg.  
  
 simvastatin 20 mg tablet Commonly known as:  ZOCOR  
TAKE 1 TABLET AT BEDTIME  
  
 VITAMIN B-12 1,000 mcg tablet Generic drug:  cyanocobalamin Take 1,000 mcg by mouth daily. VITAMIN B-6 100 mg tablet Generic drug:  pyridoxine (vitamin B6) Take 100 mg by mouth daily. * Notice: This list has 2 medication(s) that are the same as other medications prescribed for you. Read the directions carefully, and ask your doctor or other care provider to review them with you. To-Do List   
 09/05/2017 ECHO:  2D ECHO COMPLETE ADULT (TTE) W OR WO CONTR Introducing [facility]! Dear Narayan Alston: Thank you for requesting a [employer] account. Our records indicate that you already have an active [employer] account. You can access your account anytime at [email or web address] Did you know that you can access your hospital and ER discharge instructions at any time in [employer]? You can also review all of your test results from your hospital stay or ER visit. Additional Information If you have questions, please visit the Frequently Asked Questions section of the [employer] website at [email or web address] Remember, [employer] is NOT to be used for urgent needs. For medical emergencies, dial 911. Now available from your iPhone and Android! Please provide this summary of care documentation to your next provider. Your primary care clinician is listed as [facility]. If you have any questions after today's visit, please call [phone].

## 2017-09-05 NOTE — PROGRESS NOTES
Echo today shows no tamponade, no significant pericardial effusion, no flow across interatrial septum. LVEF 41%. Communicated with Dr. Sunita Bragg.    Increase diltiazem 120 mg BID. Continue amio 200 BID. To see Dr. Sunita Bragg in office in two days.

## 2017-09-06 ENCOUNTER — TELEPHONE (OUTPATIENT)
Dept: CARDIOLOGY CLINIC | Age: 62
End: 2017-09-06

## 2017-09-07 ENCOUNTER — OFFICE VISIT (OUTPATIENT)
Dept: CARDIOLOGY CLINIC | Age: 62
End: 2017-09-07

## 2017-09-07 ENCOUNTER — HOSPITAL ENCOUNTER (OUTPATIENT)
Dept: NON INVASIVE DIAGNOSTICS | Age: 62
Discharge: HOME | End: 2017-09-07
Payer: COMMERCIAL

## 2017-09-07 VITALS
HEIGHT: 72 IN | SYSTOLIC BLOOD PRESSURE: 100 MMHG | OXYGEN SATURATION: 98 % | DIASTOLIC BLOOD PRESSURE: 70 MMHG | HEART RATE: 100 BPM | BODY MASS INDEX: 33 KG/M2 | RESPIRATION RATE: 18 BRPM | WEIGHT: 243.6 LBS

## 2017-09-07 VITALS
DIASTOLIC BLOOD PRESSURE: 76 MMHG | SYSTOLIC BLOOD PRESSURE: 103 MMHG | RESPIRATION RATE: 18 BRPM | BODY MASS INDEX: 32.91 KG/M2 | WEIGHT: 243 LBS | HEIGHT: 72 IN | HEART RATE: 82 BPM | OXYGEN SATURATION: 95 %

## 2017-09-07 DIAGNOSIS — I49.9 IRREGULAR HEART BEAT: ICD-10-CM

## 2017-09-07 DIAGNOSIS — I48.19 PERSISTENT ATRIAL FIBRILLATION (HCC): Primary | ICD-10-CM

## 2017-09-07 DIAGNOSIS — I48.4 ATYPICAL ATRIAL FLUTTER (HCC): ICD-10-CM

## 2017-09-07 PROCEDURE — 74011250636 HC RX REV CODE- 250/636

## 2017-09-07 PROCEDURE — 93005 ELECTROCARDIOGRAM TRACING: CPT

## 2017-09-07 PROCEDURE — 92960 CARDIOVERSION ELECTRIC EXT: CPT | Performed by: INTERNAL MEDICINE

## 2017-09-07 PROCEDURE — 99152 MOD SED SAME PHYS/QHP 5/>YRS: CPT

## 2017-09-07 PROCEDURE — 77030018729 HC ELECTRD DEFIB PAD CARD -B

## 2017-09-07 RX ORDER — MIDAZOLAM HYDROCHLORIDE 1 MG/ML
INJECTION, SOLUTION INTRAMUSCULAR; INTRAVENOUS
Status: COMPLETED
Start: 2017-09-07 | End: 2017-09-07

## 2017-09-07 RX ORDER — VALACYCLOVIR HYDROCHLORIDE 500 MG/1
TABLET, FILM COATED ORAL
COMMUNITY
Start: 2017-08-07 | End: 2017-09-07

## 2017-09-07 RX ORDER — MIDAZOLAM HYDROCHLORIDE 1 MG/ML
.5-2 INJECTION, SOLUTION INTRAMUSCULAR; INTRAVENOUS
Status: DISCONTINUED | OUTPATIENT
Start: 2017-09-07 | End: 2017-09-07 | Stop reason: ALTCHOICE

## 2017-09-07 RX ORDER — FENTANYL CITRATE 50 UG/ML
INJECTION, SOLUTION INTRAMUSCULAR; INTRAVENOUS
Status: COMPLETED
Start: 2017-09-07 | End: 2017-09-07

## 2017-09-07 RX ORDER — AMIODARONE HYDROCHLORIDE 200 MG/1
TABLET ORAL 2 TIMES DAILY
COMMUNITY
End: 2017-10-19 | Stop reason: SDUPTHER

## 2017-09-07 RX ORDER — FENTANYL CITRATE 50 UG/ML
25-50 INJECTION, SOLUTION INTRAMUSCULAR; INTRAVENOUS
Status: DISCONTINUED | OUTPATIENT
Start: 2017-09-07 | End: 2017-09-07 | Stop reason: ALTCHOICE

## 2017-09-07 RX ADMIN — FENTANYL CITRATE 50 MCG: 50 INJECTION, SOLUTION INTRAMUSCULAR; INTRAVENOUS at 12:34

## 2017-09-07 RX ADMIN — MIDAZOLAM HYDROCHLORIDE 2 MG: 1 INJECTION, SOLUTION INTRAMUSCULAR; INTRAVENOUS at 12:37

## 2017-09-07 RX ADMIN — MIDAZOLAM HYDROCHLORIDE 2 MG: 1 INJECTION, SOLUTION INTRAMUSCULAR; INTRAVENOUS at 12:33

## 2017-09-07 NOTE — PROGRESS NOTES
Patient arrived to Non-Invasive Cardiology Lab for Out Patient Procedure. Staff introduced to patient. Patient identifiers verified with Name and Date of Birth. Procedure verified with patient. Consent forms reviewed and signed by patient or authorized representative and verified. Allergies verified. Patient informed of procedure and plan of care. Questions answered with review. Patient on cardiac monitor, non-invasive blood pressure, SPO2 monitor. On RA. Patient is A&Ox3. Patient reports no complaints. Patient on stretcher, in low position, with side rails up. Patient instructed to call for assistance as needed. Family in waiting room.

## 2017-09-07 NOTE — PROGRESS NOTES
Discharge instructions reviewed with patient and wife. Allowed adequate time to ask questions, all questions answered. Printed copy of AVS given to patient. All belongings gathered, IV and tele discontinued. Transported via wheelchair to main entrance and into care of family.

## 2017-09-07 NOTE — PROCEDURES
[address]   [address] [place] [address],[address]  [address]       Name:  Linda Davis   MR#:  [id number]   :  1955   Account #:  [de-identified]        Date of Adm:  2017       REFERRING PHYSICIAN: Douglas Ritter MD    INDICATIONS: Recurrent atrial flutter. MEDICATIONS USED: Versed 4 mg. DESCRIPTION OF PROCEDURE: After obtaining informed consent,   the patient was brought to the echocardiography laboratory. The   patient had recently undergone hybrid ablation and has been   appropriately anticoagulated. After obtaining adequate sedation,   synchronized, biphasic electricity using 200 joules x1 was utilized. The   patient converted to sinus rhythm. COMPLICATIONS: None. ESTIMATED BLOOD LOSS: None. SPECIMENS OBTAINED: None. RECOMMENDATIONS   1. Continue amiodarone 200 mg b.i.d.   2. Continue Diltiazem 120 mg once daily. 3. Continue Eliquis 5 mg b.i.d.   4. Follow up with Dr. Dilshad Avila in 7-10 days.          MD JENNY Guzman / William.Figueroa   D:  2017   12:55   T:  2017   14:04   Job #:  [id number]

## 2017-09-07 NOTE — DISCHARGE INSTRUCTIONS
DISCHARGE SUMMARY from [facility], RN        The following personal items collected during your admission are returned to you:    Vision:  glasses  Clothing:  shirt        PATIENT INSTRUCTIONS: Continue taking all the same medications      The cardioversion procedure can cause redness to the skin where the patches were placed. You may treat this with Aloe or Cortisone cream over the counter lotion. If the skin appears very reddened or blistered contact your physician      Call to make an appointment with Dr Pandya/Jose G in 1 week. What to do at Home:  Recommended activity: No driving today      The discharge information has been reviewed with the PATIENT . The PATIENT  verbalized understanding.

## 2017-09-07 NOTE — PROGRESS NOTES
Subjective:      Parish Huang is a 57 y.o. male is here for atypical afl with sob sp hybrid AF ablation. He has sob. Patient Active Problem List    Diagnosis Date Noted    Irregular heart beat 09/07/2017    Non morbid obesity due to excess calories 08/28/2017    Atrial fibrillation ([facility] 08/25/2017    Encounter for cardioversion procedure 02/07/2017    Hypertension 02/07/2017    Mixed hyperlipidemia 04/17/2012    Other dyspnea and respiratory abnormality 04/17/2012    Long term (current) use of anticoagulants 03/12/2012    S/P ablation of atrial fibrillation 02/02/2012    Pleural effusion, bilateral 10/05/2010    Sinus bradycardia 10/05/2010    SOB (shortness of breath) 10/02/2010    Paroxysmal atrial fibrillation ([facility] 07/27/2010    Dyslipidemia 07/27/2010      Desiree Galaviz MD  Past Medical History:   Diagnosis Date    A-fib [facility])     Arthritis     back    Chronic pain     back    Encounter for cardioversion procedure 2/7/2017    GERD (gastroesophageal reflux disease)     High cholesterol     Hypertension 02/07/2017    patient denies hx of HTN    Multiple thyroid nodules     biopsied and benign    PUD (peptic ulcer disease)     \"years ago\"      Past Surgical History:   Procedure Laterality Date    CARDIAC CATHETERIZATION      CARDIAC SURG PROCEDURE UNLIST  2010, 2011    ,ABLATION     CARDIOVERSION EXTERNAL      HX CERVICAL FUSION  2013    HX HEENT  6/6/13    THYROID BIOPSY    HX ORTHOPAEDIC      shoulder surgery on left    HX ORTHOPAEDIC      wrist surgery, LEFT    HX ORTHOPAEDIC      elbow surgery, RIGHT     Allergies   Allergen Reactions    Percocet [Oxycodone-Acetaminophen] Anxiety     Patient takes Tylenol without problems.       Family History   Problem Relation Age of Onset    Cancer Mother      BREAST    Heart Disease Father     Lung Disease Father     negative for cardiac disease  Social History     Social History    Marital status:  Spouse name: N/A    Number of children: N/A    Years of education: N/A     Social History Main Topics    Smoking status: Former Smoker     Packs/day: 0.25     Years: 40.00     Quit date: 10/1/2012    Smokeless tobacco: Never Used    Alcohol use Yes      Comment: occassionally 3-4 cans of beer    Drug use: No    Sexual activity: Yes     Other Topics Concern    None     Social History Narrative     Current Outpatient Prescriptions   Medication Sig    amiodarone (CORDARONE) 200 mg tablet Take  by mouth two (2) times a day.  dilTIAZem XR (DILACOR XR) 120 mg XR capsule Take 1 Cap by mouth daily. (Patient taking differently: Take 120 mg by mouth two (2) times a day.)    pyridoxine, vitamin B6, (VITAMIN B-6) 100 mg tablet Take 100 mg by mouth daily.  apixaban (ELIQUIS) 5 mg tablet Take 1 Tab by mouth two (2) times a day.  diclofenac EC (VOLTAREN) 75 mg EC tablet Take 75 mg by mouth two (2) times a day.  furosemide (LASIX) 20 mg tablet Take 1 Tab by mouth daily as needed.  cyanocobalamin (VITAMIN B-12) 1,000 mcg tablet Take 1,000 mcg by mouth daily.  valACYclovir (VALTREX) 500 mg tablet     dilTIAZem (CARDIZEM) 30 mg tablet Take 2 Tabs by mouth once as needed for up to 1 dose. Diltiazem 60 mg by mouth as needed for pulse rate above 120/min, may repeat every six hours.  methylPREDNISolone (MEDROL DOSEPACK) 4 mg tablet As directed.  oxyCODONE IR (ROXICODONE) 5 mg immediate release tablet Take 1-2 Tabs by mouth every three (3) hours as needed. Max Daily Amount: 80 mg.    simvastatin (ZOCOR) 20 mg tablet TAKE 1 TABLET AT BEDTIME     No current facility-administered medications for this visit. Vitals:    09/07/17 0959   BP: 100/70   Pulse: 100   Resp: 18   SpO2: 98%   Weight: 243 lb 9.6 oz (110.5 kg)   Height: 6' (1.829 m)       I have reviewed the nurses notes, vitals, problem list, allergy list, medical history, family, social history and medications.     Review of Symptoms:    General: Pt denies excessive weight gain or loss. Pt is able to conduct ADL's  HEENT: Denies blurred vision, headaches, epistaxis and difficulty swallowing. Respiratory: Denies shortness of breath, HENRY, wheezing or stridor. Cardiovascular: Denies precordial pain, palpitations, edema or PND  Gastrointestinal: Denies poor appetite, indigestion, abdominal pain or blood in stool  Urinary: Denies dysuria, pyuria  Musculoskeletal: Denies pain or swelling from muscles or joints  Neurologic: Denies tremor, paresthesias, or sensory motor disturbance  Skin: Denies rash, itching or texture change. Psych: Denies depression      Physical Exam:      General: Well developed, in no acute distress. HEENT: Eyes - PERRL, no jvd  Heart:  irr irr  Respiratory: Clear bilaterally x 4, no wheezing or rales  Abdomen:   Soft, non-tender, bowel sounds are active.   Extremities:  No edema, normal cap refill, no cyanosis. Musculoskeletal: No clubbing  Neuro: A&Ox3, speech clear, gait stable. Skin: Skin color is normal. No rashes or lesions. Non diaphoretic  Vascular: 2+ pulses symmetric in all extremities    Cardiographics    Ekg: atypical AFL with variable conduction    Results for orders placed or performed during the hospital encounter of 08/30/17   EKG, 12 LEAD, INITIAL   Result Value Ref Range    Ventricular Rate 109 BPM    Atrial Rate 111 BPM    QRS Duration 98 ms    Q-T Interval 372 ms    QTC Calculation (Bezet) 500 ms    Calculated R Axis 35 degrees    Calculated T Axis -34 degrees    Diagnosis       Sinus tachycardia  Nonspecific ST and T wave abnormality  When compared with ECG of 25-AUG-2017 13:24,  Vent.  rate has increased BY  49 BPM  Nonspecific T wave abnormality now evident in Lateral leads  Confirmed by Laurine Skiff ([id number]) on 8/30/2017 12:11:09 PM     Results for orders placed or performed in visit on 08/01/12   HOLTER MONITOR, 24 HOURS    Narrative    ECG Monitor/24 hours, Complete    Reason for Holter Monitor A-fib    Heartbeat    Slowest 45  Average 58  Fastest  108      Results:   Underlying Rhythm: Sinus bradycardia      Atrial Arrhythmias: premature atrial contractions; occasional             AV Conduction: normal    Ventricular Arrhythmias: premature ventricular contractions;  occasional     ST Segment Analysis:normal     Symptom Correlation:  none    Comment:   Sinus rhythm throughout     Dylan Hutton MD, [facility]                  Lab Results   Component Value Date/Time    WBC 12.5 08/30/2017 11:25 AM    HGB 16.9 08/30/2017 11:25 AM    HCT 48.3 08/30/2017 11:25 AM    PLATELET 432 82/49/4578 11:25 AM    MCV 90.4 08/30/2017 11:25 AM      Lab Results   Component Value Date/Time    Sodium 137 08/30/2017 11:25 AM    Potassium 3.4 08/30/2017 11:25 AM    Chloride 99 08/30/2017 11:25 AM    CO2 32 08/30/2017 11:25 AM    Anion gap 6 08/30/2017 11:25 AM    Glucose 129 08/30/2017 11:25 AM    BUN 18 08/30/2017 11:25 AM    Creatinine 0.90 08/30/2017 11:25 AM    BUN/Creatinine ratio 20 08/30/2017 11:25 AM    GFR est AA >60 08/30/2017 11:25 AM    GFR est non-AA >60 08/30/2017 11:25 AM    Calcium 7.5 08/30/2017 11:25 AM    Bilirubin, total 0.8 08/30/2017 11:25 AM    AST (SGOT) 20 08/30/2017 11:25 AM    Alk. phosphatase 76 08/30/2017 11:25 AM    Protein, total 6.2 08/30/2017 11:25 AM    Albumin 2.8 08/30/2017 11:25 AM    Globulin 3.4 08/30/2017 11:25 AM    A-G Ratio 0.8 08/30/2017 11:25 AM    ALT (SGPT) 37 08/30/2017 11:25 AM         Assessment:     Assessment:        ICD-10-CM ICD-9-CM    1. Persistent atrial fibrillation (HCC) I48.1 427.31 PROTHROMBIN TIME + INR      METABOLIC PANEL, BASIC      CBC WITH AUTOMATED DIFF   2. Irregular heart beat I49.9 427.9 AMB POC EKG ROUTINE W/ 12 LEADS, INTER & REP   3.  Atypical atrial flutter (HCC) I48.4 427.32      Orders Placed This Encounter    PROTHROMBIN TIME + INR    METABOLIC PANEL, BASIC    CBC WITH AUTOMATED DIFF    AMB POC EKG ROUTINE W/ 12 LEADS, INTER & REP     Order Specific Question:   Reason for Exam:     Answer:   routine    valACYclovir (VALTREX) 500 mg tablet    amiodarone (CORDARONE) 200 mg tablet     Sig: Take  by mouth two (2) times a day. Plan:   Mr Edgar Valderrama is in atypical afl with rvr. He is a candidate for cardioversion. He will cont on amio/ccb and oac. I discussed the risks/benefits/alternatives of the procedure with the patient. Risks include (but are not limited to) bleeding, heart block, infection, cva/mi/tamponade/death. The patient understands and agrees to proceed. Thank you for this interesting consultation. Continue medical management for htn, afib and afl. Thank you for allowing me to participate in Aline Mariee 's care.     Rich Camejo MD, [facility]

## 2017-09-07 NOTE — PROGRESS NOTES
ASA 2 and Mallampati 3 per Dr. Graeme Shrestha    1233-Pt sedated with 4mg Versed and 50mcg Fentanyl, given 1 synchronized shock(s) at 360 Joules, Afib/Aflutter successfully converted to NSR.

## 2017-09-08 LAB
ATRIAL RATE: 77 BPM
CALCULATED P AXIS, ECG09: 70 DEGREES
CALCULATED R AXIS, ECG10: 13 DEGREES
CALCULATED T AXIS, ECG11: 53 DEGREES
DIAGNOSIS, 93000: NORMAL
P-R INTERVAL, ECG05: 192 MS
Q-T INTERVAL, ECG07: 404 MS
QRS DURATION, ECG06: 108 MS
QTC CALCULATION (BEZET), ECG08: 457 MS
VENTRICULAR RATE, ECG03: 77 BPM

## 2017-09-14 ENCOUNTER — OFFICE VISIT (OUTPATIENT)
Dept: CARDIOLOGY CLINIC | Age: 62
End: 2017-09-14

## 2017-09-14 VITALS
WEIGHT: 245.1 LBS | HEART RATE: 75 BPM | BODY MASS INDEX: 33.2 KG/M2 | OXYGEN SATURATION: 97 % | RESPIRATION RATE: 16 BRPM | SYSTOLIC BLOOD PRESSURE: 118 MMHG | DIASTOLIC BLOOD PRESSURE: 82 MMHG | HEIGHT: 72 IN

## 2017-09-14 DIAGNOSIS — I49.9 IRREGULAR HEART BEAT: Primary | ICD-10-CM

## 2017-09-14 DIAGNOSIS — I10 ESSENTIAL HYPERTENSION: ICD-10-CM

## 2017-09-14 DIAGNOSIS — I48.20 CHRONIC ATRIAL FIBRILLATION (HCC): ICD-10-CM

## 2017-09-14 NOTE — PROGRESS NOTES
Subjective:      Parish Huang is a 57 y.o. male is sp cardioversion. The patient denies chest pain/ shortness of breath, orthopnea, PND, LE edema, palpitations, syncope, presyncope or fatigue. Patient Active Problem List    Diagnosis Date Noted    Irregular heart beat 09/07/2017    Non morbid obesity due to excess calories 08/28/2017    Atrial fibrillation ([facility] [address] 08/25/2017    Encounter for cardioversion procedure 02/07/2017    Hypertension 02/07/2017    Mixed hyperlipidemia 04/17/2012    Other dyspnea and respiratory abnormality 04/17/2012    Long term (current) use of anticoagulants 03/12/2012    S/P ablation of atrial fibrillation 02/02/2012    Pleural effusion, bilateral 10/05/2010    Sinus bradycardia 10/05/2010    SOB (shortness of breath) 10/02/2010    Paroxysmal atrial fibrillation ([facility] [address] 07/27/2010    Dyslipidemia 07/27/2010      Desiree Galaviz MD  Past Medical History:   Diagnosis Date    A-fib [facility])     Arthritis     back    Chronic pain     back    Encounter for cardioversion procedure 2/7/2017    GERD (gastroesophageal reflux disease)     High cholesterol     Hypertension 02/07/2017    patient denies hx of HTN    Multiple thyroid nodules     biopsied and benign    PUD (peptic ulcer disease)     \"years ago\"      Past Surgical History:   Procedure Laterality Date    CARDIAC CATHETERIZATION      CARDIAC SURG PROCEDURE UNLIST  2010, 2011    ,ABLATION     CARDIOVERSION EXTERNAL      HX CERVICAL FUSION  2013    HX HEENT  6/6/13    THYROID BIOPSY    HX ORTHOPAEDIC      shoulder surgery on left    HX ORTHOPAEDIC      wrist surgery, LEFT    HX ORTHOPAEDIC      elbow surgery, RIGHT     Allergies   Allergen Reactions    Percocet [Oxycodone-Acetaminophen] Anxiety     Patient takes Tylenol without problems.       Family History   Problem Relation Age of Onset    Cancer Mother      BREAST    Heart Disease Father     Lung Disease Father     negative for cardiac disease  Social History     Social History    Marital status:      Spouse name: N/A    Number of children: N/A    Years of education: N/A     Social History Main Topics    Smoking status: Former Smoker     Packs/day: 0.25     Years: 40.00     Quit date: 10/1/2012    Smokeless tobacco: Never Used    Alcohol use Yes      Comment: occassionally 3-4 cans of beer    Drug use: No    Sexual activity: Yes     Other Topics Concern    None     Social History Narrative     Current Outpatient Prescriptions   Medication Sig    amiodarone (CORDARONE) 200 mg tablet Take  by mouth two (2) times a day.  dilTIAZem XR (DILACOR XR) 120 mg XR capsule Take 1 Cap by mouth daily. (Patient taking differently: Take 120 mg by mouth two (2) times a day.)    pyridoxine, vitamin B6, (VITAMIN B-6) 100 mg tablet Take 100 mg by mouth daily.  apixaban (ELIQUIS) 5 mg tablet Take 1 Tab by mouth two (2) times a day.  diclofenac EC (VOLTAREN) 75 mg EC tablet Take 75 mg by mouth two (2) times a day.  furosemide (LASIX) 20 mg tablet Take 1 Tab by mouth daily as needed.  cyanocobalamin (VITAMIN B-12) 1,000 mcg tablet Take 1,000 mcg by mouth daily. No current facility-administered medications for this visit. Vitals:    09/14/17 0950   BP: 118/82   Pulse: 75   Resp: 16   SpO2: 97%   Weight: 245 lb 1.6 oz (111.2 kg)   Height: 6' (1.829 m)       I have reviewed the nurses notes, vitals, problem list, allergy list, medical history, family, social history and medications. Review of Symptoms:    General: Pt denies excessive weight gain or loss. Pt is able to conduct ADL's  HEENT: Denies blurred vision, headaches, epistaxis and difficulty swallowing. Respiratory: Denies shortness of breath, HENRY, wheezing or stridor.   Cardiovascular: Denies precordial pain, palpitations, edema or PND  Gastrointestinal: Denies poor appetite, indigestion, abdominal pain or blood in stool  Urinary: Denies dysuria, pyuria  Musculoskeletal: Denies pain or swelling from muscles or joints  Neurologic: Denies tremor, paresthesias, or sensory motor disturbance  Skin: Denies rash, itching or texture change. Psych: Denies depression      Physical Exam:      General: Well developed, in no acute distress. HEENT: Eyes - PERRL, no jvd  Heart:  Normal S1/S2 negative S3 or S4. Regular, no murmur, gallop or rub.   Respiratory: Clear bilaterally x 4, no wheezing or rales  Abdomen:   Soft, non-tender, bowel sounds are active.   Extremities:  No edema, normal cap refill, no cyanosis. Musculoskeletal: No clubbing  Neuro: A&Ox3, speech clear, gait stable. Skin: Skin color is normal. No rashes or lesions.  Non diaphoretic  Vascular: 2+ pulses symmetric in all extremities    Cardiographics    Ekg: nsr    Results for orders placed or performed during the hospital encounter of 09/07/17   EKG, 12 LEAD, INITIAL   Result Value Ref Range    Ventricular Rate 77 BPM    Atrial Rate 77 BPM    P-R Interval 192 ms    QRS Duration 108 ms    Q-T Interval 404 ms    QTC Calculation (Bezet) 457 ms    Calculated P Axis 70 degrees    Calculated R Axis 13 degrees    Calculated T Axis 53 degrees    Diagnosis       Normal sinus rhythm  Normal ECG  When compared with ECG of 30-AUG-2017 12:52,  LA interval has decreased  ST no longer depressed in Inferior leads  ST no longer depressed in Anterior leads  QT has shortened  Confirmed by Tyrel Powell ([id number]) on 9/8/2017 11:19:29 AM     Results for orders placed or performed in visit on 08/01/12   HOLTER MONITOR, 24 HOURS    Narrative    ECG Monitor/24 hours, Complete    Reason for Holter Monitor   A-fib    Heartbeat    Slowest 45  Average 58  Fastest  108      Results:   Underlying Rhythm: Sinus bradycardia      Atrial Arrhythmias: premature atrial contractions; occasional             AV Conduction: normal    Ventricular Arrhythmias: premature ventricular contractions;  occasional     ST Segment Analysis:normal     Symptom Correlation:  none    Comment:   Sinus rhythm throughout     Krunal Meraz MD, [facility]                  Lab Results   Component Value Date/Time    WBC 12.5 08/30/2017 11:25 AM    HGB 16.9 08/30/2017 11:25 AM    HCT 48.3 08/30/2017 11:25 AM    PLATELET 846 67/15/5167 11:25 AM    MCV 90.4 08/30/2017 11:25 AM      Lab Results   Component Value Date/Time    Sodium 137 08/30/2017 11:25 AM    Potassium 3.4 08/30/2017 11:25 AM    Chloride 99 08/30/2017 11:25 AM    CO2 32 08/30/2017 11:25 AM    Anion gap 6 08/30/2017 11:25 AM    Glucose 129 08/30/2017 11:25 AM    BUN 18 08/30/2017 11:25 AM    Creatinine 0.90 08/30/2017 11:25 AM    BUN/Creatinine ratio 20 08/30/2017 11:25 AM    GFR est AA >60 08/30/2017 11:25 AM    GFR est non-AA >60 08/30/2017 11:25 AM    Calcium 7.5 08/30/2017 11:25 AM    Bilirubin, total 0.8 08/30/2017 11:25 AM    AST (SGOT) 20 08/30/2017 11:25 AM    Alk. phosphatase 76 08/30/2017 11:25 AM    Protein, total 6.2 08/30/2017 11:25 AM    Albumin 2.8 08/30/2017 11:25 AM    Globulin 3.4 08/30/2017 11:25 AM    A-G Ratio 0.8 08/30/2017 11:25 AM    ALT (SGPT) 37 08/30/2017 11:25 AM         Assessment:     Assessment:        ICD-10-CM ICD-9-CM    1. Irregular heart beat I49.9 427.9 AMB POC EKG ROUTINE W/ 12 LEADS, INTER & REP   2. Chronic atrial fibrillation (HCC) I48.2 427.31    3. Essential hypertension I10 401.9      Orders Placed This Encounter    AMB POC EKG ROUTINE W/ 12 LEADS, INTER & REP     Order Specific Question:   Reason for Exam:     Answer:   routine        Plan:   Mr Tabitha Mcgee feels better sp cardioversion. He is in sinus. We will cont med and have him return in 4 weeks to reassess his symptoms. Continue medical management for htn, AF and obesity. Thank you for allowing me to participate in Ana Jacobjohn 's care.     Jodee Rashid MD, [facility] Zaldivar

## 2017-09-14 NOTE — MR AVS SNAPSHOT
Visit Information Date & Time Provider Department Dept. Phone Encounter #  
 9/14/2017  9:45 AM Aida Walker, [address] [place] [address] [facility] [phone] [id number] Your Appointments 11/29/2017  9:00 AM  
6 MONTH with Elsa Neely MD  
[facility] [address] [facility]) Appt Note: 6 month per Dr. Bhakti Lee,  
 [address] [facility]  
[phone] [address] [facility] Upcoming Health Maintenance Date Due Hepatitis C Screening 1955 DTaP/Tdap/Td series (1 - Tdap) 6/29/1974 FOBT Q 1 YEAR AGE 50-75 6/29/2003 ZOSTER VACCINE AGE 60> 4/29/2013 INFLUENZA AGE 9 TO ADULT 8/1/2017 Allergies as of 9/14/2017  Review Complete On: 9/14/2017 By: Sobeida Vale MD  
  
 Severity Noted Reaction Type Reactions Percocet [Oxycodone-acetaminophen] Medium 10/02/2010   Side Effect Anxiety Patient takes Tylenol without problems. Current Immunizations  Reviewed on 12/31/2015 Name Date Influenza Vaccine Split 2/8/2017 Not reviewed this visit You Were Diagnosed With   
  
 Codes Comments Irregular heart beat    -  Primary ICD-10-CM: I49.9 ICD-9-CM: 427.9 Chronic atrial fibrillation (HCC)     ICD-10-CM: M42.7 ICD-9-CM: 427.31 Essential hypertension     ICD-10-CM: I10 
ICD-9-CM: 401.9 Vitals BP Pulse Resp Height(growth percentile) Weight(growth percentile) SpO2  
 118/82 (BP 1 Location: Right arm, BP Patient Position: Sitting) 75 16 6' (1.829 m) 245 lb 1.6 oz (111.2 kg) 97% BMI Smoking Status 33.24 kg/m2 Former Smoker Vitals History BMI and BSA Data Body Mass Index Body Surface Area  
 33.24 kg/m 2 2.38 m 2 Preferred Pharmacy Pharmacy Name Phone Yoni Crump, [facility] [phone] Your Updated Medication List  
  
   
 This list is accurate as of: 9/14/17 10:18 AM.  Always use your most recent med list.  
  
  
  
  
 amiodarone 200 mg tablet Commonly known as:  CORDARONE Take  by mouth two (2) times a day. apixaban 5 mg tablet Commonly known as:  Félix Bogaert Take 1 Tab by mouth two (2) times a day. diclofenac EC 75 mg EC tablet Commonly known as:  VOLTAREN Take 75 mg by mouth two (2) times a day. dilTIAZem  mg XR capsule Commonly known as:  DILACOR XR Take 1 Cap by mouth daily. furosemide 20 mg tablet Commonly known as:  LASIX Take 1 Tab by mouth daily as needed. VITAMIN B-12 1,000 mcg tablet Generic drug:  cyanocobalamin Take 1,000 mcg by mouth daily. VITAMIN B-6 100 mg tablet Generic drug:  pyridoxine (vitamin B6) Take 100 mg by mouth daily. We Performed the Following AMB POC EKG ROUTINE W/ 12 LEADS, INTER & REP [01868 CPT(R)] Introducing [facility]! Dear Toyin Stanley: Thank you for requesting a YCh[employer] account. Our records indicate that you already have an active YCharts account. You can access your account anytime at [email or web address] Did you know that you can access your hospital and ER discharge instructions at any time in YCharts? You can also review all of your test results from your hospital stay or ER visit. Additional Information If you have questions, please visit the Frequently Asked Questions section of the YCharts website at [email or web address] Remember, YCharts is NOT to be used for urgent needs. For medical emergencies, dial 911. Now available from your iPhone and Android! Please provide this summary of care documentation to your next provider. Your primary care clinician is listed as Yesica Perez. If you have any questions after today's visit, please call [phone].

## 2017-09-14 NOTE — PROGRESS NOTES
Chief Complaint   Patient presents with   [facility] Follow Up     f/u from [facility]. [facility] 63- pt denies any cardiac symptoms

## 2017-09-18 LAB
ARTERIAL PATENCY WRIST A: ABNORMAL
BASE DEFICIT BLDA-SCNC: 6.4 MMOL/L
BDY SITE: ABNORMAL
GAS FLOW.O2 O2 DELIVERY SYS: 6 L/MIN
HCO3 BLDA-SCNC: 20 MMOL/L (ref 22–26)
PCO2 BLDA: 41 MMHG (ref 35–45)
PH BLDA: 7.3 [PH] (ref 7.35–7.45)
PO2 BLDA: 110 MMHG (ref 80–100)
SAO2% DEVICE SAO2% SENSOR NAME: ABNORMAL
SPECIMEN SITE: ABNORMAL

## 2017-10-03 ENCOUNTER — TELEPHONE (OUTPATIENT)
Dept: CARDIOLOGY CLINIC | Age: 62
End: 2017-10-03

## 2017-10-04 DIAGNOSIS — I48.20 CHRONIC ATRIAL FIBRILLATION (HCC): Primary | ICD-10-CM

## 2017-10-04 RX ORDER — DILTIAZEM HYDROCHLORIDE 120 MG/1
120 CAPSULE, EXTENDED RELEASE ORAL DAILY
Qty: 30 CAP | Refills: 0 | Status: SHIPPED | OUTPATIENT
Start: 2017-10-04 | End: 2017-11-02 | Stop reason: SDUPTHER

## 2017-10-05 ENCOUNTER — TELEPHONE (OUTPATIENT)
Dept: CARDIOLOGY CLINIC | Age: 62
End: 2017-10-05

## 2017-10-05 NOTE — TELEPHONE ENCOUNTER
Please call patient regarding prescriptions. dilTIAZem XR (DILACOR XR) 120 mg XR capsule [[id number] and a medication his PCP put him on are afflicting. Thanks.

## 2017-10-19 ENCOUNTER — APPOINTMENT (OUTPATIENT)
Dept: CT IMAGING | Age: 62
End: 2017-10-19
Attending: PHYSICIAN ASSISTANT
Payer: COMMERCIAL

## 2017-10-19 ENCOUNTER — OFFICE VISIT (OUTPATIENT)
Dept: CARDIOLOGY CLINIC | Age: 62
End: 2017-10-19

## 2017-10-19 ENCOUNTER — HOSPITAL ENCOUNTER (EMERGENCY)
Age: 62
Discharge: HOME | End: 2017-10-19
Attending: EMERGENCY MEDICINE
Payer: COMMERCIAL

## 2017-10-19 VITALS
DIASTOLIC BLOOD PRESSURE: 80 MMHG | OXYGEN SATURATION: 97 % | HEART RATE: 74 BPM | WEIGHT: 257 LBS | BODY MASS INDEX: 34.81 KG/M2 | HEIGHT: 72 IN | SYSTOLIC BLOOD PRESSURE: 122 MMHG | RESPIRATION RATE: 18 BRPM

## 2017-10-19 VITALS
RESPIRATION RATE: 18 BRPM | TEMPERATURE: 97.8 F | HEART RATE: 73 BPM | WEIGHT: 254.63 LBS | DIASTOLIC BLOOD PRESSURE: 93 MMHG | OXYGEN SATURATION: 96 % | HEIGHT: 72 IN | BODY MASS INDEX: 34.49 KG/M2 | SYSTOLIC BLOOD PRESSURE: 160 MMHG

## 2017-10-19 DIAGNOSIS — I10 ESSENTIAL HYPERTENSION: Primary | ICD-10-CM

## 2017-10-19 DIAGNOSIS — I48.20 CHRONIC ATRIAL FIBRILLATION (HCC): ICD-10-CM

## 2017-10-19 DIAGNOSIS — E04.1 THYROID NODULE: ICD-10-CM

## 2017-10-19 DIAGNOSIS — R00.1 SINUS BRADYCARDIA: ICD-10-CM

## 2017-10-19 DIAGNOSIS — M54.2 CERVICALGIA: Primary | ICD-10-CM

## 2017-10-19 DIAGNOSIS — Z98.1 HISTORY OF FUSION OF CERVICAL SPINE: ICD-10-CM

## 2017-10-19 PROCEDURE — 99283 EMERGENCY DEPT VISIT LOW MDM: CPT

## 2017-10-19 PROCEDURE — 74011250637 HC RX REV CODE- 250/637: Performed by: PHYSICIAN ASSISTANT

## 2017-10-19 PROCEDURE — 72125 CT NECK SPINE W/O DYE: CPT

## 2017-10-19 RX ORDER — AMIODARONE HYDROCHLORIDE 100 MG/1
100 TABLET ORAL DAILY
Qty: 30 TAB | Refills: 2 | Status: SHIPPED | OUTPATIENT
Start: 2017-10-19 | End: 2017-11-18

## 2017-10-19 RX ORDER — LIDOCAINE 50 MG/G
1 PATCH TOPICAL EVERY 24 HOURS
Status: DISCONTINUED | OUTPATIENT
Start: 2017-10-19 | End: 2017-10-19 | Stop reason: HOSPADM

## 2017-10-19 RX ORDER — LIDOCAINE 50 MG/G
PATCH TOPICAL
Qty: 1 EACH | Refills: 0 | Status: SHIPPED | OUTPATIENT
Start: 2017-10-19 | End: 2018-03-20

## 2017-10-19 RX ORDER — METHYLPREDNISOLONE 4 MG/1
TABLET ORAL
Qty: 21 TAB | Refills: 0 | Status: SHIPPED | OUTPATIENT
Start: 2017-10-19 | End: 2017-12-26 | Stop reason: ALTCHOICE

## 2017-10-19 RX ORDER — DIAZEPAM 5 MG/1
5 TABLET ORAL
Qty: 15 TAB | Refills: 0 | Status: SHIPPED | OUTPATIENT
Start: 2017-10-19 | End: 2018-03-20

## 2017-10-19 NOTE — MR AVS SNAPSHOT
Visit Information Date & Time Provider Department Dept. Phone Encounter #  
 10/19/2017  9:30 AM Prieto Painting, [address] [place] [address] [facility] [phone] [id number] Your Appointments 11/29/2017  9:00 AM  
6 MONTH with MD Ruben [facility]) Appt Note: 6 month per Dr. Shila Harris,  
 [address] [facility]  
[phone] [address] [facility] Upcoming Health Maintenance Date Due Hepatitis C Screening 1955 DTaP/Tdap/Td series (1 - Tdap) 6/29/1974 FOBT Q 1 YEAR AGE 50-75 6/29/2003 ZOSTER VACCINE AGE 60> 4/29/2013 INFLUENZA AGE 9 TO ADULT 8/1/2017 Allergies as of 10/19/2017  Review Complete On: 10/19/2017 By: Daysi Graf RN Severity Noted Reaction Type Reactions Percocet [Oxycodone-acetaminophen] Medium 10/02/2010   Side Effect Anxiety Patient takes Tylenol without problems. Current Immunizations  Reviewed on 12/31/2015 Name Date Influenza Vaccine Split 2/8/2017 Not reviewed this visit You Were Diagnosed With   
  
 Codes Comments Essential hypertension    -  Primary ICD-10-CM: I10 
ICD-9-CM: 401.9 Chronic atrial fibrillation (HCC)     ICD-10-CM: U17.1 ICD-9-CM: 427.31 Sinus bradycardia     ICD-10-CM: R00.1 ICD-9-CM: 427.89 Vitals BP Pulse Resp Height(growth percentile) Weight(growth percentile) SpO2  
 122/80 (BP 1 Location: Left arm, BP Patient Position: Sitting) 74 18 6' (1.829 m) 257 lb (116.6 kg) 97% BMI Smoking Status 34.86 kg/m2 Former Smoker Vitals History BMI and BSA Data Body Mass Index Body Surface Area 34.86 kg/m 2 2.43 m 2 Preferred Pharmacy Pharmacy Name Phone Yoni Crump, [facility] [phone] Your Updated Medication List  
  
   
 This list is accurate as of: 10/19/17  9:51 AM.  Always use your most recent med list.  
  
  
  
  
 amiodarone 200 mg tablet Commonly known as:  CORDARONE Take  by mouth two (2) times a day. apixaban 5 mg tablet Commonly known as:  Wonda Deem Take 1 Tab by mouth two (2) times a day. diclofenac EC 75 mg EC tablet Commonly known as:  VOLTAREN Take 75 mg by mouth two (2) times a day. dilTIAZem  mg XR capsule Commonly known as:  DILACOR XR Take 1 Cap by mouth daily. furosemide 20 mg tablet Commonly known as:  LASIX Take 1 Tab by mouth daily as needed. VITAMIN B-12 1,000 mcg tablet Generic drug:  cyanocobalamin Take 1,000 mcg by mouth daily. VITAMIN B-6 100 mg tablet Generic drug:  pyridoxine (vitamin B6) Take 100 mg by mouth daily. We Performed the Following AMB POC EKG ROUTINE W/ 12 LEADS, INTER & REP [53176 CPT(R)] Introducing \[facility]\"" & [facility]! Dear Vaughn Herbert: Thank you for requesting a Paper[employer] account. Our records indicate that you already have an active Paperfold account. You can access your account anytime at [email or web address] Did you know that you can access your hospital and ER discharge instructions at any time in Paperfold? You can also review all of your test results from your hospital stay or ER visit. Additional Information If you have questions, please visit the Frequently Asked Questions section of the Paperfold website at [email or web address] Remember, Paperfold is NOT to be used for urgent needs. For medical emergencies, dial 911. Now available from your iPhone and Android! Please provide this summary of care documentation to your next provider. Your primary care clinician is listed as Tyrel Hernandez. If you have any questions after today's visit, please call [phone].

## 2017-10-19 NOTE — PROGRESS NOTES
Subjective:      Colt Ferro is a 57 y.o. male is here for f/u of his AF. The patient denies chest pain/ shortness of breath, orthopnea, PND, LE edema, palpitations, syncope, presyncope or fatigue. Patient Active Problem List    Diagnosis Date Noted    Irregular heart beat 09/07/2017    Non morbid obesity due to excess calories 08/28/2017    Atrial fibrillation ([facility] [address] 08/25/2017    Encounter for cardioversion procedure 02/07/2017    Hypertension 02/07/2017    Mixed hyperlipidemia 04/17/2012    Other dyspnea and respiratory abnormality 04/17/2012    Long term (current) use of anticoagulants 03/12/2012    S/P ablation of atrial fibrillation 02/02/2012    Pleural effusion, bilateral 10/05/2010    Sinus bradycardia 10/05/2010    SOB (shortness of breath) 10/02/2010    Paroxysmal atrial fibrillation ([facility] [address] 07/27/2010    Dyslipidemia 07/27/2010      Chelly Hobbs MD  Past Medical History:   Diagnosis Date    A-fib [facility])     Arthritis     back    Chronic pain     back    Encounter for cardioversion procedure 2/7/2017    GERD (gastroesophageal reflux disease)     High cholesterol     Hypertension 02/07/2017    patient denies hx of HTN    Multiple thyroid nodules     biopsied and benign    PUD (peptic ulcer disease)     \"years ago\"      Past Surgical History:   Procedure Laterality Date    CARDIAC CATHETERIZATION      CARDIAC SURG PROCEDURE UNLIST  2010, 2011    ,ABLATION     CARDIOVERSION EXTERNAL      HX CERVICAL FUSION  2013    HX HEENT  6/6/13    THYROID BIOPSY    HX ORTHOPAEDIC      shoulder surgery on left    HX ORTHOPAEDIC      wrist surgery, LEFT    HX ORTHOPAEDIC      elbow surgery, RIGHT     Allergies   Allergen Reactions    Percocet [Oxycodone-Acetaminophen] Anxiety     Patient takes Tylenol without problems.       Family History   Problem Relation Age of Onset    Cancer Mother      BREAST    Heart Disease Father     Lung Disease Father     negative for cardiac disease  Social History     Social History    Marital status:      Spouse name: N/A    Number of children: N/A    Years of education: N/A     Social History Main Topics    Smoking status: Former Smoker     Packs/day: 0.25     Years: 40.00     Quit date: 10/1/2012    Smokeless tobacco: Never Used    Alcohol use Yes      Comment: occassionally 3-4 cans of beer    Drug use: No    Sexual activity: Yes     Other Topics Concern    None     Social History Narrative     Current Outpatient Prescriptions   Medication Sig    amiodarone (PACERONE) 100 mg tablet Take 1 Tab by mouth daily for 30 days.  dilTIAZem XR (DILACOR XR) 120 mg XR capsule Take 1 Cap by mouth daily.  pyridoxine, vitamin B6, (VITAMIN B-6) 100 mg tablet Take 100 mg by mouth daily.  apixaban (ELIQUIS) 5 mg tablet Take 1 Tab by mouth two (2) times a day.  furosemide (LASIX) 20 mg tablet Take 1 Tab by mouth daily as needed.  cyanocobalamin (VITAMIN B-12) 1,000 mcg tablet Take 1,000 mcg by mouth daily.  diclofenac EC (VOLTAREN) 75 mg EC tablet Take 75 mg by mouth two (2) times a day. No current facility-administered medications for this visit. Vitals:    10/19/17 0919   BP: 122/80   Pulse: 74   Resp: 18   SpO2: 97%   Weight: 257 lb (116.6 kg)   Height: 6' (1.829 m)       I have reviewed the nurses notes, vitals, problem list, allergy list, medical history, family, social history and medications. Review of Symptoms:    General: Pt denies excessive weight gain or loss. Pt is able to conduct ADL's  HEENT: Denies blurred vision, headaches, epistaxis and difficulty swallowing. Respiratory: Denies shortness of breath, HENRY, wheezing or stridor.   Cardiovascular: Denies precordial pain, palpitations, edema or PND  Gastrointestinal: Denies poor appetite, indigestion, abdominal pain or blood in stool  Urinary: Denies dysuria, pyuria  Musculoskeletal: Denies pain or swelling from muscles or joints  Neurologic: Denies tremor, paresthesias, or sensory motor disturbance  Skin: Denies rash, itching or texture change. Psych: Denies depression      Physical Exam:      General: Well developed, in no acute distress. HEENT: Eyes - PERRL, no jvd  Heart:  Normal S1/S2 negative S3 or S4. Regular, no murmur, gallop or rub.   Respiratory: Clear bilaterally x 4, no wheezing or rales  Abdomen:   Soft, non-tender, bowel sounds are active.   Extremities:  No edema, normal cap refill, no cyanosis. Musculoskeletal: No clubbing  Neuro: A&Ox3, speech clear, gait stable. Skin: Skin color is normal. No rashes or lesions.  Non diaphoretic  Vascular: 2+ pulses symmetric in all extremities    Cardiographics    Ekg: nsr    Results for orders placed or performed during the hospital encounter of 09/07/17   EKG, 12 LEAD, INITIAL   Result Value Ref Range    Ventricular Rate 77 BPM    Atrial Rate 77 BPM    P-R Interval 192 ms    QRS Duration 108 ms    Q-T Interval 404 ms    QTC Calculation (Bezet) 457 ms    Calculated P Axis 70 degrees    Calculated R Axis 13 degrees    Calculated T Axis 53 degrees    Diagnosis       Normal sinus rhythm  Normal ECG  When compared with ECG of 30-AUG-2017 12:52,  CO interval has decreased  ST no longer depressed in Inferior leads  ST no longer depressed in Anterior leads  QT has shortened  Confirmed by Helen Kathleen ([id number]) on 9/8/2017 11:19:29 AM     Results for orders placed or performed in visit on 08/01/12   HOLTER MONITOR, 24 HOURS    Narrative    ECG Monitor/24 hours, Complete    Reason for Holter Monitor   A-fib    Heartbeat    Slowest 45  Average 58  Fastest  108      Results:   Underlying Rhythm: Sinus bradycardia      Atrial Arrhythmias: premature atrial contractions; occasional             AV Conduction: normal    Ventricular Arrhythmias: premature ventricular contractions;  occasional     ST Segment Analysis:normal     Symptom Correlation:  none    Comment:   Sinus rhythm throughout     Lalitha Posada MD, [facility]                  Lab Results   Component Value Date/Time    WBC 12.5 08/30/2017 11:25 AM    HGB 16.9 08/30/2017 11:25 AM    HCT 48.3 08/30/2017 11:25 AM    PLATELET 684 57/12/3684 11:25 AM    MCV 90.4 08/30/2017 11:25 AM      Lab Results   Component Value Date/Time    Sodium 137 08/30/2017 11:25 AM    Potassium 3.4 08/30/2017 11:25 AM    Chloride 99 08/30/2017 11:25 AM    CO2 32 08/30/2017 11:25 AM    Anion gap 6 08/30/2017 11:25 AM    Glucose 129 08/30/2017 11:25 AM    BUN 18 08/30/2017 11:25 AM    Creatinine 0.90 08/30/2017 11:25 AM    BUN/Creatinine ratio 20 08/30/2017 11:25 AM    GFR est AA >60 08/30/2017 11:25 AM    GFR est non-AA >60 08/30/2017 11:25 AM    Calcium 7.5 08/30/2017 11:25 AM    Bilirubin, total 0.8 08/30/2017 11:25 AM    AST (SGOT) 20 08/30/2017 11:25 AM    Alk. phosphatase 76 08/30/2017 11:25 AM    Protein, total 6.2 08/30/2017 11:25 AM    Albumin 2.8 08/30/2017 11:25 AM    Globulin 3.4 08/30/2017 11:25 AM    A-G Ratio 0.8 08/30/2017 11:25 AM    ALT (SGPT) 37 08/30/2017 11:25 AM         Assessment:     Assessment:        ICD-10-CM ICD-9-CM    1. Essential hypertension I10 401.9 AMB POC EKG ROUTINE W/ 12 LEADS, INTER & REP      amiodarone (PACERONE) 100 mg tablet   2. Chronic atrial fibrillation (HCC) I48.2 427.31 AMB POC EKG ROUTINE W/ 12 LEADS, INTER & REP      amiodarone (PACERONE) 100 mg tablet   3. Sinus bradycardia R00.1 427.89 AMB POC EKG ROUTINE W/ 12 LEADS, INTER & REP      amiodarone (PACERONE) 100 mg tablet     Orders Placed This Encounter    AMB POC EKG ROUTINE W/ 12 LEADS, INTER & REP     Order Specific Question:   Reason for Exam:     Answer:   HTN    amiodarone (PACERONE) 100 mg tablet     Sig: Take 1 Tab by mouth daily for 30 days. Dispense:  30 Tab     Refill:  2        Plan:   Mr Ariadne Simon is doing well. He had cut his amio down to once daily due to fatigue. He is in sinus and asymptomatic. We will decrease his amio to 100 mg daily.  He will f/u in December and we discuss stopping his oac since he is a chadsvasc of 1. htn well controlled on diltiazem. Continue medical management for htn, af and obesity. Thank you for allowing me to participate in Saud Manzo 's care.     Myles Gaspar MD, [facility]

## 2017-10-19 NOTE — ED NOTES
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

## 2017-10-19 NOTE — ED PROVIDER NOTES
[address]  EMERGENCY DEPARTMENT HISTORY AND PHYSICAL EXAM         Date of Service: 10/19/2017   Patient Name: Ashwin Hyatt   YOB: 1955  Medical Record Number: [id number]    History of Presenting Illness     Chief Complaint   Patient presents with    Neck Pain     Pt presents to ED for neck pain x 3 days, pain worse with ROM and points right behind R. ear when asked about pain, pt reports taking BC powder to help pain, pt reports he just left [facility] for follow-up post abalation 9/28         History Provided By:  patient    Additional History:   Ashwin Hyatt is a 57 y.o. male with PMhx significant for Anterior cervical disectomy and fusion, C5-C6, Afib and HTN who presents ambulatory to the ED with cc of constant throbbing right sided neck pain since 10/16/17. He describes that the pain starts behind the right ear and radiates down. He states the pain is exacerbated when he tries to sleep at night and with ROM. He reports taking BC powder noting no relief. He denies any known injury. Patient notes he often lifts heavy objects without eliciting pain, but pain will worsen that night. He reports recent ablation on 9/28, noting he recently followed up with Dr. Zee Rodriguez. He denies hx of HTN and DM. He denies sx's of numbness, weakness, ear pain, CP, SOB, rhinorrhea and congestion. Social Hx: - Tobacco, + EtOH, - Illicit Drugs    There are no other complaints, changes or physical findings at this time.     Primary Care Provider: Maura Frazier MD     Past History     Past Medical History:   Past Medical History:   Diagnosis Date    [facility])     Arthritis     back    Chronic pain     back    Encounter for cardioversion procedure 2/7/2017    GERD (gastroesophageal reflux disease)     High cholesterol     Hypertension 02/07/2017    patient denies hx of HTN    Multiple thyroid nodules     biopsied and benign    PUD (peptic ulcer disease)     \"years ago\"        Past Surgical History:   Past Surgical History:   Procedure Laterality Date    CARDIAC CATHETERIZATION      CARDIAC SURG PROCEDURE UNLIST  2010, 2011    ,ABLATION     CARDIOVERSION EXTERNAL      HX CERVICAL FUSION  2013    HX HEENT  6/6/13    THYROID BIOPSY    HX ORTHOPAEDIC      shoulder surgery on left    HX ORTHOPAEDIC      wrist surgery, LEFT    HX ORTHOPAEDIC      elbow surgery, RIGHT        Family History:   Family History   Problem Relation Age of Onset    Cancer Mother      BREAST    Heart Disease Father     Lung Disease Father         Social History:   Social History   Substance Use Topics    Smoking status: Former Smoker     Packs/day: 0.25     Years: 40.00     Quit date: 10/1/2012    Smokeless tobacco: Never Used    Alcohol use Yes      Comment: occassionally 3-4 cans of beer        Allergies: Allergies   Allergen Reactions    Percocet [Oxycodone-Acetaminophen] Anxiety     Patient takes Tylenol without problems. Review of Systems   Review of Systems   Constitutional: Negative. Negative for chills and fever. HENT: Negative for ear pain, rhinorrhea and sore throat. Eyes: Negative. Negative for visual disturbance. Respiratory: Negative. Negative for cough, chest tightness, shortness of breath and wheezing. Cardiovascular: Negative. Negative for chest pain and palpitations. Gastrointestinal: Negative. Negative for abdominal pain, constipation, diarrhea, nausea and vomiting. Genitourinary: Negative. Negative for dysuria and hematuria. Musculoskeletal: Positive for neck pain. Negative for arthralgias and myalgias. Skin: Negative. Negative for rash. Allergic/Immunologic: Negative. Negative for environmental allergies and food allergies. Neurological: Negative. Negative for weakness, numbness and headaches. Psychiatric/Behavioral: Negative. Negative for suicidal ideas. All other systems reviewed and are negative.       Physical Exam  Physical Exam   Constitutional: He is oriented to person, place, and time. He appears well-developed and well-nourished. No distress. Pt is a  M, awake and alert in NAD. HENT:   Head: Normocephalic and atraumatic. Right Ear: Tympanic membrane, external ear and ear canal normal.   Left Ear: Tympanic membrane, external ear and ear canal normal.   Nose: Nose normal.   Mouth/Throat: Uvula is midline, oropharynx is clear and moist and mucous membranes are normal.   No mastoid erythema or edema. Eyes: Conjunctivae and EOM are normal. Pupils are equal, round, and reactive to light. Right eye exhibits no discharge. Left eye exhibits no discharge. Neck:   Diffuse mild midline tenderness  Right cervical paraspinal tenderness  Post auricular tenderness. Decreased twisting ROM R>L, mild decreased in flexion and extension. Extension ROM appears to worsen pain. Cardiovascular: Normal rate, normal heart sounds and intact distal pulses. 2+ radial pulses b/l. Pulmonary/Chest: Effort normal and breath sounds normal. No respiratory distress. He has no wheezes. He has no rales. Abdominal: Soft. Bowel sounds are normal. There is no tenderness. There is no guarding. No CVA tenderness b/l. Musculoskeletal: He exhibits tenderness (see Neck). He exhibits no edema. Neurological: He is alert and oriented to person, place, and time. Coordination normal.   No focal neuro deficits. Neuro and sensation intact of UE b/l.  strength 5/5 b/l. Skin: Skin is warm and dry. No rash noted. He is not diaphoretic. No erythema. No pallor. Psychiatric: He has a normal mood and affect. His behavior is normal.   Vitals reviewed. Medical Decision Making   I am the first provider for this patient. I reviewed the vital signs, available nursing notes, past medical history, past surgical history, family history and social history. Old Medical Records: Old medical records.      Provider Notes:   DDx: strain, sprain, cervical radiculopathy      ED Course:  11:43 AM   Initial assessment performed. The patients presenting problems have been discussed, and they are in agreement with the care plan formulated and outlined with them. I have encouraged them to ask questions as they arise throughout their visit. 12:24PM  Provider re-evaluated pt. Provider discussed all available diagnostics, diagnosis, and treatment plan. Thoroughly discussed worrisome signs/symptoms in which pt should immediately return to ED, otherwise follow up with ortho. Patient conveys understanding and agreement to all of the above. All patient's questions were answered by provider. JEROME Johnson    Diagnostic Study Results     Radiologic Studies -  The following have been ordered and reviewed:  CT Results  (Last 48 hours)               10/19/17 1113  CT SPINE CERV WO CONT Final result    Impression:  IMPRESSION:       No acute fracture. Mild multilevel central canal stenosis at C5-6, C6-7, and to a lesser degree   C2-3. Severe right C3-4 and moderate left C5-6 neural foraminal stenosis   Solid anterior fusion C5-6   Stable diminutive appearance of C7   Stable right thyroid nodule               Narrative:  EXAM:  CT CERVICAL SPINE WITHOUT CONTRAST       INDICATION:   neck pain x 3 days, h/o cervical fusion. COMPARISON: 3/27/2013       CONTRAST:  None. TECHNIQUE: Multislice helical CT of the cervical spine was performed without   intravenous contrast administration. Sagittal and coronal reconstructions were   generated. CT dose reduction was achieved through use of a standardized   protocol tailored for this examination and automatic exposure control for dose   modulation. FINDINGS:       The anterior fusion of C5-C6 is solid. The alignment is remarkable for straightening. The C7 vertebral body has a   diminutive vertical height, unchanged (sagittal image 54). The craniocervical   junction is within normal limits.  The prevertebral soft tissues are within   normal limits. Severe erosive change at the anterior C1-2 articulation. Chondrocalcinosis. Calcification of the apical ligament. C2-C3:  Mild disc bulge. No significant neural foraminal stenosis. Central canal   measures 9.8 mm anterior to posterior. (Reference series 3, image 32). C3-C4:  Disc osteophyte complex eccentric to the right. Asymmetric severe right   facet arthropathy. Central canal measures about 10.8 mm anterior to posterior. Severe right-sided neural foraminal stenosis. (Series 3, image 39). C4-C5:  Disc osteophyte complex. Central canal measures 10.7 mm anterior to   posterior. Mild left-sided neural foraminal stenosis (reference series 3, image   46). Janine Giron C5-C6:  Disc osteophyte complex, eccentric to the left. Central canal measures   9.3 mm. There is mild right and moderate left neuroforaminal stenosis secondary   to uncovertebral joint hypertrophy (reference series 3, image 54). Janine Giron C6-C7:  Disc osteophyte complex which narrows the central canal to 9.3 mm. Minimal bilateral neural foraminal stenosis (reference series 3, image 60). C7-T1:  There is no spinal canal stenosis. Mild mild left-sided neural foraminal   stenosis secondary to uncovertebral joint hypertrophy (reference series 3, image   67). There is a right lateral heterogeneous 2.6 x 2.3 cm nodule (image 67). Vital Signs-Reviewed the patient's vital signs. Patient Vitals for the past 12 hrs:   Temp Pulse Resp BP SpO2   10/19/17 1019 97.8 °F (36.6 °C) 73 18 (!) 160/93 96 %       Medications Given in the ED:  Medications   lidocaine (LIDODERM) 5 % patch 1 Patch (1 Patch TransDERmal Apply Patch 10/19/17 1123)       Diagnosis:  Clinical Impression:   1. Cervicalgia    2. History of fusion of cervical spine    3.  Thyroid nodule         Plan:  1:   Follow-up Information     Follow up With Details Comments Contact Info    Pool Reid MD Schedule an appointment as soon as possible for a visit in 2 days  [address]  [address] [address]  [phone]      Faustino Jay MD Schedule an appointment as soon as possible for a visit in 1 week  [address]  [address] [facility],[address]  [place] [facility]  [phone]      [facility] EMERGENCY DEPT  As needed or, If symptoms worsen [address] [place] [address]  [phone]          2:   Current Discharge Medication List      START taking these medications    Details   lidocaine (LIDODERM) 5 % Apply patch to the affected area for 12 hours a day and remove for 12 hours a day. Qty: 1 Each, Refills: 0      diazePAM (VALIUM) 5 mg tablet Take 1 Tab by mouth every twelve (12) hours as needed (spasm). Max Daily Amount: 10 mg.  Qty: 15 Tab, Refills: 0           Return to ED if worse. Disposition:  Discharge Note:  12:24 PM  The patient is ready for discharge. The patient's signs, symptoms, diagnosis, and discharge instruction have been discussed and the patient has conveyed their understanding. The patient is to follow up as recommended or return to the ER should their symptoms worsen. Plan has been discussed and the patient is in agreement. Written by Rachael Banuelos ED Scribe, as dictated by Pedro Damon PA-C  _______________________________   Attestations: This is note is prepared by Rachael Banuelos, acting as Scribe for MARTHA Li PA-C: The scribe's documentation has been prepared under my direction and personally reviewed by me in its entirety. I confirm that the note above accurately reflects all work, treatment, procedures, and medical decision making performed by me.   _______________________________           This note will not be viewable in [address]

## 2017-10-19 NOTE — ED NOTES
Received pt to lee chair, resting in position of comfort; pt states he woke up 3 days ago with right sided neck pain, pain is worse with mvmt, denies injury

## 2017-10-19 NOTE — DISCHARGE INSTRUCTIONS
Neck Pain: Care Instructions  Your Care Instructions  You can have neck pain anywhere from the bottom of your head to the top of your shoulders. It can spread to the upper back or arms. Injuries, painting a ceiling, sleeping with your neck twisted, staying in one position for too long, and many other activities can cause neck pain. Most neck pain gets better with home care. Your doctor may recommend medicine to relieve pain or relax your muscles. He or she may suggest exercise and physical therapy to increase flexibility and relieve stress. You may need to wear a special (cervical) collar to support your neck for a day or two. Follow-up care is a key part of your treatment and safety. Be sure to make and go to all appointments, and call your doctor if you are having problems. It's also a good idea to know your test results and keep a list of the medicines you take. How can you care for yourself at home? · Try using a heating pad on a low or medium setting for 15 to 20 minutes every 2 or 3 hours. Try a warm shower in place of one session with the heating pad. · You can also try an ice pack for 10 to 15 minutes every 2 to 3 hours. Put a thin cloth between the ice and your skin. · Take pain medicines exactly as directed. ¨ If the doctor gave you a prescription medicine for pain, take it as prescribed. ¨ If you are not taking a prescription pain medicine, ask your doctor if you can take an over-the-counter medicine. · If your doctor recommends a cervical collar, wear it exactly as directed. When should you call for help? Call your doctor now or seek immediate medical care if:  · You have new or worsening numbness in your arms, buttocks or legs. · You have new or worsening weakness in your arms or legs. (This could make it hard to stand up.)  · You lose control of your bladder or bowels.   Watch closely for changes in your health, and be sure to contact your doctor if:  · Your neck pain is getting worse.  · You are not getting better after 1 week. · You do not get better as expected. Where can you learn more? Go to http://neo-boris.info/. Enter [id number] in the search box to learn more about \"Neck Pain: Care Instructions. \"  Current as of: March 21, 2017  Content Version: 11.3  © 8199-5956 [employer]. Care instructions adapted under license by [employer] (which disclaims liability or warranty for this information). If you have questions about a medical condition or this instruction, always ask your healthcare professional. [place] [address] any warranty or liability for your use of this information.

## 2017-11-03 RX ORDER — DILTIAZEM HYDROCHLORIDE 120 MG/1
CAPSULE, COATED, EXTENDED RELEASE ORAL
Qty: 30 CAP | Refills: 0 | Status: SHIPPED | OUTPATIENT
Start: 2017-11-03 | End: 2017-11-24 | Stop reason: SDUPTHER

## 2017-11-24 RX ORDER — DILTIAZEM HYDROCHLORIDE 120 MG/1
CAPSULE, COATED, EXTENDED RELEASE ORAL
Qty: 30 CAP | Refills: 2 | Status: SHIPPED | OUTPATIENT
Start: 2017-11-24 | End: 2018-04-11 | Stop reason: SDUPTHER

## 2017-12-21 ENCOUNTER — OFFICE VISIT (OUTPATIENT)
Dept: CARDIOLOGY CLINIC | Age: 62
End: 2017-12-21

## 2017-12-21 VITALS
HEART RATE: 69 BPM | SYSTOLIC BLOOD PRESSURE: 114 MMHG | WEIGHT: 263 LBS | OXYGEN SATURATION: 96 % | HEIGHT: 72 IN | BODY MASS INDEX: 35.62 KG/M2 | RESPIRATION RATE: 16 BRPM | DIASTOLIC BLOOD PRESSURE: 76 MMHG

## 2017-12-21 DIAGNOSIS — I49.9 IRREGULAR HEART BEAT: Primary | ICD-10-CM

## 2017-12-21 DIAGNOSIS — I48.21 PERMANENT ATRIAL FIBRILLATION (HCC): ICD-10-CM

## 2017-12-21 DIAGNOSIS — I10 ESSENTIAL HYPERTENSION: ICD-10-CM

## 2017-12-21 RX ORDER — AMIODARONE HYDROCHLORIDE 100 MG/1
100 TABLET ORAL DAILY
COMMUNITY
Start: 2017-12-15 | End: 2017-12-21

## 2017-12-21 RX ORDER — AMIODARONE HYDROCHLORIDE 100 MG/1
100 TABLET ORAL DAILY
Qty: 30 TAB | Refills: 2 | Status: SHIPPED | OUTPATIENT
Start: 2017-12-21 | End: 2018-01-20

## 2017-12-21 NOTE — MR AVS SNAPSHOT
Visit Information Date & Time Provider Department Dept. Phone Encounter #  
 12/21/2017  9:45 AM Brooke Segovia, [address] [facility] [phone] [id number] Your Appointments 12/26/2017  1:15 PM  
6 MONTH with Alana Rosado MD  
[facility] Appt Note: 6 month per Dr. Iva Moreno,; R/S from 11/29/17[facility] [address] [facility]  
[phone] [address] [facility] Upcoming Health Maintenance Date Due Hepatitis C Screening 1955 DTaP/Tdap/Td series (1 - Tdap) 6/29/1974 FOBT Q 1 YEAR AGE 50-75 6/29/2003 ZOSTER VACCINE AGE 60> 4/29/2013 Influenza Age 5 to Adult 8/1/2017 Allergies as of 12/21/2017  Review Complete On: 12/21/2017 By: Pavan Frost LPN Severity Noted Reaction Type Reactions Percocet [Oxycodone-acetaminophen] Medium 10/02/2010   Side Effect Anxiety Patient takes Tylenol without problems. Current Immunizations  Reviewed on 12/31/2015 Name Date Influenza Vaccine Split 2/8/2017 Not reviewed this visit You Were Diagnosed With   
  
 Codes Comments Irregular heart beat    -  Primary ICD-10-CM: I49.9 ICD-9-CM: 427.9 Vitals BP Pulse Resp Height(growth percentile) Weight(growth percentile) SpO2  
 114/76 (BP 1 Location: Left arm, BP Patient Position: Sitting) 69 16 6' (1.829 m) 263 lb (119.3 kg) 96% BMI Smoking Status 35.67 kg/m2 Former Smoker Vitals History BMI and BSA Data Body Mass Index Body Surface Area  
 35.67 kg/m 2 2.46 m 2 Preferred Pharmacy Pharmacy Name Phone [facility] [place] [phone] Your Updated Medication List  
  
   
This list is accurate as of: 12/21/17 10:11 AM.  Always use your most recent med list.  
  
  
  
  
 amiodarone 100 mg tablet Commonly known as:  Lavon Arrow Take 100 mg by mouth daily. apixaban 5 mg tablet Commonly known as:  Jodelle Patient Take 1 Tab by mouth two (2) times a day. diazePAM 5 mg tablet Commonly known as:  VALIUM Take 1 Tab by mouth every twelve (12) hours as needed (spasm). Max Daily Amount: 10 mg.  
  
 diclofenac EC 75 mg EC tablet Commonly known as:  VOLTAREN Take 75 mg by mouth two (2) times a day. dilTIAZem  mg ER capsule Commonly known as:  CARDIZEM CD  
TAKE ONE CAPSULE BY MOUTH EVERY DAY  
  
 furosemide 20 mg tablet Commonly known as:  LASIX Take 1 Tab by mouth daily as needed. lidocaine 5 % Commonly known as:  Codey Mckinnon Apply patch to the affected area for 12 hours a day and remove for 12 hours a day. methylPREDNISolone 4 mg tablet Commonly known as:  MEDROL (ROSY) Take as directed on dosage pack VITAMIN B-12 1,000 mcg tablet Generic drug:  cyanocobalamin Take 1,000 mcg by mouth daily. VITAMIN B-6 100 mg tablet Generic drug:  pyridoxine (vitamin B6) Take 100 mg by mouth daily. We Performed the Following AMB POC EKG ROUTINE W/ 12 LEADS, INTER & REP [81578 CPT(R)] Introducing [facility]! Dear Allyson Crew: Thank you for requesting a [employer] account. Our records indicate that you already have an active [employer] account. You can access your account anytime at [email or web address] Did you know that you can access your hospital and ER discharge instructions at any time in [employer]? You can also review all of your test results from your hospital stay or ER visit. Additional Information If you have questions, please visit the Frequently Asked Questions section of the [employer] website at [email or web address] Remember, [employer] is NOT to be used for urgent needs. For medical emergencies, dial 911. Now available from your iPhone and Android! Please provide this summary of care documentation to your next provider. Your primary care clinician is listed as Marisel Murray. If you have any questions after today's visit, please call [phone].

## 2017-12-21 NOTE — PROGRESS NOTES
Subjective:      Brandon Pineda is a 57 y.o. male is here for f/u of his AF/  The patient denies chest pain/ shortness of breath, orthopnea, PND, LE edema, palpitations, syncope, presyncope or fatigue. Patient Active Problem List    Diagnosis Date Noted    Irregular heart beat 09/07/2017    Non morbid obesity due to excess calories 08/28/2017    Atrial fibrillation ([facility] [address] 08/25/2017    Encounter for cardioversion procedure 02/07/2017    Hypertension 02/07/2017    Mixed hyperlipidemia 04/17/2012    Other dyspnea and respiratory abnormality 04/17/2012    Long term (current) use of anticoagulants 03/12/2012    S/P ablation of atrial fibrillation 02/02/2012    Pleural effusion, bilateral 10/05/2010    Sinus bradycardia 10/05/2010    SOB (shortness of breath) 10/02/2010    Paroxysmal atrial fibrillation ([facility] [address] 07/27/2010    Dyslipidemia 07/27/2010      Marisel Murray MD  Past Medical History:   Diagnosis Date    A-fib [facility])     Arthritis     back    Chronic pain     back    Encounter for cardioversion procedure 2/7/2017    GERD (gastroesophageal reflux disease)     High cholesterol     Hypertension 02/07/2017    patient denies hx of HTN    Multiple thyroid nodules     biopsied and benign    PUD (peptic ulcer disease)     \"years ago\"      Past Surgical History:   Procedure Laterality Date    CARDIAC CATHETERIZATION      CARDIAC SURG PROCEDURE UNLIST  2010, 2011    ,ABLATION     CARDIOVERSION EXTERNAL      HX CERVICAL FUSION  2013    HX HEENT  6/6/13    THYROID BIOPSY    HX ORTHOPAEDIC      shoulder surgery on left    HX ORTHOPAEDIC      wrist surgery, LEFT    HX ORTHOPAEDIC      elbow surgery, RIGHT     Allergies   Allergen Reactions    Percocet [Oxycodone-Acetaminophen] Anxiety     Patient takes Tylenol without problems.       Family History   Problem Relation Age of Onset    Cancer Mother      BREAST    Heart Disease Father     Lung Disease Father     negative for cardiac disease  Social History     Social History    Marital status:      Spouse name: N/A    Number of children: N/A    Years of education: N/A     Social History Main Topics    Smoking status: Former Smoker     Packs/day: 0.25     Years: 40.00     Quit date: 10/1/2012    Smokeless tobacco: Never Used    Alcohol use Yes      Comment: occassionally 3-4 cans of beer    Drug use: No    Sexual activity: Yes     Other Topics Concern    None     Social History Narrative     Current Outpatient Prescriptions   Medication Sig    amiodarone (PACERONE) 100 mg tablet Take 1 Tab by mouth daily for 30 days.  dilTIAZem CD (CARDIZEM CD) 120 mg ER capsule TAKE ONE CAPSULE BY MOUTH EVERY DAY    pyridoxine, vitamin B6, (VITAMIN B-6) 100 mg tablet Take 100 mg by mouth daily.  furosemide (LASIX) 20 mg tablet Take 1 Tab by mouth daily as needed.  cyanocobalamin (VITAMIN B-12) 1,000 mcg tablet Take 1,000 mcg by mouth daily.  lidocaine (LIDODERM) 5 % Apply patch to the affected area for 12 hours a day and remove for 12 hours a day.  diazePAM (VALIUM) 5 mg tablet Take 1 Tab by mouth every twelve (12) hours as needed (spasm). Max Daily Amount: 10 mg.    methylPREDNISolone (MEDROL, ROSY,) 4 mg tablet Take as directed on dosage pack    diclofenac EC (VOLTAREN) 75 mg EC tablet Take 75 mg by mouth two (2) times a day. No current facility-administered medications for this visit. Vitals:    12/21/17 0934   BP: 114/76   Pulse: 69   Resp: 16   SpO2: 96%   Weight: 263 lb (119.3 kg)   Height: 6' (1.829 m)       I have reviewed the nurses notes, vitals, problem list, allergy list, medical history, family, social history and medications. Review of Symptoms:    General: Pt denies excessive weight gain or loss. Pt is able to conduct ADL's  HEENT: Denies blurred vision, headaches, epistaxis and difficulty swallowing. Respiratory: Denies shortness of breath, HENRY, wheezing or stridor.   Cardiovascular: Denies precordial pain, palpitations, edema or PND  Gastrointestinal: Denies poor appetite, indigestion, abdominal pain or blood in stool  Urinary: Denies dysuria, pyuria  Musculoskeletal: Denies pain or swelling from muscles or joints  Neurologic: Denies tremor, paresthesias, or sensory motor disturbance  Skin: Denies rash, itching or texture change. Psych: Denies depression      Physical Exam:      General: Well developed, in no acute distress. HEENT: Eyes - PERRL, no jvd  Heart:  Normal S1/S2 negative S3 or S4. Regular, no murmur, gallop or rub.   Respiratory: Clear bilaterally x 4, no wheezing or rales  Abdomen:   Soft, non-tender, bowel sounds are active.   Extremities:  No edema, normal cap refill, no cyanosis. Musculoskeletal: No clubbing  Neuro: A&Ox3, speech clear, gait stable. Skin: Skin color is normal. No rashes or lesions.  Non diaphoretic  Vascular: 2+ pulses symmetric in all extremities    Cardiographics    Ekg: nsr    Results for orders placed or performed during the hospital encounter of 09/07/17   EKG, 12 LEAD, INITIAL   Result Value Ref Range    Ventricular Rate 77 BPM    Atrial Rate 77 BPM    P-R Interval 192 ms    QRS Duration 108 ms    Q-T Interval 404 ms    QTC Calculation (Bezet) 457 ms    Calculated P Axis 70 degrees    Calculated R Axis 13 degrees    Calculated T Axis 53 degrees    Diagnosis       Normal sinus rhythm  Normal ECG  When compared with ECG of 30-AUG-2017 12:52,  RI interval has decreased  ST no longer depressed in Inferior leads  ST no longer depressed in Anterior leads  QT has shortened  Confirmed by Helen Kathleen ([id number]) on 9/8/2017 11:19:29 AM     Results for orders placed or performed in visit on 08/01/12   HOLTER MONITOR, 24 HOURS    Narrative    ECG Monitor/24 hours, Complete    Reason for Holter Monitor   A-fib    Heartbeat    Slowest 45  Average 58  Fastest  108      Results:   Underlying Rhythm: Sinus bradycardia      Atrial Arrhythmias: premature atrial contractions; occasional             AV Conduction: normal    Ventricular Arrhythmias: premature ventricular contractions;  occasional     ST Segment Analysis:normal     Symptom Correlation:  none    Comment:   Sinus rhythm throughout     Jillian Durham MD, [facility]                  Lab Results   Component Value Date/Time    WBC 12.5 08/30/2017 11:25 AM    HGB 16.9 08/30/2017 11:25 AM    HCT 48.3 08/30/2017 11:25 AM    PLATELET 013 27/16/1114 11:25 AM    MCV 90.4 08/30/2017 11:25 AM      Lab Results   Component Value Date/Time    Sodium 137 08/30/2017 11:25 AM    Potassium 3.4 08/30/2017 11:25 AM    Chloride 99 08/30/2017 11:25 AM    CO2 32 08/30/2017 11:25 AM    Anion gap 6 08/30/2017 11:25 AM    Glucose 129 08/30/2017 11:25 AM    BUN 18 08/30/2017 11:25 AM    Creatinine 0.90 08/30/2017 11:25 AM    BUN/Creatinine ratio 20 08/30/2017 11:25 AM    GFR est AA >60 08/30/2017 11:25 AM    GFR est non-AA >60 08/30/2017 11:25 AM    Calcium 7.5 08/30/2017 11:25 AM    Bilirubin, total 0.8 08/30/2017 11:25 AM    AST (SGOT) 20 08/30/2017 11:25 AM    Alk. phosphatase 76 08/30/2017 11:25 AM    Protein, total 6.2 08/30/2017 11:25 AM    Albumin 2.8 08/30/2017 11:25 AM    Globulin 3.4 08/30/2017 11:25 AM    A-G Ratio 0.8 08/30/2017 11:25 AM    ALT (SGPT) 37 08/30/2017 11:25 AM         Assessment:     Assessment:        ICD-10-CM ICD-9-CM    1. Irregular heart beat I49.9 427.9 AMB POC EKG ROUTINE W/ 12 LEADS, INTER & REP      amiodarone (PACERONE) 100 mg tablet      CARDIAC HOLTER MONITOR, 24 HOURS   2. Permanent atrial fibrillation (HCC) I48.2 427.31 AMB POC EKG ROUTINE W/ 12 LEADS, INTER & REP      amiodarone (PACERONE) 100 mg tablet      CARDIAC HOLTER MONITOR, 24 HOURS   3.  Essential hypertension I10 401.9 AMB POC EKG ROUTINE W/ 12 LEADS, INTER & REP      amiodarone (PACERONE) 100 mg tablet      CARDIAC HOLTER MONITOR, 24 HOURS     Orders Placed This Encounter    AMB POC EKG ROUTINE W/ 12 LEADS, INTER & REP     Order Specific Question: Reason for Exam:     Answer:   routine    HOLTER MONITOR, 24 HOURS, Clinic Performed     Standing Status:   Future     Standing Expiration Date:   6/21/2018     Order Specific Question:   Reason for Exam:     Answer:   afib    DISCONTD: amiodarone (PACERONE) 100 mg tablet     Sig: Take 100 mg by mouth daily.  amiodarone (PACERONE) 100 mg tablet     Sig: Take 1 Tab by mouth daily for 30 days. Dispense:  30 Tab     Refill:  2        Plan:   Mr Heather Jules is in sinus and asymptomatic. He is a chadsvasc of 1 and he will stop his oac. He will return in 3 months with a 24 hour monitor. Continue medical management for htn, sss, obesity. Thank you for allowing me to participate in Merton Kanner 's care.     Mak Dahl MD, Vik[facility] Vanessa

## 2017-12-21 NOTE — PROGRESS NOTES
1. Have you been to the ER, urgent care clinic since your last visit? Hospitalized since your last visit? No.    2. Have you seen or consulted any other health care providers outside of the [address] [place] [address] since your last visit? Include any pap smears or colon screening.      No.      Chief Complaint   Patient presents with    Other     2 month-pt denies any cardiac symptoms

## 2017-12-26 ENCOUNTER — OFFICE VISIT (OUTPATIENT)
Dept: CARDIOLOGY CLINIC | Age: 62
End: 2017-12-26

## 2017-12-26 VITALS
HEIGHT: 72 IN | BODY MASS INDEX: 35.83 KG/M2 | RESPIRATION RATE: 20 BRPM | OXYGEN SATURATION: 96 % | DIASTOLIC BLOOD PRESSURE: 80 MMHG | HEART RATE: 62 BPM | SYSTOLIC BLOOD PRESSURE: 128 MMHG | WEIGHT: 264.5 LBS

## 2017-12-26 DIAGNOSIS — I48.0 PAROXYSMAL ATRIAL FIBRILLATION (HCC): Primary | ICD-10-CM

## 2017-12-26 DIAGNOSIS — E78.2 MIXED HYPERLIPIDEMIA: ICD-10-CM

## 2017-12-26 DIAGNOSIS — I10 ESSENTIAL HYPERTENSION: ICD-10-CM

## 2017-12-26 NOTE — MR AVS SNAPSHOT
Visit Information Date & Time Provider Department Dept. Phone Encounter #  
 12/26/2017  1:15 PM Mera Dye, [facility] [phone] [id number] Upcoming Health Maintenance Date Due Hepatitis C Screening 1955 DTaP/Tdap/Td series (1 - Tdap) 6/29/1974 FOBT Q 1 YEAR AGE 50-75 6/29/2003 ZOSTER VACCINE AGE 60> 4/29/2013 Influenza Age 5 to Adult 8/1/2017 Allergies as of 12/26/2017  Review Complete On: 12/26/2017 By: Libby Suarez LPN Severity Noted Reaction Type Reactions Percocet [Oxycodone-acetaminophen] Medium 10/02/2010   Side Effect Anxiety Patient takes Tylenol without problems. Current Immunizations  Reviewed on 12/31/2015 Name Date Influenza Vaccine Split 2/8/2017 Not reviewed this visit You Were Diagnosed With   
  
 Codes Comments Permanent atrial fibrillation ([facility] [address]    -  Primary ICD-10-CM: E87.3 ICD-9-CM: 427.31 Vitals BP Pulse Resp Height(growth percentile) Weight(growth percentile) SpO2  
 128/80 (BP 1 Location: Right arm, BP Patient Position: Sitting) 62 20 6' (1.829 m) 264 lb 8 oz (120 kg) 96% BMI Smoking Status 35.87 kg/m2 Former Smoker Vitals History BMI and BSA Data Body Mass Index Body Surface Area  
 35.87 kg/m 2 2.47 m 2 [phone] Your Updated Medication List  
  
   
This list is accurate as of: 12/26/17  2:06 PM.  Always use your most recent med list.  
  
  
  
  
 amiodarone 100 mg tablet Commonly known as:  Batista Parrish Take 1 Tab by mouth daily for 30 days. diazePAM 5 mg tablet Commonly known as:  VALIUM Take 1 Tab by mouth every twelve (12) hours as needed (spasm). Max Daily Amount: 10 mg.  
  
 diclofenac EC 75 mg EC tablet Commonly known as:  VOLTAREN  
 Take 75 mg by mouth two (2) times a day. dilTIAZem  mg ER capsule Commonly known as:  CARDIZEM CD  
TAKE ONE CAPSULE BY MOUTH EVERY DAY  
  
 furosemide 20 mg tablet Commonly known as:  LASIX Take 1 Tab by mouth daily as needed. lidocaine 5 % Commonly known as:  Cari Brice Apply patch to the affected area for 12 hours a day and remove for 12 hours a day. VITAMIN B-12 1,000 mcg tablet Generic drug:  cyanocobalamin Take 1,000 mcg by mouth daily. VITAMIN B-6 100 mg tablet Generic drug:  pyridoxine (vitamin B6) Take 100 mg by mouth daily. We Performed the Following AMB POC EKG ROUTINE W/ 12 LEADS, INTER & REP [47270 CPT(R)] Introducing [facility]! Dear Dorina Blue: Thank you for requesting a [employer] account. Our records indicate that you already have an active [employer] account. You can access your account anytime at [email or web address]. [email or web address] Did you know that you can access your hospital and ER discharge instructions at any time in [employer]? You can also review all of your test results from your hospital stay or ER visit. Additional Information If you have questions, please visit the Frequently Asked Questions section of the [employer] website at [email or web address] Remember, [employer] is NOT to be used for urgent needs. For medical emergencies, dial 911. Now available from your iPhone and Android! Please provide this summary of care documentation to your next provider. Your primary care clinician is listed as Michael Lou. If you have any questions after today's visit, please call [phone].

## 2017-12-26 NOTE — PROGRESS NOTES
1. Have you been to the ER, urgent care clinic since your last visit? Hospitalized since your last visit? NO    2. Have you seen or consulted any other health care providers outside of the [address] [place] [address] since your last visit? Include any pap smears or colon screening. NO    6 MONTH FOLLOW UP. NO CARDIAC C/O.

## 2017-12-27 NOTE — PROGRESS NOTES
NAME:  Lj Bustos   :   1955   MRN:   [id number]   PCP:  Srini Ennis MD           Subjective: The patient is a 57y.o. year old male  who returns for a routine follow-up. Since the last visit, patient reports no change in exercise tolerance, chest pain, edema, medication intolerance, palpitations, shortness of breath, PND/orthopnea wheezing, sputum, syncope, dizziness or light headedness. Doing well. Past Medical History:   Diagnosis Date    A-fib [facility])     Arthritis     back    Chronic pain     back    Encounter for cardioversion procedure 2017    GERD (gastroesophageal reflux disease)     High cholesterol     Hypertension 2017    patient denies hx of HTN    Multiple thyroid nodules     biopsied and benign    PUD (peptic ulcer disease)     \"years ago\"        ICD-10-CM ICD-9-CM    1. Paroxysmal atrial fibrillation (HCC) I48.0 427.31 AMB POC EKG ROUTINE W/ 12 LEADS, INTER & REP   2. Mixed hyperlipidemia E78.2 272.2    3. Essential hypertension I10 401.9       Social History   Substance Use Topics    Smoking status: Former Smoker     Packs/day: 0.25     Years: 40.00     Quit date: 10/1/2012    Smokeless tobacco: Never Used    Alcohol use Yes      Comment: occassionally 3-4 cans of beer      Family History   Problem Relation Age of Onset    Cancer Mother      BREAST    Heart Disease Father     Lung Disease Father         Review of Systems  General: Pt denies excessive weight gain or loss. Pt is able to conduct ADL's  HEENT: Denies blurred vision, headaches, epistaxis and difficulty swallowing. Respiratory: Denies shortness of breath, HENRY, wheezing or stridor.   Cardiovascular: Denies precordial pain, palpitations, edema or PND  Gastrointestinal: Denies poor appetite, indigestion, abdominal pain or blood in stool  Musculoskeletal: Denies pain or swelling from muscles or joints  Neurologic: Denies tremor, paresthesias, or sensory motor disturbance  Skin: Denies rash, itching or texture change. Objective:       Vitals:    12/26/17 1301 12/26/17 1310   BP: 122/78 128/80   Pulse: 62    Resp: 20    SpO2: 96%    Weight: 264 lb 8 oz (120 kg)    Height: 6' (1.829 m)     Body mass index is 35.87 kg/(m^2). General PE  Mental Status - Alert. General Appearance - Not in acute distress. Chest and Lung Exam   Inspection: Accessory muscles - No use of accessory muscles in breathing. Auscultation:   Breath sounds: - Normal.    Cardiovascular   Inspection: Jugular vein - Bilateral - Inspection Normal.  Palpation/Percussion:   Apical Impulse: - Normal.  Auscultation: Rhythm - Regular. Heart Sounds - S1 WNL and S2 WNL. No S3 or S4. Murmurs & Other Heart Sounds: Auscultation of the heart reveals - No Murmurs. Peripheral Vascular   Upper Extremity: Inspection - Bilateral - No Cyanotic nailbeds or Digital clubbing. Lower Extremity:   Palpation: Edema - Bilateral - No edema. Data Review:     EKG -EKG: normal EKG, normal sinus rhythm, unchanged from previous tracings. Medications reviewed  Current Outpatient Prescriptions   Medication Sig    amiodarone (PACERONE) 100 mg tablet Take 1 Tab by mouth daily for 30 days.  dilTIAZem CD (CARDIZEM CD) 120 mg ER capsule TAKE ONE CAPSULE BY MOUTH EVERY DAY    pyridoxine, vitamin B6, (VITAMIN B-6) 100 mg tablet Take 100 mg by mouth daily.  diclofenac EC (VOLTAREN) 75 mg EC tablet Take 75 mg by mouth two (2) times a day.  furosemide (LASIX) 20 mg tablet Take 1 Tab by mouth daily as needed.  cyanocobalamin (VITAMIN B-12) 1,000 mcg tablet Take 1,000 mcg by mouth daily.  lidocaine (LIDODERM) 5 % Apply patch to the affected area for 12 hours a day and remove for 12 hours a day.  diazePAM (VALIUM) 5 mg tablet Take 1 Tab by mouth every twelve (12) hours as needed (spasm). Max Daily Amount: 10 mg. No current facility-administered medications for this visit.           Assessment: ICD-10-CM ICD-9-CM    1. Paroxysmal atrial fibrillation (HCC) I48.0 427.31 AMB POC EKG ROUTINE W/ 12 LEADS, INTER & REP   2. Mixed hyperlipidemia E78.2 272.2    3. Essential hypertension I10 401.9         Plan:     Patient presents doing well and stable from cardiac standpoint. Maintaining sinus rhythm after hybrid ablation followed by repeat cardiovesion. Off NOAC per EP. Continue current care and follow up in 6 months.     Perez Holt MD

## 2018-02-16 ENCOUNTER — TELEPHONE (OUTPATIENT)
Dept: OTHER | Age: 63
End: 2018-02-16

## 2018-02-16 RX ORDER — AMIODARONE HYDROCHLORIDE 200 MG/1
100 TABLET ORAL DAILY
Qty: 15 TAB | Refills: 3 | Status: SHIPPED | OUTPATIENT
Start: 2018-02-16 | End: 2018-03-20

## 2018-02-16 NOTE — TELEPHONE ENCOUNTER
Spoke with patient - the Amiodarone 100mg tablets are over $130 with patient's insurance plan (new deductible). Called pharmacy - Amiodarone 200mg tablets are $18 for 15 pills.     Spoke with patient - he is able to cut the pills in half- he has a follow up appt on 3/20/18 with Dr. Matti Lucero.

## 2018-02-16 NOTE — TELEPHONE ENCOUNTER
Please call patient in regards to prescribed med he is unsure of the name of the med but he is concerned about the cost.    Thanks

## 2018-03-20 ENCOUNTER — CLINICAL SUPPORT (OUTPATIENT)
Dept: CARDIOLOGY CLINIC | Age: 63
End: 2018-03-20

## 2018-03-20 ENCOUNTER — OFFICE VISIT (OUTPATIENT)
Dept: CARDIOLOGY CLINIC | Age: 63
End: 2018-03-20

## 2018-03-20 VITALS
DIASTOLIC BLOOD PRESSURE: 80 MMHG | RESPIRATION RATE: 18 BRPM | HEIGHT: 72 IN | OXYGEN SATURATION: 95 % | BODY MASS INDEX: 35.59 KG/M2 | SYSTOLIC BLOOD PRESSURE: 120 MMHG | HEART RATE: 63 BPM | WEIGHT: 262.8 LBS

## 2018-03-20 DIAGNOSIS — Z98.890 S/P ABLATION OF ATRIAL FIBRILLATION: ICD-10-CM

## 2018-03-20 DIAGNOSIS — E66.09 NON MORBID OBESITY DUE TO EXCESS CALORIES: ICD-10-CM

## 2018-03-20 DIAGNOSIS — I10 ESSENTIAL HYPERTENSION: ICD-10-CM

## 2018-03-20 DIAGNOSIS — I48.0 PAROXYSMAL ATRIAL FIBRILLATION (HCC): ICD-10-CM

## 2018-03-20 DIAGNOSIS — I48.21 PERMANENT ATRIAL FIBRILLATION (HCC): ICD-10-CM

## 2018-03-20 DIAGNOSIS — I49.9 IRREGULAR HEART BEAT: ICD-10-CM

## 2018-03-20 DIAGNOSIS — Z86.79 S/P ABLATION OF ATRIAL FIBRILLATION: ICD-10-CM

## 2018-03-20 DIAGNOSIS — R00.1 SINUS BRADYCARDIA: ICD-10-CM

## 2018-03-20 DIAGNOSIS — I48.91 ATRIAL FIBRILLATION, UNSPECIFIED TYPE (HCC): Primary | ICD-10-CM

## 2018-03-20 PROBLEM — E66.01 SEVERE OBESITY (BMI 35.0-39.9) WITH COMORBIDITY (HCC): Status: ACTIVE | Noted: 2018-03-20

## 2018-03-20 RX ORDER — CEFDINIR 300 MG/1
CAPSULE ORAL
COMMUNITY
Start: 2018-02-28 | End: 2018-03-20

## 2018-03-20 RX ORDER — VALACYCLOVIR HYDROCHLORIDE 500 MG/1
500 TABLET, FILM COATED ORAL
COMMUNITY
Start: 2018-02-28

## 2018-03-20 NOTE — PROGRESS NOTES
Subjective:      Marilyn Valencia is a 57 y.o. male is here for f/u of AF s/p ablation. He is doing well. The patient denies chest pain/ shortness of breath, orthopnea, PND, LE edema, palpitations, syncope, presyncope or fatigue. Patient Active Problem List    Diagnosis Date Noted    Irregular heart beat 09/07/2017    Non morbid obesity due to excess calories 08/28/2017    Atrial fibrillation ([facility] 08/25/2017    Encounter for cardioversion procedure 02/07/2017    Hypertension 02/07/2017    Mixed hyperlipidemia 04/17/2012    Other dyspnea and respiratory abnormality 04/17/2012    Long term (current) use of anticoagulants 03/12/2012    S/P ablation of atrial fibrillation 02/02/2012    Pleural effusion, bilateral 10/05/2010    Sinus bradycardia 10/05/2010    SOB (shortness of breath) 10/02/2010    Paroxysmal atrial fibrillation ([facility] 07/27/2010    Dyslipidemia 07/27/2010      Jose Armando Blair MD  Past Medical History:   Diagnosis Date    A-fib [facility])     Arthritis     back    Chronic pain     back    Encounter for cardioversion procedure 2/7/2017    GERD (gastroesophageal reflux disease)     High cholesterol     Hypertension 02/07/2017    patient denies hx of HTN    Multiple thyroid nodules     biopsied and benign    PUD (peptic ulcer disease)     \"years ago\"      Past Surgical History:   Procedure Laterality Date    CARDIAC CATHETERIZATION      CARDIAC SURG PROCEDURE UNLIST  2010, 2011    ,ABLATION     CARDIOVERSION EXTERNAL      HX CERVICAL FUSION  2013    HX HEENT  6/6/13    THYROID BIOPSY    HX ORTHOPAEDIC      shoulder surgery on left    HX ORTHOPAEDIC      wrist surgery, LEFT    HX ORTHOPAEDIC      elbow surgery, RIGHT     Allergies   Allergen Reactions    Percocet [Oxycodone-Acetaminophen] Anxiety     Patient takes Tylenol without problems.       Family History   Problem Relation Age of Onset    Cancer Mother      BREAST    Heart Disease Father     Lung Disease Father     negative for cardiac disease  Social History     Social History    Marital status:      Spouse name: N/A    Number of children: N/A    Years of education: N/A     Social History Main Topics    Smoking status: Former Smoker     Packs/day: 0.25     Years: 40.00     Quit date: 10/1/2012    Smokeless tobacco: Never Used    Alcohol use Yes      Comment: occassionally 3-4 cans of beer    Drug use: No    Sexual activity: Yes     Other Topics Concern    None     Social History Narrative     Current Outpatient Prescriptions   Medication Sig    valACYclovir (VALTREX) 500 mg tablet Take 500 mg by mouth as needed.  amiodarone (CORDARONE) 200 mg tablet Take 0.5 Tabs by mouth daily.  dilTIAZem CD (CARDIZEM CD) 120 mg ER capsule TAKE ONE CAPSULE BY MOUTH EVERY DAY    pyridoxine, vitamin B6, (VITAMIN B-6) 100 mg tablet Take 100 mg by mouth daily.  diclofenac EC (VOLTAREN) 75 mg EC tablet Take 75 mg by mouth two (2) times a day.  furosemide (LASIX) 20 mg tablet Take 1 Tab by mouth daily as needed.  cyanocobalamin (VITAMIN B-12) 1,000 mcg tablet Take 1,000 mcg by mouth daily. No current facility-administered medications for this visit. Vitals:    03/20/18 0856   BP: 120/80   Pulse: 63   Resp: 18   SpO2: 95%   Weight: 262 lb 12.8 oz (119.2 kg)   Height: 6' (1.829 m)       I have reviewed the nurses notes, vitals, problem list, allergy list, medical history, family, social history and medications. Review of Symptoms:    General: Pt denies excessive weight gain or loss. Pt is able to conduct ADL's  HEENT: Denies blurred vision, headaches, epistaxis and difficulty swallowing. Respiratory: Denies shortness of breath, HENRY, wheezing or stridor.   Cardiovascular: Denies precordial pain, palpitations, edema or PND  Gastrointestinal: Denies poor appetite, indigestion, abdominal pain or blood in stool  Urinary: Denies dysuria, pyuria  Musculoskeletal: Denies pain or swelling from muscles or joints  Neurologic: Denies tremor, paresthesias, or sensory motor disturbance  Skin: Denies rash, itching or texture change. Psych: Denies depression      Physical Exam:      General: Well developed, in no acute distress. HEENT: Eyes - PERRL, no jvd  Heart:  Normal S1/S2 negative S3 or S4. Regular, no murmur, gallop or rub.   Respiratory: Clear bilaterally x 4, no wheezing or rales  Abdomen:   Soft, non-tender, bowel sounds are active.   Extremities:  No edema, normal cap refill, no cyanosis. Musculoskeletal: No clubbing  Neuro: A&Ox3, speech clear, gait stable. Skin: Skin color is normal. No rashes or lesions.  Non diaphoretic  Vascular: 2+ pulses symmetric in all extremities    Cardiographics    Ekg: sinus bradycardia    Results for orders placed or performed during the hospital encounter of 09/07/17   EKG, 12 LEAD, INITIAL   Result Value Ref Range    Ventricular Rate 77 BPM    Atrial Rate 77 BPM    P-R Interval 192 ms    QRS Duration 108 ms    Q-T Interval 404 ms    QTC Calculation (Bezet) 457 ms    Calculated P Axis 70 degrees    Calculated R Axis 13 degrees    Calculated T Axis 53 degrees    Diagnosis       Normal sinus rhythm  Normal ECG  When compared with ECG of 30-AUG-2017 12:52,  MD interval has decreased  ST no longer depressed in Inferior leads  ST no longer depressed in Anterior leads  QT has shortened  Confirmed by Gentry Oneill ([id number]) on 9/8/2017 11:19:29 AM     Results for orders placed or performed in visit on 08/01/12   HOLTER MONITOR, 24 HOURS    Narrative    ECG Monitor/24 hours, Complete    Reason for Holter Monitor   A-fib    Heartbeat    Slowest 45  Average 58  Fastest  108      Results:   Underlying Rhythm: Sinus bradycardia      Atrial Arrhythmias: premature atrial contractions; occasional             AV Conduction: normal    Ventricular Arrhythmias: premature ventricular contractions;  occasional     ST Segment Analysis:normal     Symptom Correlation:  none    Comment:   Sinus rhythm throughout     Kym Da Silva MD, [facility], [facility]                  Lab Results   Component Value Date/Time    WBC 12.5 (H) 08/30/2017 11:25 AM    HGB 16.9 08/30/2017 11:25 AM    HCT 48.3 08/30/2017 11:25 AM    PLATELET 744 12/99/4371 11:25 AM    MCV 90.4 08/30/2017 11:25 AM      Lab Results   Component Value Date/Time    Sodium 137 08/30/2017 11:25 AM    Potassium 3.4 (L) 08/30/2017 11:25 AM    Chloride 99 08/30/2017 11:25 AM    CO2 32 08/30/2017 11:25 AM    Anion gap 6 08/30/2017 11:25 AM    Glucose 129 (H) 08/30/2017 11:25 AM    BUN 18 08/30/2017 11:25 AM    Creatinine 0.90 08/30/2017 11:25 AM    BUN/Creatinine ratio 20 08/30/2017 11:25 AM    GFR est AA >60 08/30/2017 11:25 AM    GFR est non-AA >60 08/30/2017 11:25 AM    Calcium 7.5 (L) 08/30/2017 11:25 AM    Bilirubin, total 0.8 08/30/2017 11:25 AM    AST (SGOT) 20 08/30/2017 11:25 AM    Alk. phosphatase 76 08/30/2017 11:25 AM    Protein, total 6.2 (L) 08/30/2017 11:25 AM    Albumin 2.8 (L) 08/30/2017 11:25 AM    Globulin 3.4 08/30/2017 11:25 AM    A-G Ratio 0.8 (L) 08/30/2017 11:25 AM    ALT (SGPT) 37 08/30/2017 11:25 AM         Assessment:     Assessment:        ICD-10-CM ICD-9-CM    1. Atrial fibrillation, unspecified type ([address] I48.91 427.31 AMB POC EKG ROUTINE W/ 12 LEADS, INTER & REP   2. Sinus bradycardia R00.1 427.89    3. Essential hypertension I10 401.9    4. S/P ablation of atrial fibrillation Z98.890 V45.89     Z86.79     5. Non morbid obesity due to excess calories E66.09 278.00      Orders Placed This Encounter    AMB POC EKG ROUTINE W/ 12 LEADS, INTER & REP     Order Specific Question:   Reason for Exam:     Answer:   Routine    valACYclovir (VALTREX) 500 mg tablet     Sig: Take 500 mg by mouth as needed.  DISCONTD: cefdinir (OMNICEF) 300 mg capsule        Plan:   Mr. Anny Oakley is here for follow up s/p AF ablation. He is maintaining NSR while on Amiodarone 100mg daily. 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  He denies cardiac complaints. EKG shows sinus bradycardia. Will stop his amiodarone. Will obtain 24 hour holter monitor. He is on med rx for htn. F/u with Dr. Idris Pedroza in 3 months. Continue medical management for HTN, Obesity, AF. Thank you for allowing me to participate in [facility] 's care.     STANLEY Bee MD, Tasha Mckeon

## 2018-03-20 NOTE — PROGRESS NOTES
1. Have you been to the ER, urgent care clinic since your last visit? Hospitalized since your last visit? No    2. Have you seen or consulted any other health care providers outside of the [address] [place] [address] since your last visit? Include any pap smears or colon screening.  No    Chief Complaint   Patient presents with    Irregular Heart Beat     3 mo f/u

## 2018-04-11 RX ORDER — DILTIAZEM HYDROCHLORIDE 120 MG/1
CAPSULE, COATED, EXTENDED RELEASE ORAL
Qty: 30 CAP | Refills: 0 | Status: SHIPPED | OUTPATIENT
Start: 2018-04-11 | End: 2018-05-18 | Stop reason: SDUPTHER

## 2018-05-18 ENCOUNTER — TELEPHONE (OUTPATIENT)
Dept: CARDIOLOGY CLINIC | Age: 63
End: 2018-05-18

## 2018-05-18 RX ORDER — DILTIAZEM HYDROCHLORIDE 120 MG/1
CAPSULE, COATED, EXTENDED RELEASE ORAL
Qty: 30 CAP | Refills: 0 | Status: SHIPPED | OUTPATIENT
Start: 2018-05-18 | End: 2018-06-14 | Stop reason: SDUPTHER

## 2018-05-18 NOTE — TELEPHONE ENCOUNTER
Patient called stated pharmacy told him they have sent several request for refill. I told patient we have not received such request.  Told patient all request must come from pharmacy. Please send refill for Diltiazem 120mg to [facility].   Thanks  Laury Nuno

## 2018-06-15 RX ORDER — DILTIAZEM HYDROCHLORIDE 120 MG/1
CAPSULE, COATED, EXTENDED RELEASE ORAL
Qty: 30 CAP | Refills: 0 | Status: SHIPPED | OUTPATIENT
Start: 2018-06-15 | End: 2018-07-19 | Stop reason: SDUPTHER

## 2018-09-11 ENCOUNTER — HOSPITAL ENCOUNTER (EMERGENCY)
Age: 63
Discharge: HOME | End: 2018-09-11
Attending: EMERGENCY MEDICINE
Payer: COMMERCIAL

## 2018-09-11 ENCOUNTER — TELEPHONE (OUTPATIENT)
Dept: CARDIOLOGY CLINIC | Age: 63
End: 2018-09-11

## 2018-09-11 ENCOUNTER — APPOINTMENT (OUTPATIENT)
Dept: GENERAL RADIOLOGY | Age: 63
End: 2018-09-11
Attending: EMERGENCY MEDICINE
Payer: COMMERCIAL

## 2018-09-11 ENCOUNTER — CLINICAL SUPPORT (OUTPATIENT)
Dept: CARDIOLOGY CLINIC | Age: 63
End: 2018-09-11

## 2018-09-11 VITALS
TEMPERATURE: 97.3 F | BODY MASS INDEX: 33.86 KG/M2 | HEART RATE: 110 BPM | SYSTOLIC BLOOD PRESSURE: 114 MMHG | OXYGEN SATURATION: 95 % | RESPIRATION RATE: 13 BRPM | HEIGHT: 72 IN | DIASTOLIC BLOOD PRESSURE: 85 MMHG | WEIGHT: 250 LBS

## 2018-09-11 DIAGNOSIS — R00.1 SINUS BRADYCARDIA: ICD-10-CM

## 2018-09-11 DIAGNOSIS — I48.0 PAROXYSMAL ATRIAL FIBRILLATION (HCC): ICD-10-CM

## 2018-09-11 DIAGNOSIS — Z86.79 S/P ABLATION OF ATRIAL FIBRILLATION: ICD-10-CM

## 2018-09-11 DIAGNOSIS — I49.9 IRREGULAR HEART BEAT: ICD-10-CM

## 2018-09-11 DIAGNOSIS — Z98.890 S/P ABLATION OF ATRIAL FIBRILLATION: ICD-10-CM

## 2018-09-11 DIAGNOSIS — R00.2 PALPITATIONS: Primary | ICD-10-CM

## 2018-09-11 LAB
ALBUMIN SERPL-MCNC: 3.7 G/DL (ref 3.5–5)
ALBUMIN/GLOB SERPL: 1.1 {RATIO} (ref 1.1–2.2)
ALP SERPL-CCNC: 83 U/L (ref 45–117)
ALT SERPL-CCNC: 36 U/L (ref 12–78)
ANION GAP SERPL CALC-SCNC: 9 MMOL/L (ref 5–15)
APPEARANCE UR: CLEAR
AST SERPL-CCNC: 26 U/L (ref 15–37)
ATRIAL RATE: 110 BPM
BASOPHILS # BLD: 0 K/UL (ref 0–0.1)
BASOPHILS NFR BLD: 1 % (ref 0–1)
BILIRUB SERPL-MCNC: 0.9 MG/DL (ref 0.2–1)
BILIRUB UR QL: NEGATIVE
BNP SERPL-MCNC: 415 PG/ML (ref 0–125)
BUN SERPL-MCNC: 17 MG/DL (ref 6–20)
BUN/CREAT SERPL: 19 (ref 12–20)
CALCIUM SERPL-MCNC: 8.4 MG/DL (ref 8.5–10.1)
CALCULATED R AXIS, ECG10: 41 DEGREES
CALCULATED T AXIS, ECG11: 49 DEGREES
CHLORIDE SERPL-SCNC: 108 MMOL/L (ref 97–108)
CO2 SERPL-SCNC: 22 MMOL/L (ref 21–32)
COLOR UR: NORMAL
CREAT SERPL-MCNC: 0.88 MG/DL (ref 0.7–1.3)
DIAGNOSIS, 93000: NORMAL
DIFFERENTIAL METHOD BLD: NORMAL
EOSINOPHIL # BLD: 0.1 K/UL (ref 0–0.4)
EOSINOPHIL NFR BLD: 3 % (ref 0–7)
ERYTHROCYTE [DISTWIDTH] IN BLOOD BY AUTOMATED COUNT: 12.5 % (ref 11.5–14.5)
GLOBULIN SER CALC-MCNC: 3.5 G/DL (ref 2–4)
GLUCOSE SERPL-MCNC: 107 MG/DL (ref 65–100)
GLUCOSE UR STRIP.AUTO-MCNC: NEGATIVE MG/DL
HCT VFR BLD AUTO: 47.5 % (ref 36.6–50.3)
HGB BLD-MCNC: 16.7 G/DL (ref 12.1–17)
HGB UR QL STRIP: NEGATIVE
IMM GRANULOCYTES # BLD: 0 K/UL (ref 0–0.04)
IMM GRANULOCYTES NFR BLD AUTO: 0 % (ref 0–0.5)
KETONES UR QL STRIP.AUTO: NEGATIVE MG/DL
LEUKOCYTE ESTERASE UR QL STRIP.AUTO: NEGATIVE
LYMPHOCYTES # BLD: 1.8 K/UL (ref 0.8–3.5)
LYMPHOCYTES NFR BLD: 40 % (ref 12–49)
MAGNESIUM SERPL-MCNC: 2.1 MG/DL (ref 1.6–2.4)
MCH RBC QN AUTO: 32 PG (ref 26–34)
MCHC RBC AUTO-ENTMCNC: 35.2 G/DL (ref 30–36.5)
MCV RBC AUTO: 91 FL (ref 80–99)
MONOCYTES # BLD: 0.4 K/UL (ref 0–1)
MONOCYTES NFR BLD: 8 % (ref 5–13)
NEUTS SEG # BLD: 2.1 K/UL (ref 1.8–8)
NEUTS SEG NFR BLD: 48 % (ref 32–75)
NITRITE UR QL STRIP.AUTO: NEGATIVE
NRBC # BLD: 0 K/UL (ref 0–0.01)
NRBC BLD-RTO: 0 PER 100 WBC
PH UR STRIP: 5 [PH] (ref 5–8)
PLATELET # BLD AUTO: 171 K/UL (ref 150–400)
PMV BLD AUTO: 10.2 FL (ref 8.9–12.9)
POTASSIUM SERPL-SCNC: 4.2 MMOL/L (ref 3.5–5.1)
PROT SERPL-MCNC: 7.2 G/DL (ref 6.4–8.2)
PROT UR STRIP-MCNC: NEGATIVE MG/DL
Q-T INTERVAL, ECG07: 376 MS
QRS DURATION, ECG06: 86 MS
QTC CALCULATION (BEZET), ECG08: 511 MS
RBC # BLD AUTO: 5.22 M/UL (ref 4.1–5.7)
SODIUM SERPL-SCNC: 139 MMOL/L (ref 136–145)
SP GR UR REFRACTOMETRY: 1.02 (ref 1–1.03)
TROPONIN I SERPL-MCNC: <0.05 NG/ML
UROBILINOGEN UR QL STRIP.AUTO: 0.2 EU/DL (ref 0.2–1)
VENTRICULAR RATE, ECG03: 111 BPM
WBC # BLD AUTO: 4.4 K/UL (ref 4.1–11.1)

## 2018-09-11 PROCEDURE — 80053 COMPREHEN METABOLIC PANEL: CPT | Performed by: EMERGENCY MEDICINE

## 2018-09-11 PROCEDURE — 74011250637 HC RX REV CODE- 250/637: Performed by: EMERGENCY MEDICINE

## 2018-09-11 PROCEDURE — 36415 COLL VENOUS BLD VENIPUNCTURE: CPT | Performed by: EMERGENCY MEDICINE

## 2018-09-11 PROCEDURE — 83735 ASSAY OF MAGNESIUM: CPT | Performed by: EMERGENCY MEDICINE

## 2018-09-11 PROCEDURE — 93005 ELECTROCARDIOGRAM TRACING: CPT

## 2018-09-11 PROCEDURE — 96360 HYDRATION IV INFUSION INIT: CPT

## 2018-09-11 PROCEDURE — 74011250636 HC RX REV CODE- 250/636: Performed by: EMERGENCY MEDICINE

## 2018-09-11 PROCEDURE — 99285 EMERGENCY DEPT VISIT HI MDM: CPT

## 2018-09-11 PROCEDURE — 96361 HYDRATE IV INFUSION ADD-ON: CPT

## 2018-09-11 PROCEDURE — 85025 COMPLETE CBC W/AUTO DIFF WBC: CPT | Performed by: EMERGENCY MEDICINE

## 2018-09-11 PROCEDURE — 71045 X-RAY EXAM CHEST 1 VIEW: CPT

## 2018-09-11 PROCEDURE — 84484 ASSAY OF TROPONIN QUANT: CPT | Performed by: EMERGENCY MEDICINE

## 2018-09-11 PROCEDURE — 83880 ASSAY OF NATRIURETIC PEPTIDE: CPT | Performed by: EMERGENCY MEDICINE

## 2018-09-11 PROCEDURE — 81003 URINALYSIS AUTO W/O SCOPE: CPT | Performed by: EMERGENCY MEDICINE

## 2018-09-11 RX ORDER — PREDNISONE 10 MG/1
30 TABLET ORAL
Qty: 15 TAB | Refills: 0 | Status: SHIPPED | OUTPATIENT
Start: 2018-09-11 | End: 2018-09-11

## 2018-09-11 RX ORDER — DILTIAZEM HYDROCHLORIDE 60 MG/1
120 TABLET, FILM COATED ORAL
Status: COMPLETED | OUTPATIENT
Start: 2018-09-11 | End: 2018-09-11

## 2018-09-11 RX ORDER — DOXYCYCLINE HYCLATE 100 MG
100 TABLET ORAL 2 TIMES DAILY
Qty: 28 TAB | Refills: 0 | Status: SHIPPED | OUTPATIENT
Start: 2018-09-11 | End: 2018-09-11

## 2018-09-11 RX ORDER — DILTIAZEM HYDROCHLORIDE 120 MG/1
240 CAPSULE, COATED, EXTENDED RELEASE ORAL DAILY
Qty: 60 CAP | Refills: 7 | Status: SHIPPED | OUTPATIENT
Start: 2018-09-11 | End: 2018-09-12 | Stop reason: DRUGHIGH

## 2018-09-11 RX ADMIN — SODIUM CHLORIDE 1000 ML: 900 INJECTION, SOLUTION INTRAVENOUS at 08:27

## 2018-09-11 RX ADMIN — DILTIAZEM HYDROCHLORIDE 120 MG: 60 TABLET, FILM COATED ORAL at 10:38

## 2018-09-11 NOTE — ED NOTES
Report received from [facility], [address] [place] [address]. SBAR, Anny, MAR and Recent Results was discussed.     Jessica Mcarthur RN

## 2018-09-11 NOTE — TELEPHONE ENCOUNTER
No BP, just racing heart, and getting winded when walking across room. He did take the increased diltiazem.

## 2018-09-11 NOTE — TELEPHONE ENCOUNTER
Verified patient with two identifiers. Spoke with pt, c/o he is still having SOB, stated Monitor we put on him didn't do any good. Advised pt monitor is not supposed to help SOB. Diltiazem was increased when pt went to ED this am to help with SOB. Pt said he is extremely \"winded\" when trying to walk across room at home. Advised pt to go back to ED to be evaluated. Pt wasn't sure he wanted to go back. Please advise.

## 2018-09-11 NOTE — ED NOTES
Patient discharged by Dr. Ingrid Ram. Patient provided with discharge instructions Rx and instructions on follow up care. Patient out of ED ambulatory accompanied by family.

## 2018-09-11 NOTE — DISCHARGE INSTRUCTIONS
Palpitations: Care Instructions  Your Care Instructions  Heart palpitations are the uncomfortable sensation that your heart is beating fast or irregularly. You might feel pounding or fluttering in your chest. It might feel like your heart is skipping a beat. Although palpitations may be caused by a heart problem, they also occur because of stress, fatigue, or use of alcohol, caffeine, or nicotine. Many medicines, including diet pills, antihistamines, decongestants, and some herbal products, can cause heart palpitations. Nearly everyone has palpitations from time to time. Depending on your symptoms, your doctor may need to do more tests to try to find the cause of your palpitations. Follow-up care is a key part of your treatment and safety. Be sure to make and go to all appointments, and call your doctor if you are having problems. It's also a good idea to know your test results and keep a list of the medicines you take. How can you care for yourself at home? · Avoid caffeine, nicotine, and excess alcohol. · Do not take illegal drugs, such as methamphetamines and cocaine. · Do not take weight loss or diet medicines unless you talk with your doctor first.  · Get plenty of sleep. · Do not overeat. · If you have palpitations again, take deep breaths and try to relax. This may slow a racing heart. · If you start to feel lightheaded, lie down to avoid injuries that might result if you pass out and fall down. · Keep a record of your palpitations and bring it to your next doctor's appointment. Write down:  ¨ The date and time. ¨ Your pulse. (If your heart is beating fast, it may be hard to count your pulse.)  ¨ What you were doing when the palpitations started. ¨ How long the palpitations lasted. ¨ Any other symptoms. · If an activity causes palpitations, slow down or stop. Talk to your doctor before you do that activity again. · Take your medicines exactly as prescribed.  Call your doctor if you think you are having a problem with your medicine. When should you call for help? Call 911 anytime you think you may need emergency care. For example, call if:    · You passed out (lost consciousness).     · You have symptoms of a heart attack. These may include:  ¨ Chest pain or pressure, or a strange feeling in the chest.  ¨ Sweating. ¨ Shortness of breath. ¨ Pain, pressure, or a strange feeling in the back, neck, jaw, or upper belly or in one or both shoulders or arms. ¨ Lightheadedness or sudden weakness. ¨ A fast or irregular heartbeat. After you call 911, the  may tell you to chew 1 adult-strength or 2 to 4 low-dose aspirin. Wait for an ambulance. Do not try to drive yourself.     · You have symptoms of a stroke. These may include:  ¨ Sudden numbness, tingling, weakness, or loss of movement in your face, arm, or leg, especially on only one side of your body. ¨ Sudden vision changes. ¨ Sudden trouble speaking. ¨ Sudden confusion or trouble understanding simple statements. ¨ Sudden problems with walking or balance. ¨ A sudden, severe headache that is different from past headaches.    Call your doctor now or seek immediate medical care if:    · You have heart palpitations and:  ¨ Are dizzy or lightheaded, or you feel like you may faint. ¨ Have new or increased shortness of breath.    Watch closely for changes in your health, and be sure to contact your doctor if:    · You continue to have heart palpitations. Where can you learn more? Go to http://neo-boris.info/. Enter R508 in the search box to learn more about \"Palpitations: Care Instructions. \"  Current as of: December 6, 2017  Content Version: 11.7  © 9054-0845 [employer]. Care instructions adapted under license by [employer] (which disclaims liability or warranty for this information).  If you have questions about a medical condition or this instruction, always ask your healthcare professional. [employer] disclaims any warranty or liability for your use of this information.

## 2018-09-12 ENCOUNTER — HOSPITAL ENCOUNTER (EMERGENCY)
Age: 63
Discharge: HOME | End: 2018-09-12
Attending: EMERGENCY MEDICINE
Payer: COMMERCIAL

## 2018-09-12 VITALS
TEMPERATURE: 97.6 F | DIASTOLIC BLOOD PRESSURE: 76 MMHG | OXYGEN SATURATION: 95 % | WEIGHT: 255.73 LBS | BODY MASS INDEX: 34.64 KG/M2 | HEART RATE: 70 BPM | HEIGHT: 72 IN | RESPIRATION RATE: 14 BRPM | SYSTOLIC BLOOD PRESSURE: 107 MMHG

## 2018-09-12 DIAGNOSIS — I48.4 ATYPICAL ATRIAL FLUTTER (HCC): Primary | ICD-10-CM

## 2018-09-12 PROBLEM — I48.3 TYPICAL ATRIAL FLUTTER (HCC): Status: ACTIVE | Noted: 2018-09-12

## 2018-09-12 LAB
ATRIAL RATE: 115 BPM
ATRIAL RATE: 70 BPM
CALCULATED P AXIS, ECG09: 70 DEGREES
CALCULATED P AXIS, ECG09: 83 DEGREES
CALCULATED R AXIS, ECG10: 37 DEGREES
CALCULATED R AXIS, ECG10: 64 DEGREES
CALCULATED T AXIS, ECG11: -62 DEGREES
CALCULATED T AXIS, ECG11: 60 DEGREES
CK SERPL-CCNC: 108 U/L (ref 39–308)
D DIMER PPP FEU-MCNC: 0.21 MG/L FEU (ref 0–0.65)
DIAGNOSIS, 93000: NORMAL
DIAGNOSIS, 93000: NORMAL
P-R INTERVAL, ECG05: 160 MS
P-R INTERVAL, ECG05: 226 MS
Q-T INTERVAL, ECG07: 284 MS
Q-T INTERVAL, ECG07: 376 MS
QRS DURATION, ECG06: 90 MS
QRS DURATION, ECG06: 92 MS
QTC CALCULATION (BEZET), ECG08: 392 MS
QTC CALCULATION (BEZET), ECG08: 406 MS
TROPONIN I SERPL-MCNC: <0.05 NG/ML
TSH SERPL DL<=0.05 MIU/L-ACNC: 1.95 UIU/ML (ref 0.36–3.74)
VENTRICULAR RATE, ECG03: 115 BPM
VENTRICULAR RATE, ECG03: 70 BPM

## 2018-09-12 PROCEDURE — 74011000250 HC RX REV CODE- 250

## 2018-09-12 PROCEDURE — 74011250636 HC RX REV CODE- 250/636

## 2018-09-12 PROCEDURE — 93005 ELECTROCARDIOGRAM TRACING: CPT

## 2018-09-12 PROCEDURE — 99285 EMERGENCY DEPT VISIT HI MDM: CPT

## 2018-09-12 PROCEDURE — 93325 DOPPLER ECHO COLOR FLOW MAPG: CPT

## 2018-09-12 PROCEDURE — 93005 ELECTROCARDIOGRAM TRACING: CPT | Performed by: INTERNAL MEDICINE

## 2018-09-12 PROCEDURE — 96374 THER/PROPH/DIAG INJ IV PUSH: CPT

## 2018-09-12 PROCEDURE — 77030018729 HC ELECTRD DEFIB PAD CARD -B

## 2018-09-12 PROCEDURE — 74011000250 HC RX REV CODE- 250: Performed by: INTERNAL MEDICINE

## 2018-09-12 PROCEDURE — 84443 ASSAY THYROID STIM HORMONE: CPT | Performed by: EMERGENCY MEDICINE

## 2018-09-12 PROCEDURE — 96375 TX/PRO/DX INJ NEW DRUG ADDON: CPT

## 2018-09-12 PROCEDURE — 36415 COLL VENOUS BLD VENIPUNCTURE: CPT | Performed by: EMERGENCY MEDICINE

## 2018-09-12 PROCEDURE — 92960 CARDIOVERSION ELECTRIC EXT: CPT | Performed by: INTERNAL MEDICINE

## 2018-09-12 PROCEDURE — 82550 ASSAY OF CK (CPK): CPT | Performed by: EMERGENCY MEDICINE

## 2018-09-12 PROCEDURE — 85379 FIBRIN DEGRADATION QUANT: CPT | Performed by: EMERGENCY MEDICINE

## 2018-09-12 PROCEDURE — 84484 ASSAY OF TROPONIN QUANT: CPT | Performed by: EMERGENCY MEDICINE

## 2018-09-12 PROCEDURE — 99152 MOD SED SAME PHYS/QHP 5/>YRS: CPT

## 2018-09-12 RX ORDER — LIDOCAINE HYDROCHLORIDE 20 MG/ML
SOLUTION OROPHARYNGEAL
Status: COMPLETED
Start: 2018-09-12 | End: 2018-09-12

## 2018-09-12 RX ORDER — FENTANYL CITRATE 50 UG/ML
25-50 INJECTION, SOLUTION INTRAMUSCULAR; INTRAVENOUS
Status: DISCONTINUED | OUTPATIENT
Start: 2018-09-12 | End: 2018-09-12 | Stop reason: ALTCHOICE

## 2018-09-12 RX ORDER — LIDOCAINE HYDROCHLORIDE 20 MG/ML
15 SOLUTION OROPHARYNGEAL ONCE
Status: COMPLETED | OUTPATIENT
Start: 2018-09-12 | End: 2018-09-12

## 2018-09-12 RX ORDER — MIDAZOLAM HYDROCHLORIDE 1 MG/ML
.5-2 INJECTION, SOLUTION INTRAMUSCULAR; INTRAVENOUS
Status: DISCONTINUED | OUTPATIENT
Start: 2018-09-12 | End: 2018-09-12 | Stop reason: ALTCHOICE

## 2018-09-12 RX ORDER — MIDAZOLAM HYDROCHLORIDE 1 MG/ML
INJECTION, SOLUTION INTRAMUSCULAR; INTRAVENOUS
Status: COMPLETED
Start: 2018-09-12 | End: 2018-09-12

## 2018-09-12 RX ORDER — AMIODARONE HYDROCHLORIDE 200 MG/1
200 TABLET ORAL 2 TIMES DAILY
Qty: 60 TAB | Refills: 1 | Status: ON HOLD | OUTPATIENT
Start: 2018-09-12 | End: 2018-10-19 | Stop reason: SDUPTHER

## 2018-09-12 RX ORDER — FENTANYL CITRATE 50 UG/ML
INJECTION, SOLUTION INTRAMUSCULAR; INTRAVENOUS
Status: COMPLETED
Start: 2018-09-12 | End: 2018-09-12

## 2018-09-12 RX ADMIN — MIDAZOLAM HYDROCHLORIDE 2 MG: 1 INJECTION, SOLUTION INTRAMUSCULAR; INTRAVENOUS at 12:52

## 2018-09-12 RX ADMIN — MIDAZOLAM HYDROCHLORIDE 2 MG: 1 INJECTION, SOLUTION INTRAMUSCULAR; INTRAVENOUS at 12:49

## 2018-09-12 RX ADMIN — FENTANYL CITRATE 50 MCG: 50 INJECTION, SOLUTION INTRAMUSCULAR; INTRAVENOUS at 12:49

## 2018-09-12 RX ADMIN — LIDOCAINE HYDROCHLORIDE 15 ML: 20 SOLUTION OROPHARYNGEAL at 12:50

## 2018-09-12 RX ADMIN — LIDOCAINE HYDROCHLORIDE 15 ML: 20 SOLUTION ORAL; TOPICAL at 12:50

## 2018-09-12 RX ADMIN — BENZOCAINE, BUTAMBEN, AND TETRACAINE HYDROCHLORIDE 1 SPRAY: .028; .004; .004 AEROSOL, SPRAY TOPICAL at 12:51

## 2018-09-12 NOTE — PROGRESS NOTES
Pt sedated with 50 mcg IV Fentanyl and 4 mg iV Versed for SVETA. Pt tolerated well. Pt given an additional 2 mg IV Versed for Elective Cardioversion. Pt given 1 synch shock at 360 joules. Rhythm converted to sinus.  Monitored sedation time 12:49 to 13:05

## 2018-09-12 NOTE — PROGRESS NOTES
Pharmacy Clarification of Prior to Admission Medication Regimen     The patient was interviewed regarding clarification of the prior to admission medication regimen. Wife and daughter were present in room and obtained permission from patient to discuss drug regimen with visitor(s) present. Patient was questioned regarding use of any other inhalers, topical products, over the counter medications, herbal medications, vitamin products or ophthalmic/nasal/otic medication use. Information Obtained From: Patient, RX Query    Pertinent Pharmacy Findings:   valACYclovir (VALTREX) 500 mg tablet: Per patient, he has this agent at home,but stated he has not 'needed' this PRN agent 'in a long while'. PTA medication list was corrected to the following:     Prior to Admission Medications   Prescriptions Last Dose Informant Patient Reported? Taking? cyanocobalamin (VITAMIN B-12) 1,000 mcg tablet 9/10/2018 at Unknown time Self Yes Yes   Sig: Take 1,000 mcg by mouth daily. diclofenac EC (VOLTAREN) 75 mg EC tablet 2018 at Unknown time Self Yes Yes   Sig: Take 75 mg by mouth two (2) times a day. dilTIAZem ER (CARDIZEM LA) 120 mg tablet 2018 at Unknown time Self Yes Yes   Sig: Take 120 mg by mouth nightly. furosemide (LASIX) 20 mg tablet 2018 at Unknown time Self No Yes   Sig: Take 1 Tab by mouth daily as needed. pyridoxine, vitamin B6, (VITAMIN B-6) 100 mg tablet 9/10/2018 at Unknown time Self Yes Yes   Sig: Take 100 mg by mouth daily. salt moisturing solution no.1 (OCEAN ULTRA SALINE MIST NA) 2018 at Unknown time Self Yes Yes   Si Robeline by Both Nostrils route nightly. valACYclovir (VALTREX) 500 mg tablet Not Taking at Unknown time Self Yes No   Sig: Take 500 mg by mouth daily as needed ('outbreaks').       Facility-Administered Medications: None          Thank you,  Josefina Heimlich, Ever  Medication History Pharmacy Technician

## 2018-09-12 NOTE — CONSULTS
[address] [facility]  [phone]        Date of  Admission: 9/12/2018  9:07 AM     Admission type:Emergency    Consult for: atrial flutter  Consult by: Dr Max Jeong NP     Subjective:     Saud Manzo is a 70 y.o. male  Presents to ED with complaints of palpitations and dyspnea. He states he has been doing well without problems since his last ablation. However, Monday night he felt racing, irregular HR so he took a dose of Amiodarone and an extra diltiazem and his symptoms resolved. Yesterday morning, the racing HR returned, but he denies feeling an irregular beating. He presented to ED yesterday where his dilt was doubled and he was sent to cardiology office for holter. He continued to feel poorly today and was unable to get appointment as an outpatient, so he returned to ED. Patient Active Problem List    Diagnosis Date Noted    Typical atrial flutter ([address] 09/12/2018    Severe obesity (BMI 35.0-39. 9) with comorbidity ([address] 03/20/2018    Irregular heart beat 09/07/2017    Non morbid obesity due to excess calories 08/28/2017    Atrial fibrillation ([address] 08/25/2017    Encounter for cardioversion procedure 02/07/2017    Hypertension 02/07/2017    Mixed hyperlipidemia 04/17/2012    Other dyspnea and respiratory abnormality 04/17/2012    Long term (current) use of anticoagulants 03/12/2012    S/P ablation of atrial fibrillation 02/02/2012    Pleural effusion, bilateral 10/05/2010    Sinus bradycardia 10/05/2010    SOB (shortness of breath) 10/02/2010    Paroxysmal atrial fibrillation ([address] 07/27/2010    Dyslipidemia 07/27/2010      Trinidad Epps MD  Past Medical History:   Diagnosis Date    A-fib [facility])     Arthritis     back    Chronic pain     back    Encounter for cardioversion procedure 2/7/2017    GERD (gastroesophageal reflux disease)     High cholesterol     Hypertension 02/07/2017    patient denies hx of HTN    Multiple thyroid nodules     biopsied and benign    PUD (peptic ulcer disease)     \"years ago\"      Social History     Social History    Marital status:      Spouse name: N/A    Number of children: N/A    Years of education: N/A     Social History Main Topics    Smoking status: Former Smoker     Packs/day: 0.25     Years: 40.00     Quit date: 10/1/2012    Smokeless tobacco: Never Used    Alcohol use Yes      Comment: occassionally 3-4 cans of beer    Drug use: No    Sexual activity: Yes     Other Topics Concern    None     Social History Narrative     Allergies   Allergen Reactions    Percocet [Oxycodone-Acetaminophen] Anxiety     Patient takes Tylenol without problems. Family History   Problem Relation Age of Onset    Cancer Mother      BREAST    Heart Disease Father     Lung Disease Father       No current facility-administered medications for this encounter. Current Outpatient Prescriptions   Medication Sig    dilTIAZem CD (CARDIZEM CD) 120 mg ER capsule Take 2 Caps by mouth daily.  valACYclovir (VALTREX) 500 mg tablet Take 500 mg by mouth as needed.  pyridoxine, vitamin B6, (VITAMIN B-6) 100 mg tablet Take 100 mg by mouth daily.  diclofenac EC (VOLTAREN) 75 mg EC tablet Take 75 mg by mouth two (2) times a day.  furosemide (LASIX) 20 mg tablet Take 1 Tab by mouth daily as needed.  cyanocobalamin (VITAMIN B-12) 1,000 mcg tablet Take 1,000 mcg by mouth daily.          Review of Symptoms:  Constitutional: negative, + fatigue  Eyes: negative  Ears, nose, mouth, throat, and face: negative  Respiratory: +dyspnea, cough  Cardiovascular: negative  Gastrointestinal: negative  Genitourinary:negative  Musculoskeletal:negative  Neurological: negative  Endocrine: negative     Subjective:      Visit Vitals    /83    Pulse (!) 115    Temp 97.6 °F (36.4 °C)    Resp 15    Ht 6' (1.829 m)    Wt 255 lb 11.7 oz (116 kg)    SpO2 98%    BMI 34.68 kg/m2       Physical:  Heart: Regular, tachycardic, S1 WNL and S2 WNL. No S3 or S4, no m/S3/JVD, no carotid bruits   Lungs: clear   Abdomen: Soft, +BS, NTND   Extremities: LE lenore +DP/PT, no edema   Neurologic: Grossly normal    Data Review:   Recent Labs      09/11/18   0757   WBC  4.4   HGB  16.7   HCT  47.5   PLT  171     Recent Labs      09/11/18   0757   NA  139   K  4.2   CL  108   CO2  22   GLU  107*   BUN  17   CREA  0.88   CA  8.4*   MG  2.1   ALB  3.7   TBILI  0.9   SGOT  26   ALT  36       Recent Labs      09/12/18   0930  09/11/18   0757   TROIQ  <0.05  <0.05       No intake or output data in the 24 hours ending 09/12/18 1106     Cardiographics    Telemetry: atypical atrial flutter with 2:1 av block, vent rate 110s. Assessment:            Active Problems:    Paroxysmal atrial fibrillation (HCC) (7/27/2010)      SOB (shortness of breath) (10/2/2010)      Hypertension (2/7/2017)      Non morbid obesity due to excess calories (8/28/2017)      Typical atrial flutter ([address] (9/12/2018)         Plan:     Essie Jiménez is s/p hybrid ablation 2017 with hx of RFA years prior. He is a [employer] VASC 1 and [address] [place] [address] Road was stopped in 2017, his amio was stopped in March of this year. He presents today with 2+ days of palpitations and dyspnea in atypical atrial flutter. He is a candidate for Tommy/cardioversion. I discussed the risks/benefits/alternatives of the procedure with the patient. The patient understands and would like to proceed. Thank you for this interesting consultation. Yamilex Kuhn Ards ANP    Patient seen and examined by me with nurse practitioner. I personally performed all components of the history, physical, and medical decision making and agree with the assessment and plan with minor modifications as noted. Sp hybrid abl. In atypical afl with 2:1 conduction. Candidate for tommy/cardioversion. I discussed the risks/benefits/alternatives of the procedure with the patient.  Risks include (but are not limited to) bleeding, heart block, infection, cva/mi/tamponade/death. The patient understands and agrees to proceed. Thank you for this interesting consultation.       Alina Phelan MD, Sar[facility] Duckworthrs

## 2018-09-12 NOTE — ED PROVIDER NOTES
EMERGENCY DEPARTMENT HISTORY AND PHYSICAL EXAM      Date: 9/12/2018  Patient Name: Kate Charles    History of Presenting Illness     Chief Complaint   Patient presents with    Palpitations     Ambulatory w/ wife w/ c/o palpitations, was seen here yesterday for same       History Provided By: Patient    HPI: Kate Charles, 70 y.o. male with PMHx significant for HLD, GERD, PUD, HTN, afib, s/p hybrid ablation presents ambulatory to the ED with cc of persistent palpitations x 3 days. Pt reports his heart rate has been faster than usual, ranging 113-125, despite resting. He c/o associated SOB and states sx's have been worsening with progressive chest pain. Per chart review, pt was seen at [facility] ED yesterday regarding sx's where lab work was unremarkable. Chart review reveals cardiology recommended increasing his Diltiazem from 120mg to 240mg and go to the office for a halter monitor. Pt states he put the halter monitor on at 2300 last night, however was not seen by cardiology. He explains he called the cardiologist's office this morning regarding returning the monitor as well as the persistence of sx's and was unable to be seen, ultimately prompting his ED visit. Pt endorses drinking plenty of water and avoiding any caffeine drinks. He notes hx of goiter with no complication. Pt specifically denies any lightheadedness. There are no other complaints, changes, or physical findings at this time. PCP: Kendall Marquez MD    Current Outpatient Prescriptions   Medication Sig Dispense Refill    dilTIAZem ER (CARDIZEM LA) 120 mg tablet Take 120 mg by mouth nightly.  salt moisturing solution no.1 (OCEAN ULTRA SALINE MIST NA) 1 [place] by Both Nostrils route nightly.  amiodarone (CORDARONE) 200 mg tablet Take 1 Tab by mouth two (2) times a day. 60 Tab 1    pyridoxine, vitamin B6, (VITAMIN B-6) 100 mg tablet Take 100 mg by mouth daily.       diclofenac EC (VOLTAREN) 75 mg EC tablet Take 75 mg by mouth two (2) times a day.  furosemide (LASIX) 20 mg tablet Take 1 Tab by mouth daily as needed. 90 Tab 3    cyanocobalamin (VITAMIN B-12) 1,000 mcg tablet Take 1,000 mcg by mouth daily.  apixaban (ELIQUIS) 5 mg tablet Take 1 Tab by mouth two (2) times a day. 60 Tab 3    valACYclovir (VALTREX) 500 mg tablet Take 500 mg by mouth daily as needed ('outbreaks'). Past History     Past Medical History:  Past Medical History:   Diagnosis Date    A-fib ([facility] [address]     Arthritis     back    Chronic pain     back    Encounter for cardioversion procedure 2/7/2017    GERD (gastroesophageal reflux disease)     High cholesterol     Hypertension 02/07/2017    patient denies hx of HTN    Multiple thyroid nodules     biopsied and benign    PUD (peptic ulcer disease)     \"years ago\"       Past Surgical History:  Past Surgical History:   Procedure Laterality Date    CARDIAC CATHETERIZATION      CARDIAC SURG PROCEDURE UNLIST  2010, 2011    ,ABLATION     CARDIOVERSION EXTERNAL      HX AFIB ABLATION      HX CERVICAL FUSION  2013    HX HEENT  6/6/13    THYROID BIOPSY    HX ORTHOPAEDIC      shoulder surgery on left    HX ORTHOPAEDIC      wrist surgery, LEFT    HX ORTHOPAEDIC      elbow surgery, RIGHT       Family History:  Family History   Problem Relation Age of Onset    Cancer Mother      BREAST    Heart Disease Father     Lung Disease Father        Social History:  Social History   Substance Use Topics    Smoking status: Former Smoker     Packs/day: 0.25     Years: 40.00     Quit date: 10/1/2012    Smokeless tobacco: Never Used    Alcohol use Yes      Comment: occassionally 3-4 cans of beer       Allergies: Allergies   Allergen Reactions    Percocet [Oxycodone-Acetaminophen] Anxiety     Patient takes Tylenol without problems. Review of Systems   Review of Systems   Constitutional: Negative for chills and fever. Respiratory: Positive for shortness of breath. Negative for cough. Cardiovascular: Positive for chest pain and palpitations. Gastrointestinal: Negative for constipation, diarrhea, nausea and vomiting. Neurological: Negative for weakness, light-headedness and numbness. All other systems reviewed and are negative. Physical Exam   Physical Exam   Constitutional: He is oriented to person, place, and time. He appears well-developed and well-nourished. HENT:   Head: Normocephalic and atraumatic. Eyes: Conjunctivae and EOM are normal.   Neck: Normal range of motion. Neck supple. Cardiovascular: Regular rhythm. Tachycardia present. Pulmonary/Chest: Effort normal and breath sounds normal. No respiratory distress. Abdominal: Soft. He exhibits no distension. There is no tenderness. Musculoskeletal: Normal range of motion. Neurological: He is alert and oriented to person, place, and time. Skin: Skin is warm and dry. Psychiatric: He has a normal mood and affect. Nursing note and vitals reviewed.       Diagnostic Study Results     Labs -     Recent Results (from the past 12 hour(s))   EKG, 12 LEAD, INITIAL    Collection Time: 09/12/18  9:13 AM   Result Value Ref Range    Ventricular Rate 115 BPM    Atrial Rate 115 BPM    P-R Interval 226 ms    QRS Duration 90 ms    Q-T Interval 284 ms    QTC Calculation (Bezet) 392 ms    Calculated P Axis 83 degrees    Calculated R Axis 64 degrees    Calculated T Axis -62 degrees    Diagnosis       Sinus tachycardia with 1st degree AV block  Nonspecific ST and T wave abnormality  When compared with ECG of 11-SEP-2018 06:54,  No significant change  Confirmed by Alona Major ([id number]) on 9/12/2018 12:58:59 PM     TROPONIN I    Collection Time: 09/12/18  9:30 AM   Result Value Ref Range    Troponin-I, Qt. <0.05 <0.05 ng/mL   CK W/ REFLX CKMB    Collection Time: 09/12/18  9:30 AM   Result Value Ref Range     39 - 308 U/L   D DIMER    Collection Time: 09/12/18  9:30 AM   Result Value Ref Range    D-dimer 0.21 0.00 - 0.65 mg/L FEU   TSH 3RD GENERATION    Collection Time: 09/12/18  9:30 AM   Result Value Ref Range    TSH 1.95 0.36 - 3.74 uIU/mL   EKG, 12 LEAD, INITIAL    Collection Time: 09/12/18  1:11 PM   Result Value Ref Range    Ventricular Rate 70 BPM    Atrial Rate 70 BPM    P-R Interval 160 ms    QRS Duration 92 ms    Q-T Interval 376 ms    QTC Calculation (Bezet) 406 ms    Calculated P Axis 70 degrees    Calculated R Axis 37 degrees    Calculated T Axis 60 degrees    Diagnosis       Normal sinus rhythm  Nonspecific T wave abnormality  When compared with ECG of 12-SEP-2018 09:13,  DE interval has decreased  Vent. rate has decreased BY  45 BPM  Nonspecific T wave abnormality no longer evident in Inferior leads         Radiologic Studies -   CXR Results  (Last 48 hours)               09/11/18 0806  XR CHEST PORT Final result    Impression:  IMPRESSION: No acute cardiopulmonary disease. Narrative:  INDICATION: Chest pain, palpitations. A. fib. Short of breath. Portable AP upright view of the chest.       Direct comparison made to prior chest x-ray dated August 27, 2017. Cardiomediastinal silhouette is stable. Lungs are clear bilaterally. Pleural   spaces are normal. Osseous structures are intact. Medical Decision Making   I am the first provider for this patient. I reviewed the vital signs, available nursing notes, past medical history, past surgical history, family history and social history. Vital Signs-Reviewed the patient's vital signs.   Patient Vitals for the past 12 hrs:   Temp Pulse Resp BP SpO2   09/12/18 1327 - 70 14 107/76 95 %   09/12/18 1320 - 76 16 107/76 96 %   09/12/18 1304 - 67 22 119/88 92 %   09/12/18 1240 - (!) 118 18 137/90 94 %   09/12/18 1130 - (!) 116 15 (!) 127/97 94 %   09/12/18 1115 - (!) 115 15 123/80 97 %   09/12/18 1100 - (!) 114 14 118/80 97 %   09/12/18 1045 - (!) 115 15 133/83 98 %   09/12/18 1015 - (!) 114 17 116/66 95 %   09/12/18 1000 - (!) 114 15 115/85 96 %   09/12/18 0945 - (!) 114 19 (!) 152/37 92 %   09/12/18 0934 - (!) 115 19 129/88 94 %   09/12/18 0924 97.6 °F (36.4 °C) (!) 115 12 (!) 148/94 95 %       Pulse Oximetry Analysis - 95% on RA    Cardiac Monitor:   Rate: 115 bpm  Rhythm: Sinus Tachycardia      EKG interpretation: (Preliminary)  0913  Rhythm: questionable aflutter with 1st degree AV block; and regular . Rate (approx.): 115; Axis: normal; CA interval: 226; QRS interval: 90; ST/T wave: nonspecific ST and T wave abnormality   Written by LUIS Raymond, as dictated by Alberto Rodriguez M.D. Records Reviewed: Nursing Notes, Old Medical Records, Previous electrocardiograms, Previous Radiology Studies and Previous Laboratory Studies    Provider Notes (Medical Decision Making):   Patient presents with palpitations. DDx: PVCs, afib vs. Aflutter with RVR, sinus tachycardia, SVT, stable VTach, anxiety. Will get labs, EKG, CXR and treat rhythm as indicated. Will obtain cardiology consult if warranted. ED Course:   Initial assessment performed. The patients presenting problems have been discussed, and they are in agreement with the care plan formulated and outlined with them. I have encouraged them to ask questions as they arise throughout their visit. CONSULT NOTE:   10:07 AM  Alberto Rodriguez M.D spoke with Bala Ambrocio MD   Specialty: cardiology  Discussed pt's hx, disposition, and available diagnostic and imaging results. Reviewed care plans. Consultant agrees with plans as outlined. Spoke with Dr. Cesar Guzman in the ED, he states to page the office and ask to have Dr. Ann Guillory or Dr. Carmona Able come see the pt and additionally he will go speak with his colleagues. Written by LUIS Raymond, as dictated by Alberto Rodriguez M.D. PROGRESS NOTE:  10:46 AM  Dr. Ann Guillory at bedside to evaluate pt. Written by LUIS Raymond, as dictated by Alberto Rodriguez M.D.     PROGRESS NOTE:  11:22 AM  Dr. Ann Guillory has evaluated pt and states he is a candidate for Tommy/cardioversion. Written by LUIS Pat, as dictated by Barbara Mcgregor M.D    PROGRESS NOTE:  2:11 PM  Pt has returned from his cardioversion; he did well. Heart rate is now in the 70s normal sinus rhythm. Per Dr. Bishnu Summers, pt was in a 2:1 aflutter. Will ambulate pt to make sure he is safe to be discharged home. Written by LUIS Pat, as dictated by Barbara Mcgregor M.D. Critical Care Time: 0    Disposition:  DISCHARGE NOTE:  2:12 PM  The patient is ready for discharge. The patients signs, symptoms, diagnosis, and instructions for discharge have been discussed and the pt has conveyed their understanding. The patient is to follow up as recommended with cardiology or return to the ER should their symptoms worsen. Plan has been discussed and patient has conveyed their agreement. PLAN:  1. Discharge home   Current Discharge Medication List      START taking these medications    Details   amiodarone (CORDARONE) 200 mg tablet Take 1 Tab by mouth two (2) times a day. Qty: 60 Tab, Refills: 1         CONTINUE these medications which have NOT CHANGED    Details   dilTIAZem ER (CARDIZEM LA) 120 mg tablet Take 120 mg by mouth nightly. salt moisturing solution no.1 (OCEAN ULTRA SALINE MIST NA) 1 [place] by Both Nostrils route nightly. pyridoxine, vitamin B6, (VITAMIN B-6) 100 mg tablet Take 100 mg by mouth daily. diclofenac EC (VOLTAREN) 75 mg EC tablet Take 75 mg by mouth two (2) times a day. furosemide (LASIX) 20 mg tablet Take 1 Tab by mouth daily as needed. Qty: 90 Tab, Refills: 3      cyanocobalamin (VITAMIN B-12) 1,000 mcg tablet Take 1,000 mcg by mouth daily. valACYclovir (VALTREX) 500 mg tablet Take 500 mg by mouth daily as needed ('outbreaks').            Follow-up Information     Follow up With Details Comments [address], [place] Schedule an appointment as soon as possible for a visit  [address] [address] [facility] [address]  [phone]            Diagnosis     Clinical Impression:   1. Atypical atrial flutter ([facility] [address]        Attestations: This note is prepared by Lindsey Avila, acting as Scribe for Ilene Gavin M.D. Ilene Gavin M.D: The scribe's documentation has been prepared under my direction and personally reviewed by me in its entirety. I confirm that the note above accurately reflects all work, treatment, procedures, and medical decision making performed by me.

## 2018-09-12 NOTE — PROCEDURES
[facility]      [address] [address]  [phone]    Indications and Pre-Procedure Diagnosis:  Nicole Osorio is a 70 y.o. male with AF/atypical LAFL is referred for DC cardioversion. Post Procedure Diagnosis  AF  Atypical atrial flutter    Cardioversion Procedure  After informed consent was obtained, the patient was brought back to the lab in fasting condition. The presenting rhythm was atrial fibrillation. AP and PA defib pads were placed in the appropriate regions on the chest wall. Patient was given Versed and Fentanyl for conscious sedation per nursing personnel. Continuous pulse oximetry and cuff BP monitoring was done. Once appropriately sedated, the patient was given one 360 J biphasic synchronized shock that resulted in sinus rhythm. The patient was observed until alert and oriented times 3. Total procedure times was 15 minutes. The following procedure related complication occurred: none. The following problems were encountered: none. Findings and Summary: This study demonstrates:  1. Successful cardioversion to sinus    Recommendations:    1. amiodarone  2. oac      Thank you for allowing me to participate in this patients care.     Gian Anderson MD, Daysi Amor

## 2018-09-12 NOTE — ED NOTES
TRANSFER - IN REPORT:    Verbal report received from [address] (name) on Randy Prophjacqui  being received from Echo (unit) for routine progression of care      Report consisted of patients Situation, Background, Assessment and   Recommendations(SBAR). Information from the following report(s) SBAR, Kardex, Procedure Summary and Accordion was reviewed with the receiving nurse. Opportunity for questions and clarification was provided. Patient will be returning to ED for discharge.

## 2018-09-12 NOTE — ED NOTES
Assumed care of this patient. He is alert and oriented x4. Patient placed on monitor x3. He presents to ED ambulatory with c/o rapid heart rate. Patient was seen yesterday in ED with same c/o and was sent to cardiology office for holter monitor. Patient reports that he is continuing to have rapid heart rate and is experiencing shortness of breath.

## 2018-09-20 ENCOUNTER — OFFICE VISIT (OUTPATIENT)
Dept: CARDIOLOGY CLINIC | Age: 63
End: 2018-09-20

## 2018-09-20 VITALS
BODY MASS INDEX: 34.51 KG/M2 | DIASTOLIC BLOOD PRESSURE: 82 MMHG | OXYGEN SATURATION: 99 % | WEIGHT: 254.8 LBS | HEIGHT: 72 IN | SYSTOLIC BLOOD PRESSURE: 140 MMHG | HEART RATE: 88 BPM | RESPIRATION RATE: 18 BRPM

## 2018-09-20 DIAGNOSIS — I10 ESSENTIAL HYPERTENSION: ICD-10-CM

## 2018-09-20 DIAGNOSIS — I48.3 TYPICAL ATRIAL FLUTTER (HCC): ICD-10-CM

## 2018-09-20 DIAGNOSIS — I49.9 IRREGULAR HEART BEAT: ICD-10-CM

## 2018-09-20 DIAGNOSIS — I48.91 ATRIAL FIBRILLATION, UNSPECIFIED TYPE (HCC): Primary | ICD-10-CM

## 2018-09-20 NOTE — MR AVS SNAPSHOT
[address] [facility] 
[phone] Patient: Gary Albarran MRN: [id number] RDP:[id number] Visit Information Date & Time Provider Department Dept. Phone Encounter #  
 9/20/2018  2:00 PM Cj JeffriesChace KEKE [facility] [phone] [id number] Your Appointments 11/1/2018  1:30 PM  
ESTABLISHED PATIENT with Denisse Richmond NP [facility]: 1-2 week f/u PVI rickey 9/20/18 kb  
 [address]  
[phone] [address] Upcoming Health Maintenance Date Due Hepatitis C Screening 1955 DTaP/Tdap/Td series (1 - Tdap) 6/29/1974 FOBT Q 1 YEAR AGE 50-75 6/29/2003 ZOSTER VACCINE AGE 60> 4/29/2013 GLAUCOMA SCREENING Q2Y 6/29/2018 Pneumococcal 65+ Low/Medium Risk (1 of 2 - PCV13) 6/29/2018 Influenza Age 5 to Adult 8/1/2018 Allergies as of 9/20/2018  Review Complete On: 9/20/2018 By: Denisse Richmond NP Severity Noted Reaction Type Reactions Percocet [Oxycodone-acetaminophen] Medium 10/02/2010   Side Effect Anxiety Patient takes Tylenol without problems. Current Immunizations  Reviewed on 12/31/2015 Name Date Influenza Vaccine Split 2/8/2017 Not reviewed this visit You Were Diagnosed With   
  
 Codes Comments At[facility], unspecified [facility]    -  Primary ICD-10-CM: I48.91 
ICD-9-CM: 427.31 Irregular heart beat     ICD-10-CM: I49.9 ICD-9-CM: 427.9 Typical atrial flutter (HCC)     ICD-10-CM: I48.3 ICD-9-CM: 427.32 Essential hypertension     ICD-10-CM: I10 
ICD-9-CM: 401.9 Vitals BP Pulse Resp Height(growth percentile) Weight(growth percentile) SpO2  
 140/82 (BP 1 Location: Right arm) 88 18 6' (1.829 m) 254 lb 12.8 oz (115.6 kg) 99% BMI Smoking Status 34.56 kg/m2 Former Smoker BMI and BSA Data Body Mass Index Body Surface Area 34.56 kg/m 2 2.42 m 2 Preferred Pharmacy Pharmacy Name Phone [facility], [address] [facility] [address], [address] [address] [phone] Your Updated Medication List  
  
   
This list is accurate as of 9/20/18  2:58 PM.  Always use your most recent med list.  
  
  
  
  
 amiodarone 200 mg tablet Commonly known as:  CORDARONE Take 1 Tab by mouth two (2) times a day. apixaban 5 mg tablet Commonly known as:  [place] Take 1 Tab by mouth two (2) times a day. diclofenac EC 75 mg EC tablet Commonly known as:  VOLTAREN Take 75 mg by mouth two (2) times a day. dilTIAZem  mg tablet Commonly known as:  CARDIZEM LA Take 120 mg by mouth nightly. furosemide 20 mg tablet Commonly known as:  LASIX Take 1 Tab by mouth daily as needed. OCEAN ULTRA SALINE MIST NA  
1 [place] by Both Nostrils route nightly. valACYclovir 500 mg tablet Commonly known as:  VALTREX Take 500 mg by mouth daily as needed ('outbreaks'). VITAMIN B-12 1,000 mcg tablet Generic drug:  cyanocobalamin Take 1,000 mcg by mouth daily. VITAMIN B-6 100 mg tablet Generic drug:  pyridoxine (vitamin B6) Take 100 mg by mouth daily. We Performed the Following AMB POC EKG ROUTINE W/ 12 LEADS, INTER & REP [28204 CPT(R)] CBC W/O DIFF [00756 CPT(R)] MAGNESIUM [id number] CPT(R)] METABOLIC PANEL, COMPREHENSIVE [43016 CPT(R)] PROTHROMBIN TIME + INR [18465 CPT(R)] Introducing [facility]! Dear Florence Arceo: Thank you for requesting a [facility] account. Our records indicate that you already have an active [facility] account. You can access your account anytime at [email or web address] Did you know that you can access your hospital and ER discharge instructions at any time in [facility]?   You can also review all of your test results from your hospital stay or ER visit. Additional Information If you have questions, please visit the Frequently Asked Questions section of the [employer] website at [email or web address] Remember, [employer] is NOT to be used for urgent needs. For medical emergencies, dial 911. Now available from your iPhone and Android! Please provide this summary of care documentation to your next provider. Your primary care clinician is listed as Satinder Copeland. If you have any questions after today's visit, please call [phone].

## 2018-09-20 NOTE — PROGRESS NOTES
1. Have you been to the ER, urgent care clinic since your last visit? Hospitalized since your last visit? No    2. Have you seen or consulted any other health care providers outside of the [address] [place] [address] since your last visit? Include any pap smears or colon screening. No    Chief Complaint   Patient presents with    Irregular Heart Beat     HF a fib. Denied cardiac symptoms.

## 2018-09-20 NOTE — PROGRESS NOTES
Subjective:      Jorge Luis Thomason is a 70 y.o. male is here for follow up s/p cardioversion for atypical atrial flutter. He is doing well. The patient denies chest pain/ shortness of breath, orthopnea, PND, LE edema, palpitations, syncope, presyncope or fatigue. Patient Active Problem List    Diagnosis Date Noted    Typical atrial flutter ([address] 09/12/2018    Severe obesity (BMI 35.0-39. 9) with comorbidity ([address] 03/20/2018    Irregular heart beat 09/07/2017    Non morbid obesity due to excess calories 08/28/2017    Atrial fibrillation ([address] 08/25/2017    Encounter for cardioversion procedure 02/07/2017    Hypertension 02/07/2017    Mixed hyperlipidemia 04/17/2012    Other dyspnea and respiratory abnormality 04/17/2012    Long term (current) use of anticoagulants 03/12/2012    S/P ablation of atrial fibrillation 02/02/2012    Pleural effusion, bilateral 10/05/2010    Sinus bradycardia 10/05/2010    SOB (shortness of breath) 10/02/2010    Paroxysmal atrial fibrillation ([address] 07/27/2010    Dyslipidemia 07/27/2010      Cee Álvarez MD  Past Medical History:   Diagnosis Date    A-fib [facility])     Arthritis     back    Chronic pain     back    Encounter for cardioversion procedure 2/7/2017    GERD (gastroesophageal reflux disease)     High cholesterol     Hypertension 02/07/2017    patient denies hx of HTN    Multiple thyroid nodules     biopsied and benign    PUD (peptic ulcer disease)     \"years ago\"      Past Surgical History:   Procedure Laterality Date    CARDIAC CATHETERIZATION      CARDIAC SURG PROCEDURE UNLIST  2010, 2011    ,ABLATION     CARDIOVERSION EXTERNAL      HX AFIB ABLATION      HX CERVICAL FUSION  2013    HX HEENT  6/6/13    THYROID BIOPSY    HX ORTHOPAEDIC      shoulder surgery on left    HX ORTHOPAEDIC      wrist surgery, LEFT    HX ORTHOPAEDIC      elbow surgery, RIGHT     Allergies   Allergen Reactions    Percocet [Oxycodone-Acetaminophen] Anxiety Patient takes Tylenol without problems. Family History   Problem Relation Age of Onset    Cancer Mother      BREAST    Heart Disease Father     Lung Disease Father     negative for cardiac disease  Social History     Social History    Marital status:      Spouse name: N/A    Number of children: N/A    Years of education: N/A     Social History Main Topics    Smoking status: Former Smoker     Packs/day: 0.25     Years: 40.00     Quit date: 10/1/2012    Smokeless tobacco: Never Used    Alcohol use Yes      Comment: occassionally 3-4 cans of beer    Drug use: No    Sexual activity: Yes     Other Topics Concern    None     Social History Narrative     Current Outpatient Prescriptions   Medication Sig    dilTIAZem ER (CARDIZEM LA) 120 mg tablet Take 120 mg by mouth nightly.  salt moisturing solution no.1 (OCEAN ULTRA SALINE MIST NA) 1 [place] by Both Nostrils route nightly.  amiodarone (CORDARONE) 200 mg tablet Take 1 Tab by mouth two (2) times a day.  apixaban (ELIQUIS) 5 mg tablet Take 1 Tab by mouth two (2) times a day.  valACYclovir (VALTREX) 500 mg tablet Take 500 mg by mouth daily as needed ('outbreaks').  pyridoxine, vitamin B6, (VITAMIN B-6) 100 mg tablet Take 100 mg by mouth daily.  diclofenac EC (VOLTAREN) 75 mg EC tablet Take 75 mg by mouth two (2) times a day.  furosemide (LASIX) 20 mg tablet Take 1 Tab by mouth daily as needed.  cyanocobalamin (VITAMIN B-12) 1,000 mcg tablet Take 1,000 mcg by mouth daily. No current facility-administered medications for this visit. Vitals:    09/20/18 1401   BP: 140/82   Pulse: 88   Resp: 18   SpO2: 99%   Weight: 254 lb 12.8 oz (115.6 kg)   Height: 6' (1.829 m)       I have reviewed the nurses notes, vitals, problem list, allergy list, medical history, family, social history and medications. Review of Symptoms:    General: Pt denies excessive weight gain or loss.  Pt is able to conduct ADL's  HEENT: Denies blurred vision, headaches, epistaxis and difficulty swallowing. Respiratory: Denies shortness of breath, HENRY, wheezing or stridor. Cardiovascular: Denies precordial pain, palpitations, edema or PND  Gastrointestinal: Denies poor appetite, indigestion, abdominal pain or blood in stool  Urinary: Denies dysuria, pyuria  Musculoskeletal: Denies pain or swelling from muscles or joints  Neurologic: Denies tremor, paresthesias, or sensory motor disturbance  Skin: Denies rash, itching or texture change. Psych: Denies depression      Physical Exam:      General: Well developed, in no acute distress. HEENT: Eyes - PERRL, no jvd  Heart:  Normal S1/S2 negative S3 or S4. Regular, no murmur, gallop or rub.   Respiratory: Clear bilaterally x 4, no wheezing or rales  Abdomen:   Soft, non-tender, bowel sounds are active.   Extremities:  No edema, normal cap refill, no cyanosis. Musculoskeletal: No clubbing  Neuro: A&Ox3, speech clear, gait stable. Skin: Skin color is normal. No rashes or lesions. Non diaphoretic  Vascular: 2+ pulses symmetric in all extremities    Cardiographics    Ekg: NSR    Results for orders placed or performed during the hospital encounter of 09/12/18   EKG, 12 LEAD, INITIAL   Result Value Ref Range    Ventricular Rate 70 BPM    Atrial Rate 70 BPM    P-R Interval 160 ms    QRS Duration 92 ms    Q-T Interval 376 ms    QTC Calculation (Bezet) 406 ms    Calculated P Axis 70 degrees    Calculated R Axis 37 degrees    Calculated T Axis 60 degrees    Diagnosis       Normal sinus rhythm  Nonspecific T wave abnormality  When compared with ECG of 12-SEP-2018 09:13,  RI interval has decreased  Vent.  rate has decreased BY  45 BPM  Nonspecific T wave abnormality no longer evident in Inferior leads  Confirmed by Eva Dykes ([id number]) on 9/12/2018 2:44:52 PM     Results for orders placed or performed in visit on 09/11/18   CARDIAC HOLTER MONITOR, 24 HOURS    Narrative    ECG Monitor/24 hours, Complete    Reason for Holter Monitor  A-FLUTTER    Heartbeat    Slowest 74  Average 109  Fastest  131        Results:   Underlying Rhythm: Atrial flutter; rapid ventricular rate      Atrial Arrhythmias: atypical AFL with rvr and variable block            AV Conduction: variable    Ventricular Arrhythmias: premature ventricular contractions; occasional     ST Segment Analysis:normal     Symptom Correlation:  None reported    Comment:   Atypical AFL with RVR and variable block     Keshawn Bajwa MD, [facility], [facility]            Lab Results   Component Value Date/Time    WBC 4.4 09/11/2018 07:57 AM    HGB 16.7 09/11/2018 07:57 AM    HCT 47.5 09/11/2018 07:57 AM    PLATELET 565 62/70/8828 07:57 AM    MCV 91.0 09/11/2018 07:57 AM      Lab Results   Component Value Date/Time    Sodium 139 09/11/2018 07:57 AM    Potassium 4.2 09/11/2018 07:57 AM    Chloride 108 09/11/2018 07:57 AM    CO2 22 09/11/2018 07:57 AM    Anion gap 9 09/11/2018 07:57 AM    Glucose 107 (H) 09/11/2018 07:57 AM    BUN 17 09/11/2018 07:57 AM    Creatinine 0.88 09/11/2018 07:57 AM    BUN/Creatinine ratio 19 09/11/2018 07:57 AM    GFR est AA >60 09/11/2018 07:57 AM    GFR est non-AA >60 09/11/2018 07:57 AM    Calcium 8.4 (L) 09/11/2018 07:57 AM    Bilirubin, total 0.9 09/11/2018 07:57 AM    AST (SGOT) 26 09/11/2018 07:57 AM    Alk. phosphatase 83 09/11/2018 07:57 AM    Protein, total 7.2 09/11/2018 07:57 AM    Albumin 3.7 09/11/2018 07:57 AM    Globulin 3.5 09/11/2018 07:57 AM    A-G Ratio 1.1 09/11/2018 07:57 AM    ALT (SGPT) 36 09/11/2018 07:57 AM         Assessment:     Assessment:        ICD-10-CM ICD-9-CM    1. Atrial fibrillation, unspecified type ([facility] I48.91 427.31 MAGNESIUM      METABOLIC PANEL, COMPREHENSIVE      PROTHROMBIN TIME + INR      CBC W/O DIFF      CANCELED: XR CHEST PA LAT      CANCELED: CTA CHEST W OR W WO CONT   2. Irregular heart beat I49.9 427.9 AMB POC EKG ROUTINE W/ 12 LEADS, INTER & REP   3. Typical atrial flutter (HCC) I48.3 427.32    4.  Essential hypertension I10 401.9      Orders Placed This Encounter    MAGNESIUM    METABOLIC PANEL, COMPREHENSIVE    PROTHROMBIN TIME + INR    CBC W/O DIFF    AMB POC EKG ROUTINE W/ 12 LEADS, INTER & REP     Order Specific Question:   Reason for Exam:     Answer:   routine        Plan:   Mr. Wes Angulo is here for follow up s/p cardioversion for atypical atrial flutter. He is s/p hybrid ablation 2017 with hx of RFA years prior. His amiodarone was restarted as well as his Eliquis. He has discussed repeat ablation with Dr. Amie Bustillos. He is a candidate for an afib ablation. I discussed the risks/benefits/alternatives of the procedure with the patient. Risks include (but are not limited to) bleeding, infection, cva/mi/tamponade/esophageal perforation/pv stenosis/death. The patient understands that there is a 4-5% major complication rate and agrees to proceed. Thank you for this interesting consultation. We will schedule. Continue medical management for HTN. Thank you for allowing me to participate in Kate Charles 's care.     Dennis Egan NP

## 2018-10-11 ENCOUNTER — TELEPHONE (OUTPATIENT)
Dept: CARDIOLOGY CLINIC | Age: 63
End: 2018-10-11

## 2018-10-11 LAB
ALBUMIN SERPL-MCNC: 4.3 G/DL (ref 3.6–4.8)
ALBUMIN/GLOB SERPL: 1.9 {RATIO} (ref 1.2–2.2)
ALP SERPL-CCNC: 84 IU/L (ref 39–117)
ALT SERPL-CCNC: 24 IU/L (ref 0–44)
AST SERPL-CCNC: 23 IU/L (ref 0–40)
BILIRUB SERPL-MCNC: 0.8 MG/DL (ref 0–1.2)
BUN SERPL-MCNC: 19 MG/DL (ref 8–27)
BUN/CREAT SERPL: 20 (ref 10–24)
CALCIUM SERPL-MCNC: 8.7 MG/DL (ref 8.6–10.2)
CHLORIDE SERPL-SCNC: 105 MMOL/L (ref 96–106)
CO2 SERPL-SCNC: 20 MMOL/L (ref 20–29)
CREAT SERPL-MCNC: 0.96 MG/DL (ref 0.76–1.27)
ERYTHROCYTE [DISTWIDTH] IN BLOOD BY AUTOMATED COUNT: 13.4 % (ref 12.3–15.4)
GLOBULIN SER CALC-MCNC: 2.3 G/DL (ref 1.5–4.5)
GLUCOSE SERPL-MCNC: 126 MG/DL (ref 65–99)
HCT VFR BLD AUTO: 46.6 % (ref 37.5–51)
HGB BLD-MCNC: 16 G/DL (ref 13–17.7)
INR PPP: 1.1 (ref 0.8–1.2)
MAGNESIUM SERPL-MCNC: 1.9 MG/DL (ref 1.6–2.3)
MCH RBC QN AUTO: 31.9 PG (ref 26.6–33)
MCHC RBC AUTO-ENTMCNC: 34.3 G/DL (ref 31.5–35.7)
MCV RBC AUTO: 93 FL (ref 79–97)
PLATELET # BLD AUTO: 162 X10E3/UL (ref 150–379)
POTASSIUM SERPL-SCNC: 4.4 MMOL/L (ref 3.5–5.2)
PROT SERPL-MCNC: 6.6 G/DL (ref 6–8.5)
PROTHROMBIN TIME: 11 SEC (ref 9.1–12)
RBC # BLD AUTO: 5.01 X10E6/UL (ref 4.14–5.8)
SODIUM SERPL-SCNC: 140 MMOL/L (ref 134–144)
WBC # BLD AUTO: 4.5 X10E3/UL (ref 3.4–10.8)

## 2018-10-12 ENCOUNTER — TELEPHONE (OUTPATIENT)
Dept: CARDIOLOGY CLINIC | Age: 63
End: 2018-10-12

## 2018-10-12 NOTE — TELEPHONE ENCOUNTER
Spoke with patient   Verified patient with 2 patient identifiers      Patient informed that FMLA forms are near completion pending ablation, scheduled for 10/19/18. Patient is requesting that any information that has been completed on forms be faxed to [facility] before his procedure.

## 2018-10-15 ENCOUNTER — DOCUMENTATION ONLY (OUTPATIENT)
Dept: CARDIOLOGY CLINIC | Age: 63
End: 2018-10-15

## 2018-10-15 NOTE — PROGRESS NOTES
FMLA forms was faxed to [facility], at [phone] per. patient pending Ablation, scheduled for 10/19/18. See forms in .

## 2018-10-19 ENCOUNTER — ANESTHESIA EVENT (OUTPATIENT)
Dept: NON INVASIVE DIAGNOSTICS | Age: 63
End: 2018-10-19
Payer: MEDICARE

## 2018-10-19 ENCOUNTER — HOSPITAL ENCOUNTER (OUTPATIENT)
Dept: NON INVASIVE DIAGNOSTICS | Age: 63
Setting detail: OBSERVATION
Discharge: HOME | End: 2018-10-20
Attending: INTERNAL MEDICINE | Admitting: INTERNAL MEDICINE
Payer: MEDICARE

## 2018-10-19 ENCOUNTER — ANESTHESIA (OUTPATIENT)
Dept: NON INVASIVE DIAGNOSTICS | Age: 63
End: 2018-10-19
Payer: MEDICARE

## 2018-10-19 PROBLEM — I48.91 ATRIAL FIBRILLATION AND FLUTTER (HCC): Status: ACTIVE | Noted: 2018-10-19

## 2018-10-19 PROBLEM — I48.92 ATRIAL FIBRILLATION AND FLUTTER (HCC): Status: ACTIVE | Noted: 2018-10-19

## 2018-10-19 LAB
ACT BLD: 120 SECS (ref 79–138)
ACT BLD: 268 SECS (ref 79–138)
ACT BLD: 351 SECS (ref 79–138)

## 2018-10-19 PROCEDURE — 77030029065 HC DRSG HEMO QCLOT ZMED -B

## 2018-10-19 PROCEDURE — 77030035291 HC TBNG PMP SMARTABLATE J&J -B

## 2018-10-19 PROCEDURE — 74011250636 HC RX REV CODE- 250/636: Performed by: INTERNAL MEDICINE

## 2018-10-19 PROCEDURE — 77030026438 HC STYL ET INTUB CARD -A: Performed by: ANESTHESIOLOGY

## 2018-10-19 PROCEDURE — C1893 INTRO/SHEATH, FIXED,NON-PEEL: HCPCS

## 2018-10-19 PROCEDURE — 77030015398 HC CBL EP EXT STJU -C

## 2018-10-19 PROCEDURE — 76060000035 HC ANESTHESIA 2 TO 2.5 HR

## 2018-10-19 PROCEDURE — C1732 CATH, EP, DIAG/ABL, 3D/VECT: HCPCS

## 2018-10-19 PROCEDURE — 77030029359 HC PRB ESOPH TEMP CATH ANTM -F

## 2018-10-19 PROCEDURE — C1730 CATH, EP, 19 OR FEW ELECT: HCPCS

## 2018-10-19 PROCEDURE — 76210000006 HC OR PH I REC 0.5 TO 1 HR

## 2018-10-19 PROCEDURE — C1894 INTRO/SHEATH, NON-LASER: HCPCS

## 2018-10-19 PROCEDURE — 77030034850

## 2018-10-19 PROCEDURE — 74011000250 HC RX REV CODE- 250

## 2018-10-19 PROCEDURE — 99218 HC RM OBSERVATION: CPT

## 2018-10-19 PROCEDURE — 77030013079 HC BLNKT BAIR HGGR 3M -A: Performed by: ANESTHESIOLOGY

## 2018-10-19 PROCEDURE — C1759 CATH, INTRA ECHOCARDIOGRAPHY: HCPCS

## 2018-10-19 PROCEDURE — 74011250636 HC RX REV CODE- 250/636

## 2018-10-19 PROCEDURE — 77030021678 HC GLIDESCP STAT DISP VERT -B: Performed by: ANESTHESIOLOGY

## 2018-10-19 PROCEDURE — 77030018729 HC ELECTRD DEFIB PAD CARD -B

## 2018-10-19 PROCEDURE — 74011250637 HC RX REV CODE- 250/637: Performed by: INTERNAL MEDICINE

## 2018-10-19 PROCEDURE — 93655 ICAR CATH ABLTJ DSCRT ARRHYT: CPT

## 2018-10-19 PROCEDURE — 93662 INTRACARDIAC ECG (ICE): CPT | Performed by: ANESTHESIOLOGY

## 2018-10-19 PROCEDURE — 77030020506 HC NDL TRNSPTL NRG BAYL -F

## 2018-10-19 PROCEDURE — 77030010880 HC CBL EP SUPRME STJU -C

## 2018-10-19 PROCEDURE — 77030027107 HC PTCH EXT REF CARTO3 J&J -F

## 2018-10-19 PROCEDURE — 93325 DOPPLER ECHO COLOR FLOW MAPG: CPT

## 2018-10-19 PROCEDURE — 77030008684 HC TU ET CUF COVD -B: Performed by: ANESTHESIOLOGY

## 2018-10-19 PROCEDURE — C1766 INTRO/SHEATH,STRBLE,NON-PEEL: HCPCS

## 2018-10-19 PROCEDURE — 77030013797 HC KT TRNSDUC PRSSR EDWD -A

## 2018-10-19 PROCEDURE — 85347 COAGULATION TIME ACTIVATED: CPT

## 2018-10-19 RX ORDER — ROCURONIUM BROMIDE 10 MG/ML
INJECTION, SOLUTION INTRAVENOUS AS NEEDED
Status: DISCONTINUED | OUTPATIENT
Start: 2018-10-19 | End: 2018-10-19 | Stop reason: HOSPADM

## 2018-10-19 RX ORDER — AMIODARONE HYDROCHLORIDE 200 MG/1
200 TABLET ORAL DAILY
Qty: 60 TAB | Refills: 1 | Status: SHIPPED | OUTPATIENT
Start: 2018-10-19 | End: 2019-02-04 | Stop reason: SDUPTHER

## 2018-10-19 RX ORDER — DOBUTAMINE HYDROCHLORIDE 200 MG/100ML
INJECTION INTRAVENOUS
Status: DISPENSED
Start: 2018-10-19 | End: 2018-10-20

## 2018-10-19 RX ORDER — HEPARIN SODIUM 200 [USP'U]/100ML
INJECTION, SOLUTION INTRAVENOUS
Status: COMPLETED
Start: 2018-10-19 | End: 2018-10-19

## 2018-10-19 RX ORDER — GLYCOPYRROLATE 0.2 MG/ML
INJECTION INTRAMUSCULAR; INTRAVENOUS AS NEEDED
Status: DISCONTINUED | OUTPATIENT
Start: 2018-10-19 | End: 2018-10-19 | Stop reason: HOSPADM

## 2018-10-19 RX ORDER — DIPHENHYDRAMINE HYDROCHLORIDE 50 MG/ML
12.5 INJECTION, SOLUTION INTRAMUSCULAR; INTRAVENOUS AS NEEDED
Status: CANCELLED | OUTPATIENT
Start: 2018-10-19 | End: 2018-10-19

## 2018-10-19 RX ORDER — SODIUM CHLORIDE 0.9 % (FLUSH) 0.9 %
5-10 SYRINGE (ML) INJECTION EVERY 8 HOURS
Status: DISCONTINUED | OUTPATIENT
Start: 2018-10-19 | End: 2018-10-20 | Stop reason: HOSPADM

## 2018-10-19 RX ORDER — SODIUM CHLORIDE 0.9 % (FLUSH) 0.9 %
5-10 SYRINGE (ML) INJECTION AS NEEDED
Status: CANCELLED | OUTPATIENT
Start: 2018-10-19

## 2018-10-19 RX ORDER — PROPOFOL 10 MG/ML
INJECTION, EMULSION INTRAVENOUS AS NEEDED
Status: DISCONTINUED | OUTPATIENT
Start: 2018-10-19 | End: 2018-10-19 | Stop reason: HOSPADM

## 2018-10-19 RX ORDER — MIDAZOLAM HYDROCHLORIDE 1 MG/ML
INJECTION, SOLUTION INTRAMUSCULAR; INTRAVENOUS AS NEEDED
Status: DISCONTINUED | OUTPATIENT
Start: 2018-10-19 | End: 2018-10-19 | Stop reason: HOSPADM

## 2018-10-19 RX ORDER — HEPARIN SODIUM 200 [USP'U]/100ML
1500 INJECTION, SOLUTION INTRAVENOUS ONCE
Status: COMPLETED | OUTPATIENT
Start: 2018-10-19 | End: 2018-10-19

## 2018-10-19 RX ORDER — ONDANSETRON 2 MG/ML
INJECTION INTRAMUSCULAR; INTRAVENOUS AS NEEDED
Status: DISCONTINUED | OUTPATIENT
Start: 2018-10-19 | End: 2018-10-19 | Stop reason: HOSPADM

## 2018-10-19 RX ORDER — SODIUM CHLORIDE 0.9 % (FLUSH) 0.9 %
5-10 SYRINGE (ML) INJECTION AS NEEDED
Status: DISCONTINUED | OUTPATIENT
Start: 2018-10-19 | End: 2018-10-20 | Stop reason: HOSPADM

## 2018-10-19 RX ORDER — EPHEDRINE SULFATE 50 MG/ML
INJECTION, SOLUTION INTRAVENOUS AS NEEDED
Status: DISCONTINUED | OUTPATIENT
Start: 2018-10-19 | End: 2018-10-19 | Stop reason: HOSPADM

## 2018-10-19 RX ORDER — DILTIAZEM HYDROCHLORIDE 120 MG/1
120 CAPSULE, COATED, EXTENDED RELEASE ORAL DAILY
Status: DISCONTINUED | OUTPATIENT
Start: 2018-10-20 | End: 2018-10-20 | Stop reason: HOSPADM

## 2018-10-19 RX ORDER — DEXAMETHASONE SODIUM PHOSPHATE 4 MG/ML
INJECTION, SOLUTION INTRA-ARTICULAR; INTRALESIONAL; INTRAMUSCULAR; INTRAVENOUS; SOFT TISSUE AS NEEDED
Status: DISCONTINUED | OUTPATIENT
Start: 2018-10-19 | End: 2018-10-19 | Stop reason: HOSPADM

## 2018-10-19 RX ORDER — HEPARIN SODIUM 1000 [USP'U]/ML
30000 INJECTION, SOLUTION INTRAVENOUS; SUBCUTANEOUS ONCE
Status: DISCONTINUED | OUTPATIENT
Start: 2018-10-19 | End: 2018-10-19 | Stop reason: HOSPADM

## 2018-10-19 RX ORDER — SODIUM CHLORIDE 9 MG/ML
INJECTION, SOLUTION INTRAVENOUS
Status: DISCONTINUED | OUTPATIENT
Start: 2018-10-19 | End: 2018-10-19 | Stop reason: HOSPADM

## 2018-10-19 RX ORDER — SUCCINYLCHOLINE CHLORIDE 20 MG/ML
INJECTION INTRAMUSCULAR; INTRAVENOUS AS NEEDED
Status: DISCONTINUED | OUTPATIENT
Start: 2018-10-19 | End: 2018-10-19 | Stop reason: HOSPADM

## 2018-10-19 RX ORDER — EPHEDRINE SULFATE 50 MG/ML
INJECTION, SOLUTION INTRAVENOUS
Status: COMPLETED
Start: 2018-10-19 | End: 2018-10-19

## 2018-10-19 RX ORDER — FENTANYL CITRATE 50 UG/ML
INJECTION, SOLUTION INTRAMUSCULAR; INTRAVENOUS AS NEEDED
Status: DISCONTINUED | OUTPATIENT
Start: 2018-10-19 | End: 2018-10-19 | Stop reason: HOSPADM

## 2018-10-19 RX ORDER — PROTAMINE SULFATE 10 MG/ML
25-100 INJECTION, SOLUTION INTRAVENOUS
Status: DISCONTINUED | OUTPATIENT
Start: 2018-10-19 | End: 2018-10-19

## 2018-10-19 RX ORDER — SODIUM CHLORIDE 0.9 % (FLUSH) 0.9 %
5-10 SYRINGE (ML) INJECTION EVERY 8 HOURS
Status: CANCELLED | OUTPATIENT
Start: 2018-10-19

## 2018-10-19 RX ORDER — HEPARIN SODIUM 1000 [USP'U]/ML
INJECTION, SOLUTION INTRAVENOUS; SUBCUTANEOUS AS NEEDED
Status: DISCONTINUED | OUTPATIENT
Start: 2018-10-19 | End: 2018-10-19 | Stop reason: HOSPADM

## 2018-10-19 RX ORDER — SODIUM CHLORIDE, SODIUM LACTATE, POTASSIUM CHLORIDE, CALCIUM CHLORIDE 600; 310; 30; 20 MG/100ML; MG/100ML; MG/100ML; MG/100ML
25 INJECTION, SOLUTION INTRAVENOUS CONTINUOUS
Status: CANCELLED | OUTPATIENT
Start: 2018-10-19

## 2018-10-19 RX ORDER — IBUPROFEN 600 MG/1
600 TABLET ORAL ONCE
Status: COMPLETED | OUTPATIENT
Start: 2018-10-19 | End: 2018-10-19

## 2018-10-19 RX ORDER — MIDAZOLAM HYDROCHLORIDE 1 MG/ML
1-5 INJECTION, SOLUTION INTRAMUSCULAR; INTRAVENOUS
Status: DISCONTINUED | OUTPATIENT
Start: 2018-10-19 | End: 2018-10-19

## 2018-10-19 RX ORDER — SODIUM CHLORIDE, SODIUM LACTATE, POTASSIUM CHLORIDE, CALCIUM CHLORIDE 600; 310; 30; 20 MG/100ML; MG/100ML; MG/100ML; MG/100ML
25 INJECTION, SOLUTION INTRAVENOUS CONTINUOUS
Status: CANCELLED | OUTPATIENT
Start: 2018-10-19 | End: 2018-10-20

## 2018-10-19 RX ORDER — NEOSTIGMINE METHYLSULFATE 1 MG/ML
INJECTION INTRAVENOUS AS NEEDED
Status: DISCONTINUED | OUTPATIENT
Start: 2018-10-19 | End: 2018-10-19 | Stop reason: HOSPADM

## 2018-10-19 RX ORDER — ONDANSETRON 2 MG/ML
4 INJECTION INTRAMUSCULAR; INTRAVENOUS AS NEEDED
Status: CANCELLED | OUTPATIENT
Start: 2018-10-19

## 2018-10-19 RX ORDER — DOBUTAMINE HYDROCHLORIDE 200 MG/100ML
0-10 INJECTION INTRAVENOUS
Status: DISCONTINUED | OUTPATIENT
Start: 2018-10-19 | End: 2018-10-20 | Stop reason: HOSPADM

## 2018-10-19 RX ORDER — MORPHINE SULFATE 10 MG/ML
2 INJECTION, SOLUTION INTRAMUSCULAR; INTRAVENOUS
Status: CANCELLED | OUTPATIENT
Start: 2018-10-19

## 2018-10-19 RX ORDER — DICLOFENAC SODIUM 75 MG/1
75 TABLET, DELAYED RELEASE ORAL 2 TIMES DAILY
Status: DISCONTINUED | OUTPATIENT
Start: 2018-10-19 | End: 2018-10-20 | Stop reason: HOSPADM

## 2018-10-19 RX ORDER — AMIODARONE HYDROCHLORIDE 200 MG/1
200 TABLET ORAL 2 TIMES DAILY
Status: DISCONTINUED | OUTPATIENT
Start: 2018-10-19 | End: 2018-10-20 | Stop reason: HOSPADM

## 2018-10-19 RX ORDER — FENTANYL CITRATE 50 UG/ML
12.5-5 INJECTION, SOLUTION INTRAMUSCULAR; INTRAVENOUS
Status: DISCONTINUED | OUTPATIENT
Start: 2018-10-19 | End: 2018-10-20 | Stop reason: HOSPADM

## 2018-10-19 RX ORDER — HYDROMORPHONE HYDROCHLORIDE 1 MG/ML
.2-.5 INJECTION, SOLUTION INTRAMUSCULAR; INTRAVENOUS; SUBCUTANEOUS
Status: CANCELLED | OUTPATIENT
Start: 2018-10-19

## 2018-10-19 RX ORDER — FENTANYL CITRATE 50 UG/ML
25 INJECTION, SOLUTION INTRAMUSCULAR; INTRAVENOUS
Status: CANCELLED | OUTPATIENT
Start: 2018-10-19

## 2018-10-19 RX ADMIN — SUCCINYLCHOLINE CHLORIDE 160 MG: 20 INJECTION INTRAMUSCULAR; INTRAVENOUS at 12:53

## 2018-10-19 RX ADMIN — ROCURONIUM BROMIDE 10 MG: 10 INJECTION, SOLUTION INTRAVENOUS at 13:25

## 2018-10-19 RX ADMIN — EPHEDRINE SULFATE 5 MG: 50 INJECTION, SOLUTION INTRAVENOUS at 13:25

## 2018-10-19 RX ADMIN — NEOSTIGMINE METHYLSULFATE 3 MG: 1 INJECTION INTRAVENOUS at 14:36

## 2018-10-19 RX ADMIN — MIDAZOLAM HYDROCHLORIDE 1 MG: 1 INJECTION, SOLUTION INTRAMUSCULAR; INTRAVENOUS at 12:52

## 2018-10-19 RX ADMIN — AMIODARONE HYDROCHLORIDE 200 MG: 200 TABLET ORAL at 18:10

## 2018-10-19 RX ADMIN — IBUPROFEN 600 MG: 600 TABLET ORAL at 22:15

## 2018-10-19 RX ADMIN — APIXABAN 5 MG: 5 TABLET, FILM COATED ORAL at 18:10

## 2018-10-19 RX ADMIN — GLYCOPYRROLATE 0.2 MG: 0.2 INJECTION INTRAMUSCULAR; INTRAVENOUS at 13:17

## 2018-10-19 RX ADMIN — SALINE NASAL SPRAY 1 SPRAY: 1.5 SOLUTION NASAL at 20:55

## 2018-10-19 RX ADMIN — MIDAZOLAM HYDROCHLORIDE 2 MG: 1 INJECTION, SOLUTION INTRAMUSCULAR; INTRAVENOUS at 12:44

## 2018-10-19 RX ADMIN — ROCURONIUM BROMIDE 5 MG: 10 INJECTION, SOLUTION INTRAVENOUS at 12:53

## 2018-10-19 RX ADMIN — HEPARIN SODIUM 7000 UNITS: 1000 INJECTION, SOLUTION INTRAVENOUS; SUBCUTANEOUS at 13:50

## 2018-10-19 RX ADMIN — Medication 10 ML: at 19:03

## 2018-10-19 RX ADMIN — HEPARIN SODIUM 3000 UNITS: 200 INJECTION, SOLUTION INTRAVENOUS at 13:22

## 2018-10-19 RX ADMIN — SODIUM CHLORIDE: 9 INJECTION, SOLUTION INTRAVENOUS at 12:43

## 2018-10-19 RX ADMIN — PROTAMINE SULFATE 100 MG: 10 INJECTION, SOLUTION INTRAVENOUS at 14:33

## 2018-10-19 RX ADMIN — DEXAMETHASONE SODIUM PHOSPHATE 4 MG: 4 INJECTION, SOLUTION INTRA-ARTICULAR; INTRALESIONAL; INTRAMUSCULAR; INTRAVENOUS; SOFT TISSUE at 14:22

## 2018-10-19 RX ADMIN — MIDAZOLAM HYDROCHLORIDE 2 MG: 1 INJECTION, SOLUTION INTRAMUSCULAR; INTRAVENOUS at 12:51

## 2018-10-19 RX ADMIN — GLYCOPYRROLATE 0.2 MG: 0.2 INJECTION INTRAMUSCULAR; INTRAVENOUS at 14:36

## 2018-10-19 RX ADMIN — ROCURONIUM BROMIDE 45 MG: 10 INJECTION, SOLUTION INTRAVENOUS at 12:58

## 2018-10-19 RX ADMIN — FENTANYL CITRATE 50 MCG: 50 INJECTION, SOLUTION INTRAMUSCULAR; INTRAVENOUS at 13:32

## 2018-10-19 RX ADMIN — DICLOFENAC SODIUM 75 MG: 75 TABLET, DELAYED RELEASE ORAL at 18:52

## 2018-10-19 RX ADMIN — Medication 10 ML: at 21:01

## 2018-10-19 RX ADMIN — ONDANSETRON 4 MG: 2 INJECTION INTRAMUSCULAR; INTRAVENOUS at 14:22

## 2018-10-19 RX ADMIN — DOBUTAMINE HYDROCHLORIDE 20 MCG/KG/MIN: 200 INJECTION INTRAVENOUS at 14:18

## 2018-10-19 RX ADMIN — HEPARIN SODIUM 12000 UNITS: 1000 INJECTION, SOLUTION INTRAVENOUS; SUBCUTANEOUS at 13:31

## 2018-10-19 RX ADMIN — PROPOFOL 200 MG: 10 INJECTION, EMULSION INTRAVENOUS at 12:53

## 2018-10-19 RX ADMIN — ROCURONIUM BROMIDE 10 MG: 10 INJECTION, SOLUTION INTRAVENOUS at 13:11

## 2018-10-19 RX ADMIN — GLYCOPYRROLATE 0.2 MG: 0.2 INJECTION INTRAMUSCULAR; INTRAVENOUS at 13:20

## 2018-10-19 NOTE — PERIOP NOTES
Contacted EP lab and spoke with Yen Fierro regarding leaking de guzman, she will notify Dr Samson Mckeon via text and contact me if not okay with removing cath.

## 2018-10-19 NOTE — DISCHARGE INSTRUCTIONS
[id number] [phone]        ATRIAL FIBRILLATION ABLATION DISCHARGE INSTRUCTIONS    Patient ID:  Elbert De Anda  [id number]  69 y.o.  1955    Admit Date: 10/19/2018    Discharge Date: 10/19/2018     Admitting Physician: Surya De La Cruz MD     Discharge Physician: Surya De La Cruz MD    Admission Diagnoses:   a fib    Discharge Diagnoses: Active Problems:    * No active hospital problems. *      Discharge Condition: Good    Cardiology Procedures this Admission:  Atrial fibrillation. Disposition: home    Reference discharge instructions provided by nursing for diet and activity. Follow-up with Dr Connie Garcia or his Nurse Practitioners in 1-2 weeks. Call [phone] to make an appointment. Signed:  ROSALINDA Witt  10/19/2018  2:37 PM    S/P ATRIAL FIBRILLATION ABLATION DISCHARGE INSTRUCTIONS    It is normal to feel tired the first couple days. Take it easy and follow the physicians instructions. CHECK THE CATHETER INSERTION SITE DAILY:  You may shower 24 hours after the procedure, remove the bandage during showering. Wash with soap and water and pat dry. Gentle cleaning of the site with soap and water is sufficient, cover with a dry clean dressing or bandage. Do not apply creams or powders to the area. Do not sit in a bathtub or pool of water for 7 days or until wound has completely healed. Temporary bruising and discomfort is normal and may last a few weeks. You may have a  formation of a small lump at the site which may last up to 6 weeks. CALL THE PHYSICIAN:  If the site becomes red, swollen or feels warm to the touch  If there is bleeding or drainage or if there is unusual pain at the groin or down the leg. If there is any bleeding, lie down, apply pressure or have someone apply pressure with a clean cloth until the bleeding stops.   If the bleeding continues, call [phone] to be transported to the hospital.  DO NOT [address] ANYONE ELSE DRIVE YOU - CALL [phone]. Activity:      For the first 24-48 hours or as instructed by the physician:  No lifting, pushing or pulling over 5 pounds and no straining the insertion site. Do not life grocery bags or the garbage can, do not run the vacuum  or  for 7 days. Start with short walks as in the hospital and gradually increase as tolerated each day. It is recommended to walk 30 minutes 5-7 days per week. Follow your physicians instructions on activity. Avoid walking outside in extremes of heat or cold. Walk inside when it is cold and windy or hot and humid. Things to keep in mind:  No driving for at least 5 days or as designated by your physician. Limit the number of times you go up and down the stairs  Take rests and pace yourself with activity. Be careful and do not strain with bowel movements. Medications: Take all medications as prescribed  Call your physician if you have any questions  Keep an updated list of your medications with you at all times and give a list to your physician and pharmacist    Signs and Symptoms:  Be cautious of symptoms of angina or recurrent symptoms such as chest discomfort, unusual shortness of breath or fatigue, palpitations. After Care: Follow up with your physician as instructed. Follow a heart healthy diet with proper portion control, daily stress management, daily exercise, blood pressure and cholesterol control , and smoking cessation. AFTER YOU TRANSESOPHAGEAL ECHOCARDIOGRAM    Do not eat or drink for at least two hours after your procedure. Your throat will be numb and there is a risk you might have difficulty swallowing for a while. Be careful when you do eat or drink for the first time especially with hot fluids since you could easily burn your throat. Call your doctor if:    · You are bleeding from your throat or mouth. · You have trouble breathing all of a sudden.   · You have chest pain or any pain that spreads to your neck, jaw, or arms. · You have questions or concerns.   · You have a fever greater than 101°F.

## 2018-10-19 NOTE — ANESTHESIA PREPROCEDURE EVALUATION
Anesthetic History   No history of anesthetic complications            Review of Systems / Medical History  Patient summary reviewed, nursing notes reviewed and pertinent labs reviewed    Pulmonary  Within defined limits                 Neuro/Psych   Within defined limits           Cardiovascular    Hypertension        Dysrhythmias : atrial fibrillation      Exercise tolerance: >4 METS     GI/Hepatic/Renal     GERD      PUD     Endo/Other      Hypothyroidism  Obesity and arthritis     Other Findings              Physical Exam    Airway  Mallampati: III  TM Distance: 4 - 6 cm  Neck ROM: decreased range of motion   Mouth opening: Diminished (comment)     Cardiovascular    Rhythm: regular  Rate: normal         Dental    Dentition: Lower dentition intact and Upper dentition intact     Pulmonary  Breath sounds clear to auscultation               Abdominal  GI exam deferred       Other Findings            Anesthetic Plan    ASA: 3  Anesthesia type: general          Induction: Intravenous  Anesthetic plan and risks discussed with: Patient and Family      glidescope used previously

## 2018-10-19 NOTE — PROCEDURES
[address] [place] [address]  [phone]    Indications and Pre-Procedure Diagnosis:  Roldan Sebastian is a 70 y.o. male with AF and AFL is referred for electr-physiologic evaluation and intervention. Post Procedure Diagnosis    AF  Atrial flutter    Atrial Fibrillation Ablation Summary  Informed consent was obtained. The patient was brought to the EP lab where SVETA demonstrated no thrombus in the left atrial appendage. All vascular access sites were prepped and draped in the usual sterile fashion and the Seldinger technique was used to catherize the RFV and LFV with multi-polar electrode catheters, which were placed in the appropriate intra-cardiac sites under fluoroscopic guidance (see catheter list). Right and left atrial pacing and recording, His bundle recording, and right ventricular pacing and recording were performed. Continuous pulse oximetry and cuff BP monitoring were performed. During the procedure, the patient received Versed, Fentanyl and Propofol for sedation per anesthesia personnel. Transeptal Puncture  A transeptal atrial puncture was performed using fluoroscopic and intracardiac ultrasound guidance. An SL1 sheath was dragged from the SVC along the septum until a sudden leftward displacement was observed. Engagement of the Fossa Ovalis was confirmed by the interatrial tenting seen under intracardiac ultrasound guidance. The Jennifer Alexis NRG needle was advanced into the left atrium and its positioned confirmed with contrast injection. The dilator and sheath were advanced carefully over the needle into the body of the left atrium and the dilator/needle removed. An Agilis sheath was exchanged over the wire for the SL1 sheath.  No pericardial effusion was appreciated on intracardiac ultrasound so a bolus injection of heparin 100 units/kg was administered, with additional boluses q 30 minutes as necessary so that the activated clotting time maintained was between 300 and 400 seconds. Atrial Flutter Ablation Procedure  Mapping was performed using standard catheter-based techniques and 3-D electro-anatomic mapping. Atrial pacing induced counter clockwise atrial flutter at a cycle length of 255 msec. Pacing from the posterior septum showed entrainment with concealed fusion and post pacing intervals within 30 msec of the flutter cycle length. A large curve Smartouch SF 3.5 mm tip catheter was used to deliver RF lesions in the cavo-tricuspid isthmus with termination of the arrhythmia and creation of bi-directional isthmus block. Atrial Fibrillation Ablation  A 3D shell representing the left atrium and pulmonary veins was constructed with the electroanatomic mapping system. A Lasso mapping catheter was advanced into the left atrium through the Agilis sheath and a map of the body of the LA and the pulmonary veins was created. Pulmonary vein isolation was confirmed with exit and entrance block. Voltage mapping of the left atrium demonstrated increased voltage in the mitral isthmus and anterior LA. The RF catheter was then advanced into the left atrium. Additional Focus  The RF catheter was used to perform an anterior RF line from the RSPV to the mitral isthmus. Additional Focus  The RF catheter was used to perform a RF line from the LIPV down to the MV to create a mitral isthmus line. Autonomic manipulation with Dobutamine @ 20 mcg/min was performed during this procedure. Post ablation, rapid atrial pacing to 240 msec, on dobutamine, demonstrated atypical atrial flutter with earliest activation from the mid CS. Additional Focus  The RF catheter was pulled back to the right atrium and advanced to the distal CS and used to perform a RF line along the roof of the CS from the distal CS back to the CS OS. Radiofrequency energy was applied with a target power of 25-35W. Local sites were targeted until the local electrogram amplitude decreased to <0.15mv or by >50%. Autonomic manipulation with Dobutamine @ 20 mcg/min was performed during this procedure. Post ablation, rapid atrial pacing to 240 msec, on and off dobutamine, failed to induce any atrial arrhythmias. At the end of the procedure, all catheters were removed, heparin reversed, groin sheaths pulled and hemostasis achieved. The patient left the laboratory in a stable condition. Fluoroscopy and procedure times were 18 and 70 minutes respectively. Estimated blood loss: <10 ml. Sharp counts: correct. Specimen (s) collected: none. The procedure related complication occurred: none. The following problems were encountered: none. Conduction intervals (ms)    A-A A-H H-V P-R QRS Q-T R-R V-V  1036 65 42 184  1046    AV akanksha conduction    VA Block when pacing at 490 ms    Findings and Summary    This study demonstrates:  1. PVI of all 5 PVs was confirmed  2. Counter clockwise isthmus dependent atrial flutter induced with atrial pacing and RFA of the CTI with termination to sinus and confirmed bi-directional isthmus block  3. Additional RF focus of mitral isthmus line  4. Additional RF focus of anterior line from the RSPV to the mitral isthmus  5. Atypical atrial flutter induced with entrainment from the distal CS with RFA of the CS roof line  6. No further inducible atrial arrhythmias on dobutamine with rapid atrial pacing    Recommendation:  1. [facility]  2. Amiodarone 200 mg daily    Thank you for allowing me to participate in this patients care.     Angie Padilla MD, [facility] Horner

## 2018-10-19 NOTE — PERIOP NOTES
TRANSFER - OUT REPORT:    Verbal report given to  SUHAS Roberts(name) on Carmen Pederson  being transferred to IVCU(unit) for routine post - op       Report consisted of patients Situation, Background, Assessment and   Recommendations(SBAR). Information from the following report(s) SBAR, Kardex, Procedure Summary and MAR was reviewed with the receiving nurse. Lines:   Peripheral IV 10/19/18 Right Antecubital (Active)   Site Assessment Clean, dry, & intact 10/19/2018  4:00 PM   Phlebitis Assessment 0 10/19/2018  4:00 PM   Infiltration Assessment 0 10/19/2018  4:00 PM   Dressing Status Clean, dry, & intact 10/19/2018  4:00 PM   Dressing Type Transparent 10/19/2018  4:00 PM   Hub Color/Line Status Pink;Capped 10/19/2018  4:00 PM        Opportunity for questions and clarification was provided.       Patient transported with:   Monitor  Registered Nurse     Groin sites assessed at bedside with nurse at transfer

## 2018-10-19 NOTE — PROGRESS NOTES
Cardiac Cath Lab Recovery Arrival Note:      Quincy Saldana arrived to Cardiac Cath Lab, Recovery Area. Staff introduced to patient. Patient identifiers verified with NAME and DATE OF BIRTH. Procedure verified with patient. Consent forms reviewed and signed by patient or authorized representative and verified. Allergies verified. Patient and family oriented to department. Patient and family informed of procedure and plan of care. Questions answered with review. Patient prepped for procedure, per orders from physician, prior to arrival.    Patient on cardiac monitor, non-invasive blood pressure, SPO2 monitor. On room air. Patient is A&Ox 3. Patient reports no c/o. Patient in stretcher, in low position, with side rails up, call bell within reach, patient instructed to call if assistance as needed. Patient prep in: [address], [place] [address]. Patient family has pager # none  Family in: [facility] waiting area.    Prep by: Desire Plascencia, SUHAS and Mirian Nioxn RN

## 2018-10-19 NOTE — PERIOP NOTES
Handoff Report from Operating Room to PACU    Report received from [facility] and [place] Mathews regarding [employer] Funding. Surgeon(s):  Anesthesia, Case  And Procedure(s) (LRB):  PACU/RECOVERY (N/A)  confirmed   with dressings discussed. Anesthesia type, drugs, patient history, complications, estimated blood loss, vital signs, intake and output, and lines and temperature were reviewed.

## 2018-10-19 NOTE — ANESTHESIA POSTPROCEDURE EVALUATION
* No procedures listed *. Anesthesia Post Evaluation        Patient location during evaluation: PACU  Note status: Adequate. Level of consciousness: responsive to verbal stimuli and sleepy but conscious  Pain management: satisfactory to patient  Airway patency: patent  Anesthetic complications: no  Cardiovascular status: acceptable  Respiratory status: acceptable  Hydration status: acceptable  Comments: +Post-Anesthesia Evaluation and Assessment    Patient: Sabrina Nam MRN: [id number]  SSN: [id number]   YOB: 1955  Age: 70 y.o. Sex: male      Cardiovascular Function/Vital Signs  /76   Pulse (!) 54   Temp 36.6 °C (97.9 °F)   Resp 8   Ht 6' (1.829 m)   Wt 115.7 kg (255 lb)   SpO2 100%   BMI 34.58 kg/m²     Patient is status post * No procedures listed *. Nausea/Vomiting: Controlled. Postoperative hydration reviewed and adequate. Pain:  Pain Scale 1: Numeric (0 - 10) (10/19/18 1505)  Pain Intensity 1: 0 (10/19/18 1505)   Managed. Neurological Status:   Neuro (WDL): Exceptions to WDL (10/19/18 1505)   At baseline. Mental Status and Level of Consciousness: Arousable. Pulmonary Status:   O2 Device: Nasal cannula (10/19/18 1505)   Adequate oxygenation and airway patent. Complications related to anesthesia: None    Post-anesthesia assessment completed. No concerns.     Signed By: Santosh Valentin MD    10/19/2018        Visit Vitals  /76   Pulse (!) 54   Temp 36.6 °C (97.9 °F)   Resp 8   Ht 6' (1.829 m)   Wt 115.7 kg (255 lb)   SpO2 100%   BMI 34.58 kg/m²

## 2018-10-20 VITALS
BODY MASS INDEX: 34.54 KG/M2 | WEIGHT: 255 LBS | DIASTOLIC BLOOD PRESSURE: 68 MMHG | TEMPERATURE: 97.7 F | SYSTOLIC BLOOD PRESSURE: 121 MMHG | HEART RATE: 63 BPM | HEIGHT: 72 IN | RESPIRATION RATE: 18 BRPM | OXYGEN SATURATION: 94 %

## 2018-10-20 LAB
ATRIAL RATE: 67 BPM
CALCULATED P AXIS, ECG09: 79 DEGREES
CALCULATED R AXIS, ECG10: 56 DEGREES
CALCULATED T AXIS, ECG11: 63 DEGREES
DIAGNOSIS, 93000: NORMAL
P-R INTERVAL, ECG05: 168 MS
Q-T INTERVAL, ECG07: 344 MS
QRS DURATION, ECG06: 96 MS
QTC CALCULATION (BEZET), ECG08: 363 MS
VENTRICULAR RATE, ECG03: 67 BPM

## 2018-10-20 PROCEDURE — 74011250637 HC RX REV CODE- 250/637: Performed by: INTERNAL MEDICINE

## 2018-10-20 PROCEDURE — 93005 ELECTROCARDIOGRAM TRACING: CPT

## 2018-10-20 PROCEDURE — 99218 HC RM OBSERVATION: CPT

## 2018-10-20 RX ORDER — ACETAMINOPHEN 325 MG/1
650 TABLET ORAL
Status: DISCONTINUED | OUTPATIENT
Start: 2018-10-20 | End: 2018-10-20 | Stop reason: HOSPADM

## 2018-10-20 RX ADMIN — APIXABAN 5 MG: 5 TABLET, FILM COATED ORAL at 08:34

## 2018-10-20 RX ADMIN — Medication 10 ML: at 06:32

## 2018-10-20 RX ADMIN — AMIODARONE HYDROCHLORIDE 200 MG: 200 TABLET ORAL at 08:34

## 2018-10-20 RX ADMIN — ACETAMINOPHEN 650 MG: 325 TABLET ORAL at 08:34

## 2018-10-20 NOTE — PROGRESS NOTES
Bedside report received from Aidan Mathis RN                  Assessment, Background, Procedure summary, Intake/Output, MAR, and recent results discussed. Care assumed. Pt ambulated in hallway. Pt appears steady and has no complaints of pain, shortness of breath, dizziness, or light-headedness. Incision appears clean, dry, and intact with no swelling or hematoma present. Discharge instructions reviewed with patient and family. Allowed adequate time to ask questions, all questions answered. Printed copy of AVS given to patient. All belongings gathered, IV and tele discontinued. Transported via wheelchair by [employer] to main entrance and into care of family.

## 2018-10-20 NOTE — PROGRESS NOTES
Cardiology Progress Note      10/20/2018 8:43 AM    Admit Date: 10/19/2018    Admit Diagnosis: a fib; Atrial fibrillation and flutter (HCC)      Subjective:     Memo Grajeda has no complaints. Visit Vitals  /69 (BP 1 Location: Left arm, BP Patient Position: At rest)   Pulse 68   Temp 97.7 °F (36.5 °C)   Resp 18   Ht 6' (1.829 m)   Wt 255 lb (115.7 kg)   SpO2 93%   BMI 34.58 kg/m²       Current Facility-Administered Medications   Medication Dose Route Frequency    acetaminophen (TYLENOL) tablet 650 mg  650 mg Oral Q4H PRN    fentaNYL citrate (PF) injection 12.5-50 mcg  12.5-50 mcg IntraVENous Multiple    DOBUTamine (DOBUTREX) 2,000 mcg/ml infusion  0-10 mcg/kg/min IntraVENous TITRATE    sodium chloride (NS) flush 5-10 mL  5-10 mL IntraVENous Q8H    sodium chloride (NS) flush 5-10 mL  5-10 mL IntraVENous PRN    apixaban (ELIQUIS) tablet 5 mg  5 mg Oral BID    diclofenac EC (VOLTAREN) tablet 75 mg  75 mg Oral BID    dilTIAZem CD (CARDIZEM CD) capsule 120 mg  120 mg Oral DAILY    sodium chloride (OCEAN) 0.65 % nasal squeeze bottle 1 Spray  1 Spray Both Nostrils QHS    amiodarone (CORDARONE) tablet 200 mg  200 mg Oral BID         Objective:      Physical Exam:  Visit Vitals  /69 (BP 1 Location: Left arm, BP Patient Position: At rest)   Pulse 68   Temp 97.7 °F (36.5 °C)   Resp 18   Ht 6' (1.829 m)   Wt 255 lb (115.7 kg)   SpO2 93%   BMI 34.58 kg/m²     General Appearance:  Well developed, well nourished,alert and oriented x 3, and individual in no acute distress. Ears/Nose/Mouth/Throat:   Hearing grossly normal.         Neck: Supple. Chest:   Lungs clear to auscultation bilaterally. Cardiovascular:  Regular rate and rhythm, S1, S2 normal, no murmur. Abdomen:   Soft, non-tender, bowel sounds are active. Extremities: No edema bilaterally. Skin: Warm and dry.                Data Review:   Labs:    Recent Results (from the past 24 hour(s))   POC ACTIVATED CLOTTING TIME Collection Time: 10/19/18  1:46 PM   Result Value Ref Range    Activated Clotting Time (POC) 268 (H) 79 - 138 SECS   POC ACTIVATED CLOTTING TIME    Collection Time: 10/19/18  2:08 PM   Result Value Ref Range    Activated Clotting Time (POC) 351 (H) 79 - 138 SECS   POC ACTIVATED CLOTTING TIME    Collection Time: 10/19/18  2:42 PM   Result Value Ref Range    Activated Clotting Time (POC) 120 79 - 138 SECS   EKG, 12 LEAD, INITIAL    Collection Time: 10/20/18  4:55 AM   Result Value Ref Range    Ventricular Rate 67 BPM    Atrial Rate 67 BPM    P-R Interval 168 ms    QRS Duration 96 ms    Q-T Interval 344 ms    QTC Calculation (Bezet) 363 ms    Calculated P Axis 79 degrees    Calculated R Axis 56 degrees    Calculated T Axis 63 degrees    Diagnosis       Normal sinus rhythm  Nonspecific T wave abnormality  When compared with ECG of 12-SEP-2018 13:11,  No significant change was found         Telemetry: normal sinus rhythm      Assessment:     Active Problems:    Atrial fibrillation and flutter ([address] (10/19/2018)        Plan:     Arslan Gardner well post ablation. D/c home. F/u with EP as scheduled.     Fred Lainez MD

## 2018-10-20 NOTE — PROGRESS NOTES
Bedside and Verbal shift change report given to [facility] (oncoming nurse) by JOSE ALEJANDRO Chu RN (offgoing nurse). Report given with SBAR, Kardex, Intake/Output, MAR and Recent Results.

## 2018-11-01 ENCOUNTER — OFFICE VISIT (OUTPATIENT)
Dept: CARDIOLOGY CLINIC | Age: 63
End: 2018-11-01

## 2018-11-01 VITALS
RESPIRATION RATE: 18 BRPM | DIASTOLIC BLOOD PRESSURE: 80 MMHG | HEIGHT: 72 IN | OXYGEN SATURATION: 96 % | BODY MASS INDEX: 35.41 KG/M2 | SYSTOLIC BLOOD PRESSURE: 120 MMHG | HEART RATE: 66 BPM | WEIGHT: 261.4 LBS

## 2018-11-01 DIAGNOSIS — I10 ESSENTIAL HYPERTENSION: ICD-10-CM

## 2018-11-01 DIAGNOSIS — I48.91 ATRIAL FIBRILLATION, UNSPECIFIED TYPE (HCC): Primary | ICD-10-CM

## 2018-11-01 DIAGNOSIS — Z86.79 S/P ABLATION OF ATRIAL FIBRILLATION: ICD-10-CM

## 2018-11-01 DIAGNOSIS — R53.83 FATIGUE, UNSPECIFIED TYPE: ICD-10-CM

## 2018-11-01 DIAGNOSIS — Z98.890 S/P ABLATION OF ATRIAL FIBRILLATION: ICD-10-CM

## 2018-11-01 NOTE — LETTER
11/1/2018 1:37 PM 
 
Mr. Carmen Pederson [address] [address] To Whom It May Concern: Mr. eDsi Domínguez has been under the care of myself and Dr. Ozzie Conley at [facility]. He will be able to return to work without restriction on November 12, 2018. Please call with concerns. Sincerely, Alberto Schwarz NP

## 2018-11-01 NOTE — PROGRESS NOTES
Subjective:      Yojana Acevedo is a 70 y.o. male is here for follow up s/p AF ablation. He is feeling well, still notices some shortness of breath which is not consistent or as frequent as prior to his procedure. The patient denies chest pain, orthopnea, PND, LE edema, palpitations, syncope, presyncope or fatigue. Patient Active Problem List    Diagnosis Date Noted    Atrial fibrillation and flutter ([address] 10/19/2018    Typical atrial flutter ([address] 09/12/2018    Severe obesity (BMI 35.0-39. 9) with comorbidity ([address] 03/20/2018    Irregular heart beat 09/07/2017    Non morbid obesity due to excess calories 08/28/2017    Atrial fibrillation ([address] 08/25/2017    Encounter for cardioversion procedure 02/07/2017    Hypertension 02/07/2017    Mixed hyperlipidemia 04/17/2012    Other dyspnea and respiratory abnormality 04/17/2012    Long term (current) use of anticoagulants 03/12/2012    S/P ablation of atrial fibrillation 02/02/2012    Pleural effusion, bilateral 10/05/2010    Sinus bradycardia 10/05/2010    SOB (shortness of breath) 10/02/2010    Paroxysmal atrial fibrillation ([address] 07/27/2010    Dyslipidemia 07/27/2010      Minna Horowitz MD  Past Medical History:   Diagnosis Date    A-fib [facility])     Arthritis     back    Chronic pain     back    Encounter for cardioversion procedure 2/7/2017    GERD (gastroesophageal reflux disease)     High cholesterol     Hypertension 02/07/2017    patient denies hx of HTN    Multiple thyroid nodules     biopsied and benign    PUD (peptic ulcer disease)     \"years ago\"      Past Surgical History:   Procedure Laterality Date    CARDIAC CATHETERIZATION      CARDIAC SURG PROCEDURE UNLIST  2010, 2011    ,ABLATION     CARDIOVERSION EXTERNAL      HX AFIB ABLATION      HX CERVICAL FUSION  2013    HX HEENT  6/6/13    THYROID BIOPSY    HX ORTHOPAEDIC      shoulder surgery on left    HX ORTHOPAEDIC      wrist surgery, LEFT    HX ORTHOPAEDIC elbow surgery, RIGHT     Allergies   Allergen Reactions    Percocet [Oxycodone-Acetaminophen] Anxiety     Patient takes Tylenol without problems. Family History   Problem Relation Age of Onset    Cancer Mother         BREAST    Heart Disease Father     Lung Disease Father     negative for cardiac disease  Social History     Socioeconomic History    Marital status:      Spouse name: Not on file    Number of children: Not on file    Years of education: Not on file    Highest education level: Not on file   Social Needs    Financial resource strain: Not on file    Food insecurity - worry: Not on file    Food insecurity - inability: Not on file   [employer] needs - medical: Not on file   [employer] needs - non-medical: Not on file   Occupational History    Not on file   Tobacco Use    Smoking status: Former Smoker     Packs/day: 0.25     Years: 40.00     Pack years: 10.00     Last attempt to quit: 10/1/2012     Years since quittin.0    Smokeless tobacco: Never Used   Substance and Sexual Activity    Alcohol use: Yes     Comment: occassionally 3-4 cans of beer    Drug use: No    Sexual activity: Yes   Other Topics Concern    Not on file   Social History Narrative    Not on file     Current Outpatient Medications   Medication Sig    amiodarone (CORDARONE) 200 mg tablet Take 1 Tab by mouth daily. Lowered 10/19/18    dilTIAZem ER (CARDIZEM LA) 120 mg tablet Take 120 mg by mouth nightly.  salt moisturing solution no.1 (OCEAN ULTRA SALINE MIST NA) 1 [place] by Both Nostrils route nightly.  apixaban (ELIQUIS) 5 mg tablet Take 1 Tab by mouth two (2) times a day.  valACYclovir (VALTREX) 500 mg tablet Take 500 mg by mouth daily as needed ('outbreaks').  diclofenac EC (VOLTAREN) 75 mg EC tablet Take 75 mg by mouth two (2) times a day.  furosemide (LASIX) 20 mg tablet Take 1 Tab by mouth daily as needed.     cyanocobalamin (VITAMIN B-12) 1,000 mcg tablet Take 1,000 mcg by mouth daily. No current facility-administered medications for this visit. There were no vitals filed for this visit. I have reviewed the nurses notes, vitals, problem list, allergy list, medical history, family, social history and medications. Review of Symptoms:    General: Pt denies excessive weight gain or loss. Pt is able to conduct ADL's  HEENT: Denies blurred vision, headaches, epistaxis and difficulty swallowing. Respiratory: +sob, Denies wheezing or stridor. Cardiovascular: Denies precordial pain, palpitations, edema or PND  Gastrointestinal: Denies poor appetite, indigestion, abdominal pain or blood in stool  Urinary: Denies dysuria, pyuria  Musculoskeletal: Denies pain or swelling from muscles or joints  Neurologic: Denies tremor, paresthesias, or sensory motor disturbance  Skin: Denies rash, itching or texture change. Psych: Denies depression      Physical Exam:      General: Well developed, in no acute distress. HEENT: Eyes - PERRL, no jvd  Heart:  Normal S1/S2 negative S3 or S4. Regular, no murmur, gallop or rub.   Respiratory: Clear bilaterally x 4, no wheezing or rales  Abdomen:   Soft, non-tender, bowel sounds are active.   Extremities:  No edema, normal cap refill, no cyanosis. Musculoskeletal: No clubbing  Neuro: A&Ox3, speech clear, gait stable. Skin: Skin color is normal. No rashes or lesions.  Non diaphoretic  Vascular: 2+ pulses symmetric in all extremities    Cardiographics    Ekg: sinus rhythm     Results for orders placed or performed during the hospital encounter of 10/19/18   EKG, 12 LEAD, INITIAL   Result Value Ref Range    Ventricular Rate 67 BPM    Atrial Rate 67 BPM    P-R Interval 168 ms    QRS Duration 96 ms    Q-T Interval 344 ms    QTC Calculation (Bezet) 363 ms    Calculated P Axis 79 degrees    Calculated R Axis 56 degrees    Calculated T Axis 63 degrees    Diagnosis       Normal sinus rhythm  Nonspecific T wave flattening  When compared with ECG of 12-SEP-2018 13:11,  No significant change was found  Confirmed by Nicolle Lu ([id number]) on 10/20/2018 3:06:12 PM     Results for orders placed or performed in visit on 09/11/18   CARDIAC HOLTER MONITOR, 24 HOURS    Narrative    ECG Monitor/24 hours, Complete    Reason for Holter Monitor  A-FLUTTER    Heartbeat    Slowest 74  Average 109  Fastest  131        Results:   Underlying Rhythm: Atrial flutter; rapid ventricular rate      Atrial Arrhythmias: atypical AFL with rvr and variable block            AV Conduction: variable    Ventricular Arrhythmias: premature ventricular contractions; occasional     ST Segment Analysis:normal     Symptom Correlation:  None reported    Comment:   Atypical AFL with RVR and variable block     Amira Rosa MD, [facility]            Lab Results   Component Value Date/Time    WBC 4.5 10/10/2018 08:22 AM    HGB 16.0 10/10/2018 08:22 AM    HCT 46.6 10/10/2018 08:22 AM    PLATELET 342 66/05/8900 08:22 AM    MCV 93 10/10/2018 08:22 AM      Lab Results   Component Value Date/Time    Sodium 140 10/10/2018 08:22 AM    Potassium 4.4 10/10/2018 08:22 AM    Chloride 105 10/10/2018 08:22 AM    CO2 20 10/10/2018 08:22 AM    Anion gap 9 09/11/2018 07:57 AM    Glucose 126 (H) 10/10/2018 08:22 AM    BUN 19 10/10/2018 08:22 AM    Creatinine 0.96 10/10/2018 08:22 AM    BUN/Creatinine ratio 20 10/10/2018 08:22 AM    GFR est AA 95 10/10/2018 08:22 AM    GFR est non-AA 83 10/10/2018 08:22 AM    Calcium 8.7 10/10/2018 08:22 AM    Bilirubin, total 0.8 10/10/2018 08:22 AM    AST (SGOT) 23 10/10/2018 08:22 AM    Alk. phosphatase 84 10/10/2018 08:22 AM    Protein, total 6.6 10/10/2018 08:22 AM    Albumin 4.3 10/10/2018 08:22 AM    Globulin 3.5 09/11/2018 07:57 AM    A-G Ratio 1.9 10/10/2018 08:22 AM    ALT (SGPT) 24 10/10/2018 08:22 AM         Assessment:     Assessment:        ICD-10-CM ICD-9-CM    1. Atrial fibrillation, unspecified type ([facility] I48.91 427.31    2. Essential hypertension I10 401.9    3.  S/P ablation of atrial fibrillation Z98.890 V45.89     Z86.79     4. Fatigue, unspecified type R53.83 780.79    5. BMI 37.0-37.9, adult Z68.37 V85.37      No orders of the defined types were placed in this encounter. Plan:   Mr. Gamaliel Hinkle is here for follow up s/p PVI of all 5 PVs was confirmed, Counter clockwise isthmus dependent atrial flutter induced with atrial pacing and RFA of the CTI with termination to sinus and confirmed bi-directional isthmus block, Additional RF focus of mitral isthmus line, Additional RF focus of anterior line from the RSPV to the mitral isthmus, Atypical atrial flutter induced with entrainment from the distal CS with RFA of the CS roof line. He presents for follow up. EKG shows sinus rhythm. Continue Amiodarone and follow up in 3 months. Continue medical management for HTN, BMI. Thank you for allowing me to participate in [facility] 's care.     Marty Vazquez NP

## 2018-11-01 NOTE — PROGRESS NOTES
Chief Complaint   Patient presents with    Irregular Heart Beat     1-2 week follow up     1. Have you been to the ER, urgent care clinic since your last visit? Hospitalized since your last visit? Yes When: 10/19/2018 [facility]     2. Have you seen or consulted any other health care providers outside of the [address] [place] [address] since your last visit? Include any pap smears or colon screening.  Yes When: PCP LAST WEEK

## 2018-11-08 ENCOUNTER — TELEPHONE (OUTPATIENT)
Dept: CARDIOLOGY CLINIC | Age: 63
End: 2018-11-08

## 2018-11-08 NOTE — TELEPHONE ENCOUNTER
Spoke to patient using 2 identifiers. Patient stated Conor Casas was seen on 11/1/18 by Andrew Ho NP. and was put out of work for 1 week. \" Patient stated Conor Casas will need us to complete FMLA forms  again for [employer]. Advised patient to have Saurabh, to fax paperwork for completion. Patient voiced understanding.

## 2018-11-09 ENCOUNTER — TELEPHONE (OUTPATIENT)
Dept: CARDIOLOGY CLINIC | Age: 63
End: 2018-11-09

## 2018-11-09 NOTE — TELEPHONE ENCOUNTER
Spoke to patient using 2 identifiers. Informed patient that I've received short term disability forms from  [email or web address], requesting an extension of disability benefits to return to work, without restriction on   November 12, 2018. Patient stated \"that he sent a copy of the  letter from Wilbur Whitley NP. To [email or web address], and to disregard  forms that were faxed. \"

## 2019-03-25 ENCOUNTER — TELEPHONE (OUTPATIENT)
Dept: CARDIOLOGY CLINIC | Age: 64
End: 2019-03-25

## 2019-03-25 ENCOUNTER — OFFICE VISIT (OUTPATIENT)
Dept: CARDIOLOGY CLINIC | Age: 64
End: 2019-03-25

## 2019-03-25 VITALS
SYSTOLIC BLOOD PRESSURE: 138 MMHG | WEIGHT: 267.6 LBS | BODY MASS INDEX: 36.24 KG/M2 | DIASTOLIC BLOOD PRESSURE: 82 MMHG | HEIGHT: 72 IN | OXYGEN SATURATION: 95 % | RESPIRATION RATE: 18 BRPM | HEART RATE: 59 BPM

## 2019-03-25 DIAGNOSIS — R07.89 ATYPICAL CHEST PAIN: Primary | ICD-10-CM

## 2019-03-25 DIAGNOSIS — I48.0 PAROXYSMAL ATRIAL FIBRILLATION (HCC): ICD-10-CM

## 2019-03-25 DIAGNOSIS — I10 ESSENTIAL HYPERTENSION: ICD-10-CM

## 2019-03-25 NOTE — PROGRESS NOTES
Travis Mock, [facility]    Subjective/HPI:     Mr. Gila Fernandes is a 70 y.o. male is here with complaints of left arm stinging. He has a PMHx of PAF s/p PVI, HTN and HLD. He reports an episode of left arm stinging and numbness that started this morning. He had three episodes of this in the first half of the day, and became concerned about his heart, so wanted to be seen. He specifically denies complaints of chest pains, dizziness, shortness of breath with exertion. He denies palpitations, orthopnea, PND or edema.          PCP Provider  Cherry Mckeon MD  Past Medical History:   Diagnosis Date    A-fib [facility])     Arthritis     back    Chronic pain     back    Encounter for cardioversion procedure 2/7/2017    GERD (gastroesophageal reflux disease)     High cholesterol     Hypertension 02/07/2017    patient denies hx of HTN    Multiple thyroid nodules     biopsied and benign    PUD (peptic ulcer disease)     \"years ago\"      Past Surgical History:   Procedure Laterality Date    CARDIAC CATHETERIZATION      CARDIAC SURG PROCEDURE UNLIST  2010, 2011    ,ABLATION     CARDIOVERSION EXTERNAL      HX AFIB ABLATION      HX CERVICAL FUSION  2013    HX HEENT  6/6/13    THYROID BIOPSY    HX ORTHOPAEDIC      shoulder surgery on left    HX ORTHOPAEDIC      wrist surgery, LEFT    HX ORTHOPAEDIC      elbow surgery, RIGHT     Family History   Problem Relation Age of Onset    Cancer Mother         BREAST    Heart Disease Father     Lung Disease Father      Social History     Socioeconomic History    Marital status:      Spouse name: Not on file    Number of children: Not on file    Years of education: Not on file    Highest education level: Not on file   Occupational History    Not on file   Social Needs    Financial resource strain: Not on file    Food insecurity:     Worry: Not on file     Inability: Not on file    Transportation needs:     Medical: Not on file Non-medical: Not on file   Tobacco Use    Smoking status: Former Smoker     Packs/day: 0.25     Years: 40.00     Pack years: 10.00     Last attempt to quit: 10/1/2012     Years since quittin.4    Smokeless tobacco: Never Used   Substance and Sexual Activity    Alcohol use: Yes     Comment: occassionally 3-4 cans of beer    Drug use: No    Sexual activity: Yes   Lifestyle    Physical activity:     Days per week: Not on file     Minutes per session: Not on file    Stress: Not on file   Relationships    Social connections:     Talks on phone: Not on file     Gets together: Not on file     Attends Islam service: Not on file     Active member of club or organization: Not on file     Attends meetings of clubs or organizations: Not on file     Relationship status: Not on file    Intimate partner violence:     Fear of current or ex partner: Not on file     Emotionally abused: Not on file     Physically abused: Not on file     Forced sexual activity: Not on file   Other Topics Concern    Not on file   Social History Narrative    Not on file       Allergies   Allergen Reactions    Percocet [Oxycodone-Acetaminophen] Anxiety     Patient takes Tylenol without problems.  Acetaminophen Other (comments)     Pt denies    Guaifenesin Other (comments)     Other reaction(s): gi upset    Oxycodone Other (comments)     Patient denies        Current Outpatient Medications   Medication Sig    amiodarone (CORDARONE) 200 mg tablet Take 1 Tab by mouth daily. Lowered 10/19/18    dilTIAZem ER (CARDIZEM LA) 120 mg tablet Take 120 mg by mouth nightly.  salt moisturing solution no.1 (OCEAN ULTRA SALINE MIST NA) 1 [place] by Both Nostrils route nightly.  apixaban (ELIQUIS) 5 mg tablet Take 1 Tab by mouth two (2) times a day.  valACYclovir (VALTREX) 500 mg tablet Take 500 mg by mouth daily as needed ('outbreaks').  diclofenac EC (VOLTAREN) 75 mg EC tablet Take 75 mg by mouth two (2) times a day.     furosemide (LASIX) 20 mg tablet Take 1 Tab by mouth daily as needed.  cyanocobalamin (VITAMIN B-12) 1,000 mcg tablet Take 1,000 mcg by mouth daily. No current facility-administered medications for this visit. I have reviewed the problem list, allergy list, medical history, family, social history and medications. Review of Symptoms:    Review of Systems   Constitutional: Negative for chills, fever and weight loss. HENT: Negative for nosebleeds. Eyes: Negative for blurred vision and double vision. Respiratory: Negative for cough, shortness of breath and wheezing. Cardiovascular: Negative for chest pain, palpitations, orthopnea, leg swelling and PND. Gastrointestinal: Negative for abdominal pain, blood in stool, diarrhea, nausea and vomiting. Musculoskeletal: Negative for joint pain. Skin: Negative for rash. Neurological: Negative for dizziness, tingling and loss of consciousness. Endo/Heme/Allergies: Does not bruise/bleed easily. Physical Exam:      General: Well developed, in no acute distress, cooperative and alert  HEENT: No carotid bruits, no JVD, trach is midline. Neck Supple, PEERL, EOM intact. Heart:  reg rate and rhythm; normal S1/S2; no murmurs, gallops or rubs. Respiratory: Clear bilaterally x 4, no wheezing or rales  Abdomen:   Soft, non-tender, no distention, no masses. + BS. Extremities:  Normal cap refill, no cyanosis, atraumatic. No edema. Neuro: A&Ox3, speech clear, gait stable. Skin: Skin color is normal. No rashes or lesions.  Non diaphoretic  Vascular: 2+ pulses symmetric in all extremities    Vitals:    03/25/19 1328 03/25/19 1341   BP: 134/80 138/82   Pulse: (!) 59    Resp: 18    SpO2: 95%    Weight: 267 lb 9.6 oz (121.4 kg)    Height: 6' (1.829 m)        Cardiographics    ECG: sinus bradycardia  Results for orders placed or performed during the hospital encounter of 10/19/18   EKG, 12 LEAD, INITIAL   Result Value Ref Range    Ventricular Rate 67 BPM Atrial Rate 67 BPM    P-R Interval 168 ms    QRS Duration 96 ms    Q-T Interval 344 ms    QTC Calculation (Bezet) 363 ms    Calculated P Axis 79 degrees    Calculated R Axis 56 degrees    Calculated T Axis 63 degrees    Diagnosis       Normal sinus rhythm  Nonspecific T wave flattening  When compared with ECG of 12-SEP-2018 13:11,  No significant change was found  Confirmed by Kendrick Mcgee ([id number]) on 10/20/2018 3:06:12 PM     Results for orders placed or performed in visit on 09/11/18   CARDIAC HOLTER MONITOR, 24 HOURS    Narrative    ECG Monitor/24 hours, Complete    Reason for Holter Monitor  A-FLUTTER    Heartbeat    Slowest 74  Average 109  Fastest  131        Results:   Underlying Rhythm: Atrial flutter; rapid ventricular rate      Atrial Arrhythmias: atypical AFL with rvr and variable block            AV Conduction: variable    Ventricular Arrhythmias: premature ventricular contractions; occasional     ST Segment Analysis:normal     Symptom Correlation:  None reported    Comment:   Atypical AFL with RVR and variable block     Shellie Lai MD, [facility]          Cardiology Labs:  No results found for: CHOL, CHOLX, CHLST, CHOLV, 599963, HDL, LDL, LDLC, DLDLP, [employer], CH[facility], [facility]    Lab Results   Component Value Date/Time    Sodium 140 10/10/2018 08:22 AM    Potassium 4.4 10/10/2018 08:22 AM    Chloride 105 10/10/2018 08:22 AM    CO2 20 10/10/2018 08:22 AM    Anion gap 9 09/11/2018 07:57 AM    Glucose 126 (H) 10/10/2018 08:22 AM    BUN 19 10/10/2018 08:22 AM    Creatinine 0.96 10/10/2018 08:22 AM    BUN/Creatinine ratio 20 10/10/2018 08:22 AM    GFR est AA 95 10/10/2018 08:22 AM    GFR est non-AA 83 10/10/2018 08:22 AM    Calcium 8.7 10/10/2018 08:22 AM    Bilirubin, total 0.8 10/10/2018 08:22 AM    AST (SGOT) 23 10/10/2018 08:22 AM    Alk.  phosphatase 84 10/10/2018 08:22 AM    Protein, total 6.6 10/10/2018 08:22 AM    Albumin 4.3 10/10/2018 08:22 AM    Globulin 3.5 09/11/2018 07:57 AM    A-G Ratio 1.9 10/10/2018 08:22 AM    ALT (SGPT) 24 10/10/2018 08:22 AM           Assessment:     Assessment:       ICD-10-CM ICD-9-CM    1. Atypical chest pain R07.89 786.59    2. Paroxysmal atrial fibrillation (HCC) I48.0 427.31 AMB POC EKG ROUTINE W/ 12 LEADS, INTER & REP   3. Essential hypertension I10 401.9         Plan:     1. Atypical chest pain  Symptoms atypical or ischemia; he has nonexertional chest \"stinging\" that resolves within a few seconds. Cardiac cath in 8/2015 with patent coronaries without angiographic evidence of CAD. Discussed stress testing, but patient defers at this time, as he reports he is feeling better. If his symptoms worsen, he will see us sooner for stress testing. Continue present medical management and risk factor modification. 2. Paroxysmal atrial fibrillation (HCC)  S/p PVI for A fib. In sinus rhythm today. Continue Amiodarone, Dilt and Eliquis per EP. 3. Essential hypertension  BP well controlled; continue anti-hypertensive therapy and low sodium diet.       Rashmi Silverman, NP

## 2019-03-25 NOTE — TELEPHONE ENCOUNTER
Pt is having a stinging pain in his chest and going down to his left hand, It comes and goes every few hours. He is not having any other symptoms, he stated that he feels great but is concerned about the stinging feeling he keeps getting. This symptoms started today.  Please give pt a call back regarding this matter    thanks

## 2019-03-25 NOTE — PROGRESS NOTES
1. Have you been to the ER, urgent care clinic since your last visit? Hospitalized since your last visit? No    2. Have you seen or consulted any other health care providers outside of the [address] [place] [address] since your last visit? Include any pap smears or colon screening.  Yes PCP    Chief Complaint   Patient presents with    Chest Pain     pt c/o \"stinging sensation or electrical shock\" to chest; onset this morning with 3 episodes; denies chest pain;     Shortness of Breath

## 2019-03-31 NOTE — TELEPHONE ENCOUNTER
Pt needs refill    dilTIAZem XR (DILACOR XR) 120 mg XR capsule   I don't see where Dr. Rene Sommer prescribed this it was prescribed by another doctor.       Thanks 4

## 2019-04-04 ENCOUNTER — OFFICE VISIT (OUTPATIENT)
Dept: CARDIOLOGY CLINIC | Age: 64
End: 2019-04-04

## 2019-04-04 VITALS — HEIGHT: 72 IN | BODY MASS INDEX: 36.29 KG/M2

## 2019-04-04 DIAGNOSIS — I10 ESSENTIAL HYPERTENSION: Primary | ICD-10-CM

## 2019-04-04 DIAGNOSIS — R53.83 FATIGUE, UNSPECIFIED TYPE: ICD-10-CM

## 2019-04-04 DIAGNOSIS — I48.19 PERSISTENT ATRIAL FIBRILLATION (HCC): ICD-10-CM

## 2019-04-04 RX ORDER — AMIODARONE HYDROCHLORIDE 200 MG/1
100 TABLET ORAL DAILY
Qty: 30 TAB | Refills: 3 | Status: SHIPPED | OUTPATIENT
Start: 2019-04-04 | End: 2019-09-10

## 2019-04-04 NOTE — PROGRESS NOTES
Subjective:      Diego Garcia is a 70 y.o. male is here for follow up of his AF s/p PVI. He is doing well. The patient denies chest pain/ shortness of breath, orthopnea, PND, LE edema, palpitations, syncope, presyncope or fatigue. Patient Active Problem List    Diagnosis Date Noted    Atrial fibrillation and flutter ([address] 10/19/2018    Typical atrial flutter ([address] 09/12/2018    Severe obesity (BMI 35.0-39. 9) with comorbidity ([address] 03/20/2018    Irregular heart beat 09/07/2017    Non morbid obesity due to excess calories 08/28/2017    Atrial fibrillation ([address] 08/25/2017    Encounter for cardioversion procedure 02/07/2017    Hypertension 02/07/2017    Mixed hyperlipidemia 04/17/2012    Other dyspnea and respiratory abnormality 04/17/2012    Long term (current) use of anticoagulants 03/12/2012    S/P ablation of atrial fibrillation 02/02/2012    Pleural effusion, bilateral 10/05/2010    Sinus bradycardia 10/05/2010    SOB (shortness of breath) 10/02/2010    Paroxysmal atrial fibrillation ([address] 07/27/2010    Dyslipidemia 07/27/2010      Dakota Johnson MD  Past Medical History:   Diagnosis Date    A-fib [facility])     Arthritis     back    Chronic pain     back    Encounter for cardioversion procedure 2/7/2017    GERD (gastroesophageal reflux disease)     High cholesterol     Hypertension 02/07/2017    patient denies hx of HTN    Multiple thyroid nodules     biopsied and benign    PUD (peptic ulcer disease)     \"years ago\"      Past Surgical History:   Procedure Laterality Date    CARDIAC CATHETERIZATION      CARDIAC SURG PROCEDURE UNLIST  2010, 2011    ,ABLATION     CARDIOVERSION EXTERNAL      HX AFIB ABLATION      HX CERVICAL FUSION  2013    HX HEENT  6/6/13    THYROID BIOPSY    HX ORTHOPAEDIC      shoulder surgery on left    HX ORTHOPAEDIC      wrist surgery, LEFT    HX ORTHOPAEDIC      elbow surgery, RIGHT     Allergies   Allergen Reactions    Percocet [Oxycodone-Acetaminophen] Anxiety     Patient takes Tylenol without problems.  Acetaminophen Other (comments)     Pt denies    Guaifenesin Other (comments)     Other reaction(s): gi upset    Oxycodone Other (comments)     Patient denies      Family History   Problem Relation Age of Onset    Cancer Mother         BREAST    Heart Disease Father     Lung Disease Father     negative for cardiac disease  Social History     Socioeconomic History    Marital status:      Spouse name: Not on file    Number of children: Not on file    Years of education: Not on file    Highest education level: Not on file   Tobacco Use    Smoking status: Former Smoker     Packs/day: 0.25     Years: 40.00     Pack years: 10.00     Last attempt to quit: 10/1/2012     Years since quittin.5    Smokeless tobacco: Never Used   Substance and Sexual Activity    Alcohol use: Yes     Comment: occassionally 3-4 cans of beer    Drug use: No    Sexual activity: Yes     Current Outpatient Medications   Medication Sig    amiodarone (CORDARONE) 200 mg tablet Take 1 Tab by mouth daily. Lowered 10/19/18    dilTIAZem ER (CARDIZEM LA) 120 mg tablet Take 120 mg by mouth nightly.  salt moisturing solution no.1 (OCEAN ULTRA SALINE MIST NA) 1 [place] by Both Nostrils route nightly.  apixaban (ELIQUIS) 5 mg tablet Take 1 Tab by mouth two (2) times a day.  valACYclovir (VALTREX) 500 mg tablet Take 500 mg by mouth daily as needed ('outbreaks').  diclofenac EC (VOLTAREN) 75 mg EC tablet Take 75 mg by mouth two (2) times a day.  furosemide (LASIX) 20 mg tablet Take 1 Tab by mouth daily as needed.  cyanocobalamin (VITAMIN B-12) 1,000 mcg tablet Take 1,000 mcg by mouth daily. No current facility-administered medications for this visit.        Vitals:    19 0845   Height: 6' (1.829 m)       I have reviewed the nurses notes, vitals, problem list, allergy list, medical history, family, social history and medications. Review of Symptoms:    General: Pt denies excessive weight gain or loss. Pt is able to conduct ADL's  HEENT: Denies blurred vision, headaches, epistaxis and difficulty swallowing. Respiratory: Denies shortness of breath, HENRY, wheezing or stridor. Cardiovascular: Denies precordial pain, palpitations, edema or PND  Gastrointestinal: Denies poor appetite, indigestion, abdominal pain or blood in stool  Urinary: Denies dysuria, pyuria  Musculoskeletal: Denies pain or swelling from muscles or joints  Neurologic: Denies tremor, paresthesias, or sensory motor disturbance  Skin: Denies rash, itching or texture change. Psych: Denies depression      Physical Exam:      General: Well developed, in no acute distress. HEENT: Eyes - PERRL, no jvd  Heart:  Normal S1/S2 negative S3 or S4. Regular, no murmur, gallop or rub.   Respiratory: Clear bilaterally x 4, no wheezing or rales  Abdomen:   Soft, non-tender, bowel sounds are active.   Extremities:  No edema, normal cap refill, no cyanosis. Musculoskeletal: No clubbing  Neuro: A&Ox3, speech clear, gait stable. Skin: Skin color is normal. No rashes or lesions.  Non diaphoretic  Vascular: 2+ pulses symmetric in all extremities    Cardiographics    Ekg:     Results for orders placed or performed during the hospital encounter of 10/19/18   EKG, 12 LEAD, INITIAL   Result Value Ref Range    Ventricular Rate 67 BPM    Atrial Rate 67 BPM    P-R Interval 168 ms    QRS Duration 96 ms    Q-T Interval 344 ms    QTC Calculation (Bezet) 363 ms    Calculated P Axis 79 degrees    Calculated R Axis 56 degrees    Calculated T Axis 63 degrees    Diagnosis       Normal sinus rhythm  Nonspecific T wave flattening  When compared with ECG of 12-SEP-2018 13:11,  No significant change was found  Confirmed by Damien He ([id number]) on 10/20/2018 3:06:12 PM     Results for orders placed or performed in visit on 09/11/18   CARDIAC HOLTER MONITOR, 24 HOURS    Narrative    ECG Monitor/24 hours, Complete    Reason for Holter Monitor  A-FLUTTER    Heartbeat    Slowest 74  Average 109  Fastest  131        Results:   Underlying Rhythm: Atrial flutter; rapid ventricular rate      Atrial Arrhythmias: atypical AFL with rvr and variable block            AV Conduction: variable    Ventricular Arrhythmias: premature ventricular contractions; occasional     ST Segment Analysis:normal     Symptom Correlation:  None reported    Comment:   Atypical AFL with RVR and variable block     Rohit Hopper MD, [facility]            Lab Results   Component Value Date/Time    WBC 4.5 10/10/2018 08:22 AM    HGB 16.0 10/10/2018 08:22 AM    HCT 46.6 10/10/2018 08:22 AM    PLATELET 992 63/70/0119 08:22 AM    MCV 93 10/10/2018 08:22 AM      Lab Results   Component Value Date/Time    Sodium 140 10/10/2018 08:22 AM    Potassium 4.4 10/10/2018 08:22 AM    Chloride 105 10/10/2018 08:22 AM    CO2 20 10/10/2018 08:22 AM    Anion gap 9 09/11/2018 07:57 AM    Glucose 126 (H) 10/10/2018 08:22 AM    BUN 19 10/10/2018 08:22 AM    Creatinine 0.96 10/10/2018 08:22 AM    BUN/Creatinine ratio 20 10/10/2018 08:22 AM    GFR est AA 95 10/10/2018 08:22 AM    GFR est non-AA 83 10/10/2018 08:22 AM    Calcium 8.7 10/10/2018 08:22 AM    Bilirubin, total 0.8 10/10/2018 08:22 AM    AST (SGOT) 23 10/10/2018 08:22 AM    Alk. phosphatase 84 10/10/2018 08:22 AM    Protein, total 6.6 10/10/2018 08:22 AM    Albumin 4.3 10/10/2018 08:22 AM    Globulin 3.5 09/11/2018 07:57 AM    A-G Ratio 1.9 10/10/2018 08:22 AM    ALT (SGPT) 24 10/10/2018 08:22 AM         Assessment:     Assessment:        ICD-10-CM ICD-9-CM    1. Essential hypertension I10 401.9    2. Fatigue, unspecified type R53.83 780.79    3. Persistent atrial fibrillation (HCC) I48.1 427.31    4. BMI 37.0-37.9, adult Z68.37 V85.37      No orders of the defined types were placed in this encounter.        Plan:   Mr. Dennis Lopez is here for follow up s/p PVI of all 5 PVs was confirmed, Counter clockwise isthmus dependent atrial flutter induced with atrial pacing and RFA of the CTI with termination to sinus and confirmed bi-directional isthmus block, Additional RF focus of mitral isthmus line, Additional RF focus of anterior line from the RSPV to the mitral isthmus, Atypical atrial flutter induced with entrainment from the distal CS with RFA of the CS roof line (10/2018). He presents for follow up. He has self discontinued Diltiazem d/t fatigue. Will decrease amiodarone to 100mg and have him follow up in 3 months. Doubtful he will be able to stop amiodarone completely without some form of BB or CCB therapy. He is a CHADsVASC 1 (age), BPs are normotensive. Discussed OAC vs Aspirin therapy and he would like to stop eliquis and continue aspirin. Follow up 3 months. Continue medical management for HTN, fatigue, obesity. Thank you for allowing me to participate in Guillermo Mathew 's care.     Eva Norwood NP

## 2019-04-04 NOTE — PROGRESS NOTES
1. Have you been to the ER, urgent care clinic since your last visit? Hospitalized since your last visit? No    2. Have you seen or consulted any other health care providers outside of the [address] [place] [address] since your last visit? Include any pap smears or colon screening. No    /  Chief Complaint   Patient presents with    Irregular Heart Beat     3 mo appt. Denied cardiac symptoms.

## 2019-06-12 ENCOUNTER — OFFICE VISIT (OUTPATIENT)
Dept: CARDIOLOGY CLINIC | Age: 64
End: 2019-06-12

## 2019-06-12 VITALS
WEIGHT: 261.2 LBS | SYSTOLIC BLOOD PRESSURE: 150 MMHG | BODY MASS INDEX: 35.38 KG/M2 | OXYGEN SATURATION: 97 % | HEIGHT: 72 IN | DIASTOLIC BLOOD PRESSURE: 98 MMHG | RESPIRATION RATE: 16 BRPM | HEART RATE: 70 BPM

## 2019-06-12 DIAGNOSIS — Z98.890 S/P ABLATION OF ATRIAL FIBRILLATION: ICD-10-CM

## 2019-06-12 DIAGNOSIS — R53.83 OTHER FATIGUE: ICD-10-CM

## 2019-06-12 DIAGNOSIS — I48.0 PAROXYSMAL ATRIAL FIBRILLATION (HCC): Primary | ICD-10-CM

## 2019-06-12 DIAGNOSIS — E78.2 MIXED HYPERLIPIDEMIA: ICD-10-CM

## 2019-06-12 DIAGNOSIS — Z79.899 LONG TERM CURRENT USE OF AMIODARONE: ICD-10-CM

## 2019-06-12 DIAGNOSIS — I10 ESSENTIAL HYPERTENSION: ICD-10-CM

## 2019-06-12 DIAGNOSIS — Z86.79 S/P ABLATION OF ATRIAL FIBRILLATION: ICD-10-CM

## 2019-06-12 RX ORDER — AMLODIPINE BESYLATE 2.5 MG/1
2.5 TABLET ORAL
Qty: 30 TAB | Refills: 2 | Status: SHIPPED | OUTPATIENT
Start: 2019-06-12 | End: 2019-09-10

## 2019-06-12 NOTE — PROGRESS NOTES
Subjective:      Jorge Luis Thomason is a 70 y.o. male is here for  Problem based visit. The patient called on call with complaints of fatigue, tired, abdominal pain with eating and cough. His bps have been high. He d/c'd his diltiazem in March due to fatigue. HR remains in the 50s. This morning in bed his bps were 120/74 with pulse 48. After moving around for 10 min, his bp was 154/90s with pulse of 50s. He denies chest pain, pressure, tightness, or palpitations. Denies blood in stool or urine. He notes a hx of thyroid nodules. Patient Active Problem List    Diagnosis Date Noted    Atrial fibrillation and flutter ([address] 10/19/2018    Typical atrial flutter ([address] 09/12/2018    Severe obesity (BMI 35.0-39. 9) with comorbidity ([address] 03/20/2018    Irregular heart beat 09/07/2017    Non morbid obesity due to excess calories 08/28/2017    Atrial fibrillation ([address] 08/25/2017    Encounter for cardioversion procedure 02/07/2017    Hypertension 02/07/2017    Mixed hyperlipidemia 04/17/2012    Other dyspnea and respiratory abnormality 04/17/2012    Long term (current) use of anticoagulants 03/12/2012    S/P ablation of atrial fibrillation 02/02/2012    Pleural effusion, bilateral 10/05/2010    Sinus bradycardia 10/05/2010    SOB (shortness of breath) 10/02/2010    Paroxysmal atrial fibrillation ([address] 07/27/2010    Dyslipidemia 07/27/2010      Maricruz Redding MD  Past Medical History:   Diagnosis Date    A-fib [facility])     Arthritis     back    Chronic pain     back    Encounter for cardioversion procedure 2/7/2017    GERD (gastroesophageal reflux disease)     High cholesterol     Hypertension 02/07/2017    patient denies hx of HTN    Multiple thyroid nodules     biopsied and benign    PUD (peptic ulcer disease)     \"years ago\"      Past Surgical History:   Procedure Laterality Date    CARDIAC CATHETERIZATION      CARDIAC SURG PROCEDURE UNLIST  2010, 2011    ,ABLATION     CARDIOVERSION EXTERNAL      HX AFIB ABLATION      HX CERVICAL FUSION      HX HEENT  13    THYROID BIOPSY    HX ORTHOPAEDIC      shoulder surgery on left    HX ORTHOPAEDIC      wrist surgery, LEFT    HX ORTHOPAEDIC      elbow surgery, RIGHT     Allergies   Allergen Reactions    Percocet [Oxycodone-Acetaminophen] Anxiety     Patient takes Tylenol without problems.  Acetaminophen Other (comments)     Pt denies    Guaifenesin Other (comments)     Other reaction(s): gi upset    Oxycodone Other (comments)     Patient denies      Family History   Problem Relation Age of Onset    Cancer Mother         BREAST    Heart Disease Father     Lung Disease Father     negative for cardiac disease  Social History     Socioeconomic History    Marital status:      Spouse name: Not on file    Number of children: Not on file    Years of education: Not on file    Highest education level: Not on file   Tobacco Use    Smoking status: Former Smoker     Packs/day: 0.25     Years: 40.00     Pack years: 10.00     Last attempt to quit: 10/1/2012     Years since quittin.6    Smokeless tobacco: Never Used   Substance and Sexual Activity    Alcohol use: Yes     Comment: occassionally 3-4 cans of beer    Drug use: No    Sexual activity: Yes     Current Outpatient Medications   Medication Sig    amLODIPine (NORVASC) 2.5 mg tablet Take 1 Tab by mouth every morning.  amiodarone (CORDARONE) 200 mg tablet Take 0.5 Tabs by mouth daily. Lowered 10/19/18    salt moisturing solution no.1 (OCEAN ULTRA SALINE MIST NA) 1 [place] by Both Nostrils route nightly.  valACYclovir (VALTREX) 500 mg tablet Take 500 mg by mouth daily as needed ('outbreaks').  diclofenac EC (VOLTAREN) 75 mg EC tablet Take 75 mg by mouth two (2) times a day.  furosemide (LASIX) 20 mg tablet Take 1 Tab by mouth daily as needed.  cyanocobalamin (VITAMIN B-12) 1,000 mcg tablet Take 1,000 mcg by mouth daily.      No current facility-administered medications for this visit. Vitals:    06/12/19 0857 06/12/19 0858 06/12/19 0859   BP: 150/80 (!) 140/100 (!) 150/98   Pulse: (!) 50 (!) 58 70   Resp:  16    SpO2: 96% 97% 97%   Weight:  261 lb 3.2 oz (118.5 kg)    Height:  6' (1.829 m)        I have reviewed the nurses notes, vitals, problem list, allergy list, medical history, family, social history and medications. Review of Symptoms:    General: Pt denies excessive weight gain or loss. Pt is able to conduct ADL's  HEENT: Denies blurred vision, headaches, epistaxis and difficulty swallowing. Respiratory: Reports shortness of breath, HENRY   Cardiovascular: Denies precordial pain, palpitations, edema or PND  Gastrointestinal: Denies poor appetite, indigestion, Reports abdominal pain with eating. Urinary: Denies dysuria, pyuria  Musculoskeletal: Denies pain or swelling from muscles or joints  Neurologic: Denies tremor, paresthesias, or sensory motor disturbance  Skin: Denies rash, itching or texture change. Psych: Denies depression      Physical Exam:      General: Well developed, in no acute distress. HEENT: Eyes - PERRL, no jvd  Heart:  Normal S1/S2 negative S3 or S4. Regular, no murmur, gallop or rub. Respiratory: Clear bilaterally x 4, no wheezing or rales  Extremities:  No edema, normal cap refill, no cyanosis. Musculoskeletal: No clubbing  Neuro: A&Ox3, speech clear, gait stable. Skin: Skin color is normal. No rashes or lesions. Non diaphoretic  Vascular: 2+ pulses symmetric in all extremities    Cardiographics    Ekg: Sinus  Bradycardia   Low voltage in limb leads. Ventricular rate 56.      Results for orders placed or performed during the hospital encounter of 10/19/18   EKG, 12 LEAD, INITIAL   Result Value Ref Range    Ventricular Rate 67 BPM    Atrial Rate 67 BPM    P-R Interval 168 ms    QRS Duration 96 ms    Q-T Interval 344 ms    QTC Calculation (Bezet) 363 ms    Calculated P Axis 79 degrees    Calculated R Axis 56 degrees    Calculated T Axis 63 degrees    Diagnosis       Normal sinus rhythm  Nonspecific T wave flattening  When compared with ECG of 12-SEP-2018 13:11,  No significant change was found  Confirmed by [employer] ([id number]) on 10/20/2018 3:06:12 PM     Results for orders placed or performed in visit on 09/11/18   CARDIAC HOLTER MONITOR, 24 HOURS    Narrative    ECG Monitor/24 hours, Complete    Reason for Holter Monitor  A-FLUTTER    Heartbeat    Slowest 74  Average 109  Fastest  131        Results:   Underlying Rhythm: Atrial flutter; rapid ventricular rate      Atrial Arrhythmias: atypical AFL with rvr and variable block            AV Conduction: variable    Ventricular Arrhythmias: premature ventricular contractions; occasional     ST Segment Analysis:normal     Symptom Correlation:  None reported    Comment:   Atypical AFL with RVR and variable block     Emeka Paris MD, [facility]            Lab Results   Component Value Date/Time    WBC 4.5 10/10/2018 08:22 AM    HGB 16.0 10/10/2018 08:22 AM    HCT 46.6 10/10/2018 08:22 AM    PLATELET 910 58/33/5119 08:22 AM    MCV 93 10/10/2018 08:22 AM      Lab Results   Component Value Date/Time    Sodium 140 10/10/2018 08:22 AM    Potassium 4.4 10/10/2018 08:22 AM    Chloride 105 10/10/2018 08:22 AM    CO2 20 10/10/2018 08:22 AM    Anion gap 9 09/11/2018 07:57 AM    Glucose 126 (H) 10/10/2018 08:22 AM    BUN 19 10/10/2018 08:22 AM    Creatinine 0.96 10/10/2018 08:22 AM    BUN/Creatinine ratio 20 10/10/2018 08:22 AM    GFR est AA 95 10/10/2018 08:22 AM    GFR est non-AA 83 10/10/2018 08:22 AM    Calcium 8.7 10/10/2018 08:22 AM    Bilirubin, total 0.8 10/10/2018 08:22 AM    AST (SGOT) 23 10/10/2018 08:22 AM    Alk.  phosphatase 84 10/10/2018 08:22 AM    Protein, total 6.6 10/10/2018 08:22 AM    Albumin 4.3 10/10/2018 08:22 AM    Globulin 3.5 09/11/2018 07:57 AM    A-G Ratio 1.9 10/10/2018 08:22 AM    ALT (SGPT) 24 10/10/2018 08:22 AM      Lab Results   Component Value Date/Time    TSH 1.95 09/12/2018 09:30 AM        Assessment:           ICD-10-CM ICD-9-CM    1. Paroxysmal atrial fibrillation (HCC) I48.0 427.31 AMB POC EKG ROUTINE W/ 12 LEADS, INTER & REP      CBC W/O DIFF      METABOLIC PANEL, COMPREHENSIVE      MAGNESIUM      TSH REFLEX TO T4   2. Other fatigue R53.83 780.79 CBC W/O DIFF      METABOLIC PANEL, COMPREHENSIVE      MAGNESIUM      TSH REFLEX TO T4   3. Essential hypertension I10 401.9 CBC W/O DIFF      METABOLIC PANEL, COMPREHENSIVE      MAGNESIUM      TSH REFLEX TO T4   4. Mixed hyperlipidemia E78.2 272.2 CBC W/O DIFF      METABOLIC PANEL, COMPREHENSIVE      MAGNESIUM      TSH REFLEX TO T4   5. Long term current use of amiodarone Z79.899 V58.69 CBC W/O DIFF      METABOLIC PANEL, COMPREHENSIVE      MAGNESIUM      TSH REFLEX TO T4   6. S/P ablation of atrial fibrillation Z98.890 V45.89     Z86.79       Orders Placed This Encounter    CBC W/O DIFF    METABOLIC PANEL, COMPREHENSIVE    MAGNESIUM    TSH REFLEX TO T4    AMB POC EKG ROUTINE W/ 12 LEADS, INTER & REP     Order Specific Question:   Reason for Exam:     Answer:   routine    amLODIPine (NORVASC) 2.5 mg tablet     Sig: Take 1 Tab by mouth every morning. Dispense:  30 Tab     Refill:  2        Plan:     Mr. Ana Luisa Velez is here for follow up s/p PVI of all 5 PVs was confirmed, Counter clockwise isthmus dependent atrial flutter induced with atrial pacing and RFA of the CTI with termination to sinus and confirmed bi-directional isthmus block, Additional RF focus of mitral isthmus line, Additional RF focus of anterior line from the RSPV to the mitral isthmus, Atypical atrial flutter induced with entrainment from the distal CS with RFA of the CS roof line (10/2018). Last visit his amiodarone was lowered to 100mg and it was felt that he would not do well with out it going forward. At this time he cannot tolerate CCB or BB with his bradycardia per EKG today.  However, his HR does go up with his orthostatic bps (not chronotropically incompetent). He is not orthostatic and his BPs are high. I have started him on low dose amlodipine to take in the morning. Lab slip to check thyroid, cmp/mg and cbc. He is a CHADsVASC 1 (age)  On ASA. Will touch base with him in 2 days to assess how he is feeling.       Continue medical management for HTN, obesity and fatigue. Thank you for allowing me to participate in Kev Alcaraz 's care.       Delfina Borges NP

## 2019-06-12 NOTE — PROGRESS NOTES
1. Have you been to the ER, urgent care clinic since your last visit? Hospitalized since your last visit? No    2. Have you seen or consulted any other health care providers outside of the [address] [place] [address] since your last visit? Include any pap smears or colon screening. No    Chief Complaint   Patient presents with    Slow Heart Rate     Lightheadedness     Patient C/O increase BP decreased heart rate and lightheadedness has noted abdominal discomfort at times lately with phelm production when coughing. States not taking Eliquis.

## 2019-06-12 NOTE — LETTER
NOTIFICATION RETURN TO WORK / SCHOOL 
 
6/12/2019 9:22 AM 
 
Mr. Essie Jim[facility]nez [address] [facility] [address] [id number] To Whom It May Concern: 
 
Essie Jiménez is currently under the care of [address]Fel[address] Stephenson. Please excuse him from work 6/12/19 - 6/14/19. If there are questions or concerns please have the patient contact our office. Sincerely, Yamilex Lay, ANP

## 2019-06-13 LAB
ALBUMIN SERPL-MCNC: 4.2 G/DL (ref 3.6–4.8)
ALBUMIN/GLOB SERPL: 1.7 {RATIO} (ref 1.2–2.2)
ALP SERPL-CCNC: 83 IU/L (ref 39–117)
ALT SERPL-CCNC: 27 IU/L (ref 0–44)
AST SERPL-CCNC: 22 IU/L (ref 0–40)
BILIRUB SERPL-MCNC: 0.6 MG/DL (ref 0–1.2)
BUN SERPL-MCNC: 16 MG/DL (ref 8–27)
BUN/CREAT SERPL: 15 (ref 10–24)
CALCIUM SERPL-MCNC: 9 MG/DL (ref 8.6–10.2)
CHLORIDE SERPL-SCNC: 106 MMOL/L (ref 96–106)
CO2 SERPL-SCNC: 25 MMOL/L (ref 20–29)
CREAT SERPL-MCNC: 1.09 MG/DL (ref 0.76–1.27)
ERYTHROCYTE [DISTWIDTH] IN BLOOD BY AUTOMATED COUNT: 14.1 % (ref 12.3–15.4)
GLOBULIN SER CALC-MCNC: 2.5 G/DL (ref 1.5–4.5)
GLUCOSE SERPL-MCNC: 101 MG/DL (ref 65–99)
HCT VFR BLD AUTO: 46.7 % (ref 37.5–51)
HGB BLD-MCNC: 15.7 G/DL (ref 13–17.7)
MAGNESIUM SERPL-MCNC: 2.1 MG/DL (ref 1.6–2.3)
MCH RBC QN AUTO: 31.8 PG (ref 26.6–33)
MCHC RBC AUTO-ENTMCNC: 33.6 G/DL (ref 31.5–35.7)
MCV RBC AUTO: 95 FL (ref 79–97)
PLATELET # BLD AUTO: 170 X10E3/UL (ref 150–450)
POTASSIUM SERPL-SCNC: 4.9 MMOL/L (ref 3.5–5.2)
PROT SERPL-MCNC: 6.7 G/DL (ref 6–8.5)
RBC # BLD AUTO: 4.93 X10E6/UL (ref 4.14–5.8)
SODIUM SERPL-SCNC: 145 MMOL/L (ref 134–144)
TSH SERPL DL<=0.005 MIU/L-ACNC: 1.81 UIU/ML (ref 0.45–4.5)
WBC # BLD AUTO: 4.6 X10E3/UL (ref 3.4–10.8)

## 2019-06-27 ENCOUNTER — DOCUMENTATION ONLY (OUTPATIENT)
Dept: CARDIOLOGY CLINIC | Age: 64
End: 2019-06-27

## 2019-06-27 NOTE — PROGRESS NOTES
HISTORY OF PRESENT ILLNESS  Bib Beard is a 70 y.o. male.     HPI    ROS    Physical Exam    ASSESSMENT and PLAN  {ASSESSMENT/PLAN:[id number]}

## 2019-08-27 ENCOUNTER — APPOINTMENT (OUTPATIENT)
Dept: GENERAL RADIOLOGY | Age: 64
End: 2019-08-27
Attending: EMERGENCY MEDICINE
Payer: COMMERCIAL

## 2019-08-27 ENCOUNTER — APPOINTMENT (OUTPATIENT)
Dept: CT IMAGING | Age: 64
End: 2019-08-27
Attending: EMERGENCY MEDICINE
Payer: COMMERCIAL

## 2019-08-27 ENCOUNTER — HOSPITAL ENCOUNTER (EMERGENCY)
Age: 64
Discharge: HOME | End: 2019-08-27
Attending: EMERGENCY MEDICINE
Payer: COMMERCIAL

## 2019-08-27 VITALS
OXYGEN SATURATION: 97 % | TEMPERATURE: 97.9 F | RESPIRATION RATE: 13 BRPM | HEIGHT: 72 IN | HEART RATE: 57 BPM | SYSTOLIC BLOOD PRESSURE: 151 MMHG | BODY MASS INDEX: 36.31 KG/M2 | DIASTOLIC BLOOD PRESSURE: 79 MMHG | WEIGHT: 268.08 LBS

## 2019-08-27 DIAGNOSIS — R06.00 DYSPNEA, UNSPECIFIED TYPE: Primary | ICD-10-CM

## 2019-08-27 LAB
ALBUMIN SERPL-MCNC: 3.7 G/DL (ref 3.5–5)
ALBUMIN/GLOB SERPL: 1.1 {RATIO} (ref 1.1–2.2)
ALP SERPL-CCNC: 78 U/L (ref 45–117)
ALT SERPL-CCNC: 42 U/L (ref 12–78)
ANION GAP SERPL CALC-SCNC: 4 MMOL/L (ref 5–15)
APPEARANCE UR: CLEAR
AST SERPL-CCNC: 24 U/L (ref 15–37)
ATRIAL RATE: 50 BPM
ATRIAL RATE: 53 BPM
BACTERIA URNS QL MICRO: NEGATIVE /HPF
BASOPHILS # BLD: 0.1 K/UL (ref 0–0.1)
BASOPHILS NFR BLD: 1 % (ref 0–1)
BILIRUB SERPL-MCNC: 0.6 MG/DL (ref 0.2–1)
BILIRUB UR QL: NEGATIVE
BNP SERPL-MCNC: 57 PG/ML
BUN SERPL-MCNC: 14 MG/DL (ref 6–20)
BUN/CREAT SERPL: 14 (ref 12–20)
CALCIUM SERPL-MCNC: 8.2 MG/DL (ref 8.5–10.1)
CALCULATED P AXIS, ECG09: 69 DEGREES
CALCULATED P AXIS, ECG09: 74 DEGREES
CALCULATED R AXIS, ECG10: 42 DEGREES
CALCULATED R AXIS, ECG10: 46 DEGREES
CALCULATED T AXIS, ECG11: 50 DEGREES
CALCULATED T AXIS, ECG11: 51 DEGREES
CHLORIDE SERPL-SCNC: 105 MMOL/L (ref 97–108)
CK SERPL-CCNC: 251 U/L (ref 39–308)
CO2 SERPL-SCNC: 31 MMOL/L (ref 21–32)
COLOR UR: NORMAL
CREAT SERPL-MCNC: 0.98 MG/DL (ref 0.7–1.3)
D DIMER PPP FEU-MCNC: 0.51 MG/L FEU (ref 0–0.65)
DIAGNOSIS, 93000: NORMAL
DIAGNOSIS, 93000: NORMAL
DIFFERENTIAL METHOD BLD: NORMAL
EOSINOPHIL # BLD: 0.2 K/UL (ref 0–0.4)
EOSINOPHIL NFR BLD: 4 % (ref 0–7)
EPITH CASTS URNS QL MICRO: NORMAL /LPF
ERYTHROCYTE [DISTWIDTH] IN BLOOD BY AUTOMATED COUNT: 13 % (ref 11.5–14.5)
GLOBULIN SER CALC-MCNC: 3.3 G/DL (ref 2–4)
GLUCOSE SERPL-MCNC: 84 MG/DL (ref 65–100)
GLUCOSE UR STRIP.AUTO-MCNC: NEGATIVE MG/DL
HCT VFR BLD AUTO: 42.9 % (ref 36.6–50.3)
HGB BLD-MCNC: 14.7 G/DL (ref 12.1–17)
HGB UR QL STRIP: NEGATIVE
HYALINE CASTS URNS QL MICRO: NORMAL /LPF (ref 0–5)
IMM GRANULOCYTES # BLD AUTO: 0 K/UL (ref 0–0.04)
IMM GRANULOCYTES NFR BLD AUTO: 0 % (ref 0–0.5)
KETONES UR QL STRIP.AUTO: NEGATIVE MG/DL
LEUKOCYTE ESTERASE UR QL STRIP.AUTO: NEGATIVE
LYMPHOCYTES # BLD: 2 K/UL (ref 0.8–3.5)
LYMPHOCYTES NFR BLD: 44 % (ref 12–49)
MCH RBC QN AUTO: 32.4 PG (ref 26–34)
MCHC RBC AUTO-ENTMCNC: 34.3 G/DL (ref 30–36.5)
MCV RBC AUTO: 94.5 FL (ref 80–99)
MONOCYTES # BLD: 0.4 K/UL (ref 0–1)
MONOCYTES NFR BLD: 9 % (ref 5–13)
NEUTS SEG # BLD: 2 K/UL (ref 1.8–8)
NEUTS SEG NFR BLD: 42 % (ref 32–75)
NITRITE UR QL STRIP.AUTO: NEGATIVE
NRBC # BLD: 0 K/UL (ref 0–0.01)
NRBC BLD-RTO: 0 PER 100 WBC
P-R INTERVAL, ECG05: 138 MS
P-R INTERVAL, ECG05: 172 MS
PH UR STRIP: 5.5 [PH] (ref 5–8)
PLATELET # BLD AUTO: 169 K/UL (ref 150–400)
PMV BLD AUTO: 10.3 FL (ref 8.9–12.9)
POTASSIUM SERPL-SCNC: 4.1 MMOL/L (ref 3.5–5.1)
PROT SERPL-MCNC: 7 G/DL (ref 6.4–8.2)
PROT UR STRIP-MCNC: NEGATIVE MG/DL
Q-T INTERVAL, ECG07: 446 MS
Q-T INTERVAL, ECG07: 456 MS
QRS DURATION, ECG06: 102 MS
QRS DURATION, ECG06: 96 MS
QTC CALCULATION (BEZET), ECG08: 415 MS
QTC CALCULATION (BEZET), ECG08: 418 MS
RBC # BLD AUTO: 4.54 M/UL (ref 4.1–5.7)
RBC #/AREA URNS HPF: NORMAL /HPF (ref 0–5)
SODIUM SERPL-SCNC: 140 MMOL/L (ref 136–145)
SP GR UR REFRACTOMETRY: 1.01 (ref 1–1.03)
TROPONIN I BLD-MCNC: <0.04 NG/ML (ref 0–0.08)
TROPONIN I SERPL-MCNC: <0.05 NG/ML
UA: UC IF INDICATED,UAUC: NORMAL
UROBILINOGEN UR QL STRIP.AUTO: 0.2 EU/DL (ref 0.2–1)
VENTRICULAR RATE, ECG03: 50 BPM
VENTRICULAR RATE, ECG03: 53 BPM
WBC # BLD AUTO: 4.6 K/UL (ref 4.1–11.1)
WBC URNS QL MICRO: NORMAL /HPF (ref 0–4)

## 2019-08-27 PROCEDURE — 71045 X-RAY EXAM CHEST 1 VIEW: CPT

## 2019-08-27 PROCEDURE — 84484 ASSAY OF TROPONIN QUANT: CPT

## 2019-08-27 PROCEDURE — 85379 FIBRIN DEGRADATION QUANT: CPT

## 2019-08-27 PROCEDURE — 74011250637 HC RX REV CODE- 250/637: Performed by: EMERGENCY MEDICINE

## 2019-08-27 PROCEDURE — 36415 COLL VENOUS BLD VENIPUNCTURE: CPT

## 2019-08-27 PROCEDURE — 83880 ASSAY OF NATRIURETIC PEPTIDE: CPT

## 2019-08-27 PROCEDURE — 93005 ELECTROCARDIOGRAM TRACING: CPT

## 2019-08-27 PROCEDURE — 85025 COMPLETE CBC W/AUTO DIFF WBC: CPT

## 2019-08-27 PROCEDURE — 80053 COMPREHEN METABOLIC PANEL: CPT

## 2019-08-27 PROCEDURE — 81001 URINALYSIS AUTO W/SCOPE: CPT

## 2019-08-27 PROCEDURE — 82550 ASSAY OF CK (CPK): CPT

## 2019-08-27 PROCEDURE — 71250 CT THORAX DX C-: CPT

## 2019-08-27 PROCEDURE — 99285 EMERGENCY DEPT VISIT HI MDM: CPT

## 2019-08-27 RX ORDER — NITROGLYCERIN 0.4 MG/1
0.4 TABLET SUBLINGUAL
Status: DISCONTINUED | OUTPATIENT
Start: 2019-08-27 | End: 2019-08-28 | Stop reason: HOSPADM

## 2019-08-27 RX ORDER — SODIUM CHLORIDE 0.9 % (FLUSH) 0.9 %
10 SYRINGE (ML) INJECTION
Status: DISCONTINUED | OUTPATIENT
Start: 2019-08-27 | End: 2019-08-27

## 2019-08-27 RX ORDER — ASPIRIN 325 MG
325 TABLET ORAL DAILY
COMMUNITY
End: 2022-10-04

## 2019-08-27 RX ADMIN — NITROGLYCERIN 0.4 MG: 0.4 TABLET SUBLINGUAL at 11:48

## 2019-08-27 NOTE — ED PROVIDER NOTES
EMERGENCY DEPARTMENT HISTORY AND PHYSICAL EXAM      Date: 8/27/2019  Patient Name: Jose Juan Champion  Patient Age and Sex: 75 y.o. male     History of Presenting Illness     Chief Complaint   Patient presents with    Shortness of Breath       History Provided By: Patient and wife    HPI: Jose Juan Champion  Is a 12-year-old male with past medical history of paroxysmal atrial fibrillation, hypertension, and occasional nicotine use presenting today with dyspnea. Patient states that he woke up this morning and felt dyspneic particularly with exertion. He states that yesterday he felt some dyspnea, but today was worse. He states that he went to work and continue to have symptoms. He checked his heart rate which was low, and he felt like his blood pressure was high describing symptoms of his head feeling heavy. He has had paroxysmal A. fib, but denies any history of bradycardia he has been on diltiazem in the past, but states that he was taken off of this. Currently takes amlodipine, amiodarone, and furosemide. Patient denies any chest pain today. He states he is never had a prior history of DVT or PE. Denies recent lower extremity swelling. There are no other complaints, changes, or physical findings at this time. PCP: Milton Saleh MD    No current facility-administered medications on file prior to encounter. Current Outpatient Medications on File Prior to Encounter   Medication Sig Dispense Refill    aspirin (ASPIRIN) 325 mg tablet Take 325 mg by mouth daily.  amLODIPine (NORVASC) 2.5 mg tablet Take 1 Tab by mouth every morning. 30 Tab 2    amiodarone (CORDARONE) 200 mg tablet Take 0.5 Tabs by mouth daily. Lowered 10/19/18 30 Tab 3    salt moisturing solution no.1 (OCEAN ULTRA SALINE MIST NA) 1 [place] by Both Nostrils route nightly.  valACYclovir (VALTREX) 500 mg tablet Take 500 mg by mouth daily as needed ('outbreaks').       diclofenac EC (VOLTAREN) 75 mg EC tablet Take 75 mg by mouth two (2) times a day.  furosemide (LASIX) 20 mg tablet Take 1 Tab by mouth daily as needed. 90 Tab 3    cyanocobalamin (VITAMIN B-12) 1,000 mcg tablet Take 1,000 mcg by mouth daily. Past History     Past Medical History:  Past Medical History:   Diagnosis Date    A-fib ([facility] [address]     Arthritis     back    Chronic pain     back    Encounter for cardioversion procedure 2017    GERD (gastroesophageal reflux disease)     High cholesterol     Hypertension 2017    patient denies hx of HTN    Multiple thyroid nodules     biopsied and benign    PUD (peptic ulcer disease)     \"years ago\"       Past Surgical History:  Past Surgical History:   Procedure Laterality Date    CARDIAC CATHETERIZATION      CARDIAC SURG PROCEDURE UNLIST  ,     ,ABLATION     CARDIOVERSION EXTERNAL      HX AFIB ABLATION      HX CERVICAL FUSION      HX HEENT  13    THYROID BIOPSY    HX ORTHOPAEDIC      shoulder surgery on left    HX ORTHOPAEDIC      wrist surgery, LEFT    HX ORTHOPAEDIC      elbow surgery, RIGHT       Family History:  Family History   Problem Relation Age of Onset    Cancer Mother         BREAST    Heart Disease Father     Lung Disease Father        Social History:  Social History     Tobacco Use    Smoking status: Former Smoker     Packs/day: 0.25     Years: 40.00     Pack years: 10.00     Last attempt to quit: 10/1/2012     Years since quittin.9    Smokeless tobacco: Never Used   Substance Use Topics    Alcohol use: Yes     Comment: occassionally 3-4 cans of beer    Drug use: No       Allergies: Allergies   Allergen Reactions    Percocet [Oxycodone-Acetaminophen] Anxiety     Patient takes Tylenol without problems.     Acetaminophen Other (comments)     Pt denies    Guaifenesin Other (comments)     Other reaction(s): gi upset    Oxycodone Other (comments)     Patient denies         Review of Systems   Constitutional: No  fever,  +  headache  Skin: No  rash, No jaundice  HEENT: No  nasal congestion, No  eye drainage. Resp: + cough,  No  wheezing  CV: No chest pain, No  palpitations  GI: No vomiting,  No  diarrhea.,  No  constipation  : No dysuria,  No  hematuria  MSK: No joint pain,  No  trauma  Neuro: No numbness, No  tingling  Psych: No suicidal, No  paranoid      Physical Exam     Patient Vitals for the past 12 hrs:   Temp Pulse Resp BP SpO2   08/27/19 1530 -- (!) 57 13 151/79 97 %   08/27/19 1515 -- (!) 55 13 134/69 97 %   08/27/19 1500 -- (!) 51 14 134/72 98 %   08/27/19 1445 -- (!) 51 11 129/74 96 %   08/27/19 1430 -- (!) 51 12 127/68 95 %   08/27/19 1415 -- (!) 52 13 125/70 96 %   08/27/19 1400 -- (!) 53 14 135/69 98 %   08/27/19 1345 -- (!) 52 13 129/65 98 %   08/27/19 1330 -- (!) 50 11 142/75 95 %   08/27/19 1315 -- (!) 53 14 141/77 98 %   08/27/19 1300 -- (!) 51 11 134/75 96 %   08/27/19 1245 -- (!) 50 12 134/71 97 %   08/27/19 1230 -- (!) 51 13 127/67 97 %   08/27/19 1215 -- (!) 52 11 137/66 98 %   08/27/19 1145 -- (!) 54 16 138/71 97 %   08/27/19 1052 97.9 °F (36.6 °C) (!) 53 18 (!) 146/109 100 %     General: alert, No acute distress  Eyes: EOMI, normal conjunctiva  ENT: moist mucous membranes. Neck: Active, full ROM of neck. Skin: No rashes. no jaundice              Lungs: Equal chest expansion. no respiratory distress. clear to auscultation bilaterally No accessory muscle usage  Heart: regular rate     no peripheral edema   2+ radial pulses and DPs bilaterally  Abd:  non distended soft, nontender. No rebound tenderness. No guarding  Back: Full ROM  MSK: Full, active ROM in all 4 extremities.    Neuro: Person, Place, Time and Situation; normal speech;   Psych: Cooperative with exam; Appropriate mood and affect             Diagnostic Study Results     Labs -     Recent Results (from the past 12 hour(s))   EKG, 12 LEAD, INITIAL    Collection Time: 08/27/19 11:09 AM   Result Value Ref Range    Ventricular Rate 53 BPM    Atrial Rate 53 BPM    P-R Interval 138 ms QRS Duration 102 ms    Q-T Interval 446 ms    QTC Calculation (Bezet) 418 ms    Calculated P Axis 74 degrees    Calculated R Axis 46 degrees    Calculated T Axis 50 degrees    Diagnosis       Sinus bradycardia  When compared with ECG of 20-OCT-2018 04:55,  Nonspecific T wave abnormality no longer evident in Lateral leads  QT has lengthened     CBC WITH AUTOMATED DIFF    Collection Time: 08/27/19 11:10 AM   Result Value Ref Range    WBC 4.6 4.1 - 11.1 K/uL    RBC 4.54 4.10 - 5.70 M/uL    HGB 14.7 12.1 - 17.0 g/dL    HCT 42.9 36.6 - 50.3 %    MCV 94.5 80.0 - 99.0 FL    MCH 32.4 26.0 - 34.0 PG    MCHC 34.3 30.0 - 36.5 g/dL    RDW 13.0 11.5 - 14.5 %    PLATELET 394 553 - 758 K/uL    MPV 10.3 8.9 - 12.9 FL    NRBC 0.0 0  WBC    ABSOLUTE NRBC 0.00 0.00 - 0.01 K/uL    NEUTROPHILS 42 32 - 75 %    LYMPHOCYTES 44 12 - 49 %    MONOCYTES 9 5 - 13 %    EOSINOPHILS 4 0 - 7 %    BASOPHILS 1 0 - 1 %    IMMATURE GRANULOCYTES 0 0.0 - 0.5 %    ABS. NEUTROPHILS 2.0 1.8 - 8.0 K/UL    ABS. LYMPHOCYTES 2.0 0.8 - 3.5 K/UL    ABS. MONOCYTES 0.4 0.0 - 1.0 K/UL    ABS. EOSINOPHILS 0.2 0.0 - 0.4 K/UL    ABS. BASOPHILS 0.1 0.0 - 0.1 K/UL    ABS. IMM. GRANS. 0.0 0.00 - 0.04 K/UL    DF AUTOMATED     METABOLIC PANEL, COMPREHENSIVE    Collection Time: 08/27/19 11:10 AM   Result Value Ref Range    Sodium 140 136 - 145 mmol/L    Potassium 4.1 3.5 - 5.1 mmol/L    Chloride 105 97 - 108 mmol/L    CO2 31 21 - 32 mmol/L    Anion gap 4 (L) 5 - 15 mmol/L    Glucose 84 65 - 100 mg/dL    BUN 14 6 - 20 MG/DL    Creatinine 0.98 0.70 - 1.30 MG/DL    BUN/Creatinine ratio 14 12 - 20      GFR est AA >60 >60 ml/min/1.73m2    GFR est non-AA >60 >60 ml/min/1.73m2    Calcium 8.2 (L) 8.5 - 10.1 MG/DL    Bilirubin, total 0.6 0.2 - 1.0 MG/DL    ALT (SGPT) 42 12 - 78 U/L    AST (SGOT) 24 15 - 37 U/L    Alk.  phosphatase 78 45 - 117 U/L    Protein, total 7.0 6.4 - 8.2 g/dL    Albumin 3.7 3.5 - 5.0 g/dL    Globulin 3.3 2.0 - 4.0 g/dL    A-G Ratio 1.1 1.1 - 2.2 TROPONIN I    Collection Time: 08/27/19 11:10 AM   Result Value Ref Range    Troponin-I, Qt. <0.05 <0.05 ng/mL   URINALYSIS W/ REFLEX CULTURE    Collection Time: 08/27/19 11:10 AM   Result Value Ref Range    Color YELLOW/STRAW      Appearance CLEAR CLEAR      Specific gravity 1.006 1.003 - 1.030      pH (UA) 5.5 5.0 - 8.0      Protein NEGATIVE  NEG mg/dL    Glucose NEGATIVE  NEG mg/dL    Ketone NEGATIVE  NEG mg/dL    Bilirubin NEGATIVE  NEG      Blood NEGATIVE  NEG      Urobilinogen 0.2 0.2 - 1.0 EU/dL    Nitrites NEGATIVE  NEG      Leukocyte Esterase NEGATIVE  NEG      WBC 0-4 0 - 4 /hpf    RBC 0-5 0 - 5 /hpf    Epithelial cells FEW FEW /lpf    Bacteria NEGATIVE  NEG /hpf    UA:UC IF INDICATED CULTURE NOT INDICATED BY UA RESULT CNI      Hyaline cast 0-2 0 - 5 /lpf   CK W/ REFLX CKMB    Collection Time: 08/27/19 11:10 AM   Result Value Ref Range     39 - 308 U/L   NT-PRO BNP    Collection Time: 08/27/19 11:10 AM   Result Value Ref Range    NT pro-BNP 57 <125 PG/ML   D DIMER    Collection Time: 08/27/19 11:20 AM   Result Value Ref Range    D-dimer 0.51 0.00 - 0.65 mg/L FEU   EKG, 12 LEAD, SUBSEQUENT    Collection Time: 08/27/19  1:31 PM   Result Value Ref Range    Ventricular Rate 50 BPM    Atrial Rate 50 BPM    P-R Interval 172 ms    QRS Duration 96 ms    Q-T Interval 456 ms    QTC Calculation (Bezet) 415 ms    Calculated P Axis 69 degrees    Calculated R Axis 42 degrees    Calculated T Axis 51 degrees    Diagnosis       Sinus bradycardia  Nonspecific T wave abnormality  When compared with ECG of 27-AUG-2019 11:09,  MANUAL COMPARISON REQUIRED, DATA IS UNCONFIRMED     POC TROPONIN-I    Collection Time: 08/27/19  1:58 PM   Result Value Ref Range    Troponin-I (POC) <0.04 0.00 - 0.08 ng/mL       Radiologic Studies -   CT CHEST WO CONT         XR CHEST PORT   Final Result   IMPRESSION:    1. Hypoventilatory exam otherwise without evidence of an acute cardiopulmonary   process.         CXR Results  (Last 50 hours)               08/27/19 1145  XR CHEST PORT Final result    Impression:  IMPRESSION:    1. Hypoventilatory exam otherwise without evidence of an acute cardiopulmonary   process. Narrative:  EXAM:  XR CHEST PORT       INDICATION: CP       COMPARISON: 9/11/2018. TECHNIQUE: Single frontal view of the chest.       FINDINGS: Low lung volumes with no lobar consolidation, pleural effusion, or   pneumothorax. Cardiomediastinal silhouette measures at the upper limits of   normal and is likely exaggerated by the technique. No acute or aggressive   osseous lesion. Medical Decision Making     Differential Diagnosis: PE, pneumonia, ACS, pneumothorax    I reviewed the vital signs, available nursing notes, past medical history, past surgical history, family history and social history and old medical records. On my interpretation, Laboratory workup is significant for negative troponin and delta troponin, unremarkable CBC and electrolytes, d-dimer is negative  On my interpretation of the radiology studies chest x-ray without any significant abnormality, chest CT again without significant abnormality  On my interpretation of the EKG initial EKG with sinus bradycardia rate of 53, QTc 418 and OR interval of 138, no ischemic changes  My interpretation of the subsequent EKG, sinus bradycardia, rate of 50, QTc 415, OR is 172,    Management/ED course: Patient presents with dyspnea in the setting of prior history of atrial fibrillation and hypertension. He is bradycardic with a sinus rhythm no acute distress. His work-up has been largely negative, started with a chest x-ray which showed no abnormalities and with a negative d-dimer spoke with cardiology (Dr. 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 He has had bradycardia for prolonged period of time and has not yet been worked up for possible amiodarone related lung toxicity.   They recommended a CT scan to start the evaluation for this. CT scan here was negative. Patient was informed of his results and the plan will be to follow-up with his cardiologist as an outpatient in the next week. He is comfortable with this plan. ED Course as of Aug 27 2039   Tue Aug 27, 2019   1111 EKG reviewed by me reveals sinus bradycardia cardia with a ventricular rate of 53, AR is 138, QTc 418, no evidence of ST elevation or depression or T wave inversions. EKG, 12 LEAD, INITIAL [NW]      ED Course User Index  [NW] Stefanie Pittman MD         Dispo: Discharged. The patient has been re-evaluated and is ready for discharge. Reviewed available results with patient. Counseled patient on diagnosis and care plan. Patient has expressed understanding, and all questions have been answered. Patient agrees with plan and agrees to follow up as recommended, or to return to the ED if their symptoms worsen. Discharge instructions have been provided and explained to the patient, along with reasons to return to the ED. PLAN:  Current Discharge Medication List        2. Follow-up Information     Follow up With Specialties Details Why Contact Info    Verena Pineda MD Cardiology, [address] Vascular Surgery, Internal Medicine Call in 1 week  [address] [facility] [address] [phone]          3. Return to ED if worse     Diagnosis     Clinical Impression:   1.  Dyspnea, unspecified type        Attestations:    Néstor Murrell MD

## 2019-08-27 NOTE — ED TRIAGE NOTES
The patient reports \"I felt like I was going to pass out at work, I felt my BP was high and my HR was low (52) and I had trouble breathing\".

## 2019-08-27 NOTE — LETTER
NOTIFICATION RETURN TO WORK / SCHOOL 
 
8/27/2019 4:58 PM 
 
Mr. Chandler Ahumada [address] [facility] [address] [phone] To Whom It May Concern: 
 
Chandler Ahumada is currently under the care of [facility] EMERGENCY DEPT. He will return to work/school on: 8/29/19 Chandler Ahumada may return to work/school with the following restrictions: No strenuous activity. If there are questions or concerns please have the patient contact our office. Sincerely, Blanca Anthony MD

## 2019-08-27 NOTE — LETTER
NOTIFICATION RETURN TO WORK / SCHOOL 
 
8/27/2019 5:01 PM 
 
Mr. Iglesia Chaney [address] [facility] [address] [phone] To Whom It May Concern: 
 
Iglesia Chaney is currently under the care of [facility] EMERGENCY DEPT. He will return to work/school on: 8/29/19 Iglesia Chaney may return to work/school with the following restrictions: No restrictions. If there are questions or concerns please have the patient contact our office. Sincerely, Bennett Galeas MD

## 2019-08-27 NOTE — ED NOTES
Rupesh Kingsley MD   has done a bedside review of the discharge instructions. The patient is in understanding. The patients line(s) are removed. The patient is dressed, and belongings together for discharge.

## 2019-09-10 ENCOUNTER — OFFICE VISIT (OUTPATIENT)
Dept: CARDIOLOGY CLINIC | Age: 64
End: 2019-09-10

## 2019-09-10 VITALS
HEIGHT: 72 IN | HEART RATE: 60 BPM | OXYGEN SATURATION: 95 % | DIASTOLIC BLOOD PRESSURE: 80 MMHG | BODY MASS INDEX: 35.81 KG/M2 | SYSTOLIC BLOOD PRESSURE: 118 MMHG | WEIGHT: 264.4 LBS | RESPIRATION RATE: 16 BRPM

## 2019-09-10 DIAGNOSIS — E78.2 MIXED HYPERLIPIDEMIA: ICD-10-CM

## 2019-09-10 DIAGNOSIS — I48.91 ATRIAL FIBRILLATION, UNSPECIFIED TYPE (HCC): Primary | ICD-10-CM

## 2019-09-10 DIAGNOSIS — E66.01 SEVERE OBESITY (BMI 35.0-39.9) WITH COMORBIDITY (HCC): ICD-10-CM

## 2019-09-10 DIAGNOSIS — Z86.79 S/P ABLATION OF ATRIAL FIBRILLATION: ICD-10-CM

## 2019-09-10 DIAGNOSIS — I10 ESSENTIAL HYPERTENSION: ICD-10-CM

## 2019-09-10 DIAGNOSIS — Z98.890 S/P ABLATION OF ATRIAL FIBRILLATION: ICD-10-CM

## 2019-09-10 NOTE — PROGRESS NOTES
Subjective:      Thomas Brown is a 75 y.o. male is here for EP consult. The patient denies chest pain/ shortness of breath, orthopnea, PND, LE edema, palpitations, syncope, presyncope or fatigue. Patient Active Problem List    Diagnosis Date Noted    Atrial fibrillation and flutter ([address] 10/19/2018    Typical atrial flutter ([address] 09/12/2018    Severe obesity (BMI 35.0-39. 9) with comorbidity ([address] 03/20/2018    Irregular heart beat 09/07/2017    Non morbid obesity due to excess calories 08/28/2017    Atrial fibrillation ([address] 08/25/2017    Encounter for cardioversion procedure 02/07/2017    Hypertension 02/07/2017    Mixed hyperlipidemia 04/17/2012    Other dyspnea and respiratory abnormality 04/17/2012    Long term (current) use of anticoagulants 03/12/2012    S/P ablation of atrial fibrillation 02/02/2012    Pleural effusion, bilateral 10/05/2010    Sinus bradycardia 10/05/2010    SOB (shortness of breath) 10/02/2010    Paroxysmal atrial fibrillation ([address] 07/27/2010    Dyslipidemia 07/27/2010      Ai San MD  Past Medical History:   Diagnosis Date    A-fib [facility])     Arthritis     back    Chronic pain     back    Encounter for cardioversion procedure 2/7/2017    GERD (gastroesophageal reflux disease)     High cholesterol     Hypertension 02/07/2017    patient denies hx of HTN    Multiple thyroid nodules     biopsied and benign    PUD (peptic ulcer disease)     \"years ago\"      Past Surgical History:   Procedure Laterality Date    CARDIAC CATHETERIZATION      CARDIAC SURG PROCEDURE UNLIST  2010, 2011    ,ABLATION     CARDIOVERSION EXTERNAL      HX AFIB ABLATION      HX CERVICAL FUSION  2013    HX HEENT  6/6/13    THYROID BIOPSY    HX ORTHOPAEDIC      shoulder surgery on left    HX ORTHOPAEDIC      wrist surgery, LEFT    HX ORTHOPAEDIC      elbow surgery, RIGHT     Allergies   Allergen Reactions    Percocet [Oxycodone-Acetaminophen] Anxiety Patient takes Tylenol without problems.  Acetaminophen Other (comments)     Pt denies    Guaifenesin Other (comments)     Other reaction(s): gi upset    Oxycodone Other (comments)     Patient denies      Family History   Problem Relation Age of Onset    Cancer Mother         BREAST    Heart Disease Father     Lung Disease Father     negative for cardiac disease  Social History     Socioeconomic History    Marital status:      Spouse name: Not on file    Number of children: Not on file    Years of education: Not on file    Highest education level: Not on file   Tobacco Use    Smoking status: Former Smoker     Packs/day: 0.25     Years: 40.00     Pack years: 10.00     Last attempt to quit: 10/1/2012     Years since quittin.9    Smokeless tobacco: Never Used   Substance and Sexual Activity    Alcohol use: Yes     Comment: occassionally 3-4 cans of beer    Drug use: No    Sexual activity: Yes     Current Outpatient Medications   Medication Sig    aspirin (ASPIRIN) 325 mg tablet Take 325 mg by mouth daily.  salt moisturing solution no.1 (OCEAN ULTRA SALINE MIST NA) 1 [place] by Both Nostrils route nightly.  valACYclovir (VALTREX) 500 mg tablet Take 500 mg by mouth daily as needed ('outbreaks').  diclofenac EC (VOLTAREN) 75 mg EC tablet Take 75 mg by mouth two (2) times a day.  furosemide (LASIX) 20 mg tablet Take 1 Tab by mouth daily as needed.  cyanocobalamin (VITAMIN B-12) 1,000 mcg tablet Take 1,000 mcg by mouth daily. No current facility-administered medications for this visit. Vitals:    09/10/19 1433   BP: 118/80   Pulse: 60   Resp: 16   SpO2: 95%   Weight: 264 lb 6.4 oz (119.9 kg)   Height: 6' (1.829 m)       I have reviewed the nurses notes, vitals, problem list, allergy list, medical history, family, social history and medications. Review of Symptoms:    General: Pt denies excessive weight gain or loss.  Pt is able to conduct ADL's  HEENT: Denies blurred vision, headaches, epistaxis and difficulty swallowing. Respiratory: Denies shortness of breath, HENRY, wheezing or stridor. Cardiovascular: Denies precordial pain, palpitations, edema or PND  Gastrointestinal: Denies poor appetite, indigestion, abdominal pain or blood in stool  Urinary: Denies dysuria, pyuria  Musculoskeletal: Denies pain or swelling from muscles or joints  Neurologic: Denies tremor, paresthesias, or sensory motor disturbance  Skin: Denies rash, itching or texture change. Psych: Denies depression      Physical Exam:      General: Well developed, in no acute distress. HEENT: Eyes - PERRL, no jvd  Heart:  Normal S1/S2 negative S3 or S4. Regular, no murmur, gallop or rub. Respiratory: Clear bilaterally x 4, no wheezing or rales  Abdomen:   Soft, non-tender, bowel sounds are active. Extremities:  No edema, normal cap refill, no cyanosis. Musculoskeletal: No clubbing  Neuro: A&Ox3, speech clear, gait stable. Skin: Skin color is normal. No rashes or lesions.  Non diaphoretic  Vascular: 2+ pulses symmetric in all extremities    Cardiographics    Ekg: nsr    Results for orders placed or performed during the hospital encounter of 08/27/19   EKG, 12 LEAD, INITIAL   Result Value Ref Range    Ventricular Rate 53 BPM    Atrial Rate 53 BPM    P-R Interval 138 ms    QRS Duration 102 ms    Q-T Interval 446 ms    QTC Calculation (Bezet) 418 ms    Calculated P Axis 74 degrees    Calculated R Axis 46 degrees    Calculated T Axis 50 degrees    Diagnosis       Sinus bradycardia  Confirmed by Alexx Mcneill ([id number]) on 8/27/2019 8:46:56 PM     Results for orders placed or performed in visit on 09/11/18   CARDIAC HOLTER MONITOR, 24 HOURS    Narrative    ECG Monitor/24 hours, Complete    Reason for Holter Monitor  A-FLUTTER    Heartbeat    Slowest 74  Average 109  Fastest  131        Results:   Underlying Rhythm: Atrial flutter; rapid ventricular rate      Atrial Arrhythmias: atypical AFL with rvr and variable block            AV Conduction: variable    Ventricular Arrhythmias: premature ventricular contractions; occasional     ST Segment Analysis:normal     Symptom Correlation:  None reported    Comment:   Atypical AFL with RVR and variable block     Alice Trejo MD, [facility]            Lab Results   Component Value Date/Time    WBC 4.6 08/27/2019 11:10 AM    HGB 14.7 08/27/2019 11:10 AM    HCT 42.9 08/27/2019 11:10 AM    PLATELET 804 11/08/2556 11:10 AM    MCV 94.5 08/27/2019 11:10 AM      Lab Results   Component Value Date/Time    Sodium 140 08/27/2019 11:10 AM    Potassium 4.1 08/27/2019 11:10 AM    Chloride 105 08/27/2019 11:10 AM    CO2 31 08/27/2019 11:10 AM    Anion gap 4 (L) 08/27/2019 11:10 AM    Glucose 84 08/27/2019 11:10 AM    BUN 14 08/27/2019 11:10 AM    Creatinine 0.98 08/27/2019 11:10 AM    BUN/Creatinine ratio 14 08/27/2019 11:10 AM    GFR est AA >60 08/27/2019 11:10 AM    GFR est non-AA >60 08/27/2019 11:10 AM    Calcium 8.2 (L) 08/27/2019 11:10 AM    Bilirubin, total 0.6 08/27/2019 11:10 AM    AST (SGOT) 24 08/27/2019 11:10 AM    Alk. phosphatase 78 08/27/2019 11:10 AM    Protein, total 7.0 08/27/2019 11:10 AM    Albumin 3.7 08/27/2019 11:10 AM    Globulin 3.3 08/27/2019 11:10 AM    A-G Ratio 1.1 08/27/2019 11:10 AM    ALT (SGPT) 42 08/27/2019 11:10 AM         Assessment:     Assessment:        ICD-10-CM ICD-9-CM    1. Atrial fibrillation, unspecified type ([address] I48.91 427.31 AMB POC EKG ROUTINE W/ 12 LEADS, INTER & REP   2. Essential hypertension I10 401.9    3. S/P ablation of atrial fibrillation Z98.890 V45.89     Z86.79     4. Severe obesity (BMI 35.0-39. 9) with comorbidity ([address] E66.01 278.01    5. Mixed hyperlipidemia E78.2 272.2      Orders Placed This Encounter    AMB POC EKG ROUTINE W/ 12 LEADS, INTER & REP     Order Specific Question:   Reason for Exam:     Answer:   routine        Plan:   Mr Aayush Galvan is in sinus and feeling well.  He presented to the ER last month with sob and w/u was negative. He stopped his amlodipine and has felt fine since. We will dc his amio. He will work on diet and exercise for weight loss. F/u in 3 months. Continue medical management for af, htn and hyperlipidemia. Thank you for allowing me to participate in [facility]s care.     Mariah Larry MD [address] [place] [address]

## 2019-09-10 NOTE — PROGRESS NOTES
Chief Complaint   Patient presents with    Irregular Heart Beat     follow up appointment     1. Have you been to the ER, urgent care clinic since your last visit? Hospitalized since your last visit? Yes ED [facility] ED 8/27/2019 due to increased BP    2. Have you seen or consulted any other health care providers outside of the [address] [place] [address] since your last visit? Include any pap smears or colon screening.  No

## 2019-09-10 NOTE — LETTER
9/10/19 Patient: Charlotte Enamorado YOB: 1955 Date of Visit: 9/10/2019 Jj Loaiza MD 
[address] [address] VIA Facsimile: [phone] Dear Jj Loaiza MD, Thank you for referring Mr. Gina Jiménez to [facility] for evaluation. My notes for this consultation are attached. If you have questions, please do not hesitate to call me. I look forward to following your patient along with you. Sincerely, Mark Horton MD

## 2019-09-25 PROBLEM — Z01.89 ENCOUNTER FOR CARDIOVERSION PROCEDURE: Status: RESOLVED | Noted: 2017-02-07 | Resolved: 2019-09-25

## 2020-04-20 ENCOUNTER — TELEPHONE (OUTPATIENT)
Dept: CARDIOLOGY CLINIC | Age: 65
End: 2020-04-20

## 2020-04-20 DIAGNOSIS — I48.0 PAROXYSMAL ATRIAL FIBRILLATION (HCC): Primary | ICD-10-CM

## 2020-04-20 RX ORDER — FUROSEMIDE 20 MG/1
20 TABLET ORAL
Qty: 90 TAB | Refills: 3 | Status: SHIPPED | OUTPATIENT
Start: 2020-04-20 | End: 2021-08-23 | Stop reason: SDUPTHER

## 2020-04-20 NOTE — TELEPHONE ENCOUNTER
Patient said Dr. Daja Colbert usually ask at each visit if he needs a Lasix refill. He now does, please call in to the [employer] Thanks!

## 2020-05-08 ENCOUNTER — TELEPHONE (OUTPATIENT)
Dept: CARDIOLOGY CLINIC | Age: 65
End: 2020-05-08

## 2020-05-08 ENCOUNTER — OFFICE VISIT (OUTPATIENT)
Dept: CARDIOLOGY CLINIC | Age: 65
End: 2020-05-08

## 2020-05-08 VITALS
RESPIRATION RATE: 18 BRPM | SYSTOLIC BLOOD PRESSURE: 122 MMHG | DIASTOLIC BLOOD PRESSURE: 84 MMHG | WEIGHT: 262 LBS | OXYGEN SATURATION: 97 % | HEART RATE: 66 BPM | HEIGHT: 72 IN | BODY MASS INDEX: 35.49 KG/M2

## 2020-05-08 DIAGNOSIS — Z86.79 S/P ABLATION OF ATRIAL FIBRILLATION: ICD-10-CM

## 2020-05-08 DIAGNOSIS — I10 ESSENTIAL HYPERTENSION: ICD-10-CM

## 2020-05-08 DIAGNOSIS — E66.01 SEVERE OBESITY (BMI 35.0-39.9) WITH COMORBIDITY (HCC): ICD-10-CM

## 2020-05-08 DIAGNOSIS — Z98.890 S/P ABLATION OF ATRIAL FIBRILLATION: ICD-10-CM

## 2020-05-08 DIAGNOSIS — Z79.899 LONG TERM CURRENT USE OF AMIODARONE: ICD-10-CM

## 2020-05-08 DIAGNOSIS — I48.0 PAROXYSMAL ATRIAL FIBRILLATION (HCC): Primary | ICD-10-CM

## 2020-05-08 NOTE — PROGRESS NOTES
Chief Complaint   Patient presents with    Shortness of Breath     without exercise    Irregular Heart Beat     does not feel he is a fib but states smart shows an irregular rate     Dizziness     did have yesterday     1. Have you been to the ER, urgent care clinic since your last visit? Hospitalized since your last visit? No     2. Have you seen or consulted any other health care providers outside of the [address] [place] [address] since your last visit? Include any pap smears or colon screening.  No

## 2020-05-08 NOTE — TELEPHONE ENCOUNTER
----- Message from ROSALINDA Casarez sent at 5/8/2020 12:34 PM EDT -----  Regarding: pls cll pt. Please let pt I send the [employer] through [place].   Thanks

## 2020-05-08 NOTE — PROGRESS NOTES
Subjective:      Marielena Dietrich is a 75 y.o. male is here for EP follow up/problem based visit. Pt reports noted variable HR with charisse on cell phone. He reports feeling lightheaded and dizzy, without near syncope. Occ feel pounding HR. He notes HENRY with a hx of tobacco use for 40 years. Was seen by pulmonary a few years ago. Uses wave form monitoring of HR on phone. The wave form is consistent with a sudden drop in wave then restarting which is assocaited with brief feeling of  Something being different. Patient Active Problem List    Diagnosis Date Noted    Atrial fibrillation and flutter ([address] 10/19/2018    Typical atrial flutter ([address] 09/12/2018    Severe obesity (BMI 35.0-39. 9) with comorbidity ([address] 03/20/2018    Irregular heart beat 09/07/2017    Non morbid obesity due to excess calories 08/28/2017    Atrial fibrillation ([address] 08/25/2017    Hypertension 02/07/2017    Mixed hyperlipidemia 04/17/2012    Other dyspnea and respiratory abnormality 04/17/2012    Long term (current) use of anticoagulants 03/12/2012    S/P ablation of atrial fibrillation 02/02/2012    Pleural effusion, bilateral 10/05/2010    Sinus bradycardia 10/05/2010    SOB (shortness of breath) 10/02/2010    Paroxysmal atrial fibrillation ([address] 07/27/2010    Dyslipidemia 07/27/2010      Mitchel Banks MD  Past Medical History:   Diagnosis Date    A-fib [facility])     Arthritis     back    Chronic pain     back    Encounter for cardioversion procedure 2/7/2017    GERD (gastroesophageal reflux disease)     High cholesterol     Hypertension 02/07/2017    patient denies hx of HTN    Multiple thyroid nodules     biopsied and benign    PUD (peptic ulcer disease)     \"years ago\"      Past Surgical History:   Procedure Laterality Date    CARDIAC CATHETERIZATION      CARDIAC SURG PROCEDURE UNLIST  2010, 2011    ,ABLATION     CARDIOVERSION EXTERNAL      HX AFIB ABLATION      HX CERVICAL FUSION  2013    HX HEENT  13    THYROID BIOPSY    HX ORTHOPAEDIC      shoulder surgery on left    HX ORTHOPAEDIC      wrist surgery, LEFT    HX ORTHOPAEDIC      elbow surgery, RIGHT     Allergies   Allergen Reactions    Percocet [Oxycodone-Acetaminophen] Anxiety     Patient takes Tylenol without problems.  Acetaminophen Other (comments)     Pt denies    Guaifenesin Other (comments)     Other reaction(s): gi upset    Oxycodone Other (comments)     Patient denies      Family History   Problem Relation Age of Onset    Cancer Mother         BREAST    Heart Disease Father     Lung Disease Father     negative for cardiac disease  Social History     Socioeconomic History    Marital status:      Spouse name: Not on file    Number of children: Not on file    Years of education: Not on file    Highest education level: Not on file   Tobacco Use    Smoking status: Former Smoker     Packs/day: 0.25     Years: 40.00     Pack years: 10.00     Last attempt to quit: 10/1/2012     Years since quittin.6    Smokeless tobacco: Never Used   Substance and Sexual Activity    Alcohol use: Yes     Comment: occassionally 3-4 cans of beer    Drug use: No    Sexual activity: Yes     Current Outpatient Medications   Medication Sig    furosemide (LASIX) 20 mg tablet Take 1 Tab by mouth daily as needed (edema).  aspirin (ASPIRIN) 325 mg tablet Take 325 mg by mouth daily.  salt moisturing solution no.1 (OCEAN ULTRA SALINE MIST NA) 1 [place] by Both Nostrils route nightly.  valACYclovir (VALTREX) 500 mg tablet Take 500 mg by mouth daily as needed ('outbreaks').  diclofenac EC (VOLTAREN) 75 mg EC tablet Take 75 mg by mouth two (2) times a day.  cyanocobalamin (VITAMIN B-12) 1,000 mcg tablet Take 1,000 mcg by mouth daily. No current facility-administered medications for this visit.        Vitals:    20 1119   BP: 122/84   Pulse: 66   Resp: 18   SpO2: 97%   Weight: 262 lb (118.8 kg)   Height: 6' (1.829 m) I have reviewed the nurses notes, vitals, problem list, allergy list, medical history, family, social history and medications. Review of Symptoms:    General: Pt denies excessive weight gain or loss. Pt is able to conduct ADL's  HEENT: Denies blurred vision, headaches, epistaxis and difficulty swallowing. Respiratory: Denies shortness of breath, HENRY, wheezing or stridor. Cardiovascular: Denies precordial pain, palpitations, edema or PND  Gastrointestinal: Denies poor appetite, indigestion, abdominal pain or blood in stool  Urinary: Denies dysuria, pyuria  Musculoskeletal: Denies pain or swelling from muscles or joints  Neurologic: Denies tremor, paresthesias, or sensory motor disturbance  Skin: Denies rash, itching or texture change. Psych: Denies depression      Physical Exam:      General: Well developed, in no acute distress. HEENT: Eyes - PERRL, no jvd  Heart:  Normal S1/S2 negative S3 or S4. Regular, no murmur, gallop or rub. Respiratory: Clear bilaterally x 4, no wheezing or rales  Extremities:  No edema, normal cap refill, no cyanosis. Musculoskeletal: No clubbing  Neuro: A&Ox3, speech clear, gait stable. Skin: Skin color is normal. No rashes or lesions.  Non diaphoretic  Vascular: 2+ pulses symmetric in all extremities    Cardiographics    Ekg: sinus 60    Results for orders placed or performed during the hospital encounter of 08/27/19   EKG, 12 LEAD, INITIAL   Result Value Ref Range    Ventricular Rate 53 BPM    Atrial Rate 53 BPM    P-R Interval 138 ms    QRS Duration 102 ms    Q-T Interval 446 ms    QTC Calculation (Bezet) 418 ms    Calculated P Axis 74 degrees    Calculated R Axis 46 degrees    Calculated T Axis 50 degrees    Diagnosis       Sinus bradycardia  Confirmed by Teodora Yoder ([id number]) on 8/27/2019 8:46:56 PM     Results for orders placed or performed in visit on 09/11/18   CARDIAC HOLTER MONITOR, 24 HOURS    Narrative    ECG Monitor/24 hours, Complete    Reason for Holter Monitor  A-FLUTTER    Heartbeat    Slowest 74  Average 109  Fastest  131        Results:   Underlying Rhythm: Atrial flutter; rapid ventricular rate      Atrial Arrhythmias: atypical AFL with rvr and variable block            AV Conduction: variable    Ventricular Arrhythmias: premature ventricular contractions; occasional     ST Segment Analysis:normal     Symptom Correlation:  None reported    Comment:   Atypical AFL with RVR and variable block     Jennifer Faustin MD, [facility]            Lab Results   Component Value Date/Time    WBC 4.6 08/27/2019 11:10 AM    HGB 14.7 08/27/2019 11:10 AM    HCT 42.9 08/27/2019 11:10 AM    PLATELET 189 26/74/5796 11:10 AM    MCV 94.5 08/27/2019 11:10 AM      Lab Results   Component Value Date/Time    Sodium 140 08/27/2019 11:10 AM    Potassium 4.1 08/27/2019 11:10 AM    Chloride 105 08/27/2019 11:10 AM    CO2 31 08/27/2019 11:10 AM    Anion gap 4 (L) 08/27/2019 11:10 AM    Glucose 84 08/27/2019 11:10 AM    BUN 14 08/27/2019 11:10 AM    Creatinine 0.98 08/27/2019 11:10 AM    BUN/Creatinine ratio 14 08/27/2019 11:10 AM    GFR est AA >60 08/27/2019 11:10 AM    GFR est non-AA >60 08/27/2019 11:10 AM    Calcium 8.2 (L) 08/27/2019 11:10 AM    Bilirubin, total 0.6 08/27/2019 11:10 AM    AST (SGOT) 24 08/27/2019 11:10 AM    Alk. phosphatase 78 08/27/2019 11:10 AM    Protein, total 7.0 08/27/2019 11:10 AM    Albumin 3.7 08/27/2019 11:10 AM    Globulin 3.3 08/27/2019 11:10 AM    A-G Ratio 1.1 08/27/2019 11:10 AM    ALT (SGPT) 42 08/27/2019 11:10 AM      Lab Results   Component Value Date/Time    TSH 1.810 06/12/2019 09:50 AM    TSH 1.95 09/12/2018 09:30 AM        Assessment:           ICD-10-CM ICD-9-CM    1. Paroxysmal atrial fibrillation (HCC) I48.0 427.31 AMB POC EKG ROUTINE W/ 12 LEADS, INTER & REP   2. Severe obesity (BMI 35.0-39. 9) with comorbidity ([address] E66.01 278.01    3. Essential hypertension I10 401.9    4. S/P ablation of atrial fibrillation Z98.890 V45.89     Z86.79     5. Long term current use of amiodarone Z79.899 V58.69      Orders Placed This Encounter    AMB POC EKG ROUTINE W/ 12 LEADS, INTER & REP     Order Specific Question:   Reason for Exam:     Answer:   routine        Plan:     Mr. Paola Do for follow up s/p PVI of all 5 PVs was confirmed, Counter clockwise isthmus dependent atrial flutter induced with atrial pacing and RFA of the CTI with termination to sinus and confirmed bi-directional isthmus block, Additional RF focus of mitral isthmus line, Additional RF focus of anterior line from the RSPV to the mitral isthmus, Atypical atrial flutter induced with entrainment from the distal CS with RFA of the CS roof line (10/2018). He cannot tolerate CCB or BB with his bradycardia. Dicussed what sounds like PVCs. He is not interested in follow up labs or event monitor at this time. We discussed ALIVECOR monitoring from home which he is interested in purchasing. Will follow up with PCP re: possible COPD with tobacco use hx. Follow up as planned. Continue medical management for AF, BMI 35 and HTN. Thank you for allowing me to participate in Tommy Huizar 's care.       Samantha Mcbride NP

## 2020-05-08 NOTE — TELEPHONE ENCOUNTER
Left message on Identified VM advised that sumit sent [facility] info through my chart. Advised to call office a [phone] with any questions.

## 2020-05-14 ENCOUNTER — TELEPHONE (OUTPATIENT)
Dept: CARDIOLOGY CLINIC | Age: 65
End: 2020-05-14

## 2020-05-14 NOTE — TELEPHONE ENCOUNTER
Verified patient with 2 identifiers   Wearing a mask causes anxiety and patient can not breath. Patient very claustrophobic. 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 Patient states he can absolutely NOT wear a mask due to severe claustrophobia- he tried and just cannot. Patient requesting a Dr Note so he can take FMLA for a couple of weeks until things calm down  Please advise.

## 2020-05-14 NOTE — TELEPHONE ENCOUNTER
Verified patient with 2 identifiers   Advised patient to contact Kenisha Norman as this is a PCP issue. Patient agreed.

## 2020-05-14 NOTE — TELEPHONE ENCOUNTER
Pt calling to speak to the nurse. Pt states his job is making it mandatory for everyone to wear a mask, and he is claustrophobia and every time he wears the mask he feels like he is going to faint and his heart rate goes up. Please give pt a call back.     Thanks

## 2020-05-22 ENCOUNTER — TELEPHONE (OUTPATIENT)
Dept: CARDIOLOGY CLINIC | Age: 65
End: 2020-05-22

## 2020-05-22 NOTE — TELEPHONE ENCOUNTER
Left message on Identified VM advising patient to check his My Chart message. advised to call office at [phone] with any questions.

## 2020-05-22 NOTE — TELEPHONE ENCOUNTER
----- Message from Lazaro Rowe ANP sent at 5/22/2020  4:31 PM EDT -----  After I saw him last I sent a message via My Chart which the patient did not get. Can you tell him there is a unread message on my chart about the [address] from [place]? Thanks.

## 2020-07-24 NOTE — TELEPHONE ENCOUNTER
How Severe Is This Condition?: mild Late Entry for 03.25.19 12:30pm     Called, spoke to pt, two identifiers verified. States he does not feel bad, just having stinging in chest and down left arm. Spoke with Helen Cha NP who agreed to see him on her schedule this afternoon. Pt verbalized understanding of information discussed w/ no further questions at this time.      Lisa Santiago RN

## 2020-08-31 NOTE — PROGRESS NOTES
Christoph Tyson, PAULINEP-BC    Subjective/HPI:     Vignesh Barajas is a 77 y.o. male is here for routine f/u. He has a PMHx of PAF s/p ablation, HTN and GERD. He requires cardiac clearance for back surgery. He requires L4-L5 bilateral decompression for spinal stenosis with spondylilosthesis. He denies complaints of chest pains, dizziness, orthopnea, PND or edema. He denies palpitation symptoms or shortness of breath. He has limiting symptoms due to back pain. He is hoping the surgery will provide him with relief. He did obtain AliveCor device and checks his rhythm to r/o A fib. PCP Provider  Faheem Kemp MD    Past Medical History:   Diagnosis Date    A-fib [facility])     Arthritis     back    Chronic pain     back    Encounter for cardioversion procedure 2/7/2017    GERD (gastroesophageal reflux disease)     High cholesterol     Hypertension 02/07/2017    patient denies hx of HTN    Multiple thyroid nodules     biopsied and benign    PUD (peptic ulcer disease)     \"years ago\"        Allergies   Allergen Reactions    Percocet [Oxycodone-Acetaminophen] Anxiety     Patient takes Tylenol without problems.  Acetaminophen Other (comments)     Pt denies    Guaifenesin Other (comments)     Other reaction(s): gi upset    Oxycodone Other (comments)     Patient denies        Outpatient Encounter Medications as of 9/2/2020   Medication Sig Dispense Refill    furosemide (LASIX) 20 mg tablet Take 1 Tab by mouth daily as needed (edema). 90 Tab 3    aspirin (ASPIRIN) 325 mg tablet Take 325 mg by mouth daily.  salt moisturing solution no.1 (OCEAN ULTRA SALINE MIST NA) 1 [place] by Both Nostrils route nightly.  valACYclovir (VALTREX) 500 mg tablet Take 500 mg by mouth daily as needed ('outbreaks').  diclofenac EC (VOLTAREN) 75 mg EC tablet Take 75 mg by mouth two (2) times a day.  cyanocobalamin (VITAMIN B-12) 1,000 mcg tablet Take 1,000 mcg by mouth daily.       [DISCONTINUED] dexAMETHasone (DECADRON) 4 mg tablet 4 mg.  [DISCONTINUED] diazePAM (VALIUM) 5 mg tablet       [DISCONTINUED] methocarbamoL (ROBAXIN) 750 mg tablet        No facility-administered encounter medications on file as of 9/2/2020. Review of Symptoms:    Review of Systems   Constitutional: Negative for chills, fever and weight loss. HENT: Negative for nosebleeds. Eyes: Negative for blurred vision and double vision. Respiratory: Negative for cough, shortness of breath and wheezing. Cardiovascular: Negative for chest pain, palpitations, orthopnea, leg swelling and PND. Gastrointestinal: Negative for abdominal pain, blood in stool, diarrhea, nausea and vomiting. Musculoskeletal: Negative for joint pain. Skin: Negative for rash. Neurological: Negative for dizziness, tingling and loss of consciousness. Endo/Heme/Allergies: Does not bruise/bleed easily. Physical Exam:      General: Well developed, in no acute distress, cooperative and alert  HEENT: No carotid bruits, no JVD, trach is midline. Neck Supple, PEERL, EOM intact. Heart:  reg rate and rhythm; normal S1/S2; no murmurs, no gallops or rubs. Respiratory: Clear bilaterally x 4, no wheezing or rales  Abdomen:   Soft, non-tender, no distention, no masses. + BS. Extremities:  Normal cap refill, no cyanosis, atraumatic. No edema. Neuro: A&Ox3, speech clear, gait stable. Skin: Skin color is normal. No rashes or lesions.  Non diaphoretic  Vascular: 2+ pulses symmetric in all extremities    Vitals:    09/02/20 1104 09/02/20 1114   BP: 140/82 130/86   Pulse: 68    Resp: 18    SpO2: 95%    Weight: 278 lb (126.1 kg)    Height: 6' (1.829 m)        ECG: sinus rhythm    Cardiology Labs:    No results found for: FLP, CHOL, HDL, LDLC, VLDL, CHHD    Lab Results   Component Value Date/Time    Hemoglobin A1c 5.2 07/20/2017 04:30 PM       Lab Results   Component Value Date/Time    Sodium 140 08/27/2019 11:10 AM    Potassium 4.1 08/27/2019 11:10 AM    Chloride 105 08/27/2019 11:10 AM    CO2 31 08/27/2019 11:10 AM    Glucose 84 08/27/2019 11:10 AM    BUN 14 08/27/2019 11:10 AM    Creatinine 0.98 08/27/2019 11:10 AM    BUN/Creatinine ratio 14 08/27/2019 11:10 AM    GFR est AA >60 08/27/2019 11:10 AM    GFR est non-AA >60 08/27/2019 11:10 AM    Calcium 8.2 (L) 08/27/2019 11:10 AM    Anion gap 4 (L) 08/27/2019 11:10 AM    Bilirubin, total 0.6 08/27/2019 11:10 AM    ALT (SGPT) 42 08/27/2019 11:10 AM    Alk. phosphatase 78 08/27/2019 11:10 AM    Protein, total 7.0 08/27/2019 11:10 AM    Albumin 3.7 08/27/2019 11:10 AM    Globulin 3.3 08/27/2019 11:10 AM    A-G Ratio 1.1 08/27/2019 11:10 AM          Assessment:       ICD-10-CM ICD-9-CM    1. Paroxysmal atrial fibrillation (HCC)  I48.0 427.31    2. Essential hypertension  I10 401.9 AMB POC EKG ROUTINE W/ 12 LEADS, INTER & REP   3. Pre-operative clearance  Z01.818 V72.84    4. S/P ablation of atrial fibrillation  Z98.890 V45.89     Z86.79          Plan:     1. Paroxysmal atrial fibrillation (HCC)  S/p PVI for A fib  Maintaining sinus rhythm today. Discussed utilizing Kardia device to assess for recurrent A fib at home. Continue ASA. Off rate control therapies due to fatigue/bradycardia.     2. Essential hypertension  BP well controlled; continue anti-hypertensive therapy and low sodium diet. 3.  Pre-op clearance  Cardiac cath in 8/2015 with patent coronaries without angiographic evidence of CAD. May be considered low operative risk from CV standpoint for back surgery  May hold ASA 5 days prior to procedure    4. S/p ablation of atrial fibrillation    F/u with Dr. Wright Officer in 6 months. Manuela Beckwith NP       [facility]    9/2/2020         Patient seen, examined by me personally. Plan discussed as detailed. Agree with note as outlined by  NP. I confirm findings in history and physical exam. No additional findings noted. Agree with plan as outlined above.  Cleared for back surgery.     Gonzales Davey MD

## 2020-09-02 ENCOUNTER — OFFICE VISIT (OUTPATIENT)
Dept: CARDIOLOGY CLINIC | Age: 65
End: 2020-09-02
Payer: COMMERCIAL

## 2020-09-02 VITALS
OXYGEN SATURATION: 95 % | HEIGHT: 72 IN | DIASTOLIC BLOOD PRESSURE: 86 MMHG | RESPIRATION RATE: 18 BRPM | SYSTOLIC BLOOD PRESSURE: 130 MMHG | WEIGHT: 278 LBS | BODY MASS INDEX: 37.65 KG/M2 | HEART RATE: 68 BPM

## 2020-09-02 DIAGNOSIS — Z98.890 S/P ABLATION OF ATRIAL FIBRILLATION: ICD-10-CM

## 2020-09-02 DIAGNOSIS — I48.0 PAROXYSMAL ATRIAL FIBRILLATION (HCC): Primary | ICD-10-CM

## 2020-09-02 DIAGNOSIS — Z01.818 PRE-OPERATIVE CLEARANCE: ICD-10-CM

## 2020-09-02 DIAGNOSIS — Z86.79 S/P ABLATION OF ATRIAL FIBRILLATION: ICD-10-CM

## 2020-09-02 DIAGNOSIS — I10 ESSENTIAL HYPERTENSION: ICD-10-CM

## 2020-09-02 PROCEDURE — 99213 OFFICE O/P EST LOW 20 MIN: CPT | Performed by: INTERNAL MEDICINE

## 2020-09-02 PROCEDURE — 93010 ELECTROCARDIOGRAM REPORT: CPT | Performed by: INTERNAL MEDICINE

## 2020-09-02 PROCEDURE — G8536 NO DOC ELDER MAL SCRN: HCPCS | Performed by: INTERNAL MEDICINE

## 2020-09-02 PROCEDURE — G8419 CALC BMI OUT NRM PARAM NOF/U: HCPCS | Performed by: INTERNAL MEDICINE

## 2020-09-02 PROCEDURE — G8432 DEP SCR NOT DOC, RNG: HCPCS | Performed by: INTERNAL MEDICINE

## 2020-09-02 PROCEDURE — G8427 DOCREV CUR MEDS BY ELIG CLIN: HCPCS | Performed by: INTERNAL MEDICINE

## 2020-09-02 PROCEDURE — 1101F PT FALLS ASSESS-DOCD LE1/YR: CPT | Performed by: INTERNAL MEDICINE

## 2020-09-02 PROCEDURE — 93005 ELECTROCARDIOGRAM TRACING: CPT | Performed by: INTERNAL MEDICINE

## 2020-09-02 PROCEDURE — G8754 DIAS BP LESS 90: HCPCS | Performed by: INTERNAL MEDICINE

## 2020-09-02 PROCEDURE — G8752 SYS BP LESS 140: HCPCS | Performed by: INTERNAL MEDICINE

## 2020-09-02 PROCEDURE — G0463 HOSPITAL OUTPT CLINIC VISIT: HCPCS | Performed by: INTERNAL MEDICINE

## 2020-09-02 PROCEDURE — 3017F COLORECTAL CA SCREEN DOC REV: CPT | Performed by: INTERNAL MEDICINE

## 2020-09-02 RX ORDER — DIAZEPAM 5 MG/1
TABLET ORAL
COMMUNITY
Start: 2020-08-10 | End: 2020-09-02

## 2020-09-02 RX ORDER — DEXAMETHASONE 4 MG/1
4 TABLET ORAL
COMMUNITY
Start: 2020-08-10 | End: 2020-09-02

## 2020-09-02 RX ORDER — METHOCARBAMOL 750 MG/1
TABLET, FILM COATED ORAL
COMMUNITY
Start: 2020-05-27 | End: 2020-09-02

## 2020-09-02 NOTE — PROGRESS NOTES
1. Have you been to the ER, urgent care clinic since your last visit? Hospitalized since your last visit? No    2. Have you seen or consulted any other health care providers outside of the [address] [place] [address] since your last visit? Include any pap smears or colon screening.  No    Chief Complaint   Patient presents with    Surgical Clearance     for back surgery [facility] Dr. Sheeba Collins

## 2020-09-02 NOTE — LETTER
9/2/20 Patient: Fabian Howard YOB: 1955 Date of Visit: 9/2/2020 Sandi Esqudea MD 
[place] [address] [facility] [address] VIA Facsimile: [phone] Dear Sandi Esqueda MD, Thank you for referring Mr. Bear Hurtado to [facility] for evaluation. My notes for this consultation are attached. If you have questions, please do not hesitate to call me. I look forward to following your patient along with you. Sincerely, Zehra Connor MD

## 2020-09-02 NOTE — LETTER
9/2/2020 11:37 AM 
 
Patient:  Macario Caban YOB: 1955 Date of Visit: 9/2/2020 Dear No Recipients: Thank you for referring Mr. Abbe Singh to me for evaluation/treatment. Below are the relevant portions of my assessment and plan of care. If you have questions, please do not hesitate to call me. I look forward to following Mr. Mcintyre along with you. Theo Bell, FNP-BC Subjective/HPI:  
 
Macario Caban is a 77 y.o. male is here for routine f/u. He has a PMHx of PAF s/p ablation, HTN and GERD. He requires cardiac clearance for back surgery. He requires L4-L5 bilateral decompression for spinal stenosis with spondylilosthesis. He denies complaints of chest pains, dizziness, orthopnea, PND or edema. He denies palpitation symptoms or shortness of breath. He has limiting symptoms due to back pain. He is hoping the surgery will provide him with relief. He did obtain AliveCor device and checks his rhythm to r/o A fib. PCP Provider Ondina Rodriguez MD 
 
Past Medical History:  
Diagnosis Date  A-fib ([facility] [address]  Arthritis   
 back  Chronic pain   
 back  Encounter for cardioversion procedure 2/7/2017  GERD (gastroesophageal reflux disease)  High cholesterol  Hypertension 02/07/2017  
 patient denies hx of HTN  
 Multiple thyroid nodules   
 biopsied and benign  PUD (peptic ulcer disease) \"years ago\" Allergies Allergen Reactions  Percocet [Oxycodone-Acetaminophen] Anxiety Patient takes Tylenol without problems.  Acetaminophen Other (comments) Pt denies  Guaifenesin Other (comments) Other reaction(s): gi upset  Oxycodone Other (comments) Patient denies Outpatient Encounter Medications as of 9/2/2020 Medication Sig Dispense Refill  furosemide (LASIX) 20 mg tablet Take 1 Tab by mouth daily as needed (edema).  90 Tab 3  
  aspirin (ASPIRIN) 325 mg tablet Take 325 mg by mouth daily.  salt moisturing solution no.1 (OCEAN ULTRA SALINE MIST NA) 1 [place] by Both Nostrils route nightly.  valACYclovir (VALTREX) 500 mg tablet Take 500 mg by mouth daily as needed ('outbreaks').  diclofenac EC (VOLTAREN) 75 mg EC tablet Take 75 mg by mouth two (2) times a day.  cyanocobalamin (VITAMIN B-12) 1,000 mcg tablet Take 1,000 mcg by mouth daily.  [DISCONTINUED] dexAMETHasone (DECADRON) 4 mg tablet 4 mg.  [DISCONTINUED] diazePAM (VALIUM) 5 mg tablet  [DISCONTINUED] methocarbamoL (ROBAXIN) 750 mg tablet No facility-administered encounter medications on file as of 9/2/2020. Review of Symptoms: 
 
Review of Systems Constitutional: Negative for chills, fever and weight loss. HENT: Negative for nosebleeds. Eyes: Negative for blurred vision and double vision. Respiratory: Negative for cough, shortness of breath and wheezing. Cardiovascular: Negative for chest pain, palpitations, orthopnea, leg swelling and PND. Gastrointestinal: Negative for abdominal pain, blood in stool, diarrhea, nausea and vomiting. Musculoskeletal: Negative for joint pain. Skin: Negative for rash. Neurological: Negative for dizziness, tingling and loss of consciousness. Endo/Heme/Allergies: Does not bruise/bleed easily. Physical Exam:   
 
General: Well developed, in no acute distress, cooperative and alert HEENT: No carotid bruits, no JVD, trach is midline. Neck Supple, PEERL, EOM intact. Heart:  reg rate and rhythm; normal S1/S2; no murmurs, no gallops or rubs. Respiratory: Clear bilaterally x 4, no wheezing or rales Abdomen:   Soft, non-tender, no distention, no masses. + BS. Extremities:  Normal cap refill, no cyanosis, atraumatic. No edema. Neuro: A&Ox3, speech clear, gait stable. Skin: Skin color is normal. No rashes or lesions. Non diaphoretic Vascular: 2+ pulses symmetric in all extremities Vitals:  
 09/02/20 1104 09/02/20 1114 BP: 140/82 130/86 Pulse: 68 Resp: 18 SpO2: 95% Weight: 278 lb (126.1 kg) Height: 6' (1.829 m) ECG: sinus rhythm Cardiology Labs: 
 
No results found for: FLP, CHOL, HDL, LDLC, VLDL, CHHD Lab Results Component Value Date/Time Hemoglobin A1c 5.2 07/20/2017 04:30 PM  
 
 
Lab Results Component Value Date/Time Sodium 140 08/27/2019 11:10 AM  
 Potassium 4.1 08/27/2019 11:10 AM  
 Chloride 105 08/27/2019 11:10 AM  
 CO2 31 08/27/2019 11:10 AM  
 Glucose 84 08/27/2019 11:10 AM  
 BUN 14 08/27/2019 11:10 AM  
 Creatinine 0.98 08/27/2019 11:10 AM  
 BUN/Creatinine ratio 14 08/27/2019 11:10 AM  
 GFR est AA >60 08/27/2019 11:10 AM  
 GFR est non-AA >60 08/27/2019 11:10 AM  
 Calcium 8.2 (L) 08/27/2019 11:10 AM  
 Anion gap 4 (L) 08/27/2019 11:10 AM  
 Bilirubin, total 0.6 08/27/2019 11:10 AM  
 ALT (SGPT) 42 08/27/2019 11:10 AM  
 Alk. phosphatase 78 08/27/2019 11:10 AM  
 Protein, total 7.0 08/27/2019 11:10 AM  
 Albumin 3.7 08/27/2019 11:10 AM  
 Globulin 3.3 08/27/2019 11:10 AM  
 A-G Ratio 1.1 08/27/2019 11:10 AM  
 
 
 
 Assessment: ICD-10-CM ICD-9-CM 1. Paroxysmal atrial fibrillation (HCC)  I48.0 427.31   
2. Essential hypertension  I10 401.9 AMB POC EKG ROUTINE W/ 12 LEADS, INTER & REP 3. Pre-operative clearance  Z01.818 V72.84   
4. S/P ablation of atrial fibrillation  Z98.890 V45.89   
 Z86.79 Plan: 1. Paroxysmal atrial fibrillation (HCC) S/p PVI for A fib Maintaining sinus rhythm today. Discussed utilizing JolieBox device to assess for recurrent A fib at home. Continue ASA. Off rate control therapies due to fatigue/bradycardia. 
  
2. Essential hypertension BP well controlled; continue anti-hypertensive therapy and low sodium diet. 3.  Pre-op clearance Cardiac cath in 8/2015 with patent coronaries without angiographic evidence of CAD. May be considered low operative risk from CV standpoint for back surgery May hold ASA 5 days prior to procedure 4. S/p ablation of atrial fibrillation F/u with Dr. Leora Rain in 6 months. Grey Lorenzo NP 
 
1. Have you been to the ER, urgent care clinic since your last visit? Hospitalized since your last visit? No 
 
2. Have you seen or consulted any other health care providers outside of the [address] [place] [address] since your last visit? Include any pap smears or colon screening. No 
 
Chief Complaint Patient presents with  Surgical Clearance  
  for back surgery [facility] Dr. Sonia Beaver Sincerely, Gil Lee MD

## 2020-10-06 ENCOUNTER — HOSPITAL ENCOUNTER (OUTPATIENT)
Dept: GENERAL RADIOLOGY | Age: 65
End: 2020-10-06
Attending: ORTHOPAEDIC SURGERY
Payer: COMMERCIAL

## 2020-10-06 ENCOUNTER — HOSPITAL ENCOUNTER (OUTPATIENT)
Dept: GENERAL RADIOLOGY | Age: 65
Discharge: HOME | End: 2020-10-06
Attending: ORTHOPAEDIC SURGERY
Payer: COMMERCIAL

## 2020-10-06 ENCOUNTER — HOSPITAL ENCOUNTER (OUTPATIENT)
Dept: PREADMISSION TESTING | Age: 65
Discharge: HOME | End: 2020-10-06
Attending: ORTHOPAEDIC SURGERY
Payer: COMMERCIAL

## 2020-10-06 VITALS
BODY MASS INDEX: 37.83 KG/M2 | TEMPERATURE: 98.2 F | HEIGHT: 72 IN | OXYGEN SATURATION: 95 % | RESPIRATION RATE: 18 BRPM | WEIGHT: 279.32 LBS | DIASTOLIC BLOOD PRESSURE: 71 MMHG | HEART RATE: 66 BPM | SYSTOLIC BLOOD PRESSURE: 132 MMHG

## 2020-10-06 DIAGNOSIS — E66.01 SEVERE OBESITY (BMI 35.0-39.9) WITH COMORBIDITY (HCC): ICD-10-CM

## 2020-10-06 DIAGNOSIS — I10 ESSENTIAL HYPERTENSION: ICD-10-CM

## 2020-10-06 DIAGNOSIS — E66.09 NON MORBID OBESITY DUE TO EXCESS CALORIES: ICD-10-CM

## 2020-10-06 DIAGNOSIS — R06.81 WITNESSED EPISODE OF APNEA: Primary | ICD-10-CM

## 2020-10-06 LAB
25(OH)D3 SERPL-MCNC: 27.9 NG/ML (ref 30–100)
ABO + RH BLD: NORMAL
ALBUMIN SERPL-MCNC: 3.8 G/DL (ref 3.5–5)
ALBUMIN/GLOB SERPL: 1.1 {RATIO} (ref 1.1–2.2)
ALP SERPL-CCNC: 91 U/L (ref 45–117)
ALT SERPL-CCNC: 44 U/L (ref 12–78)
ANION GAP SERPL CALC-SCNC: 4 MMOL/L (ref 5–15)
APPEARANCE UR: CLEAR
AST SERPL-CCNC: 26 U/L (ref 15–37)
BACTERIA URNS QL MICRO: NEGATIVE /HPF
BILIRUB SERPL-MCNC: 1 MG/DL (ref 0.2–1)
BILIRUB UR QL: NEGATIVE
BLOOD GROUP ANTIBODIES SERPL: NORMAL
BUN SERPL-MCNC: 12 MG/DL (ref 6–20)
BUN/CREAT SERPL: 14 (ref 12–20)
CALCIUM SERPL-MCNC: 9 MG/DL (ref 8.5–10.1)
CHLORIDE SERPL-SCNC: 106 MMOL/L (ref 97–108)
CO2 SERPL-SCNC: 30 MMOL/L (ref 21–32)
COLOR UR: NORMAL
CREAT SERPL-MCNC: 0.83 MG/DL (ref 0.7–1.3)
EPITH CASTS URNS QL MICRO: NORMAL /LPF
ERYTHROCYTE [DISTWIDTH] IN BLOOD BY AUTOMATED COUNT: 12.9 % (ref 11.5–14.5)
EST. AVERAGE GLUCOSE BLD GHB EST-MCNC: 126 MG/DL
GLOBULIN SER CALC-MCNC: 3.4 G/DL (ref 2–4)
GLUCOSE SERPL-MCNC: 101 MG/DL (ref 65–100)
GLUCOSE UR STRIP.AUTO-MCNC: NEGATIVE MG/DL
HBA1C MFR BLD: 6 % (ref 4–5.6)
HCT VFR BLD AUTO: 44.5 % (ref 36.6–50.3)
HGB BLD-MCNC: 15 G/DL (ref 12.1–17)
HGB UR QL STRIP: NEGATIVE
HYALINE CASTS URNS QL MICRO: NORMAL /LPF (ref 0–5)
INR PPP: 1 (ref 0.9–1.1)
KETONES UR QL STRIP.AUTO: NEGATIVE MG/DL
LEUKOCYTE ESTERASE UR QL STRIP.AUTO: NEGATIVE
MCH RBC QN AUTO: 31.4 PG (ref 26–34)
MCHC RBC AUTO-ENTMCNC: 33.7 G/DL (ref 30–36.5)
MCV RBC AUTO: 93.3 FL (ref 80–99)
NITRITE UR QL STRIP.AUTO: NEGATIVE
NRBC # BLD: 0 K/UL (ref 0–0.01)
NRBC BLD-RTO: 0 PER 100 WBC
PH UR STRIP: 5.5 [PH] (ref 5–8)
PLATELET # BLD AUTO: 191 K/UL (ref 150–400)
PMV BLD AUTO: 10.2 FL (ref 8.9–12.9)
POTASSIUM SERPL-SCNC: 3.7 MMOL/L (ref 3.5–5.1)
PREALB SERPL-MCNC: 24.3 MG/DL (ref 20–40)
PROT SERPL-MCNC: 7.2 G/DL (ref 6.4–8.2)
PROT UR STRIP-MCNC: NEGATIVE MG/DL
PROTHROMBIN TIME: 10.9 SEC (ref 9–11.1)
RBC # BLD AUTO: 4.77 M/UL (ref 4.1–5.7)
RBC #/AREA URNS HPF: NORMAL /HPF (ref 0–5)
SODIUM SERPL-SCNC: 140 MMOL/L (ref 136–145)
SP GR UR REFRACTOMETRY: 1.02 (ref 1–1.03)
SPECIMEN EXP DATE BLD: NORMAL
UA: UC IF INDICATED,UAUC: NORMAL
UROBILINOGEN UR QL STRIP.AUTO: 0.2 EU/DL (ref 0.2–1)
WBC # BLD AUTO: 4.7 K/UL (ref 4.1–11.1)
WBC URNS QL MICRO: NORMAL /HPF (ref 0–4)

## 2020-10-06 PROCEDURE — 80053 COMPREHEN METABOLIC PANEL: CPT

## 2020-10-06 PROCEDURE — 86900 BLOOD TYPING SEROLOGIC ABO: CPT

## 2020-10-06 PROCEDURE — 81001 URINALYSIS AUTO W/SCOPE: CPT

## 2020-10-06 PROCEDURE — 97116 GAIT TRAINING THERAPY: CPT

## 2020-10-06 PROCEDURE — 97161 PT EVAL LOW COMPLEX 20 MIN: CPT

## 2020-10-06 PROCEDURE — 71046 X-RAY EXAM CHEST 2 VIEWS: CPT

## 2020-10-06 PROCEDURE — 82306 VITAMIN D 25 HYDROXY: CPT

## 2020-10-06 PROCEDURE — 84134 ASSAY OF PREALBUMIN: CPT

## 2020-10-06 PROCEDURE — 85610 PROTHROMBIN TIME: CPT

## 2020-10-06 PROCEDURE — 85027 COMPLETE CBC AUTOMATED: CPT

## 2020-10-06 PROCEDURE — 36415 COLL VENOUS BLD VENIPUNCTURE: CPT

## 2020-10-06 PROCEDURE — 83036 HEMOGLOBIN GLYCOSYLATED A1C: CPT

## 2020-10-06 RX ORDER — ACETAMINOPHEN 500 MG
1000 TABLET ORAL ONCE
Status: CANCELLED | OUTPATIENT
Start: 2020-10-13 | End: 2020-10-13

## 2020-10-06 RX ORDER — PREGABALIN 150 MG/1
150 CAPSULE ORAL ONCE
Status: CANCELLED | OUTPATIENT
Start: 2020-10-13 | End: 2020-10-13

## 2020-10-06 RX ORDER — GLUCOSAM/CHONDRO/HERB 149/HYAL 750-100 MG
1 TABLET ORAL DAILY
COMMUNITY

## 2020-10-06 NOTE — PERIOP NOTES
Incentive Spirometer        Using the incentive spirometer helps expand the small air sacs of your lungs, helps you breathe deeply, and helps improve your lung function. Use your incentive spirometer twice a day (10 breaths each time) prior to surgery. How to Use Your Incentive Spirometer:  1. Hold the incentive spirometer in an upright position. 2. Breathe out as usual.   3. Place the mouthpiece in your mouth and seal your lips tightly around it. 4. Take a deep breath. Breathe in slowly and as deeply as possible. Keep the blue flow rate guide between the arrows. 5. Hold your breath as long as possible. Then exhale slowly and allow the piston to fall to the bottom of the column. 6. Rest for a few seconds and repeat steps one through five at least 10 times. PAT Tidal Volume_______2500_______  x______2_______  Date________10/6/2020__________    Christobal Ink THE INCENTIVE SPIROMETER WITH YOU TO THE HOSPITAL ON THE DAY OF YOUR SURGERY. Opportunity given to ask and answer questions as well as to observe return demonstration.     Patient signature_____________________________    Witness____________________________

## 2020-10-06 NOTE — ADVANCED PRACTICE NURSE
PAT Nurse Practitioner   Pre-Operative Chart Review/Assessment:-ORTHOPEDIC/NEUROSURGICAL SPINE                Patient Name:  Rosalinda Egan                                                         Age:   77 y.o.    :  1955     Today's Date:  10/6/2020     Date of PAT:   10/6/20     Date of Surgery:    10/13/20      Procedure(s):  Bilateral  L4-5 decompression      Surgeon:   Bernard Enriquez     Medical Clearance:  Dr. Roman Mathews                    PLAN:      1)  Cardiac Clearance:  Dr. Carlos Eduardo Ramirez       2)  Diabetic Treatment Consult:  Not indicated-A1C 6.0      3)  Sleep Apnea evaluation:   REFERRAL SENT-JACKELIN 6 w/ reported witnessed apnea. Has been recommended by PCP and cardiology       4) Treatment for MRSA/Staph Aureus:  Negative      5) Additional Concerns:   At risk for JACKELIN, a fib, s/p ablation, former smoker                 Vital Signs:         Vitals:    10/06/20 1327   BP: 132/71   Pulse: 66   Resp: 18   Temp: 98.2 °F (36.8 °C)   SpO2: 95%   Weight: 126.7 kg (279 lb 5.2 oz)   Height: 6' (1.829 m)            ____________________________________________  PAST MEDICAL HISTORY  Past Medical History:   Diagnosis Date    A-fib [facility])     ablations x4    Arthritis     back    Chronic pain     back    Encounter for cardioversion procedure 2017    GERD (gastroesophageal reflux disease)     High cholesterol     Hypertension 2017    patient denies hx of HTN    Multiple thyroid nodules     biopsied and benign    Non-nicotine vapor product user     on occassion    PUD (peptic ulcer disease)     \"years ago\"    Sleep apnea     wife witnessed apnea -- PCP & Card both referred to sleep clinic pt refused      ____________________________________________  PAST SURGICAL HISTORY  Past Surgical History:   Procedure Laterality Date    CARDIAC CATHETERIZATION      CARDIAC SURG PROCEDURE UNLIST  ,     ABLATION x4  &     CARDIOVERSION EXTERNAL      HX AFIB ABLATION      HX CERVICAL DISKECTOMY   pt unsure exactly what they did to his neck    HX CERVICAL FUSION      HX HEENT  13    THYROID BIOPSY    HX ORTHOPAEDIC Left     shoulder surgery on left    HX ORTHOPAEDIC Left     wrist surgery, LEFT    HX ORTHOPAEDIC Right     elbow surgery, RIGHT    HX TONSILLECTOMY        ____________________________________________  HOME MEDICATIONS    Current Outpatient Medications   Medication Sig    omega 3-DHA-EPA-fish oil 1,000 mg (120 mg-180 mg) capsule Take 1 Cap by mouth daily.  furosemide (LASIX) 20 mg tablet Take 1 Tab by mouth daily as needed (edema).  salt moisturing solution no.1 (OCEAN ULTRA SALINE MIST NA) 1 [place] by Both Nostrils route nightly.  valACYclovir (VALTREX) 500 mg tablet Take 500 mg by mouth daily as needed ('outbreaks').  cyanocobalamin (VITAMIN B-12) 1,000 mcg tablet Take 1,000 mcg by mouth daily.  aspirin (ASPIRIN) 325 mg tablet Take 325 mg by mouth daily.  diclofenac EC (VOLTAREN) 75 mg EC tablet Take 75 mg by mouth two (2) times a day. No current facility-administered medications for this encounter.       ____________________________________________  ALLERGIES  Allergies   Allergen Reactions    Percocet [Oxycodone-Acetaminophen] Anxiety     Patient takes Tylenol without problems.     Acetaminophen Other (comments)     Pt denies    Guaifenesin Other (comments)     Other reaction(s): gi upset    Oxycodone Other (comments)     Patient denies      ____________________________________________  SOCIAL HISTORY  Social History     Tobacco Use    Smoking status: Former Smoker     Packs/day: 0.25     Years: 40.00     Pack years: 10.00     Last attempt to quit: 10/1/2012     Years since quittin.0    Smokeless tobacco: Never Used   Substance Use Topics    Alcohol use: Yes     Comment: occassionally 3-4 cans of beer      ____________________________________________        Labs:     Hospital Outpatient Visit on 10/06/2020   Component Date Value Ref Range Status    WBC 10/06/2020 4.7  4.1 - 11.1 K/uL Final    RBC 10/06/2020 4.77  4.10 - 5.70 M/uL Final    HGB 10/06/2020 15.0  12.1 - 17.0 g/dL Final    HCT 10/06/2020 44.5  36.6 - 50.3 % Final    MCV 10/06/2020 93.3  80.0 - 99.0 FL Final    MCH 10/06/2020 31.4  26.0 - 34.0 PG Final    MCHC 10/06/2020 33.7  30.0 - 36.5 g/dL Final    RDW 10/06/2020 12.9  11.5 - 14.5 % Final    PLATELET 64/80/9589 235  150 - 400 K/uL Final    MPV 10/06/2020 10.2  8.9 - 12.9 FL Final    NRBC 10/06/2020 0.0  0  WBC Final    ABSOLUTE NRBC 10/06/2020 0.00  0.00 - 0.01 K/uL Final    INR 10/06/2020 1.0  0.9 - 1.1   Final    A single therapeutic range for Vit K antagonists may not be optimal for all indications - see June, 2008 issue of Chest, American College of Chest Physicians Evidence-Based Clinical Practice Guidelines, 8th Edition.  Prothrombin time 10/06/2020 10.9  9.0 - 11.1 sec Final    Sodium 10/06/2020 140  136 - 145 mmol/L Final    Potassium 10/06/2020 3.7  3.5 - 5.1 mmol/L Final    Chloride 10/06/2020 106  97 - 108 mmol/L Final    CO2 10/06/2020 30  21 - 32 mmol/L Final    Anion gap 10/06/2020 4* 5 - 15 mmol/L Final    Glucose 10/06/2020 101* 65 - 100 mg/dL Final    BUN 10/06/2020 12  6 - 20 MG/DL Final    Creatinine 10/06/2020 0.83  0.70 - 1.30 MG/DL Final    BUN/Creatinine ratio 10/06/2020 14  12 - 20   Final    GFR est AA 10/06/2020 >60  >60 ml/min/1.73m2 Final    GFR est non-AA 10/06/2020 >60  >60 ml/min/1.73m2 Final    Estimated GFR is calculated using the IDMS-traceable Modification of Diet in Renal Disease (MDRD) Study equation, reported for both  Americans (GFRAA) and non- Americans (GFRNA), and normalized to 1.73m2 body surface area. The physician must decide which value applies to the patient.     Calcium 10/06/2020 9.0  8.5 - 10.1 MG/DL Final    Bilirubin, total 10/06/2020 1.0  0.2 - 1.0 MG/DL Final    ALT (SGPT) 10/06/2020 44  12 - 78 U/L Final    AST (SGOT) 10/06/2020 26  15 - 37 U/L Final    Alk. phosphatase 10/06/2020 91  45 - 117 U/L Final    Protein, total 10/06/2020 7.2  6.4 - 8.2 g/dL Final    Albumin 10/06/2020 3.8  3.5 - 5.0 g/dL Final    Globulin 10/06/2020 3.4  2.0 - 4.0 g/dL Final    A-G Ratio 10/06/2020 1.1  1.1 - 2.2   Final          Skin:   Denies open wounds, cuts, sores, rashes or other areas of concern in PAT assessment.         Omar Melo, NP

## 2020-10-06 NOTE — PERIOP NOTES
PAT appointment reviewed all instructions and teaching with patient and reviewed Ortho/Spine book with patient with all questions answered. JACKELIN = 6 LAWRENCE Good NP aware - cardiac clearance on chart.

## 2020-10-06 NOTE — PERIOP NOTES
Hibiclens/Chlorhexidine    Preventing Infections Before and After - Your Surgery    IMPORTANT INSTRUCTIONS    Please read and follow these instructions carefully. If you are unable to comply with the below instructions your procedure will be cancelled. Every Night for Three (3) nights before your surgery:  1. Shower with an antibacterial soap, such as Dial, or the soap provided at your preassessment appointment. A shower is better than a bath for cleaning your skin. 2. If needed, ask someone to help you reach all areas of your body. Dont forget to clean your belly button with every shower. The night before your surgery: If you lose your Hibiclens/chlorhexidine please contact surgery center or you can purchase it at a local pharmacy  1. On the night before your surgery, shower with an antibacterial soap, such as Dial, or the soap provided at your preassessment appointment. 2. With one packet of Hibiclens/Chlorhexidine in hand, turn water off.  3. Apply Hibiclens antiseptic skin cleanser with a clean, freshly washed washcloth. ? Gently apply to your body from chin to toes (except the genital area) and especially the area(s) where your incision(s) will be. ? Leave Hibiclens/Chlorhexidine on your skin for at least 20 seconds. CAUTION: If needed, Hibiclens/chlorhexidine may be used to clean the folds of skin of the legs (such as in the area of the groin) and on your buttocks and hips. However, do not use Hibiclens/Chlorhexidine above the neck or in the genital area (your bottom) or put inside any area of your body. 4. Turn the water back on and rinse. 5. Dry gently with a clean, freshly washed towel. 6. After your shower, do not use any powder, deodorant, perfumes or lotion. 7. Use clean, freshly washed towels and washcloths every time you shower. 8. Wear clean, freshly washed pajamas to bed the night before surgery. 9. Sleep on clean, freshly washed sheets.   10. Do not allow pets to sleep in your bed with you. The Morning of your surgery:  1. Shower again thoroughly with an antibacterial soap, such as Dial or the soap provided at your preassessment appointment. If needed, ask someone for help to reach all areas of your body. Dont forget to clean your belly button! Rinse. 2. Dry gently with a clean, freshly washed towel. 3. After your shower, do not use any powder, deodorant, perfumes or lotion prior to surgery. 4. Put on clean, freshly washed clothing. Tips to help prevent infections after your surgery:  1. Protect your surgical wound from germs:  ? Hand washing is the most important thing you and your caregivers can do to prevent infections. ? Keep your bandage clean and dry! ? Do not touch your surgical wound. 2. Use clean, freshly washed towels and washcloths every time you shower; do not share bath linens with others. 3. Until your surgical wound is healed, wear clothing and sleep on bed linens each day that are clean and freshly washed. 4. Do not allow pets to sleep in your bed with you or touch your surgical wound. 5. Do not smoke - smoking delays wound healing. This may be a good time to stop smoking. 6. If you have diabetes, it is important for you to manage your blood sugar levels properly before your surgery as well as after your surgery. Poorly managed blood sugar levels slow down wound healing and prevent you from healing completely. If you lose your Hibiclens/chlorhexidine, please call the [facility], or it is available for purchase at your pharmacy.                ___________________      ___________________      ________________  (Signature of Patient)          (Witness)                   (Date and Time)

## 2020-10-06 NOTE — PROGRESS NOTES
[facility]  Physical Therapy Pre-surgery evaluation  [address] [facility], [address] [facility]    PHYSICAL THERAPY PRE SPINE SURGERY EVALUATION  Patient:Tay Mcintyre [de-identified]77 y.o. male)  Date: 10/6/2020    pat  Procedure(s) (LRB):  BILATERAL L4-5 DECOMPRESSION (CHOICE ANESTH) (Bilateral)     Precautions: spinal         ASSESSMENT :  Based on the objective data described below, the patient presents with impaired tolerance of activity due to end stage degenerative joint disease in the spinal level: lumbar spine. Discussed anticipated disposition to home with possible discharge within a 1 to 2 day time frame post-surgery. Patient  in agreement. Anticipate patient will need acute PT and OT orders based on expected deficits post surgery. GOALS: (Goals have been discussed and agreed upon with patient.)  DISCHARGE GOALS: Time Frame: 1 DAY  1. Patient will demonstrate increased strength, range of motion, and pain control via a home exercise program in order to minimize functional deficits in preparation for their upcoming surgery. This will be achieved by using education, demonstration and through the use of an informational handout including a home exercise program.  REHABILITATION POTENTIAL FOR STATED GOALS: Good     RECOMMENDATIONS AND PLANNED INTERVENTIONS: (Benefits and precautions of physical therapy have been discussed with the patient.)  · Home Exercise Program  TREATMENT PLAN EFFECTIVE DATES: 10/6/2020 to 10/6/2020   FREQUENCY/DURATION: Patient to continue to perform home exercise program at least twice daily until surgery. SUBJECTIVE:   Patient stated sometimes I feel a little off balance when I walk.     OBJECTIVE DATA SUMMARY:   HISTORY:      Past Medical History:   Diagnosis Date    A-fib ([address]     ablations x4    Arthritis     back    Chronic pain     back    Encounter for cardioversion procedure 2/7/2017    GERD (gastroesophageal reflux disease)     High cholesterol     Hypertension 02/07/2017    patient denies hx of HTN    Multiple thyroid nodules     biopsied and benign    Non-nicotine vapor product user     on occassion    PUD (peptic ulcer disease)     \"years ago\"    Sleep apnea     wife witnessed apnea -- PCP & Card both referred to sleep clinic pt refused     Past Surgical History:   Procedure Laterality Date   [address] UNLIST  2010, 2011    ABLATION x4 2016 & 2018    CARDIOVERSION EXTERNAL      HX AFIB ABLATION      HX CERVICAL DISKECTOMY  2015    pt unsure exactly what they did to his neck    HX CERVICAL FUSION  2013    HX HEENT  6/6/13    THYROID BIOPSY    HX ORTHOPAEDIC Left 2015    shoulder surgery on left    HX ORTHOPAEDIC Left 1990    wrist surgery, LEFT    HX ORTHOPAEDIC Right 1996    elbow surgery, RIGHT    HX TONSILLECTOMY         Prior Level of Function/Home Situation: Patient is indep in community without AD and driving, no history of falls. Patient has been to outpatient PT, which helped at first but now radiating sxs into bilateral LEs have become worse. Patient reports he feels slightly off balance at times, but gait steady today. Personal factors and/or comorbidities impacting plan of care:       Home Situation  Home Environment: Private residence  # Steps to Enter: 4  Rails to Enter: Yes  Hand Rails : Right  One/Two Story Residence: Two story, live on 1st floor  Lift Chair Available: No  Living Alone: No  Support Systems: Spouse/Significant Other/Partner  Patient Expects to be Discharged to[de-identified] Private residence  Current DME Used/Available at Home: Blood pressure cuff  Tub or Shower Type: Shower           EXAMINATION/PRESENTATION/DECISION MAKING:     ADLs (Current Functional Status):    Bathing/Showering:   [x] Independent  [] Requires Assistance from Someone  [] Sponge Bath Only   Ambulation:  [x] Independent  [] Walk Indoors Only  [] Walk Outdoors  [] Use Assistive Device  [] Use Wheelchair Only     Dressing:  [x] [address] from Someone for:  [] Sock/Shoes  [] Pants  [] Everything   Household Activities:  [x] Routine house and yard work  [] Light Housework Only  [] None       Critical Behavior:                Strength:     Strength: Within functional limits                                                            Range Of Motion:     AROM: Within functional limits           PROM: Within functional limits                Coordination:   Coordination: Within functional limits    Functional Mobility:  Transfers:  Sit to Stand: Independent  Stand to Sit: Independent                       Balance:   Sitting: Intact  Standing: Intact  Ambulation/Gait Training:  Distance (ft): 100 Feet (ft)     Ambulation - Level of Assistance: Independent                                                 Functional Measure:  10 Meter walk test:  (Specify if any supplemental oxygen is used, the type, pre, during and post sats.)    Self-Selected Or Fast-Velocity: Self Selected Velocity  Trial 1 (Time to Walk 10 Meters): 11.3 Seconds  Trial 2 (Time to Walk 10 Meters): 11.8 Seconds  Trial 3 (Time to Walk 10 Meters): 9.9 Seconds  Average : 11 Seconds  Score (Meters/Second): 0.9 Meters/Second             Walking Speed (m/s)  Modifier Scale Age 52-63 Age 61-76 Age 66-77 Age 80-80    Male Female Male Female Male Female Male Female   CH   0% Impaired ?  1.39 ? 1.40 ? 1.36 ? 1.30 ? 1.33 ? 1.27 ? 1.21 ? 1.15   CI   1-19% Impaired 1.11-1.38 1.12-1.39 1.09-1.35 1.04-1.29 1.06-1.32 1.01-1.26 0.96-1.20 0.92-1.14   CJ   20-39% Impaired 0.83-1.10 0.84-1.11 0.82-1.08 0.78-1.03 0.80-1.05 0.76-1.00 0.72-0.95 0.69-0.91   CK   40-59% Impaired 0.56-0.82 0.57-0.83 0.54-0.81 0.52-0.77 0.53-0.79 0.51-0.75 0.48-0.71 0.46-0.68   CL   60-79% Impaired 0.28-0.55 0.28-0.56 0.27-0.53 0.26-0.51 0.27-0.52 0.25-0.50 0.24-0.49 0.23-0.45   CM   80-99% Impaired 0.01-0.28 < 0.01-0.28 < 0.01-0.27 < 0.01-0.26 0.01-0.27 0.01-0.24 0. 01-0.23 0.01-0.22   CN   100% Impaired Cannot Perform   Minimal Detectable Change (MDC-90) = 0.1 m/s  Marcus CHAVEZ. \"Comfortable and maximum walking speed of adults aged 20-79 years: reference values and determinants. \" Age and Agin Volume 26(1):15-9. Joby Ruiz \"Age- and gender-related test performance in community-dwelling elderly people: Six-Minute Walk Test, Valle Balance Scale, Timed Up & Go Test, and gait speeds. \" Physical Therapy: 2002 Volume 82(2):128-37. Francois NEW, Scott ANDERSON, Rox Walker JD, [facility] Irma[facility] Hospitalbrando LINN. \"Assessing stability and change of four performance measures: a longitudinal study evaluating outcome following total hip and knee arthroplasty. \" [facility] Musculoskeletal Disorders: 2005 Volume 6(3). Uzma Campbell, PhD; Gypsy Laura, PhD. Brenden Delgado Paper: \"Walking Speed: the Sixth Vital Sign\" Journal of Geriatric Physical Therapy: 2009 - Volume 32 - Issue 2 - p 2-5 . Pain:  Pain Scale 1: Numeric (0 - 10)  Pain Intensity 1: 6  Pain Location 1: Back  Pain Orientation 1: Lower;Mid          Radicular pain:   Patient reports sharp radiating pain into bilateral LEs to level of foot, left more often and worse than right. Activity Tolerance:   good  Patient []   does  [x]   does not demonstrate signs/symptoms of shortness of breath/dyspnea on exertion/respiratory distress. COMMUNICATION/EDUCATION:   The patient was educated on:  [x]         Early post operative mobility is imperative to achieve a patient's desired outcomes and to restore biological function. [x]         Post operative spinal precautions may/may not be applicable. These precautions are based on the patient's physician and the procedure(s) performed.     [x]         Spinal precautions including:  ·   No bending forward, sideways, or backwards  ·   No twisting   ·   No lifting more than 5-10 pounds  ·   No sitting longer than 30-60 minutes at a time  ·   Fabian brace when out of bed and mobilizing    The patients plan of care was discussed as follows:   [x]         The patient verbalized understanding of his/her plan in preparation for their upcoming surgery  []         The patient's  was present for this session  []        The patient reports that he/she does not have a  identified at this time  []         The  verbalized understanding of the education regarding the patient's upcoming surgery  [x]         Patient/family agree to work toward stated goals and plan of care. []         Patient understands intent and goals of therapy, but is neutral about his/her participation. []         Patient is unable to participate in goal setting and plan of care.       Thank you for this referral.  Huma Friedman, PT    Time Calculation: 23 mins

## 2020-10-06 NOTE — PERIOP NOTES

## 2020-10-06 NOTE — PERIOP NOTES
[facility]  Joint/Spine Preoperative Instructions        Surgery Date 10/13/2020      Time of Arrival 0545am  Contact# [phone]    1. On the day of your surgery, please report to the Surgical Services Registration Desk and sign in at your designated time. The Surgery Center is located to the right of the Emergency Room. 2. You must have someone with you to drive you home. You should not drive a car for 24 hours following surgery. Please make arrangements for a friend or family member to stay with you for the first 24 hours after your surgery. 3. No food after midnight  10/12/2020. Medications morning of surgery should be taken with a sip of water. Please follow pre-surgery drink instructions that were given at your Pre Admission Testing appointment. 4. We recommend you do not drink any alcoholic beverages for 24 hours before and after your surgery. 5. Contact your surgeons office for instructions on the following medications: non-steroidal anti-inflammatory drugs (i.e. Advil, Aleve), vitamins, and supplements. (Some surgeons will want you to stop these medications prior to surgery and others may allow you to take them)  **If you are currently taking Plavix, Coumadin, Aspirin and/or other blood-thinning agents, contact your surgeon for instructions. ** Your surgeon will partner with the physician prescribing these medications to determine if it is safe to stop or if you need to continue taking. Please do not stop taking these medications without instructions from your surgeon    6. Wear comfortable clothes. Wear glasses instead of contacts. Do not bring any money or jewelry. Please bring picture ID, insurance card, and any prearranged co-payment or hospital payment. Do not wear make-up, particularly mascara the morning of your surgery. Do not wear nail polish, particularly if you are having foot /hand surgery.   Wear your hair loose or down, no ponytails, buns, eugene pins or clips. All body piercings must be removed. Please shower with antibacterial soap for three consecutive days before and on the morning of surgery, but do not apply any lotions, powders or deodorants after the shower on the day of surgery. Please use a fresh towels after each shower. Please sleep in clean clothes and change bed linens the night before surgery. Please do not shave for 48 hours prior to surgery. Shaving of the face is acceptable. 7. You should understand that if you do not follow these instructions your surgery may be cancelled. If your physical condition changes (I.e. fever, cold or flu) please contact your surgeon as soon as possible. 8. It is important that you be on time. If a situation occurs where you may be late, please call [phone] (OR Holding Area). 9. If you have any questions and or problems, please call [phone] (Pre-admission Testing). 10. Your surgery time may be subject to change. You will receive a phone call the evening prior if your time changes. 11.  If having outpatient surgery, you must have someone to drive you here, stay with you during the duration of your stay, and to drive you home at time of discharge. 12. The following link is for the educational video for patients and/or families. http://[facility].org/. com/locations/cywrvvgss-evntqxe-itxgdyn/[place]/bcmrufsr-uwvzbmii-mwjxhoz-[place]/educational-materials    Special Instructions: Follow all instructions your doctor has given you. Use your incentive spirometer everyday 20x a day and bring with you to the hospital.  Read and review your Ortho/Spine book (3 ring binder) and bring with you to the hospital.    Covid Testing 10/9/2020 @0900 - [facility] ([address] side) We recommend you self quarantine from time of testing til day of surgery. TAKE ALL MEDICATIONS THE DAY OF SURGERY EXCEPT:   Diclofenac, any vitamins or supplements.       I understand a pre-operative phone call will be made to verify my surgery time. In the event that I am not available, I give permission for a message to be left on my answering service and/or with another person?   yes         ___________________      __________   _________    (Signature of Patient)             (Witness)                (Date and Time)

## 2020-10-07 LAB
BACTERIA SPEC CULT: NORMAL
BACTERIA SPEC CULT: NORMAL
SERVICE CMNT-IMP: NORMAL

## 2020-10-07 NOTE — PERIOP NOTES
Call made to patient - made him aware of low Vit D level and recommended 2000IU/day - stated understanding.

## 2020-10-09 ENCOUNTER — HOSPITAL ENCOUNTER (OUTPATIENT)
Dept: PREADMISSION TESTING | Age: 65
Discharge: HOME | End: 2020-10-09
Payer: COMMERCIAL

## 2020-10-09 PROCEDURE — 87635 SARS-COV-2 COVID-19 AMP PRB: CPT

## 2020-10-10 LAB
HEALTH STATUS, XMCV2T: NORMAL
SARS-COV-2, COV2NT: NOT DETECTED
SOURCE, COVRS: NORMAL
SPECIMEN SOURCE, FCOV2M: NORMAL
SPECIMEN TYPE, XMCV1T: NORMAL

## 2020-10-12 ENCOUNTER — ANESTHESIA EVENT (OUTPATIENT)
Dept: SURGERY | Age: 65
End: 2020-10-12
Payer: COMMERCIAL

## 2020-10-13 ENCOUNTER — ANESTHESIA (OUTPATIENT)
Dept: SURGERY | Age: 65
End: 2020-10-13
Payer: COMMERCIAL

## 2020-10-13 ENCOUNTER — HOSPITAL ENCOUNTER (OUTPATIENT)
Age: 65
Discharge: HOME | End: 2020-10-14
Attending: ORTHOPAEDIC SURGERY | Admitting: ORTHOPAEDIC SURGERY
Payer: COMMERCIAL

## 2020-10-13 ENCOUNTER — APPOINTMENT (OUTPATIENT)
Dept: GENERAL RADIOLOGY | Age: 65
End: 2020-10-13
Attending: ORTHOPAEDIC SURGERY
Payer: COMMERCIAL

## 2020-10-13 DIAGNOSIS — Z98.890 STATUS POST LUMBAR SPINE OPERATIVE PROCEDURE FOR DECOMPRESSION OF SPINAL CORD: Primary | ICD-10-CM

## 2020-10-13 PROBLEM — M48.062 SPINAL STENOSIS OF LUMBAR REGION WITH NEUROGENIC CLAUDICATION: Status: ACTIVE | Noted: 2020-10-13

## 2020-10-13 PROCEDURE — 74011000250 HC RX REV CODE- 250: Performed by: NURSE ANESTHETIST, CERTIFIED REGISTERED

## 2020-10-13 PROCEDURE — 74011000250 HC RX REV CODE- 250: Performed by: ORTHOPAEDIC SURGERY

## 2020-10-13 PROCEDURE — 77030004402 HC BUR NEUR STRY -C: Performed by: ORTHOPAEDIC SURGERY

## 2020-10-13 PROCEDURE — 97161 PT EVAL LOW COMPLEX 20 MIN: CPT

## 2020-10-13 PROCEDURE — 77030022704 HC SUT VLOC COVD -B: Performed by: ORTHOPAEDIC SURGERY

## 2020-10-13 PROCEDURE — 97116 GAIT TRAINING THERAPY: CPT

## 2020-10-13 PROCEDURE — 74011250636 HC RX REV CODE- 250/636: Performed by: NURSE ANESTHETIST, CERTIFIED REGISTERED

## 2020-10-13 PROCEDURE — 77030018723 HC ELCTRD BLD COVD -A: Performed by: ORTHOPAEDIC SURGERY

## 2020-10-13 PROCEDURE — 94760 N-INVAS EAR/PLS OXIMETRY 1: CPT

## 2020-10-13 PROCEDURE — 72020 X-RAY EXAM OF SPINE 1 VIEW: CPT

## 2020-10-13 PROCEDURE — 74011250636 HC RX REV CODE- 250/636: Performed by: ORTHOPAEDIC SURGERY

## 2020-10-13 PROCEDURE — 74011250636 HC RX REV CODE- 250/636: Performed by: ANESTHESIOLOGY

## 2020-10-13 PROCEDURE — 77030008462 HC STPLR SKN PROX J&J -A: Performed by: ORTHOPAEDIC SURGERY

## 2020-10-13 PROCEDURE — 77030034479 HC ADH SKN CLSR PRINEO J&J -B: Performed by: ORTHOPAEDIC SURGERY

## 2020-10-13 PROCEDURE — 77030031139 HC SUT VCRL2 J&J -A: Performed by: ORTHOPAEDIC SURGERY

## 2020-10-13 PROCEDURE — 77030018836 HC SOL IRR NACL ICUM -A: Performed by: ORTHOPAEDIC SURGERY

## 2020-10-13 PROCEDURE — 99218 HC RM OBSERVATION: CPT

## 2020-10-13 PROCEDURE — 76060000034 HC ANESTHESIA 1.5 TO 2 HR: Performed by: ORTHOPAEDIC SURGERY

## 2020-10-13 PROCEDURE — 77030003666 HC NDL SPINAL BD -A: Performed by: ORTHOPAEDIC SURGERY

## 2020-10-13 PROCEDURE — 77030041680 HC PNCL ELECSURG SMK EVAC CNMD -B: Performed by: ORTHOPAEDIC SURGERY

## 2020-10-13 PROCEDURE — 77030026438 HC STYL ET INTUB CARD -A: Performed by: NURSE ANESTHETIST, CERTIFIED REGISTERED

## 2020-10-13 PROCEDURE — 77030040361 HC SLV COMPR DVT MDII -B: Performed by: ORTHOPAEDIC SURGERY

## 2020-10-13 PROCEDURE — 77030033138 HC SUT PGA STRATFX J&J -B: Performed by: ORTHOPAEDIC SURGERY

## 2020-10-13 PROCEDURE — 77030013079 HC BLNKT BAIR HGGR 3M -A: Performed by: NURSE ANESTHETIST, CERTIFIED REGISTERED

## 2020-10-13 PROCEDURE — 76210000000 HC OR PH I REC 2 TO 2.5 HR: Performed by: ORTHOPAEDIC SURGERY

## 2020-10-13 PROCEDURE — 74011250637 HC RX REV CODE- 250/637: Performed by: ORTHOPAEDIC SURGERY

## 2020-10-13 PROCEDURE — 77010033678 HC OXYGEN DAILY

## 2020-10-13 PROCEDURE — 77030040356 HC CORD BPLR FRCP COVD -A: Performed by: ORTHOPAEDIC SURGERY

## 2020-10-13 PROCEDURE — 77030003029 HC SUT VCRL J&J -B: Performed by: ORTHOPAEDIC SURGERY

## 2020-10-13 PROCEDURE — 77030003028 HC SUT VCRL J&J -A: Performed by: ORTHOPAEDIC SURGERY

## 2020-10-13 PROCEDURE — 77030040236 HC DEV DRSG WND PICO S&N -D: Performed by: ORTHOPAEDIC SURGERY

## 2020-10-13 PROCEDURE — 2709999900 HC NON-CHARGEABLE SUPPLY: Performed by: ORTHOPAEDIC SURGERY

## 2020-10-13 PROCEDURE — 77030008684 HC TU ET CUF COVD -B: Performed by: NURSE ANESTHETIST, CERTIFIED REGISTERED

## 2020-10-13 PROCEDURE — 77030029099 HC BN WAX SSPC -A: Performed by: ORTHOPAEDIC SURGERY

## 2020-10-13 PROCEDURE — 77030014647 HC SEAL FBRN TISSL BAXT -D: Performed by: ORTHOPAEDIC SURGERY

## 2020-10-13 PROCEDURE — 77030038692 HC WND DEB SYS IRMX -B: Performed by: ORTHOPAEDIC SURGERY

## 2020-10-13 PROCEDURE — 76000 FLUOROSCOPY <1 HR PHYS/QHP: CPT

## 2020-10-13 PROCEDURE — 76010000162 HC OR TIME 1.5 TO 2 HR INTENSV-TIER 1: Performed by: ORTHOPAEDIC SURGERY

## 2020-10-13 RX ORDER — NEOSTIGMINE METHYLSULFATE 1 MG/ML
INJECTION, SOLUTION INTRAVENOUS AS NEEDED
Status: DISCONTINUED | OUTPATIENT
Start: 2020-10-13 | End: 2020-10-13 | Stop reason: HOSPADM

## 2020-10-13 RX ORDER — FENTANYL CITRATE 50 UG/ML
25 INJECTION, SOLUTION INTRAMUSCULAR; INTRAVENOUS
Status: DISCONTINUED | OUTPATIENT
Start: 2020-10-13 | End: 2020-10-13 | Stop reason: HOSPADM

## 2020-10-13 RX ORDER — SODIUM CHLORIDE, SODIUM LACTATE, POTASSIUM CHLORIDE, CALCIUM CHLORIDE 600; 310; 30; 20 MG/100ML; MG/100ML; MG/100ML; MG/100ML
25 INJECTION, SOLUTION INTRAVENOUS ONCE
Status: COMPLETED | OUTPATIENT
Start: 2020-10-13 | End: 2020-10-13

## 2020-10-13 RX ORDER — SUCCINYLCHOLINE CHLORIDE 20 MG/ML
INJECTION INTRAMUSCULAR; INTRAVENOUS AS NEEDED
Status: DISCONTINUED | OUTPATIENT
Start: 2020-10-13 | End: 2020-10-13 | Stop reason: HOSPADM

## 2020-10-13 RX ORDER — DEXAMETHASONE SODIUM PHOSPHATE 4 MG/ML
INJECTION, SOLUTION INTRA-ARTICULAR; INTRALESIONAL; INTRAMUSCULAR; INTRAVENOUS; SOFT TISSUE AS NEEDED
Status: DISCONTINUED | OUTPATIENT
Start: 2020-10-13 | End: 2020-10-13 | Stop reason: HOSPADM

## 2020-10-13 RX ORDER — HYDROMORPHONE HYDROCHLORIDE 1 MG/ML
1 INJECTION, SOLUTION INTRAMUSCULAR; INTRAVENOUS; SUBCUTANEOUS
Status: DISCONTINUED | OUTPATIENT
Start: 2020-10-13 | End: 2020-10-14 | Stop reason: HOSPADM

## 2020-10-13 RX ORDER — ONDANSETRON 2 MG/ML
4 INJECTION INTRAMUSCULAR; INTRAVENOUS
Status: DISCONTINUED | OUTPATIENT
Start: 2020-10-13 | End: 2020-10-14 | Stop reason: HOSPADM

## 2020-10-13 RX ORDER — FAMOTIDINE 20 MG/1
20 TABLET, FILM COATED ORAL 2 TIMES DAILY
Status: DISCONTINUED | OUTPATIENT
Start: 2020-10-13 | End: 2020-10-14 | Stop reason: HOSPADM

## 2020-10-13 RX ORDER — ROCURONIUM BROMIDE 10 MG/ML
INJECTION, SOLUTION INTRAVENOUS AS NEEDED
Status: DISCONTINUED | OUTPATIENT
Start: 2020-10-13 | End: 2020-10-13 | Stop reason: HOSPADM

## 2020-10-13 RX ORDER — FACIAL-BODY WIPES
10 EACH TOPICAL DAILY PRN
Status: DISCONTINUED | OUTPATIENT
Start: 2020-10-15 | End: 2020-10-14 | Stop reason: HOSPADM

## 2020-10-13 RX ORDER — VALACYCLOVIR HYDROCHLORIDE 500 MG/1
500 TABLET, FILM COATED ORAL
Status: DISCONTINUED | OUTPATIENT
Start: 2020-10-13 | End: 2020-10-14 | Stop reason: HOSPADM

## 2020-10-13 RX ORDER — MIDAZOLAM HYDROCHLORIDE 1 MG/ML
INJECTION, SOLUTION INTRAMUSCULAR; INTRAVENOUS AS NEEDED
Status: DISCONTINUED | OUTPATIENT
Start: 2020-10-13 | End: 2020-10-13 | Stop reason: HOSPADM

## 2020-10-13 RX ORDER — SODIUM CHLORIDE 0.9 % (FLUSH) 0.9 %
5-40 SYRINGE (ML) INJECTION AS NEEDED
Status: DISCONTINUED | OUTPATIENT
Start: 2020-10-13 | End: 2020-10-14 | Stop reason: HOSPADM

## 2020-10-13 RX ORDER — DIAZEPAM 5 MG/1
5 TABLET ORAL
Status: DISCONTINUED | OUTPATIENT
Start: 2020-10-13 | End: 2020-10-14 | Stop reason: HOSPADM

## 2020-10-13 RX ORDER — DICLOFENAC SODIUM 10 MG/G
2 GEL TOPICAL 4 TIMES DAILY
Status: DISCONTINUED | OUTPATIENT
Start: 2020-10-13 | End: 2020-10-14 | Stop reason: HOSPADM

## 2020-10-13 RX ORDER — OXYCODONE HYDROCHLORIDE 5 MG/1
10-15 TABLET ORAL
Status: DISCONTINUED | OUTPATIENT
Start: 2020-10-13 | End: 2020-10-14 | Stop reason: HOSPADM

## 2020-10-13 RX ORDER — SODIUM CHLORIDE 0.9 % (FLUSH) 0.9 %
5-40 SYRINGE (ML) INJECTION EVERY 8 HOURS
Status: DISCONTINUED | OUTPATIENT
Start: 2020-10-13 | End: 2020-10-13 | Stop reason: HOSPADM

## 2020-10-13 RX ORDER — FENTANYL CITRATE 50 UG/ML
INJECTION, SOLUTION INTRAMUSCULAR; INTRAVENOUS AS NEEDED
Status: DISCONTINUED | OUTPATIENT
Start: 2020-10-13 | End: 2020-10-13 | Stop reason: HOSPADM

## 2020-10-13 RX ORDER — ACETAMINOPHEN 500 MG
1000 TABLET ORAL EVERY 6 HOURS
Status: DISCONTINUED | OUTPATIENT
Start: 2020-10-13 | End: 2020-10-14 | Stop reason: HOSPADM

## 2020-10-13 RX ORDER — SODIUM CHLORIDE 9 MG/ML
125 INJECTION, SOLUTION INTRAVENOUS CONTINUOUS
Status: DISPENSED | OUTPATIENT
Start: 2020-10-13 | End: 2020-10-14

## 2020-10-13 RX ORDER — CYCLOBENZAPRINE HCL 10 MG
10 TABLET ORAL
Status: DISCONTINUED | OUTPATIENT
Start: 2020-10-13 | End: 2020-10-14 | Stop reason: HOSPADM

## 2020-10-13 RX ORDER — OXYCODONE HYDROCHLORIDE 5 MG/1
5 TABLET ORAL
Status: DISCONTINUED | OUTPATIENT
Start: 2020-10-13 | End: 2020-10-14 | Stop reason: HOSPADM

## 2020-10-13 RX ORDER — ACETAMINOPHEN 325 MG/1
650 TABLET ORAL ONCE
Status: DISCONTINUED | OUTPATIENT
Start: 2020-10-13 | End: 2020-10-13 | Stop reason: HOSPADM

## 2020-10-13 RX ORDER — POLYETHYLENE GLYCOL 3350 17 G/17G
17 POWDER, FOR SOLUTION ORAL DAILY
Status: DISCONTINUED | OUTPATIENT
Start: 2020-10-14 | End: 2020-10-14 | Stop reason: HOSPADM

## 2020-10-13 RX ORDER — PREGABALIN 75 MG/1
150 CAPSULE ORAL ONCE
Status: COMPLETED | OUTPATIENT
Start: 2020-10-13 | End: 2020-10-13

## 2020-10-13 RX ORDER — ROPIVACAINE HYDROCHLORIDE 5 MG/ML
60 INJECTION, SOLUTION EPIDURAL; INFILTRATION; PERINEURAL ONCE
Status: COMPLETED | OUTPATIENT
Start: 2020-10-13 | End: 2020-10-13

## 2020-10-13 RX ORDER — GLYCOPYRROLATE 0.2 MG/ML
INJECTION INTRAMUSCULAR; INTRAVENOUS AS NEEDED
Status: DISCONTINUED | OUTPATIENT
Start: 2020-10-13 | End: 2020-10-13 | Stop reason: HOSPADM

## 2020-10-13 RX ORDER — KETOROLAC TROMETHAMINE 30 MG/ML
15 INJECTION, SOLUTION INTRAMUSCULAR; INTRAVENOUS EVERY 6 HOURS
Status: COMPLETED | OUTPATIENT
Start: 2020-10-13 | End: 2020-10-14

## 2020-10-13 RX ORDER — ONDANSETRON 2 MG/ML
4 INJECTION INTRAMUSCULAR; INTRAVENOUS AS NEEDED
Status: DISCONTINUED | OUTPATIENT
Start: 2020-10-13 | End: 2020-10-13 | Stop reason: HOSPADM

## 2020-10-13 RX ORDER — ACETAMINOPHEN 500 MG
1000 TABLET ORAL ONCE
Status: COMPLETED | OUTPATIENT
Start: 2020-10-13 | End: 2020-10-13

## 2020-10-13 RX ORDER — ONDANSETRON 2 MG/ML
INJECTION INTRAMUSCULAR; INTRAVENOUS AS NEEDED
Status: DISCONTINUED | OUTPATIENT
Start: 2020-10-13 | End: 2020-10-13 | Stop reason: HOSPADM

## 2020-10-13 RX ORDER — SODIUM CHLORIDE 0.9 % (FLUSH) 0.9 %
5-40 SYRINGE (ML) INJECTION AS NEEDED
Status: DISCONTINUED | OUTPATIENT
Start: 2020-10-13 | End: 2020-10-13 | Stop reason: HOSPADM

## 2020-10-13 RX ORDER — KETOROLAC TROMETHAMINE 30 MG/ML
INJECTION, SOLUTION INTRAMUSCULAR; INTRAVENOUS AS NEEDED
Status: DISCONTINUED | OUTPATIENT
Start: 2020-10-13 | End: 2020-10-13 | Stop reason: HOSPADM

## 2020-10-13 RX ORDER — HYDROXYZINE HYDROCHLORIDE 10 MG/1
10 TABLET, FILM COATED ORAL
Status: DISCONTINUED | OUTPATIENT
Start: 2020-10-13 | End: 2020-10-14 | Stop reason: HOSPADM

## 2020-10-13 RX ORDER — LIDOCAINE HYDROCHLORIDE 10 MG/ML
0.1 INJECTION, SOLUTION EPIDURAL; INFILTRATION; INTRACAUDAL; PERINEURAL AS NEEDED
Status: DISCONTINUED | OUTPATIENT
Start: 2020-10-13 | End: 2020-10-13 | Stop reason: HOSPADM

## 2020-10-13 RX ORDER — SODIUM CHLORIDE 0.9 % (FLUSH) 0.9 %
5-40 SYRINGE (ML) INJECTION EVERY 8 HOURS
Status: DISCONTINUED | OUTPATIENT
Start: 2020-10-13 | End: 2020-10-14 | Stop reason: HOSPADM

## 2020-10-13 RX ORDER — AMOXICILLIN 250 MG
1 CAPSULE ORAL 2 TIMES DAILY
Status: DISCONTINUED | OUTPATIENT
Start: 2020-10-13 | End: 2020-10-14 | Stop reason: HOSPADM

## 2020-10-13 RX ORDER — DICLOFENAC SODIUM 10 MG/G
GEL TOPICAL 4 TIMES DAILY
COMMUNITY

## 2020-10-13 RX ORDER — EPHEDRINE SULFATE/0.9% NACL/PF 50 MG/5 ML
SYRINGE (ML) INTRAVENOUS AS NEEDED
Status: DISCONTINUED | OUTPATIENT
Start: 2020-10-13 | End: 2020-10-13 | Stop reason: HOSPADM

## 2020-10-13 RX ORDER — LANOLIN ALCOHOL/MO/W.PET/CERES
1000 CREAM (GRAM) TOPICAL DAILY
Status: DISCONTINUED | OUTPATIENT
Start: 2020-10-14 | End: 2020-10-14 | Stop reason: HOSPADM

## 2020-10-13 RX ORDER — DICLOFENAC SODIUM 25 MG/1
75 TABLET, DELAYED RELEASE ORAL 2 TIMES DAILY
Status: DISCONTINUED | OUTPATIENT
Start: 2020-10-13 | End: 2020-10-14 | Stop reason: HOSPADM

## 2020-10-13 RX ORDER — NALOXONE HYDROCHLORIDE 0.4 MG/ML
0.4 INJECTION, SOLUTION INTRAMUSCULAR; INTRAVENOUS; SUBCUTANEOUS AS NEEDED
Status: DISCONTINUED | OUTPATIENT
Start: 2020-10-13 | End: 2020-10-14 | Stop reason: HOSPADM

## 2020-10-13 RX ORDER — PROPOFOL 10 MG/ML
INJECTION, EMULSION INTRAVENOUS AS NEEDED
Status: DISCONTINUED | OUTPATIENT
Start: 2020-10-13 | End: 2020-10-13 | Stop reason: HOSPADM

## 2020-10-13 RX ORDER — GLUCOSAM/CHONDRO/HERB 149/HYAL 750-100 MG
1 TABLET ORAL DAILY
Status: DISCONTINUED | OUTPATIENT
Start: 2020-10-14 | End: 2020-10-14 | Stop reason: HOSPADM

## 2020-10-13 RX ORDER — LIDOCAINE HYDROCHLORIDE 20 MG/ML
INJECTION, SOLUTION EPIDURAL; INFILTRATION; INTRACAUDAL; PERINEURAL AS NEEDED
Status: DISCONTINUED | OUTPATIENT
Start: 2020-10-13 | End: 2020-10-13 | Stop reason: HOSPADM

## 2020-10-13 RX ORDER — ASPIRIN 325 MG
325 TABLET ORAL DAILY
Status: DISCONTINUED | OUTPATIENT
Start: 2020-10-14 | End: 2020-10-14 | Stop reason: HOSPADM

## 2020-10-13 RX ORDER — FENTANYL CITRATE 50 UG/ML
50 INJECTION, SOLUTION INTRAMUSCULAR; INTRAVENOUS AS NEEDED
Status: DISCONTINUED | OUTPATIENT
Start: 2020-10-13 | End: 2020-10-13 | Stop reason: HOSPADM

## 2020-10-13 RX ORDER — SODIUM CHLORIDE, SODIUM LACTATE, POTASSIUM CHLORIDE, CALCIUM CHLORIDE 600; 310; 30; 20 MG/100ML; MG/100ML; MG/100ML; MG/100ML
50 INJECTION, SOLUTION INTRAVENOUS CONTINUOUS
Status: DISCONTINUED | OUTPATIENT
Start: 2020-10-13 | End: 2020-10-13 | Stop reason: HOSPADM

## 2020-10-13 RX ORDER — HYDROMORPHONE HYDROCHLORIDE 2 MG/ML
0.2 INJECTION, SOLUTION INTRAMUSCULAR; INTRAVENOUS; SUBCUTANEOUS
Status: DISCONTINUED | OUTPATIENT
Start: 2020-10-13 | End: 2020-10-13 | Stop reason: HOSPADM

## 2020-10-13 RX ORDER — FUROSEMIDE 20 MG/1
20 TABLET ORAL
Status: DISCONTINUED | OUTPATIENT
Start: 2020-10-13 | End: 2020-10-14 | Stop reason: HOSPADM

## 2020-10-13 RX ORDER — MIDAZOLAM HYDROCHLORIDE 1 MG/ML
1 INJECTION, SOLUTION INTRAMUSCULAR; INTRAVENOUS AS NEEDED
Status: DISCONTINUED | OUTPATIENT
Start: 2020-10-13 | End: 2020-10-13 | Stop reason: HOSPADM

## 2020-10-13 RX ADMIN — Medication 20 MG: at 09:27

## 2020-10-13 RX ADMIN — ONDANSETRON HYDROCHLORIDE 4 MG: 2 INJECTION, SOLUTION INTRAMUSCULAR; INTRAVENOUS at 11:03

## 2020-10-13 RX ADMIN — GLYCOPYRROLATE 0.6 MG: 0.2 INJECTION, SOLUTION INTRAMUSCULAR; INTRAVENOUS at 11:03

## 2020-10-13 RX ADMIN — Medication 3 AMPULE: at 08:17

## 2020-10-13 RX ADMIN — Medication 10 ML: at 21:54

## 2020-10-13 RX ADMIN — Medication 15 MG: at 09:30

## 2020-10-13 RX ADMIN — Medication 1 AMPULE: at 23:19

## 2020-10-13 RX ADMIN — Medication 1 AMPULE: at 14:18

## 2020-10-13 RX ADMIN — KETOROLAC TROMETHAMINE 30 MG: 30 INJECTION, SOLUTION INTRAMUSCULAR; INTRAVENOUS at 11:01

## 2020-10-13 RX ADMIN — DIAZEPAM 5 MG: 5 TABLET ORAL at 12:54

## 2020-10-13 RX ADMIN — WATER 2 G: 1 INJECTION INTRAMUSCULAR; INTRAVENOUS; SUBCUTANEOUS at 14:17

## 2020-10-13 RX ADMIN — WATER 3 G: 1 INJECTION INTRAMUSCULAR; INTRAVENOUS; SUBCUTANEOUS at 09:35

## 2020-10-13 RX ADMIN — FAMOTIDINE 20 MG: 20 TABLET, FILM COATED ORAL at 18:22

## 2020-10-13 RX ADMIN — SUCCINYLCHOLINE CHLORIDE 160 MG: 20 INJECTION, SOLUTION INTRAMUSCULAR; INTRAVENOUS at 09:23

## 2020-10-13 RX ADMIN — ACETAMINOPHEN 1000 MG: 500 TABLET ORAL at 14:15

## 2020-10-13 RX ADMIN — DIAZEPAM 5 MG: 5 TABLET ORAL at 21:53

## 2020-10-13 RX ADMIN — OXYCODONE 5 MG: 5 TABLET ORAL at 20:58

## 2020-10-13 RX ADMIN — MIDAZOLAM HYDROCHLORIDE 2 MG: 1 INJECTION, SOLUTION INTRAMUSCULAR; INTRAVENOUS at 09:18

## 2020-10-13 RX ADMIN — Medication 5 MG: at 11:03

## 2020-10-13 RX ADMIN — SODIUM CHLORIDE, POTASSIUM CHLORIDE, SODIUM LACTATE AND CALCIUM CHLORIDE: 600; 310; 30; 20 INJECTION, SOLUTION INTRAVENOUS at 10:46

## 2020-10-13 RX ADMIN — SODIUM CHLORIDE 125 ML/HR: 900 INJECTION, SOLUTION INTRAVENOUS at 11:20

## 2020-10-13 RX ADMIN — KETOROLAC TROMETHAMINE 15 MG: 30 INJECTION, SOLUTION INTRAMUSCULAR at 14:18

## 2020-10-13 RX ADMIN — WATER 2 G: 1 INJECTION INTRAMUSCULAR; INTRAVENOUS; SUBCUTANEOUS at 21:54

## 2020-10-13 RX ADMIN — FENTANYL CITRATE 25 MCG: 50 INJECTION, SOLUTION INTRAMUSCULAR; INTRAVENOUS at 13:00

## 2020-10-13 RX ADMIN — ROCURONIUM BROMIDE 5 MG: 10 INJECTION INTRAVENOUS at 09:23

## 2020-10-13 RX ADMIN — KETOROLAC TROMETHAMINE 15 MG: 30 INJECTION, SOLUTION INTRAMUSCULAR at 23:19

## 2020-10-13 RX ADMIN — LIDOCAINE HYDROCHLORIDE 100 MG: 20 INJECTION, SOLUTION INTRAVENOUS at 09:23

## 2020-10-13 RX ADMIN — ROCURONIUM BROMIDE 10 MG: 10 INJECTION INTRAVENOUS at 10:31

## 2020-10-13 RX ADMIN — DOCUSATE SODIUM 50MG AND SENNOSIDES 8.6MG 1 TABLET: 8.6; 5 TABLET, FILM COATED ORAL at 18:22

## 2020-10-13 RX ADMIN — Medication 15 MG: at 10:39

## 2020-10-13 RX ADMIN — SODIUM CHLORIDE, POTASSIUM CHLORIDE, SODIUM LACTATE AND CALCIUM CHLORIDE: 600; 310; 30; 20 INJECTION, SOLUTION INTRAVENOUS at 09:18

## 2020-10-13 RX ADMIN — ACETAMINOPHEN 1000 MG: 500 TABLET ORAL at 08:18

## 2020-10-13 RX ADMIN — ACETAMINOPHEN 1000 MG: 500 TABLET ORAL at 23:19

## 2020-10-13 RX ADMIN — FENTANYL CITRATE 100 MCG: 50 INJECTION, SOLUTION INTRAMUSCULAR; INTRAVENOUS at 09:23

## 2020-10-13 RX ADMIN — PROPOFOL 150 MG: 10 INJECTION, EMULSION INTRAVENOUS at 09:23

## 2020-10-13 RX ADMIN — DEXAMETHASONE SODIUM PHOSPHATE 8 MG: 4 INJECTION, SOLUTION INTRAMUSCULAR; INTRAVENOUS at 09:51

## 2020-10-13 RX ADMIN — ACETAMINOPHEN 1000 MG: 500 TABLET ORAL at 18:21

## 2020-10-13 RX ADMIN — SODIUM CHLORIDE, SODIUM LACTATE, POTASSIUM CHLORIDE, AND CALCIUM CHLORIDE 25 ML/HR: 600; 310; 30; 20 INJECTION, SOLUTION INTRAVENOUS at 08:19

## 2020-10-13 RX ADMIN — PREGABALIN 150 MG: 75 CAPSULE ORAL at 08:18

## 2020-10-13 RX ADMIN — ROCURONIUM BROMIDE 35 MG: 10 INJECTION INTRAVENOUS at 09:35

## 2020-10-13 RX ADMIN — KETOROLAC TROMETHAMINE 15 MG: 30 INJECTION, SOLUTION INTRAMUSCULAR at 18:23

## 2020-10-13 NOTE — PERIOP NOTES
TRANSFER - OUT REPORT:    Verbal report given to Berna Alston RN (name) on Ana Erp  being transferred to [facility] (unit) for routine post - op       Report consisted of patients Situation, Background, Assessment and   Recommendations(SBAR). Information from the following report(s) SBAR, Kardex, OR Summary, Procedure Summary, Intake/Output and MAR was reviewed with the receiving nurse. Lines:   Peripheral IV 10/13/20 Left Hand (Active)   Site Assessment Clean, dry, & intact 10/13/20 1330   Phlebitis Assessment 0 10/13/20 1330   Infiltration Assessment 0 10/13/20 1330   Dressing Status Clean, dry, & intact 10/13/20 1330   Dressing Type Tape;Transparent 10/13/20 1330   Hub Color/Line Status Pink; Infusing 10/13/20 1330        Opportunity for questions and clarification was provided. Patient transported with:   O2 @ 2 liters  Tech     Patient belongings with patient at time of stretcher. Back brace in belonging bag. Family updated with assigned room number.

## 2020-10-13 NOTE — ANESTHESIA POSTPROCEDURE EVALUATION
Post-Anesthesia Evaluation and Assessment    Patient: Loren Shetty MRN: [id number]  SSN: [id number]    YOB: 1955  Age: 77 y.o. Sex: male      I have evaluated the patient and they are stable and ready for discharge from the PACU. Cardiovascular Function/Vital Signs  Visit Vitals  /66 (BP 1 Location: Right arm, BP Patient Position: At rest)   Pulse 65   Temp 36.8 °C (98.3 °F)   Resp 16   Ht 6' (1.829 m)   Wt 123.4 kg (272 lb 0.8 oz)   SpO2 99%   BMI 36.90 kg/m²       Patient is status post Other anesthesia for Procedure(s):  BILATERAL L4-5 DECOMPRESSION (CHOICE ANESTH). Nausea/Vomiting: None    Postoperative hydration reviewed and adequate. Pain:  Pain Scale 1: Visual (10/13/20 1315)  Pain Intensity 1: 4 (10/13/20 1315)   Managed    Neurological Status:   Neuro (WDL): Exceptions to WDL (10/13/20 1113)  Neuro  Neurologic State: Drowsy;Sleeping;Eyes open to voice (10/13/20 1113)  Orientation Level: Oriented to person;Oriented to place;Oriented to situation (10/13/20 1113)  Cognition: Follows commands (10/13/20 1113)  Speech: Clear;Delayed responses (10/13/20 1113)  LUE Motor Response: Purposeful (10/13/20 1113)  LLE Motor Response: Purposeful (10/13/20 1113)  RUE Motor Response: Purposeful (10/13/20 1113)  RLE Motor Response: Purposeful (10/13/20 1113)   At baseline    Mental Status, Level of Consciousness: Alert and  oriented to person, place, and time    Pulmonary Status:   O2 Device: Nasal cannula (10/13/20 1315)   Adequate oxygenation and airway patent    Complications related to anesthesia: None    Post-anesthesia assessment completed.  No concerns    Signed By: Gerard Lutz MD     October 13, 2020

## 2020-10-13 NOTE — PROGRESS NOTES
Orthopedic End of Shift Note    Bedside shift change report given to Mukesh Leiva RN (oncoming nurse) by Jeremias Montana RN (offgoing nurse). Report included the following information SBAR, Kardex, Intake/Output, MAR and Recent Results.      POD# 0  Significant issues during shift: pain control    Issues for Physician to address: n/a    Activity This Shift  (check all that apply) [] chair  [] dangle   [] bathroom  [] bedside commode [] hallway  [x] bedrest   Nausea/Vomiting [] yes [x] no     Voiding Status [x] void [] Franklin [] I&O Cath   Bowel Movements [] yes [x] no     Foot Pumps or SCD [x] yes [] no    Ice Pack [x] yes    [] no    Incentive Spirometer [x] yes [] no Volume:      Telemetry Monitoring   [] yes [x] no Rhythm:   Supplemental O2 [] yes [x] no Sat off O2:   95%

## 2020-10-13 NOTE — ROUTINE PROCESS
Patient: Dameon Delvalle MRN: [id number]  SSN: [id number]   YOB: 1955  Age: 77 y.o. Sex: male     Patient is status post Procedure(s):  BILATERAL L4-5 DECOMPRESSION (CHOICE ANESTH).     Surgeon(s) and Role:     Tom Barnett MD - Primary    Local/Dose/Irrigation:  60 ml local injetiton low back                  Peripheral IV 10/13/20 Left Hand (Active)   Site Assessment Clean, dry, & intact 10/13/20 0817   Phlebitis Assessment 0 10/13/20 0817   Infiltration Assessment 0 10/13/20 0817   Dressing Status Clean, dry, & intact 10/13/20 0817   Dressing Type Transparent;Tape 10/13/20 0817   Hub Color/Line Status Infusing;Pink 10/13/20 0817            Airway - Endotracheal Tube 10/13/20 Oral (Active)   Line Ascencion Lips 10/13/20 0000                   Dressing/Packing:  Wound Back Lower-Dressing Type: Staples(marino dressing) (10/13/20 1045)    Splint/Cast:  ]    Other:  Franklin removed at endd of case

## 2020-10-13 NOTE — PERIOP NOTES
Handoff Report from Operating Room to PACU    Report received from Fidel Escobedo CRNA and Lon Sauer RN regarding Kay Mclean. Surgeon(s):  David Link MD  And Procedure(s) (LRB):  BILATERAL L4-5 DECOMPRESSION (CHOICE ANESTH) (Bilateral)  confirmed   with allergies, drains and dressings discussed. Anesthesia type, drugs, patient history, complications, estimated blood loss, vital signs, intake and output, and last pain medication, lines, reversal medications and temperature were reviewed.

## 2020-10-13 NOTE — ANESTHESIA PREPROCEDURE EVALUATION
Anesthetic History   No history of anesthetic complications            Review of Systems / Medical History  Patient summary reviewed, nursing notes reviewed and pertinent labs reviewed    Pulmonary        Sleep apnea           Neuro/Psych   Within defined limits           Cardiovascular    Hypertension        Dysrhythmias : atrial fibrillation  Hyperlipidemia    Exercise tolerance: >4 METS     GI/Hepatic/Renal     GERD      PUD     Endo/Other      Hypothyroidism  Obesity and arthritis     Other Findings              Physical Exam    Airway  Mallampati: III  TM Distance: 4 - 6 cm  Neck ROM: decreased range of motion   Mouth opening: Diminished (comment)     Cardiovascular    Rhythm: regular  Rate: normal         Dental    Dentition: Lower dentition intact and Upper dentition intact     Pulmonary  Breath sounds clear to auscultation               Abdominal  GI exam deferred       Other Findings            Anesthetic Plan    ASA: 3  Anesthesia type: general          Induction: Intravenous  Anesthetic plan and risks discussed with: Patient and Family      glidescope used previously

## 2020-10-13 NOTE — PROGRESS NOTES
Transition of Care Plan:            Observation notice provided in writing to patient and/or caregiver as well as verbal explanation of the policy. Patients who are in outpatient status also receive the Observation notice. Plan is Home with Spouse once cleared on 10/14/20. Wife can transport Pt home in car. CM to assist with follow up appt as needed. Therapy worked with him tonight and indicated no needs. Reason for Admission:   Pt was admitted under OBS status on 10/13/20 for procedure B L4-5 Decompression. RUR Score:        OBS             Plan for utilizing home health:      Not indicated per therapy    PCP: First and Last name:  Artur Urena   Name of Practice:    Are you a current patient: Yes/No: yes   Approximate date of last visit: 3 months ago   Can you participate in a virtual visit with your PCP:  yes                    Current Advanced Directive/Advance Care Plan: Full Code. No AMD. CM offered to bring him a copy of [facility] AMD and he was not interested at this time. Pt is alert and oriented. Pt lives with spouse in one story home with 4 FARHANA. No DME  No experience with HH or SNF  I W ADLs and IADLs. Drives. Pharmacy is [facility]. Wife can transport Pt home. Care Management Interventions  PCP Verified by CM:  Yes  Palliative Care Criteria Met (RRAT>21 & CHF Dx)?: No  Mode of Transport at Discharge: (wife)  Transition of Care Consult (CM Consult): Discharge Planning  MyChart Signup: No  Discharge Durable Medical Equipment: No  Physical Therapy Consult: No  Occupational Therapy Consult: No  Speech Therapy Consult: No  Current Support Network: Lives with Spouse, Own Home  Confirm Follow Up Transport: Family  Discharge Location  Discharge Placement: Home with family assistance    Rajeev Thompson

## 2020-10-13 NOTE — PROGRESS NOTES
Problem: Mobility Impaired (Adult and Pediatric)  Goal: *Acute Goals and Plan of Care (Insert Text)  Description: FUNCTIONAL STATUS PRIOR TO ADMISSION: Patient was independent and active without use of DME.    HOME SUPPORT PRIOR TO ADMISSION: The patient lived with wife but did not require assist.    Physical Therapy Goals  Initiated 10/13/2020  1. Patient will move from supine to sit and sit to supine , scoot up and down, and roll side to side in bed with modified independence within 7 day(s). 2.  Patient will transfer from bed to chair and chair to bed with supervision/set-up using the least restrictive device within 7 day(s). 3.  Patient will perform sit to stand with modified independence within 7 day(s). 4.  Patient will ambulate with modified independence for 150 feet with the least restrictive device within 7 day(s). 5.  Patient will ascend/descend 4 stairs with right handrail(s) with modified independence within 7 day(s). Outcome: Progressing Towards Goal   PHYSICAL THERAPY EVALUATION  Patient: Arianna Mirza (45 y.o. male)  Date: 10/13/2020  Primary Diagnosis: Lumbar pain [M54.5]  Spondylolisthesis of lumbar region [M43.16]  Bilateral sciatica [M54.31, M54.32]  Spinal stenosis, lumbar region, with neurogenic claudication [M48.062]  Spinal stenosis of lumbar region with neurogenic claudication [M48.062]  Procedure(s) (LRB):  BILATERAL L4-5 DECOMPRESSION (CHOICE ANESTH) (Bilateral) Day of Surgery   Precautions:  Spinal(no BLT)    ASSESSMENT  Based on the objective data described below, the patient presents with decreased activity tolerance, report that pre-op deficits have improved, and minimally impaired mobility. Pt received seated eob on RA, with wife present, stating he had just walked to the bathroom. Reviewed back precautions and brace wear time, as he didn't have his brace on. Pt requested clarification for the bending precaution.   Pt able to stand and ambulate into the hallway with cga and no ad. Pt reporting he feels good, but thinks the good meds will wear off and he'll feel bad tomorrow. VSS t/o session. Education provided regarding effective pain control. Pt returned to supine in bed and RN made aware of his status    Current Level of Function Impacting Discharge (mobility/balance):   Bed Mobility:  Rolling: Supervision  Sit to Supine: Contact guard assistance;Minimum assistance(BLE assist)  Scooting: Supervision  Transfers:  Sit to Stand: Stand-by assistance  Stand to Sit: Supervision  Ambulation/Gait Training:  Distance (ft): 120 Feet (ft)  Assistive Device: Gait belt  Ambulation - Level of Assistance: Contact guard assistance        Functional Outcome Measure: The patient scored 70/100 on the Barthel Index outcome measure which is indicative of 30% impaired function/adls. Other factors to consider for discharge: none     Patient will benefit from skilled therapy intervention to address the above noted impairments. PLAN :  Recommendations and Planned Interventions: bed mobility training, transfer training, gait training, patient and family training/education, and therapeutic activities      Frequency/Duration: Patient will be followed by physical therapy:  twice daily to address goals. Recommendation for discharge: (in order for the patient to meet his/her long term goals)  No skilled physical therapy/ follow up rehabilitation needs identified at this time. This discharge recommendation:  A follow-up discussion with the attending provider and/or case management is planned    IF patient discharges home will need the following DME: none         SUBJECTIVE:   Patient stated i'll let you know how I feel tomorrow, right now I feel good.     OBJECTIVE DATA SUMMARY:   HISTORY:    Past Medical History:   Diagnosis Date    A-fib ([address]     ablations x4    Arthritis     back    Chronic pain     back    Encounter for cardioversion procedure 2/7/2017    GERD (gastroesophageal reflux disease)     High cholesterol     Hypertension 02/07/2017    patient denies hx of HTN    Multiple thyroid nodules     biopsied and benign    Non-nicotine vapor product user     on occassion    PUD (peptic ulcer disease)     \"years ago\"    Sleep apnea     wife witnessed apnea -- PCP & Card both referred to sleep clinic pt refused     Past Surgical History:   Procedure Laterality Date    [id number] UNLIST  2010, 2011    ABLATION x4 2016 & 2018    CARDIOVERSION EXTERNAL      HX AFIB ABLATION      HX CERVICAL DISKECTOMY  2015    pt unsure exactly what they did to his neck    HX CERVICAL FUSION  2013    HX HEENT  6/6/13    THYROID BIOPSY    HX ORTHOPAEDIC Left 2015    shoulder surgery on left    HX ORTHOPAEDIC Left 1990    wrist surgery, LEFT    HX ORTHOPAEDIC Right 1996    elbow surgery, RIGHT    HX TONSILLECTOMY         Personal factors and/or comorbidities impacting plan of care: none    Home Situation  Home Environment: Private residence  # Steps to Enter: 4  Rails to Enter: Yes  Hand Rails : Right  One/Two Story Residence: Two story, live on 1st floor  Support Systems: Spouse/Significant Other/Partner  Patient Expects to be Discharged to[de-identified] Shelter  Current DME Used/Available at Home: Blood pressure cuff  Tub or Shower Type: Shower    EXAMINATION/PRESENTATION/DECISION MAKING:   Critical Behavior:  Neurologic State: Alert, Drowsy, Eyes open spontaneously  Orientation Level: Oriented to person, Oriented to place, Oriented to situation  Cognition: Follows commands     Range Of Motion:  AROM: Within functional limits                       Strength:    Strength:  Within functional limits                    Tone & Sensation:   Tone: Normal              Sensation: Intact               Coordination:  Coordination: Within functional limits     Functional Mobility:  Bed Mobility:  Rolling: Supervision     Sit to Supine: Contact guard assistance;Minimum assistance(BLE assist)  Scooting: Supervision  Transfers:  Sit to Stand: Stand-by assistance  Stand to Sit: Supervision                       Balance:   Sitting: Intact  Standing: Intact  Ambulation/Gait Training:  Distance (ft): 120 Feet (ft)  Assistive Device: Gait belt  Ambulation - Level of Assistance: Contact guard assistance     Gait Description (WDL): Exceptions to WDL  Gait Abnormalities: Decreased step clearance              Speed/Leyla: Pace decreased (<100 feet/min)              Functional Measure:  Barthel Index:    Bathin  Bladder: 10  Bowels: 10  Groomin  Dressin  Feeding: 10  Mobility: 10  Stairs: 5  Toilet Use: 5  Transfer (Bed to Chair and Back): 10  Total: 70/100       The Barthel ADL Index: Guidelines  1. The index should be used as a record of what a patient does, not as a record of what a patient could do. 2. The main aim is to establish degree of independence from any help, physical or verbal, however minor and for whatever reason. 3. The need for supervision renders the patient not independent. 4. A patient's performance should be established using the best available evidence. Asking the patient, friends/relatives and nurses are the usual sources, but direct observation and common sense are also important. However direct testing is not needed. 5. Usually the patient's performance over the preceding 24-48 hours is important, but occasionally longer periods will be relevant. 6. Middle categories imply that the patient supplies over 50 per cent of the effort. 7. Use of aids to be independent is allowed. Shea Good., Barthel, D.W. (5081). Functional evaluation: the Barthel Index. [address] [facility] (14)2. Lorre Gums michelle [place], J.J.M.F, Stephany Sport., Ray Drummond., Evelyne Bhardwaj, [address] [place] [address] (). Measuring the change indisability after inpatient rehabilitation; comparison of the responsiveness of the Barthel Index and Functional [place] Measure.  Journal of Neurology, Neurosurgery, and Psychiatry, 66(4), 488-173. NEEL Talley, SHAI Giles, & Tiffanie Salinas M.A. (2004.) Assessment of post-stroke quality of life in cost-effectiveness studies: The usefulness of the Barthel Index and the EuroQoL-5D. Quality of Life Research, 15, 303-67           Physical Therapy Evaluation Charge Determination   History Examination Presentation Decision-Making   LOW Complexity : Zero comorbidities / personal factors that will impact the outcome / POC LOW Complexity : 1-2 Standardized tests and measures addressing body structure, function, activity limitation and / or participation in recreation  LOW Complexity : Stable, uncomplicated  LOW Complexity : FOTO score of       Based on the above components, the patient evaluation is determined to be of the following complexity level: LOW     Pain Rating:  None reported    Activity Tolerance:   Good  Please refer to the flowsheet for vital signs taken during this treatment. After treatment patient left in no apparent distress:   Supine in bed, Call bell within reach, Caregiver / family present, and Side rails x 3    COMMUNICATION/EDUCATION:   The patients plan of care was discussed with: Registered nurse. Fall prevention education was provided and the patient/caregiver indicated understanding., Patient/family have participated as able in goal setting and plan of care. , and Patient/family agree to work toward stated goals and plan of care.     Thank you for this referral.  Mariah Mcconnell, PT   Time Calculation: 27 mins

## 2020-10-13 NOTE — BRIEF OP NOTE
Brief Postoperative Note    Patient: Rosalinda Egan  YOB: 1955  MRN: [id number]    Date of Procedure: 10/13/2020     Pre-Op Diagnosis: Lumbar pain [M54.5]  Spondylolisthesis of lumbar region [M43.16]  Bilateral sciatica [M54.31, M54.32]  Spinal stenosis, lumbar region, with neurogenic claudication [M48.062]    Post-Op Diagnosis: Same as preoperative diagnosis.       Procedure(s):  BILATERAL L4-5 DECOMPRESSION (CHOICE ANESTH)    Surgeon(s):  George Laura MD    Surgical Assistant: Physician Assistant: JEROME Kuhn    Anesthesia: Other     Estimated Blood Loss (mL): 183    Complications: None    Specimens: * No specimens in log *     Implants: * No implants in log *    Drains: * No LDAs found *    Findings: Stenosis    Electronically Signed by Agustin Restrepo MD on 10/13/2020 at 10:56 AM

## 2020-10-13 NOTE — H&P
Progress notes    Expand AllCollapse All       Mr. Mcintyre presents today for a telemedicine visit with Dr Amaya Officer and has given consent for vitual check in. The entirety of this visit is performed via video conference with the patient located at home while I am located at [facility]. Personnel participating in the telemedicine service today includes the health care provider: Dr Amaya Officer. The scribe: Chelo Doyle.     ASSESSMENT:  (M54.5) Lumbar pain  (primary encounter diagnosis)  (M43.16) Spondylolisthesis, lumbar region  (M54.31,  M54.32) Bilateral sciatica  (M48.062) Spinal stenosis, lumbar region, with neurogenic claudication  (M43.10) Acquired spondylolisthesis         Patient Active Problem List   Diagnosis    Cervical spondylosis without myelopathy    S/P spinal fusion    Cervicalgia    Cervical radiculopathy    Dyslipidemia    GERD (gastroesophageal reflux disease)    Hypertension    Irregular heart beat    Long term (current) use of anticoagulants    Mixed hyperlipidemia    Other dyspnea and respiratory abnormality    Pleural effusion, bilateral    PUD (peptic ulcer disease)    S/P ablation of atrial fibrillation    Severe obesity (BMI 35.0-39. 9) with comorbidity    SOB (shortness of breath)    Typical atrial flutter         Impression: stenosis at L3-S1, worse at L4-L5 and a spondylilosthesis at L5-S1     PLAN:  I reviewed the exam findings with Mr. Kar Santacruz today. He has tried PT and medication with some relief. At this time, however, his symptoms persist.  I scheduled an L4-L5 bilateral decompression.  He must obtain cardiac clearance before we can proceed.      I have discussed the procedure in detail with the patient and mentioned complications, including but not limited to: death, permanent disability, heart attack, stroke, lung injury or infection, blindness, ileus, bladder or bowel problems, ureter injury, bleeding, nerve injury (including numbness, pain and weakness), paralysis (which may be permanent), failure to heal, failure to fuse bone together in fusion procedures, failure to relief symptoms, failure to relief pain, increased pain, need for further surgeries, failure or breakage or hardware, malpositioning of hardware, need to fuse or operate on additional levels determined either during or after surgery, destabilization of the spine (which may require fusion or later surgery), infections (which may or may not require additional surgery), dural tears (tears of the sac holding in nerves and spinal fluid), meningitis, voice changes, vocal cord injury, hoarseness, blood clots, pulmonary embolus, Alexis syndrome, recurrent disc herniation, diaphragm paralysis, and anesthetic complications. Comorbidities such as obesity, smoking, rheumatoid arthritis, chronic steroid use and diabetes increase these risks. The patient understands and wants to proceed.      The patient has been prescribed a LSO spinal orthosis pre-operatively for pain relief. The orthosis is medically necessary to reduce pain by restricting mobility of the trunk and to otherwise support weak spinal muscles and/or deformed spine. The patient will meet with our bracing coordinator to be fit for the brace. Follow up after the surgery.           This consultation was done with: audio and video        Treatment Plan:       Orders Placed This Encounter    Surgical Posting Sheet      Follow-up: Return for Follow up 2-3 weeks after surgery.      HISTORY OF PRESENT ILLNESS:  Stevenson Quinn; [id number]   Age: 77 y.o. Sex: male   Pain score: Pain rating = Data Unavailable out of 10      Chief Complaint: pain of the lower back      History of present illness:  Mr. Dominguez Hand consults today for a telemedicine visit. The patient complains of low back pain radiating down the left posterior thigh stopping at the knee. He admits to pain beginning on the right side as well.  It is described as sharp and is there daily. It is worse with bending and improves with lying down flat. Norm Carlaow has tried decadron, PT and robaxin. The pain is rated 7 out of 10 on the VAS.            Patient Active Problem List     Diagnosis Date Noted    Typical atrial flutter 09/12/2018    Severe obesity (BMI 35.0-39. 9) with comorbidity 03/20/2018    Cervical spondylosis without myelopathy 11/08/2017    S/P spinal fusion 11/08/2017    Cervicalgia 11/08/2017    Cervical radiculopathy 11/08/2017    Irregular heart beat 09/07/2017    GERD (gastroesophageal reflux disease) 02/17/2017    PUD (peptic ulcer disease) 02/17/2017    Hypertension 02/07/2017    Mixed hyperlipidemia 04/17/2012    Other dyspnea and respiratory abnormality 04/17/2012    Long term (current) use of anticoagulants 03/12/2012    S/P ablation of atrial fibrillation 02/02/2012    Pleural effusion, bilateral 10/05/2010    SOB (shortness of breath) 10/02/2010    Dyslipidemia 07/27/2010               Family History   Problem Relation Age of Onset    No Known Problems Mother      No Known Problems Father      No Known Problems Brother      No Known Problems Sister      No Known Problems Son      No Known Problems Daughter      No Known Problems Other      Coronary artery disease Neg Hx      Diabetes Neg Hx      Clotting disorder Neg Hx      Anesthesia problems Neg Hx           Social History           Tobacco Use    Smoking status: Former Smoker    Smokeless tobacco: Never Used   Substance Use Topics    Alcohol use:  Yes         Medical History        Past Medical History:   Diagnosis Date    Atrial fibrillation              Surgical History         Past Surgical History:   Procedure Laterality Date    NO RELEVANT SURGERIES        SHOULDER SURGERY        SPINE SURGERY                   Current Outpatient Medications:     dexamethasone (DECADRON) 4 MG tablet, Take 1 tablet (4 mg total) by mouth 2 (two) times a day with meals, Disp: 14 tablet, Rfl: 0    dexamethasone (DECADRON) 4 MG tablet, Take 1 tablet (4 mg total) by mouth 2 (two) times a day with meals, Disp: 14 tablet, Rfl: 0    diazepam (VALIUM) 10 MG tablet, Take one tablet 1 hour prior to MRI, then may take one tablet 10 minutes prior to MRI if needed. , Disp: 2 tablet, Rfl: 0    furosemide (LASIX) 20 MG tablet, Take 20 mg by mouth daily  , Disp: , Rfl:     methocarbamol (ROBAXIN) 750 MG tablet, Take 1 tablet (750 mg total) by mouth 3 (three) times a day as needed for muscle spasms for up to 10 days, Disp: 60 tablet, Rfl: 0    methocarbamol (ROBAXIN) 750 MG tablet, Take 1 tablet (750 mg total) by mouth every 4 (four) hours for 10 days, Disp: 60 tablet, Rfl: 0    valACYclovir (VALTREX) 500 MG tablet, Take 500 mg by mouth daily  , Disp: , Rfl:            Allergies   Allergen Reactions    Oxycodone-Acetaminophen Anxiety       Patient takes Tylenol without problems.  Acetaminophen      Guaifenesin         Other reaction(s): gi upset    Oxycodone           ROS:   No new bowel or bladder incontinence. No fever. No saddle anesthesia.     OBJECTIVE:  There is no height or weight on file to calculate BMI., a BMI over 30 is considered obese and a BMI over 40 has been associated with a higher risk of surgical complications.     Constitutional: No acute distress. Respiratory:  No labored breathing. Psychiatric: Alert and oriented x3. Musculoskeletal/Neurological:      RESULTS REVIEWED:          Mri Lumbar Spine Without Contrast ([id number])     Result Date: 8/17/2020  [facility] MRI INDICATION:  LBP W/ RADIATION TO LLE X2 MONTHS COMPARISON: None TECHNIQUE: MR imaging of the lumbar spine was performed with sagittal T1, T2, STIR;  axial T1, T2.  FINDINGS: ALIGNMENT:  Minimal grade 1 anterolisthesis L5-S1 approximately 2-3 mm. VERTEBRAL BODIES:  No compression fracture. MARROW SIGNAL:  No significant edema.   SPINAL CORD:  Terminates at L1. ADDITIONAL COMMENTS:  N/A. L1/2:  The spinal canal and foramina are widely patent. L2/3:  The spinal canal and foramina are widely patent. L3/4:  Diffuse disc bulge and facet degeneration, resulting in mild spinal canal and bilateral foraminal stenosis. L4/5:  Diffuse disc bulge slightly asymmetric toward the right with ligament flavum thickening and facet degeneration result in moderate to severe spinal canal stenosis. Mild to moderate bilateral foraminal stenosis. L5/S1:  Diffuse disc bulge and facet degeneration, but minimal if any spinal canal stenosis. Mild right foraminal stenosis. IMPRESSION: 1. Moderate to severe spinal canal and mild to moderate bilateral foraminal stenosis L4-5. 2. Mild right foraminal stenosis L5-S1. Mild spinal canal and bilateral foraminal stenosis L3-4. Luis Eduardo Wright     I independently reviewed the lumbar spine MRI that shows  severe stenosis at L4-L5, mild stenosis at L3-L4 and L5-S1 and a spondylolisthesis at L5-S1.      I, Therese Lambert MD, personally, performed the services described in this documentation, as scribed in my presence, and it is both accurate and complete. Scribed by: Jessy Berger      This note has been transcribed electronically using voice recognition and/or a trained scribe. It is believed to be accurate, but may contain errors secondary to technological limitations and other factors.

## 2020-10-13 NOTE — PROGRESS NOTES
TRANSFER - IN REPORT:    [facility] Dameon Delvalle  being received from PACU (unit) for routine post - op      Report consisted of patients Situation, Background, Assessment and   Recommendations(SBAR). Information from the following report(s) SBAR, Kardex, OR Summary, Intake/Output, MAR and Recent Results was reviewed with the receiving nurse. Opportunity for questions and clarification was provided. Assessment completed upon patients arrival to unit and care assumed.

## 2020-10-14 VITALS
WEIGHT: 272.05 LBS | RESPIRATION RATE: 18 BRPM | OXYGEN SATURATION: 98 % | HEIGHT: 72 IN | HEART RATE: 51 BPM | SYSTOLIC BLOOD PRESSURE: 151 MMHG | TEMPERATURE: 97.6 F | DIASTOLIC BLOOD PRESSURE: 87 MMHG | BODY MASS INDEX: 36.85 KG/M2

## 2020-10-14 PROCEDURE — 74011250637 HC RX REV CODE- 250/637: Performed by: ORTHOPAEDIC SURGERY

## 2020-10-14 PROCEDURE — 94760 N-INVAS EAR/PLS OXIMETRY 1: CPT

## 2020-10-14 PROCEDURE — 74011250636 HC RX REV CODE- 250/636: Performed by: ORTHOPAEDIC SURGERY

## 2020-10-14 PROCEDURE — 97165 OT EVAL LOW COMPLEX 30 MIN: CPT

## 2020-10-14 PROCEDURE — 99218 HC RM OBSERVATION: CPT

## 2020-10-14 PROCEDURE — 97116 GAIT TRAINING THERAPY: CPT

## 2020-10-14 PROCEDURE — 97535 SELF CARE MNGMENT TRAINING: CPT

## 2020-10-14 PROCEDURE — 97530 THERAPEUTIC ACTIVITIES: CPT

## 2020-10-14 RX ORDER — POLYETHYLENE GLYCOL 3350 17 G/17G
17 POWDER, FOR SOLUTION ORAL DAILY
Qty: 14 PACKET | Refills: 0 | Status: SHIPPED
Start: 2020-10-15 | End: 2020-10-29

## 2020-10-14 RX ORDER — AMOXICILLIN 250 MG
1 CAPSULE ORAL 2 TIMES DAILY
Qty: 28 TAB | Refills: 0 | Status: SHIPPED
Start: 2020-10-14 | End: 2020-10-28

## 2020-10-14 RX ORDER — ACETAMINOPHEN 500 MG
1000 TABLET ORAL EVERY 6 HOURS
Qty: 112 TAB | Refills: 0 | Status: SHIPPED
Start: 2020-10-14 | End: 2020-10-28

## 2020-10-14 RX ORDER — OXYCODONE HYDROCHLORIDE 5 MG/1
5-10 TABLET ORAL
Qty: 60 TAB | Refills: 0 | Status: SHIPPED | OUTPATIENT
Start: 2020-10-14 | End: 2020-10-28

## 2020-10-14 RX ADMIN — CYANOCOBALAMIN TAB 500 MCG 1000 MCG: 500 TAB at 08:56

## 2020-10-14 RX ADMIN — OMEGA-3 FATTY ACIDS CAP 1000 MG 1 CAPSULE: 1000 CAP at 08:57

## 2020-10-14 RX ADMIN — OXYCODONE 5 MG: 5 TABLET ORAL at 03:28

## 2020-10-14 RX ADMIN — OXYCODONE 5 MG: 5 TABLET ORAL at 06:36

## 2020-10-14 RX ADMIN — Medication 10 ML: at 06:36

## 2020-10-14 RX ADMIN — OXYCODONE 5 MG: 5 TABLET ORAL at 13:04

## 2020-10-14 RX ADMIN — ASPIRIN 325 MG: 325 TABLET, FILM COATED ORAL at 08:57

## 2020-10-14 RX ADMIN — POLYETHYLENE GLYCOL 3350 17 G: 17 POWDER, FOR SOLUTION ORAL at 08:58

## 2020-10-14 RX ADMIN — ACETAMINOPHEN 1000 MG: 500 TABLET ORAL at 06:36

## 2020-10-14 RX ADMIN — FAMOTIDINE 20 MG: 20 TABLET, FILM COATED ORAL at 08:57

## 2020-10-14 RX ADMIN — KETOROLAC TROMETHAMINE 15 MG: 30 INJECTION, SOLUTION INTRAMUSCULAR at 06:36

## 2020-10-14 RX ADMIN — Medication 1 AMPULE: at 08:56

## 2020-10-14 RX ADMIN — DOCUSATE SODIUM 50MG AND SENNOSIDES 8.6MG 1 TABLET: 8.6; 5 TABLET, FILM COATED ORAL at 08:56

## 2020-10-14 NOTE — PROGRESS NOTES
Bedside and Verbal shift change report given to Vianca Maravilla [address] (oncoming nurse) by Judah Abbasi (offgoing nurse). Report included the following information SBAR, Kardex, OR Summary, Procedure Summary, Intake/Output, MAR, Recent Results and Med Rec Status.

## 2020-10-14 NOTE — OP NOTES
[address]  OPERATIVE REPORT    Name:  Matt Baker  MR#:  [id number]  :  1955  ACCOUNT #:  [de-identified]  DATE OF SERVICE:  10/13/2020    PREOPERATIVE DIAGNOSES:  1. Spinal stenosis, L4-L5. 2.  Lumbago. 3.  Sciatica. 4.  Lumbar spondylosis with radiculopathy. POSTOPERATIVE DIAGNOSES:  1. Spinal stenosis, L4-L5. 2.  Lumbago. 3.  Sciatica. 4.  Lumbar spondylosis with radiculopathy. PROCEDURE PERFORMED:  L4-L5 laminectomy, facetectomy, and foraminotomy for purpose of nerve decompression. SURGEON:  Tato Castro MD    ASSISTANT:  JEROME Fountain    ANESTHESIA:  General.    COMPLICATIONS:  None. SPECIMENS REMOVED:  None. IMPLANTS:  None. ESTIMATED BLOOD LOSS:  250 mL. DRAINS:  None. INDICATIONS FOR THE PROCEDURE:  The patient is a pleasant 78-year-old gentleman with lumbar spinal stenosis, lumbago, and sciatica. He has failed to improve with nonoperative treatment and at this point, would like to proceed with surgical intervention. We have given him warnings about the possible complications including, but not limited to pain, scar, bleeding, infection, nonunion, damage to surrounding structures, death, paralysis, blindness, stroke. He understands and wants to proceed. PROCEDURE:  After informed consent was obtained and the operative site was properly marked, the patient was moved back to the operating room and underwent general endotracheal anesthesia. He was positioned prone in the operating room table using the [employer] frame. His arms were placed in a 90/90 position. The knees were gently bent with pillows. Fluoroscopy was used to allen the level of the incision. We then proceeded to prep and drape in the usual manner. A time-out was obtained verifying that this was the correct patient, the correct surgery, and the correct site as well as that he had received IV antibiotics within 30 minutes of the incision.     I then proceeded to perform a standard posterior approach to the lumbar spine exposing the area between L4 and L5. Once that area was exposed, I proceeded to place a self-retaining retractor. We then proceeded to use a Midas Kenny to perform an U-cut laminectomy at L4 and a J-cut at L5 initially on the right side. I was able to remove the intervening bone with a pituitary and detached the ligamentum flavum from superior leading edge, flipped it into the bony defect, removed with a Kerrison #3, followed by Kerrison #4, removing the medial overhang of the facet in the process for a facetectomy and following the nerve root distally into the foramen and performing a foraminotomy. Once this was completed on the right, I inspected with a Vasquez ball and then repeated the procedure in the exact same manner on the left. Once that area was fully decompressed, I was able to irrigate the wound with normal saline, closed the fascia with #1 Vicryl figure-of-eight interrupted sutures, followed by irrigating subcu, closed the subcu with 2-0 Vicryl, and the skin was closed with staples. Sterile dressing was applied. The patient was then awakened and transferred to PACU in stable condition. POSTOPERATIVE PLAN:  The patient is going to remain here overnight. We are going to give him SCDs and ARDEN hoses for DVT prophylaxis and Ancef for infection prophylaxis.         MD JESUS [id number]  D:  10/13/2020 15:42  T:  10/13/2020 20:36  JOB #:  [id number]

## 2020-10-14 NOTE — PROGRESS NOTES
Ortho/Neurosurgery Progress Note:    Pt seen by JEROME Harvey earlier this morning. Pt resting in bed, feeling well. Pain well controlled with prn oxycodone   Tolerating regular diet. Visit Vitals  BP (!) 151/87   Pulse (!) 56   Temp 97.6 °F (36.4 °C)   Resp 18   Ht 6' (1.829 m)   Wt 123.4 kg (272 lb 0.8 oz)   SpO2 95%   BMI 36.90 kg/m²       TISHA dressing c/d/i   PF/DF/EHL intact   Aspen brace when oob  Cleared PT/OT     Home today with wife's support   Rx sent to patient's pharmacy.        Leigh Bear NP

## 2020-10-14 NOTE — DISCHARGE SUMMARY
Spine Discharge Summary    Patient ID:  Loren Shetty  [id number]  male  77 y.o.  1955    Admit date: 10/13/2020    Discharge date: 10/14/2020    Admitting Physician: Siomara Veras MD     Consulting Physician(s):   Treatment Team: Attending Provider: Simran Ascencio MD; Care Manager: Skyler Valentin; Primary Nurse: Pavithra Edmonds; Utilization Review: Leonard Sim RN    Date of Surgery:   10/13/2020     Preoperative Diagnosis:  Lumbar pain [M54.5]  Spondylolisthesis of lumbar region [M43.16]  Bilateral sciatica [M54.31, M54.32]  Spinal stenosis, lumbar region, with neurogenic claudication [M48.062]    Postoperative Diagnosis:   lumbar paion, spondylolisthesisi lumbar region    Procedure(s):  BILATERAL L4-5 DECOMPRESSION (CHOICE ANESTH)     Anesthesia Type: Other     Surgeon: Simran Ascencio MD                            HPI:  Pt is a 77 y.o. male who has a history of Lumbar pain [M54.5]  Spondylolisthesis of lumbar region [M43.16]  Bilateral sciatica [M54.31, M54.32]  Spinal stenosis, lumbar region, with neurogenic claudication [M48.062]  with pain and limitations of activities of daily living who presents at this time for a BILATERAL L4-5 DECOMPRESSION (CHOICE ANESTH)  following the failure of conservative management. PMH:   Past Medical History:   Diagnosis Date    A-fib [facility])     ablations x4    Arthritis     back    Chronic pain     back    Encounter for cardioversion procedure 2/7/2017    GERD (gastroesophageal reflux disease)     High cholesterol     Hypertension 02/07/2017    patient denies hx of HTN    Multiple thyroid nodules     biopsied and benign    Non-nicotine vapor product user     on occassion    PUD (peptic ulcer disease)     \"years ago\"    Sleep apnea     wife witnessed apnea -- PCP & Card both referred to sleep clinic pt refused       Body mass index is 36.9 kg/m². : A BMI > 30 is classified as obesity and > 40 is classified as morbid obesity.      Medications upon admission :   Prior to Admission Medications   Prescriptions Last Dose Informant Patient Reported? Taking?   aspirin (ASPIRIN) 325 mg tablet 10/6/2020 at Unknown time  Yes Yes   Sig: Take 325 mg by mouth daily. cyanocobalamin (VITAMIN B-12) 1,000 mcg tablet 10/12/2020 at Unknown time Self Yes Yes   Sig: Take 1,000 mcg by mouth daily. diclofenac (VOLTAREN) 1 % gel 10/12/2020 at Unknown time  Yes Yes   Sig: Apply  to affected area four (4) times daily. diclofenac EC (VOLTAREN) 75 mg EC tablet 10/11/2020 Self Yes No   Sig: Take 75 mg by mouth two (2) times a day. furosemide (LASIX) 20 mg tablet 10/6/2020 at Unknown time  No Yes   Sig: Take 1 Tab by mouth daily as needed (edema). omega 3-DHA-EPA-fish oil 1,000 mg (120 mg-180 mg) capsule 10/12/2020 at Unknown time  Yes Yes   Sig: Take 1 Cap by mouth daily. salt moisturing solution no.1 (OCEAN ULTRA SALINE MIST NA) 10/12/2020 at Unknown time Self Yes Yes   [place] by Both Nostrils route nightly. valACYclovir (VALTREX) 500 mg tablet 2020 at Unknown time Self Yes Yes   Sig: Take 500 mg by mouth daily as needed ('outbreaks'). Facility-Administered Medications: None        Allergies: Allergies   Allergen Reactions    Percocet [Oxycodone-Acetaminophen] Anxiety     Patient takes Tylenol without problems.  Guaifenesin Other (comments)     Other reaction(s): gi upset        Hospital Course: The patient underwent surgery. Complications:  None; patient tolerated the procedure well. Was taken to the PACU in stable condition and then transferred to the ortho floor. Perioperative Antibiotics:  Ancef     Postoperative Pain Management:  Oxycodone & Tylenol     Postoperative transfusions:    Number of units banked PRBCs =   none     Post Op complications: none    Hemoglobin at discharge:    Lab Results   Component Value Date/Time    HGB 15.0 10/06/2020 01:31 PM    INR 1.0 10/06/2020 01:31 PM       TISHA dressing remained clean, dry and intact.  No significant erythema or swelling. Wound appears to be healing without any evidence of infection. Neurovascular exam found to be within normal limits. Physical Therapy started following surgery and participated in bed mobility, transfers and ambulation. Gait:  Gait  Base of Support: Widened  Speed/Leyla: Pace decreased (<100 feet/min)  Gait Abnormalities: Decreased step clearance  Ambulation - Level of Assistance: Contact guard assistance, Supervision  Distance (ft): 400 Feet (ft)  Assistive Device: Gait belt  Rail Use: Right   Stairs - Level of Assistance: Contact guard assistance, Stand-by assistance  Number of Stairs Trained: 4                   Discharged to: Home. Condition on Discharge:   stable    Discharge instructions:  - Take pain medications as prescribed  - Resume pre hospital diet      - Discharge activity: activity as tolerated  - Ambulate as tolerated  - Lumbar brace when oob  - Avoid bending, lifting and twisting  - Wound Care Keep wound clean and dry. See discharge instruction sheet. -DISCHARGE MEDICATION LIST     Current Discharge Medication List      START taking these medications    Details   acetaminophen (TYLENOL) 500 mg tablet Take 2 Tabs by mouth every six (6) hours for 14 days. Qty: 112 Tab, Refills: 0      oxyCODONE IR (ROXICODONE) 5 mg immediate release tablet Take 1-2 Tabs by mouth every four (4) hours as needed for Pain for up to 14 days. Max Daily Amount: 60 mg.  Qty: 60 Tab, Refills: 0    Associated Diagnoses: Status post lumbar spine operative procedure for decompression of spinal cord      polyethylene glycol (MIRALAX) 17 gram packet Take 1 Packet by mouth daily for 14 days. Qty: 14 Packet, Refills: 0      senna-docusate (PERICOLACE) 8.6-50 mg per tablet Take 1 Tab by mouth two (2) times a day for 14 days.   Qty: 28 Tab, Refills: 0         CONTINUE these medications which have NOT CHANGED    Details   diclofenac (VOLTAREN) 1 % gel Apply  to affected area four (4) times daily. omega 3-DHA-EPA-fish oil 1,000 mg (120 mg-180 mg) capsule Take 1 Cap by mouth daily. furosemide (LASIX) 20 mg tablet Take 1 Tab by mouth daily as needed (edema). Qty: 90 Tab, Refills: 3      aspirin (ASPIRIN) 325 mg tablet Take 325 mg by mouth daily. salt moisturing solution no.1 (OCEAN ULTRA SALINE MIST NA) 1 [place] by Both Nostrils route nightly. valACYclovir (VALTREX) 500 mg tablet Take 500 mg by mouth daily as needed ('outbreaks'). cyanocobalamin (VITAMIN B-12) 1,000 mcg tablet Take 1,000 mcg by mouth daily. diclofenac EC (VOLTAREN) 75 mg EC tablet Take 75 mg by mouth two (2) times a day.           per medical continuation form      -Follow up in office in 2 weeks      Signed:  Sonia Delgado NP  Orthopaedic Nurse Practitioner    10/14/2020  11:34 AM

## 2020-10-14 NOTE — PROGRESS NOTES
ORTHO POST OP SPINE PROGRESS NOTE    2020  Admit Date: 10/13/2020  Admit Diagnosis: Lumbar pain [M54.5]  Spondylolisthesis of lumbar region [M43.16]  Bilateral sciatica [M54.31, M54.32]  Spinal stenosis, lumbar region, with neurogenic claudication [M48.062]  Spinal stenosis of lumbar region with neurogenic claudication [M48.062]  Procedure: Procedure(s):  BILATERAL L4-5 DECOMPRESSION (CHOICE ANESTH)  Post Op day: 1 Day Post-Op    Subjective:     Rosalinda Egan is a patient who has complaints Of pain in the low back and some pain into the right buttock status post L4-5 bilateral decompression postop day 1. Tolerating p.o./able to void. Review of Systems: Pertinent items are noted in HPI. Objective:     PT/OT:   Distance Ambulated:           Time Ambulated (min):        Ambulation Response: Activity Response: Tolerated well  Assistive Device:              Assistive Device: Fall prevention device    Vital Signs:    Blood pressure (!) 143/77, pulse (!) 57, temperature 97.6 °F (36.4 °C), resp. rate 18, height 6' (1.829 m), weight 123.4 kg (272 lb 0.8 oz), SpO2 93 %. Temp (24hrs), Av.8 °F (36.6 °C), Min:97.4 °F (36.3 °C), Max:98.3 °F (36.8 °C)      No intake/output data recorded. 10/12 1901 - 10/14 0700  In: 2320 [I.V.:2320]  Out: 1150 [Urine:900]    LAB:    No results for input(s): HGB, HGBEXT, WBC, PLT, PLTEXT, HGBEXT, PLTEXT in the last 72 hours.     Wound/Drain Assessment:  Drain:      Dressing:     Physical Exam:  Neurological: no deficit  Incision clean, dry, and intact  5/5 strength bilateral lower extremities    Assessment:      Patient Active Problem List   Diagnosis Code    Paroxysmal atrial fibrillation (HCC) I48.0    Dyslipidemia E78.5    SOB (shortness of breath) R06.02    Pleural effusion, bilateral J90    Sinus bradycardia R00.1    S/P ablation of atrial fibrillation Z98.890, Z86.79    Long term (current) use of anticoagulants Z79.01    Mixed hyperlipidemia E78.2  Other dyspnea and respiratory abnormality R06.09, R09.89    Hypertension I10    Atrial fibrillation (HCC) I48.91    Non morbid obesity due to excess calories E66.09    Irregular heart beat I49.9    Severe obesity (BMI 35.0-39. 9) with comorbidity ([facility] [address] E66.01    Typical atrial flutter (HCC) I48.3    Atrial fibrillation and flutter (HCC) I48.91, I48.92    Spinal stenosis of lumbar region with neurogenic claudication M48.062       Plan:     Continue PT/OT/Rehab  Discontinue: IV  Consult: PT  and OT    Discharge To: Home.   Today    Can likely change from [facility] to [facility]

## 2020-10-14 NOTE — DISCHARGE INSTRUCTIONS
[facility]    Discharge Instruction Sheet: Lumbar Laminectomy    DR. Apodaca Party    Pain control:  Typically, we will prescribe a narcotic usually 1-2 tabs every four hours is sufficient for the pain. Most patients need this only for the first few weeks. You should discontinue this as the pain decreases. You should not drive while taking any narcotic pain medications. Constipation  Pain medicines and anesthesia can be constipating-this can be prevented by gentle physical activity and drinking plenty of fluid. It should be treated with over-the-counter medications such as Miralax or suppositories, and/or Fleets enema. You should have a bowel movement at least every other day following surgery. Incision care  Keep this area clean and dry. Your dressing is designed to stay in place for 5-7 days. You will be sent home with one additional dressing to change at that time. Leave this new dressing in place until our follow up visit in the office in about 10-14 days. If staples are in place, they should be removed about 14-20 days after surgery. DO NOT take a tub bath or go swimming until cleared by your doctor. DO NOT apply lotions, oils, or creams to incision. Cover the wound with an impermiable dressing to shower for then next 5 days, then no cover is needed. To increase and promote healing:   Stop Smoking (or at least cut back on smoking).  Eat a well-balanced diet (high in protein and vitamin C)   If your appetite is poor, consider nutritional supplements like Ensure, Glucerna, or [place] Instant Breakfast.   If you are diabetic, controlling you blood sugars is very important to prevent infection and promote wound healing. Nutrition:   If you were on a supplement such as Ensure or Glucerna) while in the hospital, please continue using them with each meal for the next 30 days.    Eat a well-balanced diet - High in protein, high in vitamins and minerals, especially vitamin C and zinc.     Restrictions:  Limited bending at waist  Lift no more than 10 pounds    Warning signs : Please call your physician immediately at [phone] if you have   Bleeding from incision that is constant.  Change in mental status (unusual behavior or confusion)   If your incision develops redness or swelling   Change in wound drainage (increase in amount, color, or foul odor)   [place] over 101.5 degrees Fahrenheit    Headache that is not relieved with pain medication   Tenderness or redness in the calf of your leg    Emergency: CALL 911 if you have   Shortness of breath   Chest pain   Localized chest pain when coughing or taking a deep breath    Follow-up  Please call Dr. Kyara Freeman office for a follow up appointment in 2-3 weeks at [phone]. You can return to work when cleared by a physician. During normal business hours you may reach Dr. Jack Klein' team directly at [phone] if you have concerns or questions. Anette Dawson     .

## 2020-10-14 NOTE — PROGRESS NOTES
Problem: Mobility Impaired (Adult and Pediatric)  Goal: *Acute Goals and Plan of Care (Insert Text)  Description: FUNCTIONAL STATUS PRIOR TO ADMISSION: Patient was independent and active without use of DME.    HOME SUPPORT PRIOR TO ADMISSION: The patient lived with wife but did not require assist.    Physical Therapy Goals  Initiated 10/13/2020  1. Patient will move from supine to sit and sit to supine , scoot up and down, and roll side to side in bed with modified independence within 7 day(s). 2.  Patient will transfer from bed to chair and chair to bed with supervision/set-up using the least restrictive device within 7 day(s). 3.  Patient will perform sit to stand with modified independence within 7 day(s). 4.  Patient will ambulate with modified independence for 150 feet with the least restrictive device within 7 day(s). 5.  Patient will ascend/descend 4 stairs with right handrail(s) with modified independence within 7 day(s). Outcome: Resolved/Met   PHYSICAL THERAPY TREATMENT  Patient: Dameon Delvalle (78 y.o. male)  Date: 10/14/2020  Diagnosis: Lumbar pain [M54.5]  Spondylolisthesis of lumbar region [M43.16]  Bilateral sciatica [M54.31, M54.32]  Spinal stenosis, lumbar region, with neurogenic claudication [M48.062]  Spinal stenosis of lumbar region with neurogenic claudication [M48.062]   <principal problem not specified>  Procedure(s) (LRB):  BILATERAL L4-5 DECOMPRESSION (CHOICE ANESTH) (Bilateral) 1 Day Post-Op  Precautions: Spinal(no BLT) No bending, no lifting greater than 5 lbs, no twisting, log-roll technique, repositioning every 20-30 min except when sleeping, brace when OOB (if ordered)  Chart, physical therapy assessment, plan of care and goals were reviewed. ASSESSMENT  Patient continues with skilled PT services and is progressing towards goals. Pt presents with decreased gait speed. Pt performed sit to stand transfer at SBA/supervision.  Pt ambulated 400ft with no device at CGA/SBA. Pt ascended and descended 4 steps with the right railing at CGA/SBA step over step. Pt performed a car transfer at [facility]  with cueing for sequencing but with no difficulties. Pt moving well with no safety concerns. From physical therapy stand point pt cleared for discharge     Current Level of Function Impacting Discharge (mobility/balance): mobility at SBA/Supervision     Other factors to consider for discharge: none         PLAN :  Patient continues to benefit from skilled intervention to address the above impairments. Continue treatment per established plan of care. to address goals. Recommendation for discharge: (in order for the patient to meet his/her long term goals)  No skilled physical therapy/ follow up rehabilitation needs identified at this time. This discharge recommendation:  Has been made in collaboration with the attending provider and/or case management    IF patient discharges home will need the following DME: none       SUBJECTIVE:   Patient stated  I really don't hurt much.     OBJECTIVE DATA SUMMARY:   Critical Behavior:  Neurologic State: Alert  Orientation Level: Oriented X4  Cognition: Follows commands       Spinal diagnosis intervention:  The patient stated 3/3 back precautions when prompted. Reviewed all 3 back precautions, log roll technique, and sitting for 30 minutes at a time.     Functional Mobility Training:    Bed Mobility:  Log                   Transfers:  Sit to Stand: Stand-by assistance;Supervision  Stand to Sit: Supervision                             Balance:  Sitting: Intact  Standing: Intact  Ambulation/Gait Training:  Distance (ft): 400 Feet (ft)  Assistive Device: Gait belt  Ambulation - Level of Assistance: Contact guard assistance;Supervision        Gait Abnormalities: Decreased step clearance        Base of Support: Widened     Speed/Leyla: Pace decreased (<100 feet/min)                       Stairs:  Number of Stairs Trained: 4  Stairs - Level of Assistance: Contact guard assistance;Stand-by assistance   Rail Use: Right     Pain Rating:  Pt with no complaints of pain     Activity Tolerance:   Good  Please refer to the flowsheet for vital signs taken during this treatment. After treatment patient left in no apparent distress:   Sitting in chair and Call bell within reach    COMMUNICATION/COLLABORATION:   The patients plan of care was discussed with: Occupational therapist and Registered nurse.      Alicia Staton, LACHELLE   Time Calculation: 17 mins

## 2020-10-14 NOTE — PROGRESS NOTES
OCCUPATIONAL THERAPY EVALUATION/DISCHARGE  Patient: Padmini Morales (89 y.o. male)  Date: 10/14/2020  Primary Diagnosis: Lumbar pain [M54.5]  Spondylolisthesis of lumbar region [M43.16]  Bilateral sciatica [M54.31, M54.32]  Spinal stenosis, lumbar region, with neurogenic claudication [M48.062]  Spinal stenosis of lumbar region with neurogenic claudication [M48.062]  Procedure(s) (LRB):  BILATERAL L4-5 DECOMPRESSION (CHOICE ANESTH) (Bilateral) 1 Day Post-Op   Precautions:   Spinal(no BLT)    ASSESSMENT  Based on the objective data described below, the patient presents with decreased endurance and strength, but he is able to bathe and dress at the sink with setup. He was able to tailor sit and marilin and doff socks, pants  And  Shoes. Pt was up ad tor with out AD In room when OT arrived and stated that he had been up walking to bathroom all am.  OT  assessed  pts transfer to bed  And toilet and he was able to clean buttocks with no assist and no back pain. He was up walking around room with out back brace and educated on use of back brace and he was SAB for donning back brace. Pt at this time has no OT needs  And  He is able to return  Home with family and  No further OT needed . Current Level of Function (ADLs/self-care): decreased  endurance and strength     Functional Outcome Measure: The patient scored 85/100 on the Barthel outcome measure which is indicative of SBA to supervision for ADLs. Other factors to consider for discharge: pt to return home with family       PLAN :  Recommendation for discharge: (in order for the patient to meet his/her long term goals)  No skilled occupational therapy/ follow up rehabilitation needs identified at this time.     This discharge recommendation:  Has been made in collaboration with the attending provider and/or case management    IF patient discharges home will need the following DME: none       SUBJECTIVE:   Patient stated I have been up all  Morning moving around the room.     OBJECTIVE DATA SUMMARY:   HISTORY:   Past Medical History:   Diagnosis Date    A-fib ([address]     ablations x4    Arthritis     back    Chronic pain     back    Encounter for cardioversion procedure 2/7/2017    GERD (gastroesophageal reflux disease)     High cholesterol     Hypertension 02/07/2017    patient denies hx of HTN    Multiple thyroid nodules     biopsied and benign    Non-nicotine vapor product user     on occassion    PUD (peptic ulcer disease)     \"years ago\"    Sleep apnea     wife witnessed apnea -- PCP & Card both referred to sleep clinic pt refused     Past Surgical History:   Procedure Laterality Date   [address] PROCEDURE UNLIST  2010, 2011    ABLATION x4 2016 & 2018    CARDIOVERSION EXTERNAL      HX AFIB ABLATION      HX CERVICAL DISKECTOMY  2015    pt unsure exactly what they did to his neck    HX CERVICAL FUSION  2013    HX HEENT  6/6/13    THYROID BIOPSY    HX ORTHOPAEDIC Left 2015    shoulder surgery on left    HX ORTHOPAEDIC Left 1990    wrist surgery, LEFT    HX ORTHOPAEDIC Right 1996    elbow surgery, RIGHT    HX TONSILLECTOMY         Prior Level of Function/Environment/Context: pt lives  At home with family and was independent with ADLs and ILS  Expanded or extensive additional review of patient history:     Home Situation  Home Environment: Private residence  # Steps to Enter: 4  Rails to Enter: Yes  Hand Rails : Right  One/Two Story Residence: Two story, live on 1st floor  Living Alone: No  Support Systems: Spouse/Significant Other/Partner  Patient Expects to be Discharged to[de-identified] Private residence  Current DME Used/Available at Home: Blood pressure cuff  Tub or Shower Type: Shower    Hand dominance: Right    EXAMINATION OF PERFORMANCE DEFICITS:  Cognitive/Behavioral Status:  Neurologic State: Alert  Orientation Level: Oriented X4  Cognition: Appropriate decision making; Appropriate for age attention/concentration Skin: in fair health     Edema: none    Hearing: Auditory  Auditory Impairment: None    Vision/Perceptual:                    intact                 Range of Motion:  Intact  In BUE                             Strength:  Functional in BUE                   Coordination:    intact            Tone & Sensation:  intact                            Balance:  Sitting: Intact  Standing: Intact    Functional Mobility and Transfers for ADLs:         Transfers:  Sit to Stand: Stand-by assistance;Supervision  Stand to Sit: Supervision    ADL Assessment:        pt is setup for ADLs at the  sink                  Patient instructed and demonstrated 3/3 spine precautions with verbal cues. Patient instructed and indicated understanding the benefits of maintaining activity tolerance, functional mobility, and independence with self care tasks during acute stay  to ensure safe return home and to baseline. Encouraged patient to increase frequency and duration OOB, not sitting longer than 30 mins without marching/walking with staff, be out of bed for all meals, perform daily ADLs (as approved by RN/MD regarding bathing etc), and performing functional mobility to/from bathroom. Patient instruction and indicated understanding on body mechanics, ergonomics and gravitational force on the spine during different body positions to plan activities in prep for return home to complete basic ADLs, instrumental ADLs and back to work safely. Bathing: Patient instructed and indicated understanding when bathing to not submerge wound in water, stand to shower or sponge bathe, cover wound with plastic and tape to ensure no water reaches bandage/wound without cues. Dressing brace: Patient instructed and demonstrated to don/doff velcro on brace using dominant side, keeping non-dominant side intact.  Patient instructed and demonstrated in meantime of being able to stand with back against wall to don/doff brace, to don/doff seated using lap and bed/chair surface to support brace while manipulating. Dressing lower body: Patient instructed to don brace first and on the benefits to remain seated to don all clothing to increase independence with precautions and pain management. Patient instructed and demonstrated tailor sitting for lower body dressing with no assist and no use of AD. Toileting: Patient instructed on the benefits of using flushable wet wipes and toilet tongs if decreased reach or pain for megan care. Also, the benefits of a reacher to aid in clothing management. Patient instruction and indicated understanding to not strain i.e. holding breath to bear down during a bowel movement, lifting/activity, and sexual activity. Home safety: Patient instructed and indicated understanding on home modifications and safety [raise height of ADL objects (i.e. clothing, sink items, fridge items, items to mouth when grooming), appropriate height of chair surfaces, recliner safety, change of floor surfaces, clear pathways] to increase independence and fall prevention. Standing: Patient instructed and indicated understanding to walk up to sink/counter top/surfaces, step into walker, square off while using objects, slide objects along surfaces, to increase adherence to back precautions and fall prevention. Patient instructed to increase amount of time standing in order to increase independence and tolerance with ADLs. During prolonged standing, can open cabinet door or place foot on stool to decrease spinal pressure/increase pain. Tub transfer: Patient instructed and indicated understanding regarding when it is safe to begin transfer into tub (complete stairs with PT, advance exercises with PT high enough to clear tub height, and while clothes donned practice with another person present).     Functional Measure:  Barthel Index:    Bathin  Bladder: 10  Bowels: 10  Groomin  Dressing: 10  Feeding: 10  Mobility: 10  Stairs: 5  Toilet Use: 10  Transfer (Bed to Chair and Back): 10  Total: 85/100        The Barthel ADL Index: Guidelines  1. The index should be used as a record of what a patient does, not as a record of what a patient could do. 2. The main aim is to establish degree of independence from any help, physical or verbal, however minor and for whatever reason. 3. The need for supervision renders the patient not independent. 4. A patient's performance should be established using the best available evidence. Asking the patient, friends/relatives and nurses are the usual sources, but direct observation and common sense are also important. However direct testing is not needed. 5. Usually the patient's performance over the preceding 24-48 hours is important, but occasionally longer periods will be relevant. 6. Middle categories imply that the patient supplies over 50 per cent of the effort. 7. Use of aids to be independent is allowed. [facility] Barthel, D.W. ([id number]). Functional evaluation: the Barthel Index. [address] [facility] (14)2. Otilia  michelle Guevara, MAXF, Yovana Sandoval., [facility], [address] [place] [address] (1999). Measuring the change indisability after inpatient rehabilitation; comparison of the responsiveness of the Barthel Index and Functional [place] Measure. Journal of Neurology, Neurosurgery, and Psychiatry, 66(4), 306-896. ANAND GillJ.YAJAIRA, SHAI Giles, & Melissa Sierra, MTeeA. (2004.) Assessment of post-stroke quality of life in cost-effectiveness studies: The usefulness of the Barthel Index and the EuroQoL-5D.  Quality of Life Research, 15, 688-23         Occupational Therapy Evaluation Charge Determination   History Examination Decision-Making   LOW Complexity : Brief history review  LOW Complexity : 1-3 performance deficits relating to physical, cognitive , or psychosocial skils that result in activity limitations and / or participation restrictions  LOW Complexity : No comorbidities that affect functional and no verbal or physical assistance needed to complete eval tasks       Based on the above components, the patient evaluation is determined to be of the following complexity level: LOW   Pain Rating:  None     Activity Tolerance:   Good  Please refer to the flowsheet for vital signs taken during this treatment. After treatment patient left in no apparent distress:    Sitting in chair and Call bell within reach    COMMUNICATION/EDUCATION:   The patients plan of care was discussed with: Physical therapist and Registered nurse.      Thank you for this referral.  Rose Mijares OT  Time Calculation: 16 mins

## 2020-11-27 ENCOUNTER — TELEPHONE (OUTPATIENT)
Dept: CARDIOLOGY CLINIC | Age: 65
End: 2020-11-27

## 2020-11-27 NOTE — TELEPHONE ENCOUNTER
Pt called to report being in afib for about an hour this morning. HR 167bpm, /115 per pt. He has the charisse [employer] and wanted to know how to send it to us. Unable to troubleshoot this - pt stated he has some diltizem that is not  that he has taken in the past as needed during these episodes that have helped. Instructed pt that if he has been told he can take diltiazem as needed, then to go ahead and take a dose, and to go to ER if afib w/ RVR persists. Pt verbalized understanding.

## 2021-03-09 ENCOUNTER — TELEPHONE (OUTPATIENT)
Dept: SLEEP MEDICINE | Age: 66
End: 2021-03-09

## 2021-03-09 NOTE — TELEPHONE ENCOUNTER
Spoke to patient on 03/09/2021 at 12:19pm, patient states he does have sleep issues and does not wish to schedule sleep consult visit, per Lucila Dakins, NP.

## 2021-06-15 ENCOUNTER — TRANSCRIBE ORDER (OUTPATIENT)
Dept: SCHEDULING | Age: 66
End: 2021-06-15

## 2021-06-15 DIAGNOSIS — M43.10 ACQUIRED SPONDYLOLISTHESIS: ICD-10-CM

## 2021-06-15 DIAGNOSIS — M48.062 SPINAL STENOSIS, LUMBAR REGION, WITH NEUROGENIC CLAUDICATION: ICD-10-CM

## 2021-06-15 DIAGNOSIS — M54.32 BILATERAL SCIATICA: ICD-10-CM

## 2021-06-15 DIAGNOSIS — M54.50 LUMBAR PAIN: Primary | ICD-10-CM

## 2021-06-15 DIAGNOSIS — R25.2 BILATERAL LEG CRAMPS: ICD-10-CM

## 2021-06-15 DIAGNOSIS — M54.31 BILATERAL SCIATICA: ICD-10-CM

## 2021-06-15 DIAGNOSIS — M96.1 POSTLAMINECTOMY SYNDROME, LUMBAR REGION: ICD-10-CM

## 2021-11-23 ENCOUNTER — HOSPITAL ENCOUNTER (EMERGENCY)
Age: 66
Discharge: HOME | End: 2021-11-23
Attending: EMERGENCY MEDICINE
Payer: COMMERCIAL

## 2021-11-23 VITALS
BODY MASS INDEX: 37.03 KG/M2 | HEIGHT: 72 IN | RESPIRATION RATE: 15 BRPM | OXYGEN SATURATION: 92 % | TEMPERATURE: 98.3 F | SYSTOLIC BLOOD PRESSURE: 148 MMHG | HEART RATE: 72 BPM | DIASTOLIC BLOOD PRESSURE: 83 MMHG | WEIGHT: 273.37 LBS

## 2021-11-23 DIAGNOSIS — Z86.79 HISTORY OF ATRIAL FIBRILLATION: ICD-10-CM

## 2021-11-23 DIAGNOSIS — R00.2 PALPITATIONS: Primary | ICD-10-CM

## 2021-11-23 DIAGNOSIS — I49.3 PVC (PREMATURE VENTRICULAR CONTRACTION): ICD-10-CM

## 2021-11-23 LAB
ALBUMIN SERPL-MCNC: 3.6 G/DL (ref 3.5–5)
ALBUMIN/GLOB SERPL: 0.9 {RATIO} (ref 1.1–2.2)
ALP SERPL-CCNC: 101 U/L (ref 45–117)
ALT SERPL-CCNC: 48 U/L (ref 12–78)
ANION GAP SERPL CALC-SCNC: 7 MMOL/L (ref 5–15)
AST SERPL-CCNC: 29 U/L (ref 15–37)
BASOPHILS # BLD: 0 K/UL (ref 0–0.1)
BASOPHILS NFR BLD: 1 % (ref 0–1)
BILIRUB SERPL-MCNC: 0.6 MG/DL (ref 0.2–1)
BUN SERPL-MCNC: 12 MG/DL (ref 6–20)
BUN/CREAT SERPL: 13 (ref 12–20)
CALCIUM SERPL-MCNC: 9.1 MG/DL (ref 8.5–10.1)
CHLORIDE SERPL-SCNC: 106 MMOL/L (ref 97–108)
CO2 SERPL-SCNC: 27 MMOL/L (ref 21–32)
CREAT SERPL-MCNC: 0.93 MG/DL (ref 0.7–1.3)
DIFFERENTIAL METHOD BLD: NORMAL
EOSINOPHIL # BLD: 0.1 K/UL (ref 0–0.4)
EOSINOPHIL NFR BLD: 2 % (ref 0–7)
ERYTHROCYTE [DISTWIDTH] IN BLOOD BY AUTOMATED COUNT: 12.9 % (ref 11.5–14.5)
GLOBULIN SER CALC-MCNC: 3.9 G/DL (ref 2–4)
GLUCOSE SERPL-MCNC: 156 MG/DL (ref 65–100)
HCT VFR BLD AUTO: 49.7 % (ref 36.6–50.3)
HGB BLD-MCNC: 17 G/DL (ref 12.1–17)
IMM GRANULOCYTES # BLD AUTO: 0 K/UL (ref 0–0.04)
IMM GRANULOCYTES NFR BLD AUTO: 0 % (ref 0–0.5)
LYMPHOCYTES # BLD: 1.7 K/UL (ref 0.8–3.5)
LYMPHOCYTES NFR BLD: 34 % (ref 12–49)
MAGNESIUM SERPL-MCNC: 2 MG/DL (ref 1.6–2.4)
MCH RBC QN AUTO: 31.5 PG (ref 26–34)
MCHC RBC AUTO-ENTMCNC: 34.2 G/DL (ref 30–36.5)
MCV RBC AUTO: 92 FL (ref 80–99)
MONOCYTES # BLD: 0.4 K/UL (ref 0–1)
MONOCYTES NFR BLD: 8 % (ref 5–13)
NEUTS SEG # BLD: 2.8 K/UL (ref 1.8–8)
NEUTS SEG NFR BLD: 55 % (ref 32–75)
NRBC # BLD: 0 K/UL (ref 0–0.01)
NRBC BLD-RTO: 0 PER 100 WBC
PLATELET # BLD AUTO: 170 K/UL (ref 150–400)
PMV BLD AUTO: 10.1 FL (ref 8.9–12.9)
POTASSIUM SERPL-SCNC: 4.1 MMOL/L (ref 3.5–5.1)
PROT SERPL-MCNC: 7.5 G/DL (ref 6.4–8.2)
RBC # BLD AUTO: 5.4 M/UL (ref 4.1–5.7)
SODIUM SERPL-SCNC: 140 MMOL/L (ref 136–145)
WBC # BLD AUTO: 5 K/UL (ref 4.1–11.1)

## 2021-11-23 PROCEDURE — 93005 ELECTROCARDIOGRAM TRACING: CPT

## 2021-11-23 PROCEDURE — 36415 COLL VENOUS BLD VENIPUNCTURE: CPT

## 2021-11-23 PROCEDURE — 80053 COMPREHEN METABOLIC PANEL: CPT

## 2021-11-23 PROCEDURE — 85025 COMPLETE CBC W/AUTO DIFF WBC: CPT

## 2021-11-23 PROCEDURE — 83735 ASSAY OF MAGNESIUM: CPT

## 2021-11-23 PROCEDURE — 99283 EMERGENCY DEPT VISIT LOW MDM: CPT

## 2021-11-23 NOTE — DISCHARGE INSTRUCTIONS
You are seen here in the emergency department for concern that you were in atrial fibrillation this morning. Thankfully, your heart is now in a normal rhythm and your EKG and lab work are reassuring.

## 2021-11-23 NOTE — ED NOTES
Assumed care of pt from triage. Pt reporting being in afib since around 30 w/ accompanying SOB, CP to the left of his sternum, nausea that has now resolved, dizziness and insomnia. Pt has a hx of a fib with multiple ablation and cardioversion, the last of which was about 1 year ago. Pt cardiologist Dr Danell Dance. Pt has been feeling congested and took OTC iboprofen w/ phenylephrine last night.      Pt notes that while in the waiting room he felt his sx subside

## 2021-11-23 NOTE — ED NOTES
Patient discharged by Dr. Malena Goodwin. Patient provided with discharge instructions Rx and instructions on follow up care. Patient out of ED ambulatory accompanied by family.

## 2021-11-23 NOTE — ED TRIAGE NOTES
Pt comes to ED from home with c/o irregular heart beat. Pt states he has been in a-fib since about 0300 this morning. He states he has a hx of a-fib and has a home EKG monitor his cardiologist advised him to get. He is not prescribed any medciation. He says his HR on his home monitor was about 160.

## 2021-11-23 NOTE — ED PROVIDER NOTES
EMERGENCY DEPARTMENT HISTORY AND PHYSICAL EXAM      Date: 11/23/2021  Patient Name: Agustin Calderón    History of Presenting Illness     Chief Complaint   Patient presents with    Irregular Heart Beat     Pt states he has been in a-fib since about 0300 this morning. He states he has a hx of a-fib and has a home EKG monitor his cardiologist advised him to get. He is not prescribed any medciation. He says his HR on his home monitor was about 160. History Provided By: Patient and Patient's Wife    HPI: Agustin Calderón, 74 y.o. male presents to the ED with history of high blood pressure, peptic ulcer disease, history of atrial fibrillation with a history of ablation, multiple attempts at cardioversion, has [employer] charisse at home and felt himself going to atrial fibrillation approximately 3 AM.  His heart rate on the home monitor was approximately 160, persisted so he came to the emergency department for evaluation. He states while he was in the waiting room he felt it stop. He denies any chest pressure or chest pain with the sensation of being in atrial fibrillation but did tell his wife that he felt a little short of breath. He denies any cough or fever but does relays had some sinus congestion and took an over-the-counter medication for congestion that has phenylephrine 10 mg in it last night. He is otherwise not been ill, denies abdominal pain or pelvic pain. He denies any new swelling in his legs. His cardiologist is .    There are no other complaints, changes, or physical findings at this time. PCP: Leanna Reyes MD    No current facility-administered medications on file prior to encounter. Current Outpatient Medications on File Prior to Encounter   Medication Sig Dispense Refill    furosemide (LASIX) 20 mg tablet Take 1 Tablet by mouth daily as needed (edema). 90 Tablet 3    diclofenac (VOLTAREN) 1 % gel Apply  to affected area four (4) times daily.       omega 3-DHA-EPA-fish oil 1,000 mg (120 mg-180 mg) capsule Take 1 Cap by mouth daily.  aspirin (ASPIRIN) 325 mg tablet Take 325 mg by mouth daily.  salt moisturing solution no.1 (OCEAN ULTRA SALINE MIST NA) 1 Fred by Both Nostrils route nightly.  valACYclovir (VALTREX) 500 mg tablet Take 500 mg by mouth daily as needed ('outbreaks').  diclofenac EC (VOLTAREN) 75 mg EC tablet Take 75 mg by mouth two (2) times a day.  cyanocobalamin (VITAMIN B-12) 1,000 mcg tablet Take 1,000 mcg by mouth daily.          Past History     Past Medical History:  Past Medical History:   Diagnosis Date    A-fib [facility])     ablations x4    Arthritis     back    Chronic pain     back    Encounter for cardioversion procedure 2/7/2017    GERD (gastroesophageal reflux disease)     High cholesterol     Hypertension 02/07/2017    patient denies hx of HTN    Multiple thyroid nodules     biopsied and benign    Non-nicotine vapor product user     on occassion    PUD (peptic ulcer disease)     \"years ago\"    Sleep apnea     wife witnessed apnea -- PCP & Card both referred to sleep clinic pt refused       Past Surgical History:  Past Surgical History:   Procedure Laterality Date    CARDIAC CATHETERIZATION      CARDIAC SURG PROCEDURE UNLIST  2010, 2011    ABLATION x4 2016 & 2018    CARDIOVERSION EXTERNAL      HX AFIB ABLATION      HX CERVICAL DISKECTOMY  2015    pt unsure exactly what they did to his neck    HX CERVICAL FUSION  2013    HX HEENT  6/6/13    THYROID BIOPSY    HX ORTHOPAEDIC Left 2015    shoulder surgery on left    HX ORTHOPAEDIC Left 1990    wrist surgery, LEFT    HX ORTHOPAEDIC Right 1996    elbow surgery, RIGHT    HX TONSILLECTOMY         Family History:  Family History   Problem Relation Age of Onset    Cancer Mother         BREAST    Heart Disease Father     Lung Disease Father        Social History:  Social History     Tobacco Use    Smoking status: Former Smoker     Packs/day: 0.25     Years: 40.00 Pack years: 10.00     Quit date: 10/1/2012     Years since quittin.1    Smokeless tobacco: Never Used   Substance Use Topics    Alcohol use: Yes     Comment: occassionally 3-4 cans of beer    Drug use: No       Allergies: Allergies   Allergen Reactions    Percocet [Oxycodone-Acetaminophen] Anxiety     Patient takes Tylenol without problems.  Guaifenesin Other (comments)     Other reaction(s): gi upset         Review of Systems   Review of Systems   Constitutional: Negative for activity change and appetite change. HENT: Positive for congestion. Negative for sore throat. Respiratory: Positive for shortness of breath. Negative for chest tightness. Cardiovascular: Positive for chest pain and palpitations. Negative for leg swelling. Gastrointestinal: Negative for abdominal pain. Genitourinary: Negative for urgency. Musculoskeletal: Negative for back pain and neck pain. Neurological: Negative for syncope. All other systems reviewed and are negative. Physical Exam   Physical Exam   Vital signs and nursing notes reviewed    CONSTITUTIONAL: Alert, in mild distress; well-developed; well-nourished. Wife that he is at the bedside. HEAD:  Normocephalic, atraumatic  EYES: PERRL; EOM's intact. ENTM: Nose: no rhinorrhea; Throat: no erythema or exudate, mucous membranes moist  Neck:  Supple. trachea is midline. No JVD, no carotid bruits bilaterally. RESP: Chest clear, equal breath sounds. - W/R/R  CV: S1 and S2 WNL; No murmurs, gallops or rubs. 2+ radial and DP pulses bilaterally. Rate equals 70-80, occasional PVC on monitor. GI: non-distended, normal bowel sounds, abdomen soft and non-tender. No masses or organomegaly. : No costo-vertebral angle tenderness. BACK:  Non-tender, normal appearance  UPPER EXT:  Normal inspection. no joint or soft tissue swelling  LOWER EXT: No edema, no calf tenderness.   NEURO: Alert and oriented x3, 5/5 strength and light touch sensation intact in bilateral upper and lower extremities. SKIN: No rashes; Warm and dry  PSYCH: Normal mood, normal affect    Diagnostic Study Results     Labs -     Recent Results (from the past 12 hour(s))   EKG, 12 LEAD, INITIAL    Collection Time: 11/23/21  7:26 AM   Result Value Ref Range    Ventricular Rate 87 BPM    Atrial Rate 87 BPM    P-R Interval 154 ms    QRS Duration 84 ms    Q-T Interval 352 ms    QTC Calculation (Bezet) 423 ms    Calculated P Axis 69 degrees    Calculated R Axis 42 degrees    Calculated T Axis 45 degrees    Diagnosis       Sinus rhythm with frequent premature ventricular complexes  Septal infarct , age undetermined  When compared with ECG of 27-AUG-2019 13:31,  premature ventricular complexes are now present  Vent. rate has increased BY  37 BPM  Nonspecific T wave abnormality, improved in Anterior leads     CBC WITH AUTOMATED DIFF    Collection Time: 11/23/21  7:55 AM   Result Value Ref Range    WBC 5.0 4.1 - 11.1 K/uL    RBC 5.40 4. 10 - 5.70 M/uL    HGB 17.0 12.1 - 17.0 g/dL    HCT 49.7 36.6 - 50.3 %    MCV 92.0 80.0 - 99.0 FL    MCH 31.5 26.0 - 34.0 PG    MCHC 34.2 30.0 - 36.5 g/dL    RDW 12.9 11.5 - 14.5 %    PLATELET 688 972 - 612 K/uL    MPV 10.1 8.9 - 12.9 FL    NRBC 0.0 0  WBC    ABSOLUTE NRBC 0.00 0.00 - 0.01 K/uL    NEUTROPHILS 55 32 - 75 %    LYMPHOCYTES 34 12 - 49 %    MONOCYTES 8 5 - 13 %    EOSINOPHILS 2 0 - 7 %    BASOPHILS 1 0 - 1 %    IMMATURE GRANULOCYTES 0 0.0 - 0.5 %    ABS. NEUTROPHILS 2.8 1.8 - 8.0 K/UL    ABS. LYMPHOCYTES 1.7 0.8 - 3.5 K/UL    ABS. MONOCYTES 0.4 0.0 - 1.0 K/UL    ABS. EOSINOPHILS 0.1 0.0 - 0.4 K/UL    ABS. BASOPHILS 0.0 0.0 - 0.1 K/UL    ABS. IMM.  GRANS. 0.0 0.00 - 0.04 K/UL    DF AUTOMATED     METABOLIC PANEL, COMPREHENSIVE    Collection Time: 11/23/21  7:55 AM   Result Value Ref Range    Sodium 140 136 - 145 mmol/L    Potassium 4.1 3.5 - 5.1 mmol/L    Chloride 106 97 - 108 mmol/L    CO2 27 21 - 32 mmol/L    Anion gap 7 5 - 15 mmol/L    Glucose 156 (H) 65 - 100 mg/dL    BUN 12 6 - 20 MG/DL    Creatinine 0.93 0.70 - 1.30 MG/DL    BUN/Creatinine ratio 13 12 - 20      GFR est AA >60 >60 ml/min/1.73m2    GFR est non-AA >60 >60 ml/min/1.73m2    Calcium 9.1 8.5 - 10.1 MG/DL    Bilirubin, total 0.6 0.2 - 1.0 MG/DL    ALT (SGPT) 48 12 - 78 U/L    AST (SGOT) 29 15 - 37 U/L    Alk. phosphatase 101 45 - 117 U/L    Protein, total 7.5 6.4 - 8.2 g/dL    Albumin 3.6 3.5 - 5.0 g/dL    Globulin 3.9 2.0 - 4.0 g/dL    A-G Ratio 0.9 (L) 1.1 - 2.2     MAGNESIUM    Collection Time: 11/23/21  7:55 AM   Result Value Ref Range    Magnesium 2.0 1.6 - 2.4 mg/dL       Radiologic Studies -   No orders to display     CT Results  (Last 48 hours)    None        CXR Results  (Last 48 hours)    None          Medical Decision Making   I am the first provider for this patient. I reviewed the vital signs, available nursing notes, past medical history, past surgical history, family history and social history. Vital Signs-Reviewed the patient's vital signs. Patient Vitals for the past 12 hrs:   Temp Pulse Resp BP SpO2   11/23/21 0818 98.3 °F (36.8 °C) 72 15 (!) 148/83 92 %   11/23/21 0717 97.5 °F (36.4 °C) 92 18 135/88 98 %       EKG interpretation: (Preliminary)  EKG performed at 7:26 AM shows a sinus rhythm at a rate 87, normal axis, normal intervals, frequent PVCs without visible acute ischemic change. Records Reviewed: Nursing Notes and Old Medical Records    Provider Notes (Medical Decision Making):   53-year-old male here with concern for atrial fibrillation pattern at home, now in sinus rhythm but with PVCs. No visible ischemic change on EKG, no chest pain, will check electrolytes today, hemoglobin and hematocrit prior to discharge, maintain on monitor while here in case patient goes back into atrial fibrillation. ED Course:   Initial assessment performed. The patients presenting problems have been discussed, and they are in agreement with the care plan formulated and outlined with them.   I have encouraged them to ask questions as they arise throughout their visit. Disposition:  Discharge    Discharge Note:  9:03 AM  The pt is ready for discharge. The pt's signs, symptoms, diagnosis, and discharge instructions have been discussed and pt has conveyed their understanding. The pt is to follow up as recommended or return to ER should their symptoms worsen. Plan has been discussed and pt is in agreement. DISCHARGE PLAN:  1. Current Discharge Medication List        2. Follow-up Information     Follow up With Specialties Details Why Contact [facility] EMERGENCY DEPT Emergency Medicine  If symptoms worsen including return of palpitations, chest pain, shortness of breath, new onset weakness in her face arms legs or other new concerning symptoms. [address] [place] [address]  [phone]        3. Return to ED if worse     Diagnosis     Clinical Impression:   1. Palpitations    2. PVC (premature ventricular contraction)    3. History of atrial fibrillation        Attestations:    Maureen Rhodes MD    Please note that this dictation was completed with [employer], the computer voice recognition software. Quite often unanticipated grammatical, syntax, homophones, and other interpretive errors are inadvertently transcribed by the computer software. Please disregard these errors. Please excuse any errors that have escaped final proofreading. Thank you.

## 2021-11-24 LAB
ATRIAL RATE: 87 BPM
CALCULATED P AXIS, ECG09: 69 DEGREES
CALCULATED R AXIS, ECG10: 42 DEGREES
CALCULATED T AXIS, ECG11: 45 DEGREES
DIAGNOSIS, 93000: NORMAL
P-R INTERVAL, ECG05: 154 MS
Q-T INTERVAL, ECG07: 352 MS
QRS DURATION, ECG06: 84 MS
QTC CALCULATION (BEZET), ECG08: 423 MS
VENTRICULAR RATE, ECG03: 87 BPM

## 2022-02-03 ENCOUNTER — APPOINTMENT (OUTPATIENT)
Dept: GENERAL RADIOLOGY | Age: 67
End: 2022-02-03
Attending: EMERGENCY MEDICINE
Payer: COMMERCIAL

## 2022-02-03 ENCOUNTER — HOSPITAL ENCOUNTER (EMERGENCY)
Age: 67
Discharge: HOME | End: 2022-02-03
Attending: EMERGENCY MEDICINE
Payer: COMMERCIAL

## 2022-02-03 VITALS
SYSTOLIC BLOOD PRESSURE: 148 MMHG | RESPIRATION RATE: 18 BRPM | HEIGHT: 72 IN | TEMPERATURE: 99.4 F | DIASTOLIC BLOOD PRESSURE: 84 MMHG | WEIGHT: 277.34 LBS | HEART RATE: 77 BPM | OXYGEN SATURATION: 92 % | BODY MASS INDEX: 37.56 KG/M2

## 2022-02-03 DIAGNOSIS — J12.82 PNEUMONIA DUE TO COVID-19 VIRUS: Primary | ICD-10-CM

## 2022-02-03 DIAGNOSIS — U07.1 PNEUMONIA DUE TO COVID-19 VIRUS: Primary | ICD-10-CM

## 2022-02-03 LAB
ALBUMIN SERPL-MCNC: 3.5 G/DL (ref 3.5–5)
ALBUMIN/GLOB SERPL: 0.8 {RATIO} (ref 1.1–2.2)
ALP SERPL-CCNC: 124 U/L (ref 45–117)
ALT SERPL-CCNC: 55 U/L (ref 12–78)
ANION GAP SERPL CALC-SCNC: 5 MMOL/L (ref 5–15)
AST SERPL-CCNC: 40 U/L (ref 15–37)
ATRIAL RATE: 78 BPM
BASOPHILS # BLD: 0 K/UL (ref 0–0.1)
BASOPHILS NFR BLD: 0 % (ref 0–1)
BILIRUB SERPL-MCNC: 0.9 MG/DL (ref 0.2–1)
BNP SERPL-MCNC: 26 PG/ML
BUN SERPL-MCNC: 10 MG/DL (ref 6–20)
BUN/CREAT SERPL: 11 (ref 12–20)
CALCIUM SERPL-MCNC: 8.4 MG/DL (ref 8.5–10.1)
CALCULATED P AXIS, ECG09: 80 DEGREES
CALCULATED R AXIS, ECG10: 26 DEGREES
CALCULATED T AXIS, ECG11: 56 DEGREES
CHLORIDE SERPL-SCNC: 100 MMOL/L (ref 97–108)
CO2 SERPL-SCNC: 29 MMOL/L (ref 21–32)
CREAT SERPL-MCNC: 0.93 MG/DL (ref 0.7–1.3)
DIAGNOSIS, 93000: NORMAL
DIFFERENTIAL METHOD BLD: ABNORMAL
EOSINOPHIL # BLD: 0 K/UL (ref 0–0.4)
EOSINOPHIL NFR BLD: 0 % (ref 0–7)
ERYTHROCYTE [DISTWIDTH] IN BLOOD BY AUTOMATED COUNT: 13.1 % (ref 11.5–14.5)
GLOBULIN SER CALC-MCNC: 4.3 G/DL (ref 2–4)
GLUCOSE SERPL-MCNC: 157 MG/DL (ref 65–100)
HCT VFR BLD AUTO: 47.1 % (ref 36.6–50.3)
HGB BLD-MCNC: 16.2 G/DL (ref 12.1–17)
IMM GRANULOCYTES # BLD AUTO: 0 K/UL (ref 0–0.04)
IMM GRANULOCYTES NFR BLD AUTO: 1 % (ref 0–0.5)
LYMPHOCYTES # BLD: 1 K/UL (ref 0.8–3.5)
LYMPHOCYTES NFR BLD: 16 % (ref 12–49)
MCH RBC QN AUTO: 31.6 PG (ref 26–34)
MCHC RBC AUTO-ENTMCNC: 34.4 G/DL (ref 30–36.5)
MCV RBC AUTO: 92 FL (ref 80–99)
MONOCYTES # BLD: 0.5 K/UL (ref 0–1)
MONOCYTES NFR BLD: 7 % (ref 5–13)
NEUTS SEG # BLD: 4.8 K/UL (ref 1.8–8)
NEUTS SEG NFR BLD: 76 % (ref 32–75)
NRBC # BLD: 0 K/UL (ref 0–0.01)
NRBC BLD-RTO: 0 PER 100 WBC
P-R INTERVAL, ECG05: 158 MS
PLATELET # BLD AUTO: 121 K/UL (ref 150–400)
PMV BLD AUTO: 10 FL (ref 8.9–12.9)
POTASSIUM SERPL-SCNC: 4.2 MMOL/L (ref 3.5–5.1)
PROT SERPL-MCNC: 7.8 G/DL (ref 6.4–8.2)
Q-T INTERVAL, ECG07: 342 MS
QRS DURATION, ECG06: 94 MS
QTC CALCULATION (BEZET), ECG08: 389 MS
RBC # BLD AUTO: 5.12 M/UL (ref 4.1–5.7)
SODIUM SERPL-SCNC: 134 MMOL/L (ref 136–145)
TROPONIN-HIGH SENSITIVITY: 6 NG/L (ref 0–76)
VENTRICULAR RATE, ECG03: 78 BPM
WBC # BLD AUTO: 6.4 K/UL (ref 4.1–11.1)

## 2022-02-03 PROCEDURE — 36415 COLL VENOUS BLD VENIPUNCTURE: CPT

## 2022-02-03 PROCEDURE — 96376 TX/PRO/DX INJ SAME DRUG ADON: CPT

## 2022-02-03 PROCEDURE — 71045 X-RAY EXAM CHEST 1 VIEW: CPT

## 2022-02-03 PROCEDURE — 96375 TX/PRO/DX INJ NEW DRUG ADDON: CPT

## 2022-02-03 PROCEDURE — 80053 COMPREHEN METABOLIC PANEL: CPT

## 2022-02-03 PROCEDURE — 83880 ASSAY OF NATRIURETIC PEPTIDE: CPT

## 2022-02-03 PROCEDURE — 74011250636 HC RX REV CODE- 250/636: Performed by: EMERGENCY MEDICINE

## 2022-02-03 PROCEDURE — 96361 HYDRATE IV INFUSION ADD-ON: CPT

## 2022-02-03 PROCEDURE — 99283 EMERGENCY DEPT VISIT LOW MDM: CPT

## 2022-02-03 PROCEDURE — 96374 THER/PROPH/DIAG INJ IV PUSH: CPT

## 2022-02-03 PROCEDURE — 84484 ASSAY OF TROPONIN QUANT: CPT

## 2022-02-03 PROCEDURE — U0005 INFEC AGEN DETEC AMPLI PROBE: HCPCS

## 2022-02-03 PROCEDURE — 93005 ELECTROCARDIOGRAM TRACING: CPT

## 2022-02-03 PROCEDURE — 85025 COMPLETE CBC W/AUTO DIFF WBC: CPT

## 2022-02-03 RX ORDER — ONDANSETRON 4 MG/1
4 TABLET, FILM COATED ORAL
Qty: 20 TABLET | Refills: 0 | Status: SHIPPED | OUTPATIENT
Start: 2022-02-03 | End: 2022-10-04

## 2022-02-03 RX ORDER — AZITHROMYCIN 250 MG/1
TABLET, FILM COATED ORAL
Qty: 6 TABLET | Refills: 0 | Status: SHIPPED | OUTPATIENT
Start: 2022-02-03 | End: 2022-02-08

## 2022-02-03 RX ORDER — ONDANSETRON 2 MG/ML
4 INJECTION INTRAMUSCULAR; INTRAVENOUS
Status: COMPLETED | OUTPATIENT
Start: 2022-02-03 | End: 2022-02-03

## 2022-02-03 RX ORDER — KETOROLAC TROMETHAMINE 30 MG/ML
15 INJECTION, SOLUTION INTRAMUSCULAR; INTRAVENOUS
Status: COMPLETED | OUTPATIENT
Start: 2022-02-03 | End: 2022-02-03

## 2022-02-03 RX ORDER — MECLIZINE HYDROCHLORIDE 25 MG/1
25 TABLET ORAL
Qty: 20 TABLET | Refills: 0 | Status: SHIPPED | OUTPATIENT
Start: 2022-02-03 | End: 2022-02-03

## 2022-02-03 RX ADMIN — ONDANSETRON 4 MG: 2 INJECTION INTRAMUSCULAR; INTRAVENOUS at 15:06

## 2022-02-03 RX ADMIN — KETOROLAC TROMETHAMINE 15 MG: 30 INJECTION, SOLUTION INTRAMUSCULAR at 18:02

## 2022-02-03 RX ADMIN — ONDANSETRON 4 MG: 2 INJECTION INTRAMUSCULAR; INTRAVENOUS at 18:02

## 2022-02-03 RX ADMIN — SODIUM CHLORIDE 500 ML: 9 INJECTION, SOLUTION INTRAVENOUS at 18:02

## 2022-02-04 ENCOUNTER — PATIENT OUTREACH (OUTPATIENT)
Dept: CASE MANAGEMENT | Age: 67
End: 2022-02-04

## 2022-02-04 LAB
SARS-COV-2, XPLCVT: DETECTED
SOURCE, COVRS: ABNORMAL

## 2022-02-04 NOTE — ED PROVIDER NOTES
EMERGENCY DEPARTMENT HISTORY AND PHYSICAL EXAM      Date: 2/3/2022  Patient Name: Dena Phillips    History of Presenting Illness     Chief Complaint   Patient presents with    Positive For Covid-19     reports tested positive recently and is feeling worse, back pain, SOB, body aches hot and cold chills. History Provided By: Patient    HPI: Dena Phillips, 74 y.o. male with PMHx as noted below presents the emergency department with chief complaint of body aches, lower back pain, dyspnea and cough. Symptom onset approximately 7 to 8 days ago, tested positive for Covid 6 days ago. Symptoms since then have been constant and progressively worsening. Patient does report a cough productive of yellowish-brown sputum. Symptoms have been moderate in intensity and nonradiating. Pt denies any other alleviating or exacerbating factors. Additionally, pt specifically denies any chest pain, severe dyspnea, AMS    PCP: Jovana Antoine MD    Current Outpatient Medications   Medication Sig Dispense Refill    ondansetron hcl (Zofran) 4 mg tablet Take 1 Tablet by mouth every eight (8) hours as needed for Nausea. 20 Tablet 0    azithromycin (Zithromax Z-Jose) 250 mg tablet 1 pack 6 Tablet 0    furosemide (LASIX) 20 mg tablet Take 1 Tablet by mouth daily as needed (edema). 90 Tablet 3    diclofenac (VOLTAREN) 1 % gel Apply  to affected area four (4) times daily.  omega 3-DHA-EPA-fish oil 1,000 mg (120 mg-180 mg) capsule Take 1 Cap by mouth daily.  aspirin (ASPIRIN) 325 mg tablet Take 325 mg by mouth daily.  salt moisturing solution no.1 (OCEAN ULTRA SALINE MIST NA) 1 [place] by Both Nostrils route nightly.  valACYclovir (VALTREX) 500 mg tablet Take 500 mg by mouth daily as needed ('outbreaks').  diclofenac EC (VOLTAREN) 75 mg EC tablet Take 75 mg by mouth two (2) times a day.  cyanocobalamin (VITAMIN B-12) 1,000 mcg tablet Take 1,000 mcg by mouth daily.          Past History     Past Medical History:  Past Medical History:   Diagnosis Date    A-fib [facility])     ablations x4    Arthritis     back    Chronic pain     back    Encounter for cardioversion procedure 2017    GERD (gastroesophageal reflux disease)     High cholesterol     Hypertension 2017    patient denies hx of HTN    Multiple thyroid nodules     biopsied and benign    Non-nicotine vapor product user     on occassion    PUD (peptic ulcer disease)     \"years ago\"    Sleep apnea     wife witnessed apnea -- PCP & Card both referred to sleep clinic pt refused       Past Surgical History:  Past Surgical History:   Procedure Laterality Date    CARDIAC CATHETERIZATION      CARDIAC SURG PROCEDURE UNLIST  ,     ABLATION x4 2016 & 2018    CARDIOVERSION EXTERNAL      HX AFIB ABLATION      HX CERVICAL DISKECTOMY      pt unsure exactly what they did to his neck    HX CERVICAL FUSION      HX HEENT  13    THYROID BIOPSY    HX ORTHOPAEDIC Left     shoulder surgery on left    HX ORTHOPAEDIC Left     wrist surgery, LEFT    HX ORTHOPAEDIC Right     elbow surgery, RIGHT    HX TONSILLECTOMY         Family History:  Family History   Problem Relation Age of Onset    Cancer Mother         BREAST    Heart Disease Father     Lung Disease Father        Social History:  Social History     Tobacco Use    Smoking status: Former Smoker     Packs/day: 0.25     Years: 40.00     Pack years: 10.00     Quit date: 10/1/2012     Years since quittin.3    Smokeless tobacco: Never Used   Substance Use Topics    Alcohol use: Yes     Comment: occassionally 3-4 cans of beer    Drug use: No       Allergies: Allergies   Allergen Reactions    Percocet [Oxycodone-Acetaminophen] Anxiety     Patient takes Tylenol without problems.  Guaifenesin Other (comments)     Other reaction(s): gi upset         Review of Systems   Review of Systems  Constitutional: positive for fever, chills, and fatigue.    HENT: positive for congestion. Negative sore throat, neck stiffness   Eyes: Negative for discharge and redness. Respiratory: Positive for  shortness of breath, cough  Cardiovascular: Negative for chest pain, palpitations   Gastrointestinal: Negative for nausea, vomiting, abdominal pain, constipation, diarrhea and blood in stool. Genitourinary: Negative for dysuria, hematuria, flank pain, decreased urine volume, discharge,   Musculoskeletal: positive for myalgias. Negative joint pain . Skin: Negative for rash or lesions . Neurological: Negative weakness, light-headedness, numbness and headaches. Physical Exam   Physical Exam    GENERAL: alert and oriented, no acute distress  EYES: PEERL, No injection, discharge or icterus. ENT: Mucous membranes pink and moist.  NECK: Supple  LUNGS: Airway patent. Non-labored respirations. Breath sounds clear with good air entry bilaterally. HEART: Regular rate and rhythm. No peripheral edema  ABDOMEN: Non-distended and non-tender, without guarding or rebound.   SKIN:  warm, dry  MSK/EXTREMITIES: Without swelling, tenderness or deformity, symmetric with normal ROM  NEUROLOGICAL: Alert, oriented      Diagnostic Study Results     Labs -     Recent Results (from the past 12 hour(s))   EKG, 12 LEAD, INITIAL    Collection Time: 02/03/22  2:57 PM   Result Value Ref Range    Ventricular Rate 78 BPM    Atrial Rate 78 BPM    P-R Interval 158 ms    QRS Duration 94 ms    Q-T Interval 342 ms    QTC Calculation (Bezet) 389 ms    Calculated P Axis 80 degrees    Calculated R Axis 26 degrees    Calculated T Axis 56 degrees    Diagnosis       Sinus rhythm with occasional premature ventricular complexes and premature   atrial complexes  When compared with ECG of 23-NOV-2021 07:26,  premature atrial complexes are now present  Criteria for Septal infarct are no longer present     CBC WITH AUTOMATED DIFF    Collection Time: 02/03/22  3:09 PM   Result Value Ref Range    WBC 6.4 4.1 - 11.1 K/uL RBC 5.12 4.10 - 5.70 M/uL    HGB 16.2 12.1 - 17.0 g/dL    HCT 47.1 36.6 - 50.3 %    MCV 92.0 80.0 - 99.0 FL    MCH 31.6 26.0 - 34.0 PG    MCHC 34.4 30.0 - 36.5 g/dL    RDW 13.1 11.5 - 14.5 %    PLATELET 442 (L) 338 - 400 K/uL    MPV 10.0 8.9 - 12.9 FL    NRBC 0.0 0  WBC    ABSOLUTE NRBC 0.00 0.00 - 0.01 K/uL    NEUTROPHILS 76 (H) 32 - 75 %    LYMPHOCYTES 16 12 - 49 %    MONOCYTES 7 5 - 13 %    EOSINOPHILS 0 0 - 7 %    BASOPHILS 0 0 - 1 %    IMMATURE GRANULOCYTES 1 (H) 0.0 - 0.5 %    ABS. NEUTROPHILS 4.8 1.8 - 8.0 K/UL    ABS. LYMPHOCYTES 1.0 0.8 - 3.5 K/UL    ABS. MONOCYTES 0.5 0.0 - 1.0 K/UL    ABS. EOSINOPHILS 0.0 0.0 - 0.4 K/UL    ABS. BASOPHILS 0.0 0.0 - 0.1 K/UL    ABS. IMM. GRANS. 0.0 0.00 - 0.04 K/UL    DF AUTOMATED     METABOLIC PANEL, COMPREHENSIVE    Collection Time: 02/03/22  3:09 PM   Result Value Ref Range    Sodium 134 (L) 136 - 145 mmol/L    Potassium 4.2 3.5 - 5.1 mmol/L    Chloride 100 97 - 108 mmol/L    CO2 29 21 - 32 mmol/L    Anion gap 5 5 - 15 mmol/L    Glucose 157 (H) 65 - 100 mg/dL    BUN 10 6 - 20 MG/DL    Creatinine 0.93 0.70 - 1.30 MG/DL    BUN/Creatinine ratio 11 (L) 12 - 20      GFR est AA >60 >60 ml/min/1.73m2    GFR est non-AA >60 >60 ml/min/1.73m2    Calcium 8.4 (L) 8.5 - 10.1 MG/DL    Bilirubin, total 0.9 0.2 - 1.0 MG/DL    ALT (SGPT) 55 12 - 78 U/L    AST (SGOT) 40 (H) 15 - 37 U/L    Alk.  phosphatase 124 (H) 45 - 117 U/L    Protein, total 7.8 6.4 - 8.2 g/dL    Albumin 3.5 3.5 - 5.0 g/dL    Globulin 4.3 (H) 2.0 - 4.0 g/dL    A-G Ratio 0.8 (L) 1.1 - 2.2     NT-PRO BNP    Collection Time: 02/03/22  3:09 PM   Result Value Ref Range    NT pro-BNP 26 <125 PG/ML   TROPONIN-HIGH SENSITIVITY    Collection Time: 02/03/22  3:09 PM   Result Value Ref Range    Troponin-High Sensitivity 6 0 - 76 ng/L       Radiologic Studies -   XR CHEST PORT   Final Result   Left lower lung pneumonia        CT Results  (Last 48 hours)    None        CXR Results  (Last 48 hours)               02/03/22 0932 XR CHEST PORT Final result    Impression:  Left lower lung pneumonia       Narrative:  EXAM: XR CHEST PORT       INDICATION: chest pain       COMPARISON: 10/6/2020       FINDINGS: A portable AP radiograph of the chest was obtained at 1656 hours. There is an infiltrate at the left lung base. The cardiac and mediastinal   contours and pulmonary vascularity are normal.  The bones and soft tissues are   grossly within normal limits. Medical Decision Making     ISiomara MD am the first provider for this patient and am the attending of record for this patient encounter. I reviewed the vital signs, available nursing notes, past medical history, past surgical history, family history and social history. Vital Signs-Reviewed the patient's vital signs. Patient Vitals for the past 12 hrs:   Temp Pulse Resp BP SpO2   02/03/22 1811 -- -- -- (!) 148/84 92 %   02/03/22 1448 99.4 °F (37.4 °C) 77 18 (!) 180/77 96 %     EKG interpretation: (Preliminary)  Rhythm: normal sinus rhythm. . Rate (approx.): 78; Axis: normal; P wave: normal; QRS interval: normal ; ST/T wave: normal;  was interpreted by Shellie Godoy MD,ED Provider. Pulse Oximetry Analysis - 96% on RA      Records Reviewed: Nursing Notes and Old Medical Records    Provider Notes (Medical Decision Making): On presentation, the patient is well appearing, in no acute distress with reassuring vital signs. Based on my history and exam the differential diagnosis for this patient includes COVID 19, flu, URI, sinusitis, dehydration, other viral illness, bacterial pneumonia, bronchitis. Nothing to suggest strep pharyngitis. Home Covid test was positive, will send PCR. Basic labs obtained were overall reassuring. Patient was given IV fluids, Zofran and Toradol with significant improvement in symptoms. Tolerating p.o. and feeling much better on reevaluation.   Has had no observed hypoxia while in the emergency department. Patient may have COVID 19, however is breathing comfortably, maintaining adequate O2 sats on room air is tolerating PO and is overall well appearing so does not require admission at this time. Have recommended self quarantine per CDC guidelines. Symptomatic therapy suggested. Should return immediately to the emergency department develops shortness of breath, confusion, weakness or any othe new/worrisome symptoms       ED Course:   Initial assessment performed. The patients presenting problems have been discussed, and they are in agreement with the care plan formulated and outlined with them. I have encouraged them to ask questions as they arise throughout their visit. Medications   ondansetron (ZOFRAN) injection 4 mg (4 mg IntraVENous Given 2/3/22 1506)   sodium chloride 0.9 % bolus infusion 500 mL (0 mL IntraVENous IV Completed 2/3/22 1909)   ketorolac (TORADOL) injection 15 mg (15 mg IntraVENous Given 2/3/22 1802)   ondansetron (ZOFRAN) injection 4 mg (4 mg IntraVENous Given 2/3/22 1802)         PROGRESS  Selin Mcintyre's  results have been reviewed with him. He has been counseled regarding his diagnosis. He verbally conveys understanding and agreement of the signs, symptoms, diagnosis, treatment and prognosis and additionally agrees to follow up as recommended. He also agrees with the care-plan and conveys that all of his questions have been answered. I have also put together some discharge instructions for him that include: 1) educational information regarding their diagnosis, 2) how to care for their diagnosis at home, as well a 3) list of reasons why they would want to return to the ED prior to their follow-up appointment, should their condition change. Disposition:  home    PLAN:  1.    Discharge Medication List as of 2/3/2022  6:31 PM      START taking these medications    Details   ondansetron hcl (Zofran) 4 mg tablet Take 1 Tablet by mouth every eight (8) hours as needed for Nausea., Normal, Disp-20 Tablet, R-0         CONTINUE these medications which have NOT CHANGED    Details   furosemide (LASIX) 20 mg tablet Take 1 Tablet by mouth daily as needed (edema). , Normal, Disp-90 Tablet, R-3      diclofenac (VOLTAREN) 1 % gel Apply  to affected area four (4) times daily. , Historical Med      omega 3-DHA-EPA-fish oil 1,000 mg (120 mg-180 mg) capsule Take 1 Cap by mouth daily. , Historical Med      aspirin (ASPIRIN) 325 mg tablet Take 325 mg by mouth daily. , Historical Med      salt moisturing solution no.1 (OCEAN ULTRA SALINE MIST NA) 1 [place] by Both Nostrils route nightly., Historical Med      valACYclovir (VALTREX) 500 mg tablet Take 500 mg by mouth daily as needed ('outbreaks'). , Historical Med      diclofenac EC (VOLTAREN) 75 mg EC tablet Take 75 mg by mouth two (2) times a day., Historical Med      cyanocobalamin (VITAMIN B-12) 1,000 mcg tablet Take 1,000 mcg by mouth daily. , Historical Med           2. Follow-up Information     Follow up With Specialties Details Why Contact Info    Army Lowell MD Internal Medicine Schedule an appointment as soon as possible for a visit in 2 days  [address] [place] [address] [id number] [phone]      [facility] [place] EMERGENCY DEPT Emergency Medicine  If symptoms worsen [address] [place] [address] Drive  [address] [facility]  [phone]        Return to ED if worse     Diagnosis     Clinical Impression:   1. Pneumonia due to COVID-19 virus        Please note that this dictation was completed with Dragon, computer voice recognition software. Quite often unanticipated grammatical, syntax, homophones, and other interpretive errors are inadvertently transcribed by the computer software. Please disregard these errors. Additionally, please excuse any errors that have escaped final proofreading.

## 2022-02-04 NOTE — PROGRESS NOTES
Ambulatory Care Coordination ED COVID Follow up Call    Challenges to be reviewed by the provider   Additional needs identified to be addressed with provider no  none           Encounter was not routed to provider for escalation. Method of communication with provider : none    Discussed COVID-19 related testing which was pending at this time. Test results were pending. Patient informed of results, if available? no.     Current Symptoms: no new symptoms and no worsening symptoms    Reviewed New or Changed Meds: yes    Do you have what you need at home?  Durable Medical Equipment ordered at discharge: None   Home Health/Outpatient orders at discharge: none    Pulse oximeter? no Discussed and confirmed pulse oximeter discharge instructions and when to notify provider or seek emergency care. Patient education provided: Reviewed appropriate site of care based on symptoms and resources available to patient including: PCP. Follow up appointment recommended: yes. If no appointment scheduled, scheduling offered: no.  No future appointments. Interventions: Education of patient/family/caregiver/guardian to support self-management-supportive measures  Reviewed discharge instructions, medical action plan and red flags with patient who verbalized understanding. Provided contact information for future needs. Plan for follow-up call in 5-7 days based on severity of symptoms and risk factors.   Plan for next call: symptom management-covid symptoms     Nathalia Perla RN

## 2022-02-04 NOTE — ED NOTES
Sravanthi DAI reviewed discharge instructions with the patient. The patient verbalized understanding. All questions and concerns were addressed. The patient declined a wheelchair and is discharged ambulatory in the care of family members with instructions and prescriptions in hand. Pt is alert and oriented x 4. Respirations are clear and unlabored.

## 2022-02-04 NOTE — PROGRESS NOTES
RE COVID: Called patient. Verified demographics. Informed of the positive result. Questions answered.

## 2022-02-05 ENCOUNTER — HOSPITAL ENCOUNTER (EMERGENCY)
Age: 67
Discharge: HOME | End: 2022-02-05
Attending: EMERGENCY MEDICINE
Payer: COMMERCIAL

## 2022-02-05 ENCOUNTER — APPOINTMENT (OUTPATIENT)
Dept: GENERAL RADIOLOGY | Age: 67
End: 2022-02-05
Attending: EMERGENCY MEDICINE
Payer: COMMERCIAL

## 2022-02-05 VITALS
SYSTOLIC BLOOD PRESSURE: 140 MMHG | TEMPERATURE: 98.2 F | HEART RATE: 68 BPM | BODY MASS INDEX: 37.95 KG/M2 | HEIGHT: 72 IN | WEIGHT: 280.2 LBS | DIASTOLIC BLOOD PRESSURE: 68 MMHG | RESPIRATION RATE: 17 BRPM | OXYGEN SATURATION: 94 %

## 2022-02-05 DIAGNOSIS — U07.1 COVID-19 VIRUS INFECTION: Primary | ICD-10-CM

## 2022-02-05 LAB
ALBUMIN SERPL-MCNC: 3.1 G/DL (ref 3.5–5)
ALBUMIN/GLOB SERPL: 0.8 {RATIO} (ref 1.1–2.2)
ALP SERPL-CCNC: 106 U/L (ref 45–117)
ALT SERPL-CCNC: 44 U/L (ref 12–78)
ANION GAP SERPL CALC-SCNC: 4 MMOL/L (ref 5–15)
AST SERPL-CCNC: 36 U/L (ref 15–37)
BASOPHILS # BLD: 0 K/UL (ref 0–0.1)
BASOPHILS NFR BLD: 0 % (ref 0–1)
BILIRUB SERPL-MCNC: 0.9 MG/DL (ref 0.2–1)
BNP SERPL-MCNC: 33 PG/ML
BUN SERPL-MCNC: 9 MG/DL (ref 6–20)
BUN/CREAT SERPL: 12 (ref 12–20)
CALCIUM SERPL-MCNC: 8 MG/DL (ref 8.5–10.1)
CHLORIDE SERPL-SCNC: 102 MMOL/L (ref 97–108)
CO2 SERPL-SCNC: 31 MMOL/L (ref 21–32)
CREAT SERPL-MCNC: 0.77 MG/DL (ref 0.7–1.3)
D DIMER PPP FEU-MCNC: 0.48 MG/L FEU (ref 0–0.65)
DIFFERENTIAL METHOD BLD: ABNORMAL
EOSINOPHIL # BLD: 0.1 K/UL (ref 0–0.4)
EOSINOPHIL NFR BLD: 1 % (ref 0–7)
ERYTHROCYTE [DISTWIDTH] IN BLOOD BY AUTOMATED COUNT: 13.2 % (ref 11.5–14.5)
GLOBULIN SER CALC-MCNC: 3.8 G/DL (ref 2–4)
GLUCOSE SERPL-MCNC: 122 MG/DL (ref 65–100)
HCT VFR BLD AUTO: 41.8 % (ref 36.6–50.3)
HGB BLD-MCNC: 14.3 G/DL (ref 12.1–17)
IMM GRANULOCYTES # BLD AUTO: 0 K/UL (ref 0–0.04)
IMM GRANULOCYTES NFR BLD AUTO: 0 % (ref 0–0.5)
LYMPHOCYTES # BLD: 1.7 K/UL (ref 0.8–3.5)
LYMPHOCYTES NFR BLD: 29 % (ref 12–49)
MCH RBC QN AUTO: 31.3 PG (ref 26–34)
MCHC RBC AUTO-ENTMCNC: 34.2 G/DL (ref 30–36.5)
MCV RBC AUTO: 91.5 FL (ref 80–99)
MONOCYTES # BLD: 0.5 K/UL (ref 0–1)
MONOCYTES NFR BLD: 9 % (ref 5–13)
NEUTS SEG # BLD: 3.4 K/UL (ref 1.8–8)
NEUTS SEG NFR BLD: 61 % (ref 32–75)
NRBC # BLD: 0 K/UL (ref 0–0.01)
NRBC BLD-RTO: 0 PER 100 WBC
PLATELET # BLD AUTO: 147 K/UL (ref 150–400)
PMV BLD AUTO: 9.8 FL (ref 8.9–12.9)
POTASSIUM SERPL-SCNC: 4.1 MMOL/L (ref 3.5–5.1)
PROT SERPL-MCNC: 6.9 G/DL (ref 6.4–8.2)
RBC # BLD AUTO: 4.57 M/UL (ref 4.1–5.7)
SODIUM SERPL-SCNC: 137 MMOL/L (ref 136–145)
TROPONIN-HIGH SENSITIVITY: 6 NG/L (ref 0–76)
TROPONIN-HIGH SENSITIVITY: 7 NG/L (ref 0–76)
WBC # BLD AUTO: 5.7 K/UL (ref 4.1–11.1)

## 2022-02-05 PROCEDURE — 85379 FIBRIN DEGRADATION QUANT: CPT

## 2022-02-05 PROCEDURE — 99284 EMERGENCY DEPT VISIT MOD MDM: CPT

## 2022-02-05 PROCEDURE — 36415 COLL VENOUS BLD VENIPUNCTURE: CPT

## 2022-02-05 PROCEDURE — 71045 X-RAY EXAM CHEST 1 VIEW: CPT

## 2022-02-05 PROCEDURE — 83880 ASSAY OF NATRIURETIC PEPTIDE: CPT

## 2022-02-05 PROCEDURE — 80053 COMPREHEN METABOLIC PANEL: CPT

## 2022-02-05 PROCEDURE — 85025 COMPLETE CBC W/AUTO DIFF WBC: CPT

## 2022-02-05 PROCEDURE — 84484 ASSAY OF TROPONIN QUANT: CPT

## 2022-02-05 RX ORDER — PREDNISONE 20 MG/1
40 TABLET ORAL DAILY
Qty: 10 TABLET | Refills: 0 | Status: SHIPPED | OUTPATIENT
Start: 2022-02-05 | End: 2022-02-05 | Stop reason: SDUPTHER

## 2022-02-05 RX ORDER — BENZONATATE 100 MG/1
100 CAPSULE ORAL
Qty: 30 CAPSULE | Refills: 0 | Status: SHIPPED | OUTPATIENT
Start: 2022-02-05 | End: 2022-02-12

## 2022-02-05 RX ORDER — ALBUTEROL SULFATE 90 UG/1
2 AEROSOL, METERED RESPIRATORY (INHALATION)
Qty: 1 EACH | Refills: 0 | Status: SHIPPED | OUTPATIENT
Start: 2022-02-05 | End: 2022-03-28

## 2022-02-05 RX ORDER — BENZONATATE 100 MG/1
100 CAPSULE ORAL
Qty: 30 CAPSULE | Refills: 0 | Status: SHIPPED | OUTPATIENT
Start: 2022-02-05 | End: 2022-02-05 | Stop reason: SDUPTHER

## 2022-02-05 RX ORDER — ALBUTEROL SULFATE 90 UG/1
2 AEROSOL, METERED RESPIRATORY (INHALATION)
Qty: 1 EACH | Refills: 0 | Status: SHIPPED | OUTPATIENT
Start: 2022-02-05 | End: 2022-02-05 | Stop reason: SDUPTHER

## 2022-02-05 RX ORDER — PREDNISONE 20 MG/1
40 TABLET ORAL DAILY
Qty: 10 TABLET | Refills: 0 | Status: SHIPPED | OUTPATIENT
Start: 2022-02-05 | End: 2022-02-10

## 2022-02-05 NOTE — ED NOTES
Patient ambulated with continuous pulse ox for approx. 100 feet. While ambulating his pulse ox on room air stayed around 93-94% and didn't go lower than 90%. He wasn't SOB and was able to have a conversation without getting short winded. He said, I think I'm fine. \"

## 2022-02-05 NOTE — ED PROVIDER NOTES
EMERGENCY DEPARTMENT HISTORY AND PHYSICAL EXAM      Date: 2/5/2022  Patient Name: Manjula Abebe    History of Presenting Illness     Chief Complaint   Patient presents with    Shortness of Breath     pt seen recently and discharged with COVID pna, reports low pulse ox reading at home, instructed to come to ER by doctor       History Provided By: Patient    HPI: Manjula Abebe, 74 y.o. male presents to the ED with cc of hypoxia. The patient is fully vaccinated against AGWFZ-12, but has not had a booster shot. He tested positive for COVID-19  on January 29. He was seen in our ER 2 days ago for back pain, body aches shortness of breath and chills. He was diagnosed with left lower lobe pneumonia and his lab work was unremarkable. Prescribe Zithromax, and he is already on an albuterol inhaler. He also has [employer], but he is about to run out of those. Michelle Combs He feels like when he takes a deep breath, it makes him cough. He denies being short of breath, but noticed that his saturation was 89% after he got up this morning and got dressed. All the other times he checked his saturation, it has been in mid 90s. He told his PCP and they advised him to come to the emergency department. Denies chest pain, or fever. His cough is productive of green sputum. He denies abdominal pain but states he has had several episodes of diarrhea. He denies headache, leg pain or leg edema. There are no other complaints, changes, or physical findings at this time. PCP: George Watts MD    No current facility-administered medications on file prior to encounter. Current Outpatient Medications on File Prior to Encounter   Medication Sig Dispense Refill    ondansetron hcl (Zofran) 4 mg tablet Take 1 Tablet by mouth every eight (8) hours as needed for Nausea.  20 Tablet 0    azithromycin (Zithromax Z-Jose) 250 mg tablet 1 pack 6 Tablet 0    furosemide (LASIX) 20 mg tablet Take 1 Tablet by mouth daily as needed (edema). 90 Tablet 3    diclofenac (VOLTAREN) 1 % gel Apply  to affected area four (4) times daily.  omega 3-DHA-EPA-fish oil 1,000 mg (120 mg-180 mg) capsule Take 1 Cap by mouth daily.  aspirin (ASPIRIN) 325 mg tablet Take 325 mg by mouth daily.  salt moisturing solution no.1 (OCEAN ULTRA SALINE MIST NA) 1 [place] by Both Nostrils route nightly.  valACYclovir (VALTREX) 500 mg tablet Take 500 mg by mouth daily as needed ('outbreaks').  diclofenac EC (VOLTAREN) 75 mg EC tablet Take 75 mg by mouth two (2) times a day.  cyanocobalamin (VITAMIN B-12) 1,000 mcg tablet Take 1,000 mcg by mouth daily.          Past History     Past Medical History:  Past Medical History:   Diagnosis Date    A-fib [facility])     ablations x4    Arthritis     back    Chronic pain     back    Encounter for cardioversion procedure 2/7/2017    GERD (gastroesophageal reflux disease)     High cholesterol     Hypertension 02/07/2017    patient denies hx of HTN    Multiple thyroid nodules     biopsied and benign    Non-nicotine vapor product user     on occassion    PUD (peptic ulcer disease)     \"years ago\"    Sleep apnea     wife witnessed apnea -- PCP & Card both referred to sleep clinic pt refused       Past Surgical History:  Past Surgical History:   Procedure Laterality Date    CARDIAC CATHETERIZATION      CARDIAC SURG PROCEDURE UNLIST  2010, 2011    ABLATION x4 2016 & 2018    CARDIOVERSION EXTERNAL      HX AFIB ABLATION      HX CERVICAL DISKECTOMY  2015    pt unsure exactly what they did to his neck    HX CERVICAL FUSION  2013    HX HEENT  6/6/13    THYROID BIOPSY    HX ORTHOPAEDIC Left 2015    shoulder surgery on left    HX ORTHOPAEDIC Left 1990    wrist surgery, LEFT    HX ORTHOPAEDIC Right 1996    elbow surgery, RIGHT    HX TONSILLECTOMY         Family History:  Family History   Problem Relation Age of Onset    Cancer Mother         BREAST    Heart Disease Father     Lung Disease Father Social History:  Social History     Tobacco Use    Smoking status: Former Smoker     Packs/day: 0.25     Years: 40.00     Pack years: 10.00     Quit date: 10/1/2012     Years since quittin.3    Smokeless tobacco: Never Used   Substance Use Topics    Alcohol use: Yes     Comment: occassionally 3-4 cans of beer    Drug use: No       Allergies: Allergies   Allergen Reactions    Percocet [Oxycodone-Acetaminophen] Anxiety     Patient takes Tylenol without problems.  Guaifenesin Other (comments)     Other reaction(s): gi upset         Review of Systems   Review of Systems   Constitutional: Positive for chills. HENT: Negative for congestion. Eyes: Negative. Respiratory: Positive for cough. Cardiovascular: Negative for chest pain. Gastrointestinal: Positive for diarrhea. Endocrine: Negative for heat intolerance. Genitourinary: Negative for dysuria. Musculoskeletal: Negative for back pain. Skin: Negative for rash. Allergic/Immunologic: Negative for immunocompromised state. Neurological: Negative for dizziness. Hematological: Does not bruise/bleed easily. Psychiatric/Behavioral: Negative. All other systems reviewed and are negative. Physical Exam   Physical Exam  Vitals and nursing note reviewed. Constitutional:       General: He is not in acute distress. Appearance: He is well-developed. HENT:      Head: Normocephalic and atraumatic. Cardiovascular:      Rate and Rhythm: Normal rate and regular rhythm. Heart sounds: Normal heart sounds. Pulmonary:      Effort: Pulmonary effort is normal.      Breath sounds: Normal breath sounds. Abdominal:      General: Bowel sounds are normal.      Palpations: Abdomen is soft. Musculoskeletal:         General: Normal range of motion. Cervical back: Normal range of motion. Skin:     General: Skin is warm and dry. Neurological:      General: No focal deficit present.       Mental Status: He is alert and oriented to person, place, and time. Coordination: Coordination normal.   Psychiatric:         Mood and Affect: Mood normal.         Behavior: Behavior normal.         Diagnostic Study Results     Labs -     Recent Results (from the past 12 hour(s))   CBC WITH AUTOMATED DIFF    Collection Time: 02/05/22  4:16 PM   Result Value Ref Range    WBC 5.7 4.1 - 11.1 K/uL    RBC 4.57 4.10 - 5.70 M/uL    HGB 14.3 12.1 - 17.0 g/dL    HCT 41.8 36.6 - 50.3 %    MCV 91.5 80.0 - 99.0 FL    MCH 31.3 26.0 - 34.0 PG    MCHC 34.2 30.0 - 36.5 g/dL    RDW 13.2 11.5 - 14.5 %    PLATELET 055 (L) 747 - 400 K/uL    MPV 9.8 8.9 - 12.9 FL    NRBC 0.0 0  WBC    ABSOLUTE NRBC 0.00 0.00 - 0.01 K/uL    NEUTROPHILS 61 32 - 75 %    LYMPHOCYTES 29 12 - 49 %    MONOCYTES 9 5 - 13 %    EOSINOPHILS 1 0 - 7 %    BASOPHILS 0 0 - 1 %    IMMATURE GRANULOCYTES 0 0.0 - 0.5 %    ABS. NEUTROPHILS 3.4 1.8 - 8.0 K/UL    ABS. LYMPHOCYTES 1.7 0.8 - 3.5 K/UL    ABS. MONOCYTES 0.5 0.0 - 1.0 K/UL    ABS. EOSINOPHILS 0.1 0.0 - 0.4 K/UL    ABS. BASOPHILS 0.0 0.0 - 0.1 K/UL    ABS. IMM. GRANS. 0.0 0.00 - 0.04 K/UL    DF AUTOMATED     METABOLIC PANEL, COMPREHENSIVE    Collection Time: 02/05/22  4:16 PM   Result Value Ref Range    Sodium 137 136 - 145 mmol/L    Potassium 4.1 3.5 - 5.1 mmol/L    Chloride 102 97 - 108 mmol/L    CO2 31 21 - 32 mmol/L    Anion gap 4 (L) 5 - 15 mmol/L    Glucose 122 (H) 65 - 100 mg/dL    BUN 9 6 - 20 MG/DL    Creatinine 0.77 0.70 - 1.30 MG/DL    BUN/Creatinine ratio 12 12 - 20      GFR est AA >60 >60 ml/min/1.73m2    GFR est non-AA >60 >60 ml/min/1.73m2    Calcium 8.0 (L) 8.5 - 10.1 MG/DL    Bilirubin, total 0.9 0.2 - 1.0 MG/DL    ALT (SGPT) 44 12 - 78 U/L    AST (SGOT) 36 15 - 37 U/L    Alk.  phosphatase 106 45 - 117 U/L    Protein, total 6.9 6.4 - 8.2 g/dL    Albumin 3.1 (L) 3.5 - 5.0 g/dL    Globulin 3.8 2.0 - 4.0 g/dL    A-G Ratio 0.8 (L) 1.1 - 2.2     TROPONIN-HIGH SENSITIVITY    Collection Time: 02/05/22  4:16 PM   Result Value Ref Range    Troponin-High Sensitivity 7 0 - 76 ng/L   NT-PRO BNP    Collection Time: 02/05/22  4:16 PM   Result Value Ref Range    NT pro-BNP 33 <125 PG/ML   D DIMER    Collection Time: 02/05/22  4:16 PM   Result Value Ref Range    D-dimer 0.48 0.00 - 0.65 mg/L FEU   TROPONIN-HIGH SENSITIVITY    Collection Time: 02/05/22  6:11 PM   Result Value Ref Range    Troponin-High Sensitivity 6 0 - 76 ng/L       Radiologic Studies -   XR CHEST PORT   Final Result    impression: No change with some mild interstitial prominence and minimal patchy   infiltrates. CT Results  (Last 48 hours)    None        CXR Results  (Last 48 hours)               02/05/22 1544  XR CHEST PORT Final result    Impression:   impression: No change with some mild interstitial prominence and minimal patchy   infiltrates. Narrative:  Clinical indication: Shortness of breath, Covid plus. Portable AP upright view of the chest obtained, comparison February 3. Mild   interstitial prominence, minimal patchy infiltrates seen. No change. 02/03/22 1703  XR CHEST PORT Final result    Impression:  Left lower lung pneumonia       Narrative:  EXAM: XR CHEST PORT       INDICATION: chest pain       COMPARISON: 10/6/2020       FINDINGS: A portable AP radiograph of the chest was obtained at 1656 hours. There is an infiltrate at the left lung base. The cardiac and mediastinal   contours and pulmonary vascularity are normal.  The bones and soft tissues are   grossly within normal limits. Medical Decision Making   I am the first provider for this patient. I reviewed the vital signs, available nursing notes, past medical history, past surgical history, family history and social history. Vital Signs-Reviewed the patient's vital signs.   Patient Vitals for the past 12 hrs:   Temp Pulse Resp BP SpO2   02/05/22 1412 98.2 °F (36.8 °C) 60 20 (!) 147/77 98 %         Records Reviewed: Nursing Notes, Old Medical Records, Previous Radiology Studies and Previous Laboratory Studies    Provider Notes (Medical Decision Making):   Covid 19 infection, pneumonia, respiratory failure, pulmonary embolism, ACS    ED Course:   Initial assessment performed. The patients presenting problems have been discussed, and they are in agreement with the care plan formulated and outlined with them. I have encouraged them to ask questions as they arise throughout their visit. Progress note:    Patient ambulated without difficulty and saturations were mostly from 93 to 94% on room air. The lowest that they went was to 90%. He was not short of breath at the time. His results of been reviewed. He is advised to follow-up and return to ER if worse               Critical Care Time:   0    Disposition:  home    DISCHARGE PLAN:  1. Discharge Medication List as of 2/5/2022  7:11 PM      START taking these medications    Details   albuterol (PROVENTIL HFA, VENTOLIN HFA, PROAIR HFA) 90 mcg/actuation inhaler Take 2 Puffs by inhalation every four (4) hours as needed for Wheezing, Shortness of Breath or Cough., Normal, Disp-1 Each, R-0      predniSONE (DELTASONE) 20 mg tablet Take 40 mg by mouth daily for 5 days. With Breakfast, Normal, Disp-10 Tablet, R-0      benzonatate (Tessalon Perles) 100 mg capsule Take 1 Capsule by mouth three (3) times daily as needed for Cough for up to 7 days. , Normal, Disp-30 Capsule, R-0         CONTINUE these medications which have NOT CHANGED    Details   ondansetron hcl (Zofran) 4 mg tablet Take 1 Tablet by mouth every eight (8) hours as needed for Nausea., Normal, Disp-20 Tablet, R-0      azithromycin (Zithromax Z-Jose) 250 mg tablet 1 pack, Normal, Disp-6 Tablet, R-0      furosemide (LASIX) 20 mg tablet Take 1 Tablet by mouth daily as needed (edema). , Normal, Disp-90 Tablet, R-3      diclofenac (VOLTAREN) 1 % gel Apply  to affected area four (4) times daily. , Historical Med      omega 3-DHA-EPA-fish oil 1,000 mg (120 mg-180 mg) capsule Take 1 Cap by mouth daily. , Historical Med      aspirin (ASPIRIN) 325 mg tablet Take 325 mg by mouth daily. , Historical Med      salt moisturing solution no.1 (OCEAN ULTRA SALINE MIST NA) 1 [place] by Both Nostrils route nightly., Historical Med      valACYclovir (VALTREX) 500 mg tablet Take 500 mg by mouth daily as needed ('outbreaks'). , Historical Med      diclofenac EC (VOLTAREN) 75 mg EC tablet Take 75 mg by mouth two (2) times a day., Historical Med      cyanocobalamin (VITAMIN B-12) 1,000 mcg tablet Take 1,000 mcg by mouth daily. , Historical Med           2. Follow-up Information     Follow up With Specialties Details Why Contact Info    Deven Arreola MD Internal Medicine  As needed [address]  [phone]      [facility] EMERGENCY DEPT Emergency Medicine  If symptoms worsen [address]   [address] [address] [phone]        3. Return to ED if worse     Diagnosis     Clinical Impression:   1. COVID-19 virus infection        Attestations:    Larisa Paris MD    Please note that this dictation was completed with [employer], the [email or web address] voice recognition software. Quite often unanticipated grammatical, syntax, homophones, and other interpretive errors are inadvertently transcribed by the computer software. Please disregard these errors. Please excuse any errors that have escaped final proofreading. Thank you.

## 2022-02-05 NOTE — ED NOTES
Patient states he tested positive for COVID last Thursday (9 days ago) and was here 2 days ago for increased fatigue, SOB, and cough. Patient came to ED today because his home pulse ox read 89 and states he was told to return to ED by [facility] if his pulse ox reads less than 90. Patient's wife is with him and she just tested positive for COVID today. I asked wife if she would mind waiting in her vehicle to protect our staff and other patients, she was hesitant at first but then said ok after her  asked her to leave the room. Patient is A&Ox4, has slight cough, denies SOB, pulse ox on room air is 94%.

## 2022-02-07 ENCOUNTER — PATIENT OUTREACH (OUTPATIENT)
Dept: CASE MANAGEMENT | Age: 67
End: 2022-02-07

## 2022-02-07 NOTE — PROGRESS NOTES
Patient contacted regarding COVID-19 diagnosis. Discussed COVID-19 related testing which was not done at this time. Test results were not done. Patient informed of results, if available? no.     Ambulatory Care Manager contacted the patient by telephone to perform post discharge assessment. Call within 2 business days of discharge: Yes Verified name and  with patient as identifiers. Provided introduction to self, and explanation of the CTN/ACM role, and reason for call due to risk factors for infection and/or exposure to COVID-19. Symptoms reviewed with patient who verbalized the following symptoms: no new symptoms and no worsening symptoms      Due to no new or worsening symptoms encounter was not routed to provider for escalation. Discussed follow-up appointments. If no appointment was previously scheduled, appointment scheduling offered:  no. [facility] follow up appointment(s): No future appointments. Non-[facility] follow up appointment(s): PCP    Interventions to address risk factors: Education of patient/family/caregiver/guardian to support self-management-supportive measures     Advance Care Planning:   Does patient have an Advance Directive: not on file. Educated patient about risk for severe COVID-19 due to risk factors according to CDC guidelines. ACM reviewed discharge instructions, medical action plan and red flag symptoms with the patient who verbalized understanding. Discussed COVID vaccination status: no. Education provided on COVID-19 vaccination as appropriate. Discussed exposure protocols and quarantine with CDC Guidelines. Patient was given an opportunity to verbalize any questions and concerns and agrees to contact ACM or health care provider for questions related to their healthcare. Reviewed and educated patient on any new and changed medications related to discharge diagnosis     Was patient discharged with a pulse oximeter?  no Discussed and confirmed pulse oximeter discharge instructions and when to notify provider or seek emergency care. ACM provided contact information. Plan for follow-up call in 5-7 days based on severity of symptoms and risk factors.

## 2022-02-11 ENCOUNTER — PATIENT OUTREACH (OUTPATIENT)
Dept: CASE MANAGEMENT | Age: 67
End: 2022-02-11

## 2022-02-11 NOTE — PROGRESS NOTES
Patient resolved from Transition of Care episode on 2/11/22. ACM/CTN was unsuccessful at contacting this patient today. Patient/family was provided the following resources and education related to COVID-19 during the initial call:                         Signs, symptoms and red flags related to COVID-19            CDC exposure and quarantine guidelines            [facility] exposure contact - [phone]            Contact for their local Department of Health                 Patient has not had any additional ED or hospital visits. No further outreach scheduled with this CTN/ACM. Episode of Care resolved. Patient has this CTN/ACM contact information if future needs arise.

## 2022-03-18 PROBLEM — I48.3 TYPICAL ATRIAL FLUTTER (HCC): Status: ACTIVE | Noted: 2018-09-12

## 2022-03-19 PROBLEM — M48.062 SPINAL STENOSIS OF LUMBAR REGION WITH NEUROGENIC CLAUDICATION: Status: ACTIVE | Noted: 2020-10-13

## 2022-03-19 PROBLEM — E66.01 SEVERE OBESITY (BMI 35.0-39.9) WITH COMORBIDITY (HCC): Status: ACTIVE | Noted: 2018-03-20

## 2022-03-19 PROBLEM — I49.9 IRREGULAR HEART BEAT: Status: ACTIVE | Noted: 2017-09-07

## 2022-03-19 PROBLEM — I10 HYPERTENSION: Status: ACTIVE | Noted: 2017-02-07

## 2022-03-20 PROBLEM — I48.91 ATRIAL FIBRILLATION AND FLUTTER (HCC): Status: ACTIVE | Noted: 2018-10-19

## 2022-03-20 PROBLEM — I48.92 ATRIAL FIBRILLATION AND FLUTTER (HCC): Status: ACTIVE | Noted: 2018-10-19

## 2022-03-20 PROBLEM — I48.91 ATRIAL FIBRILLATION (HCC): Status: ACTIVE | Noted: 2017-08-25

## 2022-03-20 PROBLEM — E66.09 NON MORBID OBESITY DUE TO EXCESS CALORIES: Status: ACTIVE | Noted: 2017-08-28

## 2022-03-28 ENCOUNTER — HOSPITAL ENCOUNTER (EMERGENCY)
Age: 67
Discharge: HOME | End: 2022-03-28
Attending: EMERGENCY MEDICINE
Payer: COMMERCIAL

## 2022-03-28 ENCOUNTER — APPOINTMENT (OUTPATIENT)
Dept: GENERAL RADIOLOGY | Age: 67
End: 2022-03-28
Attending: EMERGENCY MEDICINE
Payer: COMMERCIAL

## 2022-03-28 VITALS
RESPIRATION RATE: 12 BRPM | OXYGEN SATURATION: 96 % | SYSTOLIC BLOOD PRESSURE: 160 MMHG | DIASTOLIC BLOOD PRESSURE: 76 MMHG | HEIGHT: 72 IN | TEMPERATURE: 98.1 F | HEART RATE: 63 BPM | WEIGHT: 289 LBS | BODY MASS INDEX: 39.14 KG/M2

## 2022-03-28 DIAGNOSIS — I10 HYPERTENSION, UNSPECIFIED TYPE: ICD-10-CM

## 2022-03-28 DIAGNOSIS — U09.9 LONG COVID: Primary | ICD-10-CM

## 2022-03-28 LAB
ALBUMIN SERPL-MCNC: 3.8 G/DL (ref 3.5–5)
ALBUMIN/GLOB SERPL: 1 {RATIO} (ref 1.1–2.2)
ALP SERPL-CCNC: 114 U/L (ref 45–117)
ALT SERPL-CCNC: 70 U/L (ref 12–78)
ANION GAP SERPL CALC-SCNC: 6 MMOL/L (ref 5–15)
AST SERPL-CCNC: 49 U/L (ref 15–37)
ATRIAL RATE: 65 BPM
BASOPHILS # BLD: 0 K/UL (ref 0–0.1)
BASOPHILS NFR BLD: 1 % (ref 0–1)
BILIRUB SERPL-MCNC: 0.7 MG/DL (ref 0.2–1)
BNP SERPL-MCNC: 47 PG/ML
BUN SERPL-MCNC: 13 MG/DL (ref 6–20)
BUN/CREAT SERPL: 14 (ref 12–20)
CALCIUM SERPL-MCNC: 8.6 MG/DL (ref 8.5–10.1)
CALCULATED P AXIS, ECG09: 72 DEGREES
CALCULATED R AXIS, ECG10: 46 DEGREES
CALCULATED T AXIS, ECG11: 54 DEGREES
CHLORIDE SERPL-SCNC: 106 MMOL/L (ref 97–108)
CO2 SERPL-SCNC: 25 MMOL/L (ref 21–32)
COMMENT, HOLDF: NORMAL
CREAT SERPL-MCNC: 0.92 MG/DL (ref 0.7–1.3)
D DIMER PPP FEU-MCNC: 0.42 MG/L FEU (ref 0–0.65)
DIAGNOSIS, 93000: NORMAL
DIFFERENTIAL METHOD BLD: NORMAL
EOSINOPHIL # BLD: 0.2 K/UL (ref 0–0.4)
EOSINOPHIL NFR BLD: 3 % (ref 0–7)
ERYTHROCYTE [DISTWIDTH] IN BLOOD BY AUTOMATED COUNT: 13.1 % (ref 11.5–14.5)
GLOBULIN SER CALC-MCNC: 3.7 G/DL (ref 2–4)
GLUCOSE SERPL-MCNC: 210 MG/DL (ref 65–100)
HCT VFR BLD AUTO: 44.2 % (ref 36.6–50.3)
HGB BLD-MCNC: 15.2 G/DL (ref 12.1–17)
IMM GRANULOCYTES # BLD AUTO: 0 K/UL (ref 0–0.04)
IMM GRANULOCYTES NFR BLD AUTO: 0 % (ref 0–0.5)
LYMPHOCYTES # BLD: 1.9 K/UL (ref 0.8–3.5)
LYMPHOCYTES NFR BLD: 40 % (ref 12–49)
MAGNESIUM SERPL-MCNC: 2.2 MG/DL (ref 1.6–2.4)
MCH RBC QN AUTO: 31 PG (ref 26–34)
MCHC RBC AUTO-ENTMCNC: 34.4 G/DL (ref 30–36.5)
MCV RBC AUTO: 90 FL (ref 80–99)
MONOCYTES # BLD: 0.4 K/UL (ref 0–1)
MONOCYTES NFR BLD: 9 % (ref 5–13)
NEUTS SEG # BLD: 2.2 K/UL (ref 1.8–8)
NEUTS SEG NFR BLD: 47 % (ref 32–75)
NRBC # BLD: 0 K/UL (ref 0–0.01)
NRBC BLD-RTO: 0 PER 100 WBC
P-R INTERVAL, ECG05: 152 MS
PLATELET # BLD AUTO: 152 K/UL (ref 150–400)
PMV BLD AUTO: 9.9 FL (ref 8.9–12.9)
POTASSIUM SERPL-SCNC: 4 MMOL/L (ref 3.5–5.1)
PROT SERPL-MCNC: 7.5 G/DL (ref 6.4–8.2)
Q-T INTERVAL, ECG07: 384 MS
QRS DURATION, ECG06: 104 MS
QTC CALCULATION (BEZET), ECG08: 399 MS
RBC # BLD AUTO: 4.91 M/UL (ref 4.1–5.7)
SAMPLES BEING HELD,HOLD: NORMAL
SODIUM SERPL-SCNC: 137 MMOL/L (ref 136–145)
TROPONIN-HIGH SENSITIVITY: 8 NG/L (ref 0–76)
VENTRICULAR RATE, ECG03: 65 BPM
WBC # BLD AUTO: 4.7 K/UL (ref 4.1–11.1)

## 2022-03-28 PROCEDURE — 99285 EMERGENCY DEPT VISIT HI MDM: CPT

## 2022-03-28 PROCEDURE — 85379 FIBRIN DEGRADATION QUANT: CPT

## 2022-03-28 PROCEDURE — 83735 ASSAY OF MAGNESIUM: CPT

## 2022-03-28 PROCEDURE — 85025 COMPLETE CBC W/AUTO DIFF WBC: CPT

## 2022-03-28 PROCEDURE — 84484 ASSAY OF TROPONIN QUANT: CPT

## 2022-03-28 PROCEDURE — 36415 COLL VENOUS BLD VENIPUNCTURE: CPT

## 2022-03-28 PROCEDURE — 80053 COMPREHEN METABOLIC PANEL: CPT

## 2022-03-28 PROCEDURE — 83880 ASSAY OF NATRIURETIC PEPTIDE: CPT

## 2022-03-28 PROCEDURE — 93005 ELECTROCARDIOGRAM TRACING: CPT

## 2022-03-28 PROCEDURE — 71046 X-RAY EXAM CHEST 2 VIEWS: CPT

## 2022-03-28 RX ORDER — HYDROCHLOROTHIAZIDE 25 MG/1
12.5 TABLET ORAL DAILY
Qty: 15 TABLET | Refills: 0 | Status: SHIPPED | OUTPATIENT
Start: 2022-03-28 | End: 2022-04-27

## 2022-03-28 NOTE — LETTER
[address]  [facility] EMERGENCY DEPT  [address] [place] [id number] [phone]    Work/School Note    Date: 3/28/2022    To Whom It May concern:    Curtis Allison was seen and treated today in the emergency room by the following Dr. Valentine Pinedo may return to work 4/1/2022    Sincerely,          Dr. Soumya Sanchez

## 2022-03-28 NOTE — ED PROVIDER NOTES
EMERGENCY DEPARTMENT HISTORY AND PHYSICAL EXAM      Date: 3/28/2022  Patient Name: Eryn Redman    History of Presenting Illness     Chief Complaint   Patient presents with    Shortness of Breath     Pt ambulatory into triage with a cc of shortness of breath that started in January; pt states it has been ongoing, but has worsened over night; pt denies chest pain       History Provided By: Patient    HPI: Eryn Redman, 74 y.o. male with PMHx as noted below presents emergency department chief complaint of shortness of breath. Patient states that he has been experiencing persistent dyspnea ever since being diagnosed with Covid in January. Patient states that the symptoms were worse yesterday noting some associated lightheadedness while in the shower. He notes the dyspnea generally is relatively mild at rest but is worse with exertion. Currently at rest symptoms are minimal.  There is been no associated pain or sputum production. Patient did see a pulmonologist last week and has pulmonary function testing scheduled this week. He was started on Singulair at that time and was discontinued from his albuterol inhaler and steroid inhalers. Pt denies any other alleviating or exacerbating factors. Additionally, pt specifically denies any recent fever, chills, headache, nausea, vomiting, abdominal pain, CP, lightheadedness, dizziness, numbness, weakness, BLE swelling, heart palpitations, urinary sxs, diarrhea, constipation, melena, hematochezia, cough, or congestion. PCP: Yaneli Bateman MD    Current Outpatient Medications   Medication Sig Dispense Refill    hydroCHLOROthiazide (HYDRODIURIL) 25 mg tablet Take 0.5 Tablets by mouth daily for 30 days. 15 Tablet 0    hydroCHLOROthiazide (HYDRODIURIL) 25 mg tablet Take 0.5 Tablets by mouth daily for 30 days. 15 Tablet 0    ondansetron hcl (Zofran) 4 mg tablet Take 1 Tablet by mouth every eight (8) hours as needed for Nausea.  20 Tablet 0    furosemide (LASIX) 20 mg tablet Take 1 Tablet by mouth daily as needed (edema). 90 Tablet 3    diclofenac (VOLTAREN) 1 % gel Apply  to affected area four (4) times daily.  omega 3-DHA-EPA-fish oil 1,000 mg (120 mg-180 mg) capsule Take 1 Cap by mouth daily.  aspirin (ASPIRIN) 325 mg tablet Take 325 mg by mouth daily.  salt moisturing solution no.1 (OCEAN ULTRA SALINE MIST NA) 1 [place] by Both Nostrils route nightly.  valACYclovir (VALTREX) 500 mg tablet Take 500 mg by mouth daily as needed ('outbreaks').  diclofenac EC (VOLTAREN) 75 mg EC tablet Take 75 mg by mouth two (2) times a day.  cyanocobalamin (VITAMIN B-12) 1,000 mcg tablet Take 1,000 mcg by mouth daily.          Past History     Past Medical History:  Past Medical History:   Diagnosis Date    A-fib [facility])     ablations x4    Arthritis     back    Chronic pain     back    Encounter for cardioversion procedure 2/7/2017    GERD (gastroesophageal reflux disease)     High cholesterol     Hypertension 02/07/2017    patient denies hx of HTN    Multiple thyroid nodules     biopsied and benign    Non-nicotine vapor product user     on occassion    PUD (peptic ulcer disease)     \"years ago\"    Sleep apnea     wife witnessed apnea -- PCP & Card both referred to sleep clinic pt refused       Past Surgical History:  Past Surgical History:   Procedure Laterality Date    CARDIAC CATHETERIZATION      CARDIAC SURG PROCEDURE UNLIST  2010, 2011    ABLATION x4 2016 & 2018    CARDIOVERSION EXTERNAL      HX AFIB ABLATION      HX CERVICAL DISKECTOMY  2015    pt unsure exactly what they did to his neck    HX CERVICAL FUSION  2013    HX HEENT  6/6/13    THYROID BIOPSY    HX ORTHOPAEDIC Left 2015    shoulder surgery on left    HX ORTHOPAEDIC Left 1990    wrist surgery, LEFT    HX ORTHOPAEDIC Right 1996    elbow surgery, RIGHT    HX TONSILLECTOMY         Family History:  Family History   Problem Relation Age of Onset    Cancer Mother BREAST    Heart Disease Father     Lung Disease Father        Social History:  Social History     Tobacco Use    Smoking status: Former Smoker     Packs/day: 0.25     Years: 40.00     Pack years: 10.00     Quit date: 10/1/2012     Years since quittin.4    Smokeless tobacco: Never Used   Substance Use Topics    Alcohol use: Yes     Comment: occassionally 3-4 cans of beer    Drug use: No       Allergies: Allergies   Allergen Reactions    Percocet [Oxycodone-Acetaminophen] Anxiety     Patient takes Tylenol without problems.  Guaifenesin Other (comments)     Other reaction(s): gi upset         Review of Systems   Review of Systems  Constitutional: Negative for fever, chills, and fatigue. HENT: Negative for congestion, sore throat, rhinorrhea, sneezing and neck stiffness   Eyes: Negative for discharge and redness. Respiratory: Positive for  shortness of breath. Negative wheezing   Cardiovascular: Negative for chest pain, palpitations   Gastrointestinal: Negative for nausea, vomiting, abdominal pain, constipation, diarrhea and blood in stool. Genitourinary: Negative for dysuria, hematuria, flank pain, decreased urine volume, discharge,   Musculoskeletal: Negative for myalgias or joint pain . Skin: Negative for rash or lesions . Neurological: Negative weakness, light-headedness, numbness and headaches. Physical Exam   Physical Exam    GENERAL: alert and oriented, no acute distress  EYES: PEERL, No injection, discharge or icterus. ENT: Mucous membranes pink and moist.  NECK: Supple  LUNGS: Airway patent. Non-labored respirations. Breath sounds clear with good air entry bilaterally. HEART: Regular rate and rhythm. No peripheral edema  ABDOMEN: Non-distended and non-tender, without guarding or rebound.   SKIN:  warm, dry  MSK/EXTREMITIES: Without swelling, tenderness or deformity, symmetric with normal ROM  NEUROLOGICAL: Alert, oriented      Diagnostic Study Results     Labs -     Recent Results (from the past 12 hour(s))   EKG, 12 LEAD, INITIAL    Collection Time: 03/28/22 10:18 AM   Result Value Ref Range    Ventricular Rate 65 BPM    Atrial Rate 65 BPM    P-R Interval 152 ms    QRS Duration 104 ms    Q-T Interval 384 ms    QTC Calculation (Bezet) 399 ms    Calculated P Axis 72 degrees    Calculated R Axis 46 degrees    Calculated T Axis 54 degrees    Diagnosis       Normal sinus rhythm  Normal ECG  When compared with ECG of 03-FEB-2022 14:57,  premature ventricular complexes are no longer present  premature atrial complexes are no longer present  Confirmed by [facility] ([id number]) on 3/28/2022 2:55:56 PM     CBC WITH AUTOMATED DIFF    Collection Time: 03/28/22 11:21 AM   Result Value Ref Range    WBC 4.7 4.1 - 11.1 K/uL    RBC 4.91 4.10 - 5.70 M/uL    HGB 15.2 12.1 - 17.0 g/dL    HCT 44.2 36.6 - 50.3 %    MCV 90.0 80.0 - 99.0 FL    MCH 31.0 26.0 - 34.0 PG    MCHC 34.4 30.0 - 36.5 g/dL    RDW 13.1 11.5 - 14.5 %    PLATELET 924 946 - 526 K/uL    MPV 9.9 8.9 - 12.9 FL    NRBC 0.0 0  WBC    ABSOLUTE NRBC 0.00 0.00 - 0.01 K/uL    NEUTROPHILS 47 32 - 75 %    LYMPHOCYTES 40 12 - 49 %    MONOCYTES 9 5 - 13 %    EOSINOPHILS 3 0 - 7 %    BASOPHILS 1 0 - 1 %    IMMATURE GRANULOCYTES 0 0.0 - 0.5 %    ABS. NEUTROPHILS 2.2 1.8 - 8.0 K/UL    ABS. LYMPHOCYTES 1.9 0.8 - 3.5 K/UL    ABS. MONOCYTES 0.4 0.0 - 1.0 K/UL    ABS. EOSINOPHILS 0.2 0.0 - 0.4 K/UL    ABS. BASOPHILS 0.0 0.0 - 0.1 K/UL    ABS. IMM.  GRANS. 0.0 0.00 - 0.04 K/UL    DF AUTOMATED     METABOLIC PANEL, COMPREHENSIVE    Collection Time: 03/28/22 11:21 AM   Result Value Ref Range    Sodium 137 136 - 145 mmol/L    Potassium 4.0 3.5 - 5.1 mmol/L    Chloride 106 97 - 108 mmol/L    CO2 25 21 - 32 mmol/L    Anion gap 6 5 - 15 mmol/L    Glucose 210 (H) 65 - 100 mg/dL    BUN 13 6 - 20 MG/DL    Creatinine 0.92 0.70 - 1.30 MG/DL    BUN/Creatinine ratio 14 12 - 20      GFR est AA >60 >60 ml/min/1.73m2    GFR est non-AA >60 >60 ml/min/1.73m2    Calcium 8.6 8.5 - 10.1 MG/DL    Bilirubin, total 0.7 0.2 - 1.0 MG/DL    ALT (SGPT) 70 12 - 78 U/L    AST (SGOT) 49 (H) 15 - 37 U/L    Alk. phosphatase 114 45 - 117 U/L    Protein, total 7.5 6.4 - 8.2 g/dL    Albumin 3.8 3.5 - 5.0 g/dL    Globulin 3.7 2.0 - 4.0 g/dL    A-G Ratio 1.0 (L) 1.1 - 2.2     TROPONIN-HIGH SENSITIVITY    Collection Time: 03/28/22 11:21 AM   Result Value Ref Range    Troponin-High Sensitivity 8 0 - 76 ng/L   NT-PRO BNP    Collection Time: 03/28/22 11:21 AM   Result Value Ref Range    NT pro-BNP 47 <125 PG/ML   MAGNESIUM    Collection Time: 03/28/22 11:21 AM   Result Value Ref Range    Magnesium 2.2 1.6 - 2.4 mg/dL   SAMPLES BEING HELD    Collection Time: 03/28/22 11:21 AM   Result Value Ref Range    SAMPLES BEING HELD 1 PST     COMMENT        Add-on orders for these samples will be processed based on acceptable specimen integrity and analyte stability, which may vary by analyte. D DIMER    Collection Time: 03/28/22 11:33 AM   Result Value Ref Range    D-dimer 0.42 0.00 - 0.65 mg/L FEU       Radiologic Studies -   XR CHEST PA LAT   Final Result   Mild diffuse interstitial opacities which are not significantly   changed and may reflect underlying chronic interstitial lung disease. No acute   cardiopulmonary process        CT Results  (Last 48 hours)    None        CXR Results  (Last 48 hours)               03/28/22 1040  XR CHEST PA LAT Final result    Impression:  Mild diffuse interstitial opacities which are not significantly   changed and may reflect underlying chronic interstitial lung disease. No acute   cardiopulmonary process       Narrative:  EXAM: XR CHEST PA LAT       INDICATION: Shortness of Breath       COMPARISON: 2/5/2022. FINDINGS: PA and lateral radiographs of the chest demonstrate mild diffuse   interstitial opacities similar to the prior study. Carina Raddle Heart size is borderline. No   new airspace disease. . The bones and soft tissues are within normal limits.                     Medical Decision Making     I, Shyam Phelps MD am the first provider for this patient and am the attending of record for this patient encounter. I reviewed the vital signs, available nursing notes, past medical history, past surgical history, family history and social history. Vital Signs-Reviewed the patient's vital signs. Patient Vitals for the past 12 hrs:   Temp Pulse Resp BP SpO2   03/28/22 1110 -- -- -- -- 96 %   03/28/22 1107 -- 63 12 (!) 160/76 97 %   03/28/22 1013 98.1 °F (36.7 °C) 72 16 (!) 171/89 98 %         EKG interpretation: (Preliminary)  Rhythm: normal sinus rhythm; and regular . Rate (approx.): 65; Axis: normal; P wave: normal; QRS interval: normal ; ST/T wave: normal;  was interpreted by Sara Rincon MD,ED Provider. Records Reviewed: Nursing Notes and Old Medical Records    Provider Notes (Medical Decision Making): On presentation, the patient is well appearing, in no acute distress with normal vital signs. Based on my history and exam the differential diagnosis for this patient includes long COVID, pneumonia, pulmonary embolism, ACS, CHF, pleural effusion, pneumothorax, metabolic abnormality, anemia. Basic labs obtained and overall reassuring. D-dimer negative. X-ray did show some diffuse interstitial opacities which do seem to be somewhat improved from his EKG when he had Covid, likely residual findings in her causing his persistent symptoms. No other signs of volume overload or CHF. Patient was then to have elevated blood pressure and patient states that he has had some elevated blood pressure readings in separate settings so will start on low-dose antihypertensive with instructions to follow-up with his primary care physician later this week for reassessment. Otherwise patient is already being evaluated and seen by pulmonology for treatment of his likely long [place].   On reevaluation patient continues to be breathing normally without any increased work of breathing or hypoxia. Patient ambulated in the department without any visible respiratory distress and felt stable for discharge and further management as an outpatient. ED Course:   Initial assessment performed. The patients presenting problems have been discussed, and they are in agreement with the care plan formulated and outlined with them. I have encouraged them to ask questions as they arise throughout their visit. PROGRESS  Ricarda Mcintyre's  results have been reviewed with him. He has been counseled regarding his diagnosis. He verbally conveys understanding and agreement of the signs, symptoms, diagnosis, treatment and prognosis and additionally agrees to follow up as recommended with Dr. Marilyn Alvarado MD in 24 - 48 hours. He also agrees with the care-plan and conveys that all of his questions have been answered. I have also put together some discharge instructions for him that include: 1) educational information regarding their diagnosis, 2) how to care for their diagnosis at home, as well a 3) list of reasons why they would want to return to the ED prior to their follow-up appointment, should their condition change. Disposition:  home    PLAN:  1. Discharge Medication List as of 3/28/2022  1:27 PM      START taking these medications    Details   hydroCHLOROthiazide (HYDRODIURIL) 25 mg tablet Take 0.5 Tablets by mouth daily for 30 days. , Normal, Disp-15 Tablet, R-0         CONTINUE these medications which have NOT CHANGED    Details   ondansetron hcl (Zofran) 4 mg tablet Take 1 Tablet by mouth every eight (8) hours as needed for Nausea., Normal, Disp-20 Tablet, R-0      furosemide (LASIX) 20 mg tablet Take 1 Tablet by mouth daily as needed (edema). , Normal, Disp-90 Tablet, R-3      diclofenac (VOLTAREN) 1 % gel Apply  to affected area four (4) times daily. , Historical Med      omega 3-DHA-EPA-fish oil 1,000 mg (120 mg-180 mg) capsule Take 1 Cap by mouth daily. , Historical Med aspirin (ASPIRIN) 325 mg tablet Take 325 mg by mouth daily. , Historical Med      salt moisturing solution no.1 (OCEAN ULTRA SALINE MIST NA) 1 [place] by Both Nostrils route nightly., Historical Med      valACYclovir (VALTREX) 500 mg tablet Take 500 mg by mouth daily as needed ('outbreaks'). , Historical Med      diclofenac EC (VOLTAREN) 75 mg EC tablet Take 75 mg by mouth two (2) times a day., Historical Med      cyanocobalamin (VITAMIN B-12) 1,000 mcg tablet Take 1,000 mcg by mouth daily. , Historical Med           2. Follow-up Information     Follow up With Specialties Details Why Contact Info    Arron Alvarez MD Internal Medicine Schedule an appointment as soon as possible for a visit in 2 days  [address] [place] [address] [id number] [phone]      [facility] EMERGENCY DEPT Emergency Medicine  If symptoms worsen [address]  [address] [facility] [address]  [phone]        Return to ED if worse     Diagnosis     Clinical Impression:   1. Long COVID    2. Hypertension, unspecified type        Please note that this dictation was completed with Dragon, computer voice recognition software. Quite often unanticipated grammatical, syntax, homophones, and other interpretive errors are inadvertently transcribed by the computer software. Please disregard these errors. Additionally, please excuse any errors that have escaped final proofreading.

## 2022-03-28 NOTE — DISCHARGE INSTRUCTIONS
Thank you! Thank you for allowing me to care for you in the emergency department. I sincerely hope that you are satisfied with your visit today. It is my goal to provide you with excellent care. Below you will find a list of your labs and imaging from your visit today. Should you have any questions regarding these results please do not hesitate to call the emergency department. Labs -     Recent Results (from the past 12 hour(s))   EKG, 12 LEAD, INITIAL    Collection Time: 03/28/22 10:18 AM   Result Value Ref Range    Ventricular Rate 65 BPM    Atrial Rate 65 BPM    P-R Interval 152 ms    QRS Duration 104 ms    Q-T Interval 384 ms    QTC Calculation (Bezet) 399 ms    Calculated P Axis 72 degrees    Calculated R Axis 46 degrees    Calculated T Axis 54 degrees    Diagnosis       Normal sinus rhythm  Normal ECG  When compared with ECG of 03-FEB-2022 14:57,  premature ventricular complexes are no longer present  premature atrial complexes are no longer present     CBC WITH AUTOMATED DIFF    Collection Time: 03/28/22 11:21 AM   Result Value Ref Range    WBC 4.7 4.1 - 11.1 K/uL    RBC 4.91 4.10 - 5.70 M/uL    HGB 15.2 12.1 - 17.0 g/dL    HCT 44.2 36.6 - 50.3 %    MCV 90.0 80.0 - 99.0 FL    MCH 31.0 26.0 - 34.0 PG    MCHC 34.4 30.0 - 36.5 g/dL    RDW 13.1 11.5 - 14.5 %    PLATELET 647 933 - 468 K/uL    MPV 9.9 8.9 - 12.9 FL    NRBC 0.0 0  WBC    ABSOLUTE NRBC 0.00 0.00 - 0.01 K/uL    NEUTROPHILS 47 32 - 75 %    LYMPHOCYTES 40 12 - 49 %    MONOCYTES 9 5 - 13 %    EOSINOPHILS 3 0 - 7 %    BASOPHILS 1 0 - 1 %    IMMATURE GRANULOCYTES 0 0.0 - 0.5 %    ABS. NEUTROPHILS 2.2 1.8 - 8.0 K/UL    ABS. LYMPHOCYTES 1.9 0.8 - 3.5 K/UL    ABS. MONOCYTES 0.4 0.0 - 1.0 K/UL    ABS. EOSINOPHILS 0.2 0.0 - 0.4 K/UL    ABS. BASOPHILS 0.0 0.0 - 0.1 K/UL    ABS. IMM.  GRANS. 0.0 0.00 - 0.04 K/UL    DF AUTOMATED     METABOLIC PANEL, COMPREHENSIVE    Collection Time: 03/28/22 11:21 AM   Result Value Ref Range    Sodium 137 136 - 145 mmol/L    Potassium 4.0 3.5 - 5.1 mmol/L    Chloride 106 97 - 108 mmol/L    CO2 25 21 - 32 mmol/L    Anion gap 6 5 - 15 mmol/L    Glucose 210 (H) 65 - 100 mg/dL    BUN 13 6 - 20 MG/DL    Creatinine 0.92 0.70 - 1.30 MG/DL    BUN/Creatinine ratio 14 12 - 20      GFR est AA >60 >60 ml/min/1.73m2    GFR est non-AA >60 >60 ml/min/1.73m2    Calcium 8.6 8.5 - 10.1 MG/DL    Bilirubin, total 0.7 0.2 - 1.0 MG/DL    ALT (SGPT) 70 12 - 78 U/L    AST (SGOT) 49 (H) 15 - 37 U/L    Alk. phosphatase 114 45 - 117 U/L    Protein, total 7.5 6.4 - 8.2 g/dL    Albumin 3.8 3.5 - 5.0 g/dL    Globulin 3.7 2.0 - 4.0 g/dL    A-G Ratio 1.0 (L) 1.1 - 2.2     TROPONIN-HIGH SENSITIVITY    Collection Time: 03/28/22 11:21 AM   Result Value Ref Range    Troponin-High Sensitivity 8 0 - 76 ng/L   NT-PRO BNP    Collection Time: 03/28/22 11:21 AM   Result Value Ref Range    NT pro-BNP 47 <125 PG/ML   MAGNESIUM    Collection Time: 03/28/22 11:21 AM   Result Value Ref Range    Magnesium 2.2 1.6 - 2.4 mg/dL   SAMPLES BEING HELD    Collection Time: 03/28/22 11:21 AM   Result Value Ref Range    SAMPLES BEING HELD 1 PST     COMMENT        Add-on orders for these samples will be processed based on acceptable specimen integrity and analyte stability, which may vary by analyte. D DIMER    Collection Time: 03/28/22 11:33 AM   Result Value Ref Range    D-dimer 0.42 0.00 - 0.65 mg/L FEU       Radiologic Studies -   XR CHEST PA LAT   Final Result   Mild diffuse interstitial opacities which are not significantly   changed and may reflect underlying chronic interstitial lung disease. No acute   cardiopulmonary process        CT Results  (Last 48 hours)      None          CXR Results  (Last 48 hours)                 03/28/22 1040  XR CHEST PA LAT Final result    Impression:  Mild diffuse interstitial opacities which are not significantly   changed and may reflect underlying chronic interstitial lung disease.  No acute   cardiopulmonary process       Narrative: EXAM: XR CHEST PA LAT       INDICATION: Shortness of Breath       COMPARISON: 2/5/2022. FINDINGS: PA and lateral radiographs of the chest demonstrate mild diffuse   interstitial opacities similar to the prior study. Algis Broaden Heart size is borderline. No   new airspace disease. . The bones and soft tissues are within normal limits. If you feel that you have not received excellent quality care or timely care, please ask to speak to the nurse manager. Please choose us in the future for your continued health care needs. ------------------------------------------------------------------------------------------------------------  The exam and treatment you received in the Emergency Department were for an urgent problem and are not intended as complete care. It is important that you follow-up with a doctor, nurse practitioner, or physician assistant to:  (1) confirm your diagnosis,  (2) re-evaluation of changes in your illness and treatment, and  (3) for ongoing care. If your symptoms become worse or you do not improve as expected and you are unable to reach your usual health care provider, you should return to the Emergency Department. We are available 24 hours a day. Please take your discharge instructions with you when you go to your follow-up appointment. If you have any problem arranging a follow-up appointment, contact the Emergency Department immediately. If a prescription has been provided, please have it filled as soon as possible to prevent a delay in treatment. Read the entire medication instruction sheet provided to you by the pharmacy. If you have any questions or reservations about taking the medication due to side effects or interactions with other medications, please call your primary care physician or contact the ER to speak with the charge nurse.      Make an appointment with your family doctor or the physician you were referred to for follow-up of this visit as instructed on your discharge paperwork, as this is a mandatory follow-up. Return to the ER if you are unable to be seen or if you are unable to be seen in a timely manner. If you have any problem arranging the follow-up visit, contact the Emergency Department immediately.

## 2022-04-26 NOTE — PROGRESS NOTES
1900-Pt noted to be in Sinus Santana Duhamel rhythm     2130- Pt Cardizem qtt decreased to 5 per pt heart rate upper 50's     2330-Page MD, Dr. Jayshree Paredes received orders to hold Cardizem if HR is under 55, Cardizem stopped at this time per pt HR is 50.   Cardizem qtt also changed to titratable 5-15    0540-Pt had a small burst of sinus tach followed by a 1.83 sec pause, strip printed and on chart appears normal and intact

## 2022-09-07 ENCOUNTER — TRANSCRIBE ORDER (OUTPATIENT)
Dept: SCHEDULING | Age: 67
End: 2022-09-07

## 2022-09-07 DIAGNOSIS — C61 MALIGNANT NEOPLASM OF PROSTATE (HCC): Primary | ICD-10-CM

## 2022-09-23 ENCOUNTER — HOSPITAL ENCOUNTER (OUTPATIENT)
Dept: PET IMAGING | Age: 67
Discharge: HOME | End: 2022-09-23
Attending: STUDENT IN AN ORGANIZED HEALTH CARE EDUCATION/TRAINING PROGRAM
Payer: MEDICARE

## 2022-09-23 VITALS — HEIGHT: 72 IN | BODY MASS INDEX: 37.93 KG/M2 | WEIGHT: 280 LBS

## 2022-09-23 DIAGNOSIS — C61 MALIGNANT NEOPLASM OF PROSTATE (HCC): ICD-10-CM

## 2022-09-23 PROCEDURE — 78815 PET IMAGE W/CT SKULL-THIGH: CPT

## 2022-09-28 ENCOUNTER — TRANSCRIBE ORDER (OUTPATIENT)
Dept: SCHEDULING | Age: 67
End: 2022-09-28

## 2022-09-28 DIAGNOSIS — M89.9 BONE LESION: Primary | ICD-10-CM

## 2022-10-04 ENCOUNTER — HOSPITAL ENCOUNTER (OUTPATIENT)
Dept: CT IMAGING | Age: 67
Discharge: HOME | End: 2022-10-04
Attending: STUDENT IN AN ORGANIZED HEALTH CARE EDUCATION/TRAINING PROGRAM
Payer: MEDICARE

## 2022-10-04 VITALS
WEIGHT: 280 LBS | SYSTOLIC BLOOD PRESSURE: 131 MMHG | HEART RATE: 62 BPM | RESPIRATION RATE: 20 BRPM | DIASTOLIC BLOOD PRESSURE: 79 MMHG | OXYGEN SATURATION: 98 % | TEMPERATURE: 98 F | HEIGHT: 72 IN | BODY MASS INDEX: 37.93 KG/M2

## 2022-10-04 DIAGNOSIS — M89.9 BONE LESION: ICD-10-CM

## 2022-10-04 PROCEDURE — 74011250636 HC RX REV CODE- 250/636: Performed by: STUDENT IN AN ORGANIZED HEALTH CARE EDUCATION/TRAINING PROGRAM

## 2022-10-04 PROCEDURE — 88341 IMHCHEM/IMCYTCHM EA ADD ANTB: CPT

## 2022-10-04 PROCEDURE — 77030018781

## 2022-10-04 PROCEDURE — 88333 PATH CONSLTJ SURG CYTO XM 1: CPT

## 2022-10-04 PROCEDURE — 88307 TISSUE EXAM BY PATHOLOGIST: CPT

## 2022-10-04 PROCEDURE — 88342 IMHCHEM/IMCYTCHM 1ST ANTB: CPT

## 2022-10-04 PROCEDURE — 20220 BONE BIOPSY TROCAR/NDL SUPFC: CPT

## 2022-10-04 PROCEDURE — 77030028872 HC BN BIOP NDL ON CNTRL TY TELE -C

## 2022-10-04 PROCEDURE — 77030014115

## 2022-10-04 PROCEDURE — 74011000250 HC RX REV CODE- 250: Performed by: RADIOLOGY

## 2022-10-04 RX ORDER — TAMSULOSIN HYDROCHLORIDE 0.4 MG/1
0.4 CAPSULE ORAL DAILY
COMMUNITY

## 2022-10-04 RX ORDER — ALBUTEROL SULFATE 90 UG/1
2 AEROSOL, METERED RESPIRATORY (INHALATION)
COMMUNITY

## 2022-10-04 RX ORDER — FENTANYL CITRATE 50 UG/ML
100 INJECTION, SOLUTION INTRAMUSCULAR; INTRAVENOUS
Status: DISCONTINUED | OUTPATIENT
Start: 2022-10-04 | End: 2022-10-04

## 2022-10-04 RX ORDER — MIDAZOLAM HYDROCHLORIDE 1 MG/ML
5 INJECTION, SOLUTION INTRAMUSCULAR; INTRAVENOUS
Status: DISCONTINUED | OUTPATIENT
Start: 2022-10-04 | End: 2022-10-04

## 2022-10-04 RX ORDER — DIPHENHYDRAMINE HYDROCHLORIDE 50 MG/ML
50 INJECTION, SOLUTION INTRAMUSCULAR; INTRAVENOUS ONCE
Status: COMPLETED | OUTPATIENT
Start: 2022-10-04 | End: 2022-10-04

## 2022-10-04 RX ORDER — LIDOCAINE HYDROCHLORIDE 10 MG/ML
10 INJECTION INFILTRATION; PERINEURAL
Status: COMPLETED | OUTPATIENT
Start: 2022-10-04 | End: 2022-10-04

## 2022-10-04 RX ORDER — SODIUM CHLORIDE 9 MG/ML
25 INJECTION, SOLUTION INTRAVENOUS CONTINUOUS
Status: DISCONTINUED | OUTPATIENT
Start: 2022-10-04 | End: 2022-10-04

## 2022-10-04 RX ORDER — FLUTICASONE FUROATE, UMECLIDINIUM BROMIDE AND VILANTEROL TRIFENATATE 100; 62.5; 25 UG/1; UG/1; UG/1
1 POWDER RESPIRATORY (INHALATION) DAILY
COMMUNITY

## 2022-10-04 RX ADMIN — FENTANYL CITRATE 50 MCG: 50 INJECTION, SOLUTION INTRAMUSCULAR; INTRAVENOUS at 11:17

## 2022-10-04 RX ADMIN — FENTANYL CITRATE 50 MCG: 50 INJECTION, SOLUTION INTRAMUSCULAR; INTRAVENOUS at 11:35

## 2022-10-04 RX ADMIN — DIPHENHYDRAMINE HYDROCHLORIDE 50 MG: 50 INJECTION, SOLUTION INTRAMUSCULAR; INTRAVENOUS at 11:07

## 2022-10-04 RX ADMIN — MIDAZOLAM 2 MG: 1 INJECTION INTRAMUSCULAR; INTRAVENOUS at 11:16

## 2022-10-04 RX ADMIN — MIDAZOLAM 2 MG: 1 INJECTION INTRAMUSCULAR; INTRAVENOUS at 11:35

## 2022-10-04 RX ADMIN — LIDOCAINE HYDROCHLORIDE 10 ML: 10 INJECTION, SOLUTION INFILTRATION; PERINEURAL at 12:16

## 2022-10-04 RX ADMIN — MIDAZOLAM 1 MG: 1 INJECTION INTRAMUSCULAR; INTRAVENOUS at 11:42

## 2022-10-04 RX ADMIN — SODIUM CHLORIDE 25 ML/HR: 0.9 INJECTION, SOLUTION INTRAVENOUS at 10:29

## 2022-10-04 NOTE — DISCHARGE INSTRUCTIONS
[facility]  Radiology Department  [phone]    Radiologist:  Dr. Arline Mccabe and /or Associate Mckay Andrea, [address])    Nurse:  Bhakti Siddiqi RN    Date:  10/4/2022       Bone Biopsy Discharge Instructions      Go home and rest  and restrict your activity the next 24 hours. You have been given sedating medications, so do not drive or make important decisions today. Resume your previous diet and prescribed medications. You may shower in 24 hours. Do not soak or swim until the biopsy site has healed completely to minimize any risk of infection. Watch site for redness, drainage, pus, foul odor, increasing pain and fevers. Should this occur call you doctor immediatly. You may take Tylenol if allowed, as directed on the label, for pain or discomfort. Avoid Ibuprofen (Advil, Motrin etc.) and Aspirin today as they may increase your risk of bleeding. Be sure to follow up with your physician, and let him know how you are progressing and to receive your results. If you have any questions about your procedure today please call and ask to speak to a radiology nurse.        I

## 2022-10-04 NOTE — PROGRESS NOTES
Pt. Transferred back to xray recovery via stretcher accomp. By nurse and pt. Noted with HOB elevated approx 30 degrees. Pt without c/o pain. Dressing noted 1 dried blood stain (approx 1 mm round) without active bleeding noted at this time. Pt requesting diet ginger ale and peanut butter crackers.

## 2022-10-04 NOTE — ROUTINE PROCESS
I have reviewed discharge instructions with the patient. The patient verbalized understanding. Copy of discharge instructions per AVS copied, reviewed with pt., signature sheet signed and sent to med. Rec. For scanning r/t penpad not working and copy to pt. Prior to discharge. Pt without c/o pain. Pt bandaid changed to larger one on bone biopsy site. No bleeding, swelling or pain noted at biopsy site. Pt. Dressed self. Pt sat into w/c for discharge to home via private vehicle with his wife driving him home. Pt accomp. By nurse to car for discharge.

## 2022-10-04 NOTE — PROGRESS NOTES
Pt requested to ambulate to bathroom to void accomp by nurse. Pt then ambulated back to stretcher. JEROME Feliciano in to speak with pt. About procedure, assess pt. And obtain consent.

## 2022-10-04 NOTE — PROGRESS NOTES
Name of procedure:  CT guided bone biopsy with conscious sedation    Sedation medications given: Yes    Versed: 5 mg    Fentanyl: 100 mcg    Sedation tolerated: without difficulty    Total Sedation time: 28 minutes    Sedation start:  1116  Sedation end:  1144    Vital Signs:  see connectcare

## 2022-10-04 NOTE — H&P
INTERVENTIONAL RADIOLOGY  Preoperative History and Physical      Patient:  Eryn Redman  :  1955  Age:  69 y.o. MRN:  [id number]  Today's Date:  10/4/2022      CC / HPI   Eryn Redman is a 69 y.o. male with a history of multiple bone lesions who presents for CT guided biopsy of right iliac bone lesion.     PAST MEDICAL HISTORY  Past Medical History:   Diagnosis Date    A-fib [facility])     ablations x4    Arthritis     back    Chronic pain     back    Encounter for cardioversion procedure 2017    GERD (gastroesophageal reflux disease)     High cholesterol     Hypertension 2017    patient denies hx of HTN    Multiple thyroid nodules     biopsied and benign    Non-nicotine vapor product user     on occassion    PUD (peptic ulcer disease)     \"years ago\"    Sleep apnea     wife witnessed apnea -- PCP & Card both referred to sleep clinic pt refused       PAST SURGICAL HISTORY  Past Surgical History:   Procedure Laterality Date    CARDIAC CATHETERIZATION      CARDIOVERSION EXTERNAL      HX AFIB ABLATION      HX CERVICAL DISKECTOMY      pt unsure exactly what they did to his neck    HX CERVICAL FUSION  2013    HX HEENT  13    THYROID BIOPSY    HX ORTHOPAEDIC Left 2015    shoulder surgery on left    HX ORTHOPAEDIC Left 1990    wrist surgery, LEFT    HX ORTHOPAEDIC Right 1996    elbow surgery, RIGHT    HX TONSILLECTOMY      MS CARDIAC SURG PROCEDURE UNLIST  ,     ABLATION x4  & 2018       SOCIAL HISTORY  Social History     Socioeconomic History    Marital status:      Spouse name: Not on file    Number of children: Not on file    Years of education: Not on file    Highest education level: Not on file   Occupational History    Not on file   Tobacco Use    Smoking status: Former     Packs/day: 0.25     Years: 40.00     Pack years: 10.00     Types: Cigarettes     Quit date: 10/1/2012     Years since quitting: 10.0    Smokeless tobacco: Never   Substance and Sexual Activity Alcohol use: Yes     Comment: occassionally 3-4 cans of beer    Drug use: No    Sexual activity: Yes   Other Topics Concern    Not on file   Social History Narrative    Not on file     Social Determinants of Health     Financial Resource Strain: Not on file   Food Insecurity: Not on file   Transportation Needs: Not on file   Physical Activity: Not on file   Stress: Not on file   Social Connections: Not on file   Intimate Partner Violence: Not on file   Housing Stability: Not on file       FAMILY HISTORY  Family History   Problem Relation Age of Onset    Cancer Mother         BREAST    Heart Disease Father     Lung Disease Father        CURRENT MEDICATIONS  Current Outpatient Medications   Medication Sig Dispense Refill    albuterol (PROVENTIL HFA, VENTOLIN HFA, PROAIR HFA) 90 mcg/actuation inhaler Take 2 Puffs by inhalation every six (6) hours as needed for Wheezing. tamsulosin (Flomax) 0.4 mg capsule Take 0.4 mg by mouth daily. At nights      fluticasone-umeclidinium-vilanterol (Trelegy Ellipta) 100-62.5-25 mcg inhaler Take 1 Puff by inhalation daily. fluticasone propionate (FLONASE ALLERGY RELIEF NA) 1 Puff by Nasal route daily. Each nostril every morning      benzonatate (TESSALON PERLE PO) Take  by mouth two (2) times a day. furosemide (LASIX) 20 mg tablet Take 1 Tablet by mouth daily as needed (edema). 90 Tablet 3    diclofenac (VOLTAREN) 1 % gel Apply  to affected area four (4) times daily. omega 3-DHA-EPA-fish oil 1,000 mg (120 mg-180 mg) capsule Take 1 Cap by mouth daily. salt moisturing solution no.1 (OCEAN ULTRA SALINE MIST NA) 1 [place] by Both Nostrils route nightly. cyanocobalamin 1,000 mcg tablet Take 1,000 mcg by mouth daily. valACYclovir (VALTREX) 500 mg tablet Take 500 mg by mouth daily as needed ('outbreaks').        Current Facility-Administered Medications   Medication Dose Route Frequency Provider Last Rate Last Admin    fentaNYL citrate (PF) injection 100 mcg 100 mcg IntraVENous Multiple Vivienne Berman MD        midazolam (VERSED) injection 5 mg  5 mg IntraVENous Multiple Vivienne Berman MD        0.9% sodium chloride infusion  25 mL/hr IntraVENous CONTINUOUS Vivienne Berman MD 25 mL/hr at 10/04/22 1029 25 mL/hr at 10/04/22 1029    lidocaine (XYLOCAINE) 10 mg/mL (1 %) injection 10 mL  10 mL SubCUTAneous RAD ONCE Shraddha Vail MD           ALLERGIES  Allergies   Allergen Reactions    Percocet [Oxycodone-Acetaminophen] Anxiety     Patient takes Tylenol without problems. Guaifenesin Other (comments)     Other reaction(s): gi upset       DIAGNOSTIC STUDIES   IMAGING STUDIES  Relevant Imaging studies reviewed:  PET Results (most recent):  Results from Hospital Encounter encounter on 09/23/22    PET/CT TUMOR IMAGE SKULL THIGH PSMA (INI)    Narrative  PET/CT SCAN    PROCEDURE: Following IV injection of 9.86 mCi F-18 Pylarify and a standard  uptake delay, PET imaging is performed from the skull vertex to mid thigh and  axial, sagittal and coronal images were acquired. Unenhanced CT is obtained for  anatomic localization, and attenuation correction of the PET scan. CORRELATIVE IMAGING STUDIES: None. PRIOR PET:  None    HISTORY: The study is requested for staging prostate cancer. FINDINGS:    HEAD/NECK: No apparent foci of abnormal hypermetabolism. Cerebral evaluation is  limited by normal intense activity. CHEST: No foci of abnormal hypermetabolism. Punctate bilateral pulmonary nodules  are noted. ABDOMEN/PELVIS: Small focus of increased tracer activity is seen in the left  prostate gland. SKELETON: Numerous foci of increased tracer activity are noted, including the  skull base, scapula bilaterally, thoracic spine, ribs bilaterally, right  humerus, lumbar spine, sacrum, left iliac wing, and femur bilaterally. These  correspond to sclerotic lesions.     Impression  Small focus of increased tracer activity left prostate gland  consistent with the patient's known neoplasm. Hypermetabolic sclerotic osseous  lesions are concerning for metastatic disease.        LABS  Lab Results   Component Value Date/Time    WBC 4.7 03/28/2022 11:21 AM    HGB 15.2 03/28/2022 11:21 AM    HCT 44.2 03/28/2022 11:21 AM    PLATELET 119 52/75/5990 11:21 AM    MCV 90.0 03/28/2022 11:21 AM     Lab Results   Component Value Date/Time    Sodium 137 03/28/2022 11:21 AM    Potassium 4.0 03/28/2022 11:21 AM    Chloride 106 03/28/2022 11:21 AM    CO2 25 03/28/2022 11:21 AM    Anion gap 6 03/28/2022 11:21 AM    Glucose 210 (H) 03/28/2022 11:21 AM    BUN 13 03/28/2022 11:21 AM    Creatinine 0.92 03/28/2022 11:21 AM    BUN/Creatinine ratio 14 03/28/2022 11:21 AM    GFR est AA >60 03/28/2022 11:21 AM    GFR est non-AA >60 03/28/2022 11:21 AM    Calcium 8.6 03/28/2022 11:21 AM     Lab Results   Component Value Date/Time    INR 1.0 10/06/2020 01:31 PM    Prothrombin time 10.9 10/06/2020 01:31 PM       PHYSICAL EXAM   BP (!) 165/81 (BP 1 Location: Left upper arm, BP Patient Position: At rest;Sitting)   Pulse (!) 59   Temp 98 °F (36.7 °C)   Resp 20   Ht 6' (1.829 m)   Wt 127 kg (280 lb)   SpO2 96%   BMI 37.97 kg/m²   General:  NAD  Heart:  RRR  Lungs:  NWOB  Neurological:  AAOX3    PLAN   Procedure to be performed:  CT guided right iliac bone lesion biopsy  Plan for sedation:  moderate  Post procedure plan:  observation per protocol  Informed consent:  risks, benefits, and alternatives reviewed with the patient / family who agree to proceed  Code status:  Prior Mcintosh, [address] [facility], Wally Mejia.

## 2022-10-24 ENCOUNTER — TRANSCRIBE ORDER (OUTPATIENT)
Dept: SCHEDULING | Age: 67
End: 2022-10-24

## 2022-10-24 DIAGNOSIS — Z79.818 USE OF OTHER AGENTS AFFECTING ESTROGEN RECEPTORS AND ESTROGEN LEVELS: ICD-10-CM

## 2022-10-24 DIAGNOSIS — C61 PROSTATE CANCER (HCC): Primary | ICD-10-CM

## 2022-10-27 ENCOUNTER — HOSPITAL ENCOUNTER (OUTPATIENT)
Dept: NUCLEAR MEDICINE | Age: 67
Discharge: HOME | End: 2022-10-27
Attending: UROLOGY
Payer: MEDICARE

## 2022-10-27 DIAGNOSIS — C61 PROSTATE CANCER (HCC): ICD-10-CM

## 2022-10-27 PROCEDURE — 78306 BONE IMAGING WHOLE BODY: CPT

## 2022-11-03 ENCOUNTER — HOSPITAL ENCOUNTER (OUTPATIENT)
Dept: BONE DENSITY | Age: 67
Discharge: HOME | End: 2022-11-03
Attending: UROLOGY
Payer: MEDICARE

## 2022-11-03 DIAGNOSIS — Z79.818 USE OF OTHER AGENTS AFFECTING ESTROGEN RECEPTORS AND ESTROGEN LEVELS: ICD-10-CM

## 2022-11-03 DIAGNOSIS — C61 PROSTATE CANCER (HCC): ICD-10-CM

## 2022-11-03 PROCEDURE — 77080 DXA BONE DENSITY AXIAL: CPT

## 2022-11-04 ENCOUNTER — HOSPITAL ENCOUNTER (OUTPATIENT)
Dept: RADIATION THERAPY | Age: 67
Discharge: HOME | End: 2022-11-04

## 2023-05-10 ENCOUNTER — TRANSCRIBE ORDERS (OUTPATIENT)
Facility: HOSPITAL | Age: 68
End: 2023-05-10

## 2023-05-10 ENCOUNTER — HOSPITAL ENCOUNTER (OUTPATIENT)
Facility: HOSPITAL | Age: 68
Discharge: HOME | End: 2023-05-13
Payer: MEDICARE

## 2023-05-10 DIAGNOSIS — R07.81 RIB PAIN: Primary | ICD-10-CM

## 2023-05-10 DIAGNOSIS — R07.81 RIB PAIN ON RIGHT SIDE: Primary | ICD-10-CM

## 2023-05-10 DIAGNOSIS — R07.81 RIB PAIN: ICD-10-CM

## 2023-05-10 LAB — CREAT BLD-MCNC: 1 MG/DL (ref 0.6–1.3)

## 2023-05-10 PROCEDURE — 71275 CT ANGIOGRAPHY CHEST: CPT

## 2023-05-10 PROCEDURE — 6360000004 HC RX CONTRAST MEDICATION: Performed by: INTERNAL MEDICINE

## 2023-05-10 PROCEDURE — 82565 ASSAY OF CREATININE: CPT

## 2023-05-10 RX ADMIN — IOPAMIDOL 100 ML: 755 INJECTION, SOLUTION INTRAVENOUS at 17:48

## 2023-06-07 ENCOUNTER — HOSPITAL ENCOUNTER (OUTPATIENT)
Facility: HOSPITAL | Age: 68
Discharge: HOME | End: 2023-06-10

## 2023-06-15 ENCOUNTER — HOSPITAL ENCOUNTER (OUTPATIENT)
Facility: HOSPITAL | Age: 68
Discharge: HOME | End: 2023-06-18
Attending: RADIOLOGY

## 2023-06-16 ENCOUNTER — HOSPITAL ENCOUNTER (OUTPATIENT)
Facility: HOSPITAL | Age: 68
Discharge: HOME | End: 2023-06-19
Attending: RADIOLOGY

## 2023-06-19 ENCOUNTER — HOSPITAL ENCOUNTER (OUTPATIENT)
Facility: HOSPITAL | Age: 68
Discharge: HOME | End: 2023-06-22
Attending: RADIOLOGY

## 2023-06-19 LAB
RAD ONC ARIA COURSE FIRST TREATMENT DATE: NORMAL
RAD ONC ARIA COURSE FIRST TREATMENT DATE: NORMAL
RAD ONC ARIA COURSE ID: NORMAL
RAD ONC ARIA COURSE ID: NORMAL
RAD ONC ARIA COURSE INTENT: NORMAL
RAD ONC ARIA COURSE INTENT: NORMAL
RAD ONC ARIA COURSE LAST TREATMENT DATE: NORMAL
RAD ONC ARIA COURSE LAST TREATMENT DATE: NORMAL
RAD ONC ARIA COURSE SESSION NUMBER: 1
RAD ONC ARIA COURSE SESSION NUMBER: 5
RAD ONC ARIA COURSE START DATE: NORMAL
RAD ONC ARIA COURSE START DATE: NORMAL
RAD ONC ARIA COURSE TREATMENT ELAPSED DAYS: 0
RAD ONC ARIA COURSE TREATMENT ELAPSED DAYS: 6
RAD ONC ARIA PLAN FRACTIONS TREATED TO DATE: 1
RAD ONC ARIA PLAN FRACTIONS TREATED TO DATE: 5
RAD ONC ARIA PLAN FRACTIONS TREATED TO DATE: 5
RAD ONC ARIA PLAN ID: NORMAL
RAD ONC ARIA PLAN PRESCRIBED DOSE PER FRACTION: 10 GY
RAD ONC ARIA PLAN PRESCRIBED DOSE PER FRACTION: 4 GY
RAD ONC ARIA PLAN PRESCRIBED DOSE PER FRACTION: 4 GY
RAD ONC ARIA PLAN PRIMARY REFERENCE POINT: NORMAL
RAD ONC ARIA PLAN TOTAL FRACTIONS PRESCRIBED: 1
RAD ONC ARIA PLAN TOTAL FRACTIONS PRESCRIBED: 5
RAD ONC ARIA PLAN TOTAL FRACTIONS PRESCRIBED: 5
RAD ONC ARIA PLAN TOTAL PRESCRIBED DOSE: 1000 CGY
RAD ONC ARIA PLAN TOTAL PRESCRIBED DOSE: 2000 CGY
RAD ONC ARIA PLAN TOTAL PRESCRIBED DOSE: 2000 CGY
RAD ONC ARIA REFERENCE POINT DOSAGE GIVEN TO DATE: 10 GY
RAD ONC ARIA REFERENCE POINT DOSAGE GIVEN TO DATE: 10.2 GY
RAD ONC ARIA REFERENCE POINT DOSAGE GIVEN TO DATE: 2.35 GY
RAD ONC ARIA REFERENCE POINT DOSAGE GIVEN TO DATE: 20 GY
RAD ONC ARIA REFERENCE POINT DOSAGE GIVEN TO DATE: 21.05 GY
RAD ONC ARIA REFERENCE POINT DOSAGE GIVEN TO DATE: 21.06 GY
RAD ONC ARIA REFERENCE POINT ID: NORMAL
RAD ONC ARIA REFERENCE POINT SESSION DOSAGE GIVEN: 0.47 GY
RAD ONC ARIA REFERENCE POINT SESSION DOSAGE GIVEN: 10 GY
RAD ONC ARIA REFERENCE POINT SESSION DOSAGE GIVEN: 10.2 GY
RAD ONC ARIA REFERENCE POINT SESSION DOSAGE GIVEN: 4 GY
RAD ONC ARIA REFERENCE POINT SESSION DOSAGE GIVEN: 4.21 GY
RAD ONC ARIA REFERENCE POINT SESSION DOSAGE GIVEN: 4.21 GY

## 2023-08-02 ENCOUNTER — HOSPITAL ENCOUNTER (OUTPATIENT)
Facility: HOSPITAL | Age: 68
Discharge: HOME | End: 2023-08-05
Payer: MEDICARE

## 2023-08-02 DIAGNOSIS — Z91.81 PERSONAL HISTORY OF FALL: ICD-10-CM

## 2023-08-02 PROCEDURE — 71046 X-RAY EXAM CHEST 2 VIEWS: CPT

## 2023-08-10 ENCOUNTER — APPOINTMENT (OUTPATIENT)
Facility: HOSPITAL | Age: 68
End: 2023-08-10
Payer: MEDICARE

## 2023-08-10 ENCOUNTER — HOSPITAL ENCOUNTER (EMERGENCY)
Facility: HOSPITAL | Age: 68
Discharge: HOME | End: 2023-08-10
Attending: STUDENT IN AN ORGANIZED HEALTH CARE EDUCATION/TRAINING PROGRAM
Payer: MEDICARE

## 2023-08-10 VITALS
DIASTOLIC BLOOD PRESSURE: 97 MMHG | SYSTOLIC BLOOD PRESSURE: 130 MMHG | BODY MASS INDEX: 37.24 KG/M2 | HEART RATE: 81 BPM | OXYGEN SATURATION: 96 % | WEIGHT: 274.91 LBS | RESPIRATION RATE: 18 BRPM | TEMPERATURE: 98.6 F | HEIGHT: 72 IN

## 2023-08-10 DIAGNOSIS — J18.9 PNEUMONIA OF LEFT LOWER LOBE DUE TO INFECTIOUS ORGANISM: Primary | ICD-10-CM

## 2023-08-10 LAB
ALBUMIN SERPL-MCNC: 3.7 G/DL (ref 3.5–5)
ALBUMIN/GLOB SERPL: 0.9 (ref 1.1–2.2)
ALP SERPL-CCNC: 99 U/L (ref 45–117)
ALT SERPL-CCNC: 35 U/L (ref 12–78)
ANION GAP SERPL CALC-SCNC: 4 MMOL/L (ref 5–15)
AST SERPL-CCNC: 33 U/L (ref 15–37)
BASOPHILS # BLD: 0.1 K/UL (ref 0–0.1)
BASOPHILS NFR BLD: 1 % (ref 0–1)
BILIRUB SERPL-MCNC: 0.8 MG/DL (ref 0.2–1)
BUN SERPL-MCNC: 9 MG/DL (ref 6–20)
BUN/CREAT SERPL: 10 (ref 12–20)
CALCIUM SERPL-MCNC: 9.1 MG/DL (ref 8.5–10.1)
CHLORIDE SERPL-SCNC: 101 MMOL/L (ref 97–108)
CO2 SERPL-SCNC: 29 MMOL/L (ref 21–32)
CREAT SERPL-MCNC: 0.88 MG/DL (ref 0.7–1.3)
DIFFERENTIAL METHOD BLD: ABNORMAL
EOSINOPHIL # BLD: 0 K/UL (ref 0–0.4)
EOSINOPHIL NFR BLD: 0 % (ref 0–7)
ERYTHROCYTE [DISTWIDTH] IN BLOOD BY AUTOMATED COUNT: 13.2 % (ref 11.5–14.5)
GLOBULIN SER CALC-MCNC: 4.3 G/DL (ref 2–4)
GLUCOSE SERPL-MCNC: 147 MG/DL (ref 65–100)
HCT VFR BLD AUTO: 40.5 % (ref 36.6–50.3)
HGB BLD-MCNC: 13.9 G/DL (ref 12.1–17)
IMM GRANULOCYTES # BLD AUTO: 0 K/UL (ref 0–0.04)
IMM GRANULOCYTES NFR BLD AUTO: 1 % (ref 0–0.5)
LYMPHOCYTES # BLD: 1 K/UL (ref 0.8–3.5)
LYMPHOCYTES NFR BLD: 12 % (ref 12–49)
MCH RBC QN AUTO: 31.2 PG (ref 26–34)
MCHC RBC AUTO-ENTMCNC: 34.3 G/DL (ref 30–36.5)
MCV RBC AUTO: 90.8 FL (ref 80–99)
MONOCYTES # BLD: 0.8 K/UL (ref 0–1)
MONOCYTES NFR BLD: 10 % (ref 5–13)
NEUTS SEG # BLD: 6.7 K/UL (ref 1.8–8)
NEUTS SEG NFR BLD: 76 % (ref 32–75)
NRBC # BLD: 0 K/UL (ref 0–0.01)
NRBC BLD-RTO: 0 PER 100 WBC
NT PRO BNP: 86 PG/ML
PLATELET # BLD AUTO: 177 K/UL (ref 150–400)
PMV BLD AUTO: 9.8 FL (ref 8.9–12.9)
POTASSIUM SERPL-SCNC: 4.1 MMOL/L (ref 3.5–5.1)
PROT SERPL-MCNC: 8 G/DL (ref 6.4–8.2)
RBC # BLD AUTO: 4.46 M/UL (ref 4.1–5.7)
SODIUM SERPL-SCNC: 134 MMOL/L (ref 136–145)
TROPONIN I SERPL HS-MCNC: 6 NG/L (ref 0–76)
WBC # BLD AUTO: 8.7 K/UL (ref 4.1–11.1)

## 2023-08-10 PROCEDURE — 80053 COMPREHEN METABOLIC PANEL: CPT

## 2023-08-10 PROCEDURE — 85025 COMPLETE CBC W/AUTO DIFF WBC: CPT

## 2023-08-10 PROCEDURE — 36415 COLL VENOUS BLD VENIPUNCTURE: CPT

## 2023-08-10 PROCEDURE — 83880 ASSAY OF NATRIURETIC PEPTIDE: CPT

## 2023-08-10 PROCEDURE — 71275 CT ANGIOGRAPHY CHEST: CPT

## 2023-08-10 PROCEDURE — 99285 EMERGENCY DEPT VISIT HI MDM: CPT

## 2023-08-10 PROCEDURE — 2580000003 HC RX 258: Performed by: STUDENT IN AN ORGANIZED HEALTH CARE EDUCATION/TRAINING PROGRAM

## 2023-08-10 PROCEDURE — 84484 ASSAY OF TROPONIN QUANT: CPT

## 2023-08-10 PROCEDURE — 71045 X-RAY EXAM CHEST 1 VIEW: CPT

## 2023-08-10 PROCEDURE — 6360000004 HC RX CONTRAST MEDICATION: Performed by: STUDENT IN AN ORGANIZED HEALTH CARE EDUCATION/TRAINING PROGRAM

## 2023-08-10 PROCEDURE — 93005 ELECTROCARDIOGRAM TRACING: CPT | Performed by: STUDENT IN AN ORGANIZED HEALTH CARE EDUCATION/TRAINING PROGRAM

## 2023-08-10 RX ORDER — ACETAMINOPHEN AND CODEINE PHOSPHATE 300; 30 MG/1; MG/1
1 TABLET ORAL EVERY 4 HOURS PRN
Qty: 12 TABLET | Refills: 0 | Status: SHIPPED | OUTPATIENT
Start: 2023-08-10 | End: 2023-08-13

## 2023-08-10 RX ORDER — 0.9 % SODIUM CHLORIDE 0.9 %
1000 INTRAVENOUS SOLUTION INTRAVENOUS ONCE
Status: COMPLETED | OUTPATIENT
Start: 2023-08-10 | End: 2023-08-10

## 2023-08-10 RX ORDER — ACETAMINOPHEN AND CODEINE PHOSPHATE 300; 30 MG/1; MG/1
1 TABLET ORAL EVERY 4 HOURS PRN
Qty: 12 TABLET | Refills: 0 | Status: SHIPPED | OUTPATIENT
Start: 2023-08-10 | End: 2023-08-10 | Stop reason: SDUPTHER

## 2023-08-10 RX ADMIN — SODIUM CHLORIDE 1000 ML: 9 INJECTION, SOLUTION INTRAVENOUS at 16:36

## 2023-08-10 RX ADMIN — IOPAMIDOL 100 ML: 755 INJECTION, SOLUTION INTRAVENOUS at 16:58

## 2023-08-10 NOTE — ED PROVIDER NOTES
EMERGENCY DEPARTMENT HISTORY AND PHYSICAL EXAM      Date: 8/10/2023  Patient Name: Linnea Gamboa    History of Presenting Illness     Chief Complaint   Patient presents with    Chest Pain     PT arrives ambulatory to triage, reports cough with chest pain and sore throat starting last Wednesday. Patient seen by PCP Monday and started on abx with no improvement, second abx called in Wednesday, without improvement and referred to ED. Patient also reports SOB with cough and general fatigue. Cough    Other         HPI: History From: patient, History limited by: none  Linnea Gamboa, 77 y.o. male presents to the ED with cc of cough. This has been going on for about a week, he describes associated discomfort in his throat, the cough is productive of clear mucus, and he feels like whenever he coughs there is a severe pain in his throat that feels like needles that goes down into his chest.  He denies any fevers. He reports associated shortness of breath. He was prescribed azithromycin on Monday, however his symptoms did not improve so he started taking Augmentin yesterday. He denies any change in swelling in his legs. He reports a history of prostate cancer with metastases to the bone for which he is currently undergoing hormone therapy. There are no other complaints, changes, or physical findings at this time. PCP: Heath King MD    No current facility-administered medications on file prior to encounter.      Current Outpatient Medications on File Prior to Encounter   Medication Sig Dispense Refill    albuterol sulfate HFA (PROVENTIL;VENTOLIN;PROAIR) 108 (90 Base) MCG/ACT inhaler Inhale 2 puffs into the lungs every 6 hours as needed      cyanocobalamin 1000 MCG tablet Take 1,000 mcg by mouth daily      diclofenac sodium (VOLTAREN) 1 % GEL Apply topically 4 times daily      fluticasone-umeclidin-vilant (TRELEGY ELLIPTA) 100-62.5-25 MCG/ACT AEPB inhaler Inhale 1 puff into the lungs

## 2023-08-11 LAB
EKG ATRIAL RATE: 74 BPM
EKG DIAGNOSIS: NORMAL
EKG P AXIS: 81 DEGREES
EKG P-R INTERVAL: 172 MS
EKG Q-T INTERVAL: 370 MS
EKG QRS DURATION: 92 MS
EKG QTC CALCULATION (BAZETT): 410 MS
EKG R AXIS: 29 DEGREES
EKG T AXIS: 82 DEGREES
EKG VENTRICULAR RATE: 74 BPM

## 2023-08-25 ENCOUNTER — OFFICE VISIT (OUTPATIENT)
Age: 68
End: 2023-08-25
Payer: MEDICARE

## 2023-08-25 VITALS
SYSTOLIC BLOOD PRESSURE: 124 MMHG | OXYGEN SATURATION: 94 % | TEMPERATURE: 97.6 F | WEIGHT: 277 LBS | RESPIRATION RATE: 20 BRPM | HEART RATE: 76 BPM | HEIGHT: 72 IN | BODY MASS INDEX: 37.52 KG/M2 | DIASTOLIC BLOOD PRESSURE: 62 MMHG

## 2023-08-25 DIAGNOSIS — E04.1 THYROID NODULE: Primary | ICD-10-CM

## 2023-08-25 DIAGNOSIS — E04.1 THYROID NODULE: ICD-10-CM

## 2023-08-25 PROCEDURE — 3017F COLORECTAL CA SCREEN DOC REV: CPT | Performed by: SURGERY

## 2023-08-25 PROCEDURE — 3078F DIAST BP <80 MM HG: CPT | Performed by: SURGERY

## 2023-08-25 PROCEDURE — G8427 DOCREV CUR MEDS BY ELIG CLIN: HCPCS | Performed by: SURGERY

## 2023-08-25 PROCEDURE — 3074F SYST BP LT 130 MM HG: CPT | Performed by: SURGERY

## 2023-08-25 PROCEDURE — 99204 OFFICE O/P NEW MOD 45 MIN: CPT | Performed by: SURGERY

## 2023-08-25 PROCEDURE — 1123F ACP DISCUSS/DSCN MKR DOCD: CPT | Performed by: SURGERY

## 2023-08-25 PROCEDURE — 1036F TOBACCO NON-USER: CPT | Performed by: SURGERY

## 2023-08-25 PROCEDURE — G8417 CALC BMI ABV UP PARAM F/U: HCPCS | Performed by: SURGERY

## 2023-08-25 RX ORDER — METFORMIN HYDROCHLORIDE 1000 MG/1
1000 TABLET, FILM COATED, EXTENDED RELEASE ORAL 2 TIMES DAILY
COMMUNITY

## 2023-08-25 RX ORDER — CHLORAL HYDRATE 500 MG
1 CAPSULE ORAL DAILY
COMMUNITY

## 2023-08-25 RX ORDER — FAMOTIDINE 40 MG/1
40 TABLET, FILM COATED ORAL DAILY PRN
COMMUNITY
Start: 2022-06-28

## 2023-08-25 RX ORDER — GLIPIZIDE 2.5 MG/1
2.5 TABLET, EXTENDED RELEASE ORAL DAILY
COMMUNITY
Start: 2023-06-06

## 2023-08-25 RX ORDER — VENLAFAXINE HYDROCHLORIDE 37.5 MG/1
37.5 CAPSULE, EXTENDED RELEASE ORAL DAILY
COMMUNITY

## 2023-08-25 RX ORDER — MONTELUKAST SODIUM 10 MG/1
10 TABLET ORAL DAILY
COMMUNITY
Start: 2022-08-11

## 2023-08-25 RX ORDER — FLUTICASONE PROPIONATE 50 MCG
1 SPRAY, SUSPENSION (ML) NASAL DAILY PRN
COMMUNITY

## 2023-08-25 RX ORDER — DENOSUMAB 120 MG/1.7ML
1.7 INJECTION SUBCUTANEOUS ONCE
COMMUNITY

## 2023-08-25 RX ORDER — APALUTAMIDE 60 MG/1
4 TABLET, FILM COATED ORAL DAILY
COMMUNITY

## 2023-08-25 ASSESSMENT — ENCOUNTER SYMPTOMS
CONSTIPATION: 0
STRIDOR: 0
SORE THROAT: 0
EYE PAIN: 0
VOMITING: 0
BLOOD IN STOOL: 0
SHORTNESS OF BREATH: 1
NAUSEA: 0
COUGH: 1
DIARRHEA: 0
WHEEZING: 0
ABDOMINAL PAIN: 0
BACK PAIN: 1

## 2023-08-25 ASSESSMENT — PATIENT HEALTH QUESTIONNAIRE - PHQ9
SUM OF ALL RESPONSES TO PHQ QUESTIONS 1-9: 0
1. LITTLE INTEREST OR PLEASURE IN DOING THINGS: 0
SUM OF ALL RESPONSES TO PHQ QUESTIONS 1-9: 0
2. FEELING DOWN, DEPRESSED OR HOPELESS: 0
SUM OF ALL RESPONSES TO PHQ QUESTIONS 1-9: 0
SUM OF ALL RESPONSES TO PHQ QUESTIONS 1-9: 0
SUM OF ALL RESPONSES TO PHQ9 QUESTIONS 1 & 2: 0

## 2023-08-26 LAB
T4 FREE SERPL-MCNC: 0.9 NG/DL (ref 0.8–1.5)
TSH SERPL DL<=0.05 MIU/L-ACNC: 2.83 UIU/ML (ref 0.36–3.74)

## 2023-09-05 ENCOUNTER — HOSPITAL ENCOUNTER (OUTPATIENT)
Facility: HOSPITAL | Age: 68
Discharge: HOME | End: 2023-09-08
Attending: SURGERY
Payer: MEDICARE

## 2023-09-05 DIAGNOSIS — E04.1 THYROID NODULE: ICD-10-CM

## 2023-09-05 PROCEDURE — 76536 US EXAM OF HEAD AND NECK: CPT

## 2023-09-06 ENCOUNTER — TELEPHONE (OUTPATIENT)
Age: 68
End: 2023-09-06

## 2023-09-06 DIAGNOSIS — E04.1 THYROID NODULE: Primary | ICD-10-CM

## 2023-09-06 NOTE — TELEPHONE ENCOUNTER
TSH and Free T4 are normal  US shows relatively stable thyroid nodules except the smaller one on the right being a little larger but images look less ominous    Favor repeat US neck one year  He is understanding and amenable to the approach      I ordered US neck for 1 year  RTC after US in 1 year    Saqib Deleon MD FACS

## 2023-10-05 ENCOUNTER — HOSPITAL ENCOUNTER (OUTPATIENT)
Facility: HOSPITAL | Age: 68
Discharge: HOME | End: 2023-10-08
Attending: RADIOLOGY

## 2023-10-10 DIAGNOSIS — G89.3 CANCER ASSOCIATED PAIN: Primary | ICD-10-CM

## 2023-10-10 RX ORDER — HYDROCODONE BITARTRATE AND ACETAMINOPHEN 5; 325 MG/1; MG/1
1 TABLET ORAL EVERY 6 HOURS PRN
Qty: 120 TABLET | Refills: 0 | Status: SHIPPED | OUTPATIENT
Start: 2023-10-10 | End: 2023-11-09

## 2023-10-12 ENCOUNTER — HOSPITAL ENCOUNTER (OUTPATIENT)
Facility: HOSPITAL | Age: 68
Discharge: HOME | End: 2023-10-15
Attending: RADIOLOGY

## 2023-10-12 LAB
RAD ONC ARIA COURSE FIRST TREATMENT DATE: NORMAL
RAD ONC ARIA COURSE ID: NORMAL
RAD ONC ARIA COURSE INTENT: NORMAL
RAD ONC ARIA COURSE LAST TREATMENT DATE: NORMAL
RAD ONC ARIA COURSE SESSION NUMBER: 1
RAD ONC ARIA COURSE START DATE: NORMAL
RAD ONC ARIA COURSE TREATMENT ELAPSED DAYS: 0
RAD ONC ARIA PLAN FRACTIONS TREATED TO DATE: 1
RAD ONC ARIA PLAN ID: NORMAL
RAD ONC ARIA PLAN PRESCRIBED DOSE PER FRACTION: 4 GY
RAD ONC ARIA PLAN PRIMARY REFERENCE POINT: NORMAL
RAD ONC ARIA PLAN TOTAL FRACTIONS PRESCRIBED: 5
RAD ONC ARIA PLAN TOTAL PRESCRIBED DOSE: 2000 CGY
RAD ONC ARIA REFERENCE POINT DOSAGE GIVEN TO DATE: 3.92 GY
RAD ONC ARIA REFERENCE POINT DOSAGE GIVEN TO DATE: 4 GY
RAD ONC ARIA REFERENCE POINT ID: NORMAL
RAD ONC ARIA REFERENCE POINT ID: NORMAL
RAD ONC ARIA REFERENCE POINT SESSION DOSAGE GIVEN: 3.92 GY
RAD ONC ARIA REFERENCE POINT SESSION DOSAGE GIVEN: 4 GY

## 2023-10-13 ENCOUNTER — HOSPITAL ENCOUNTER (OUTPATIENT)
Facility: HOSPITAL | Age: 68
Discharge: HOME | End: 2023-10-16
Attending: RADIOLOGY

## 2023-10-13 LAB
RAD ONC ARIA COURSE FIRST TREATMENT DATE: NORMAL
RAD ONC ARIA COURSE ID: NORMAL
RAD ONC ARIA COURSE INTENT: NORMAL
RAD ONC ARIA COURSE LAST TREATMENT DATE: NORMAL
RAD ONC ARIA COURSE SESSION NUMBER: 2
RAD ONC ARIA COURSE START DATE: NORMAL
RAD ONC ARIA COURSE TREATMENT ELAPSED DAYS: 1
RAD ONC ARIA PLAN FRACTIONS TREATED TO DATE: 2
RAD ONC ARIA PLAN ID: NORMAL
RAD ONC ARIA PLAN PRESCRIBED DOSE PER FRACTION: 4 GY
RAD ONC ARIA PLAN PRIMARY REFERENCE POINT: NORMAL
RAD ONC ARIA PLAN TOTAL FRACTIONS PRESCRIBED: 5
RAD ONC ARIA PLAN TOTAL PRESCRIBED DOSE: 2000 CGY
RAD ONC ARIA REFERENCE POINT DOSAGE GIVEN TO DATE: 7.84 GY
RAD ONC ARIA REFERENCE POINT DOSAGE GIVEN TO DATE: 8 GY
RAD ONC ARIA REFERENCE POINT ID: NORMAL
RAD ONC ARIA REFERENCE POINT ID: NORMAL
RAD ONC ARIA REFERENCE POINT SESSION DOSAGE GIVEN: 3.92 GY
RAD ONC ARIA REFERENCE POINT SESSION DOSAGE GIVEN: 4 GY

## 2023-10-16 ENCOUNTER — HOSPITAL ENCOUNTER (OUTPATIENT)
Facility: HOSPITAL | Age: 68
Discharge: HOME | End: 2023-10-19
Attending: RADIOLOGY

## 2023-10-16 LAB
RAD ONC ARIA COURSE FIRST TREATMENT DATE: NORMAL
RAD ONC ARIA COURSE ID: NORMAL
RAD ONC ARIA COURSE INTENT: NORMAL
RAD ONC ARIA COURSE LAST TREATMENT DATE: NORMAL
RAD ONC ARIA COURSE SESSION NUMBER: 3
RAD ONC ARIA COURSE START DATE: NORMAL
RAD ONC ARIA COURSE TREATMENT ELAPSED DAYS: 4
RAD ONC ARIA PLAN FRACTIONS TREATED TO DATE: 3
RAD ONC ARIA PLAN ID: NORMAL
RAD ONC ARIA PLAN PRESCRIBED DOSE PER FRACTION: 4 GY
RAD ONC ARIA PLAN PRIMARY REFERENCE POINT: NORMAL
RAD ONC ARIA PLAN TOTAL FRACTIONS PRESCRIBED: 5
RAD ONC ARIA PLAN TOTAL PRESCRIBED DOSE: 2000 CGY
RAD ONC ARIA REFERENCE POINT DOSAGE GIVEN TO DATE: 11.77 GY
RAD ONC ARIA REFERENCE POINT DOSAGE GIVEN TO DATE: 12 GY
RAD ONC ARIA REFERENCE POINT ID: NORMAL
RAD ONC ARIA REFERENCE POINT ID: NORMAL
RAD ONC ARIA REFERENCE POINT SESSION DOSAGE GIVEN: 3.92 GY
RAD ONC ARIA REFERENCE POINT SESSION DOSAGE GIVEN: 4 GY

## 2023-10-17 ENCOUNTER — HOSPITAL ENCOUNTER (OUTPATIENT)
Facility: HOSPITAL | Age: 68
Discharge: HOME | End: 2023-10-20
Attending: RADIOLOGY

## 2023-10-17 LAB
RAD ONC ARIA COURSE FIRST TREATMENT DATE: NORMAL
RAD ONC ARIA COURSE ID: NORMAL
RAD ONC ARIA COURSE INTENT: NORMAL
RAD ONC ARIA COURSE LAST TREATMENT DATE: NORMAL
RAD ONC ARIA COURSE SESSION NUMBER: 4
RAD ONC ARIA COURSE START DATE: NORMAL
RAD ONC ARIA COURSE TREATMENT ELAPSED DAYS: 5
RAD ONC ARIA PLAN FRACTIONS TREATED TO DATE: 4
RAD ONC ARIA PLAN ID: NORMAL
RAD ONC ARIA PLAN PRESCRIBED DOSE PER FRACTION: 4 GY
RAD ONC ARIA PLAN PRIMARY REFERENCE POINT: NORMAL
RAD ONC ARIA PLAN TOTAL FRACTIONS PRESCRIBED: 5
RAD ONC ARIA PLAN TOTAL PRESCRIBED DOSE: 2000 CGY
RAD ONC ARIA REFERENCE POINT DOSAGE GIVEN TO DATE: 15.69 GY
RAD ONC ARIA REFERENCE POINT DOSAGE GIVEN TO DATE: 16 GY
RAD ONC ARIA REFERENCE POINT ID: NORMAL
RAD ONC ARIA REFERENCE POINT ID: NORMAL
RAD ONC ARIA REFERENCE POINT SESSION DOSAGE GIVEN: 3.92 GY
RAD ONC ARIA REFERENCE POINT SESSION DOSAGE GIVEN: 4 GY

## 2023-10-18 ENCOUNTER — HOSPITAL ENCOUNTER (OUTPATIENT)
Facility: HOSPITAL | Age: 68
Discharge: HOME | End: 2023-10-21
Attending: RADIOLOGY

## 2023-10-18 LAB
RAD ONC ARIA COURSE FIRST TREATMENT DATE: NORMAL
RAD ONC ARIA COURSE ID: NORMAL
RAD ONC ARIA COURSE INTENT: NORMAL
RAD ONC ARIA COURSE LAST TREATMENT DATE: NORMAL
RAD ONC ARIA COURSE SESSION NUMBER: 5
RAD ONC ARIA COURSE START DATE: NORMAL
RAD ONC ARIA COURSE TREATMENT ELAPSED DAYS: 6
RAD ONC ARIA PLAN FRACTIONS TREATED TO DATE: 5
RAD ONC ARIA PLAN ID: NORMAL
RAD ONC ARIA PLAN PRESCRIBED DOSE PER FRACTION: 4 GY
RAD ONC ARIA PLAN PRIMARY REFERENCE POINT: NORMAL
RAD ONC ARIA PLAN TOTAL FRACTIONS PRESCRIBED: 5
RAD ONC ARIA PLAN TOTAL PRESCRIBED DOSE: 2000 CGY
RAD ONC ARIA REFERENCE POINT DOSAGE GIVEN TO DATE: 19.61 GY
RAD ONC ARIA REFERENCE POINT DOSAGE GIVEN TO DATE: 20 GY
RAD ONC ARIA REFERENCE POINT ID: NORMAL
RAD ONC ARIA REFERENCE POINT ID: NORMAL
RAD ONC ARIA REFERENCE POINT SESSION DOSAGE GIVEN: 3.92 GY
RAD ONC ARIA REFERENCE POINT SESSION DOSAGE GIVEN: 4 GY

## 2023-11-10 ENCOUNTER — CLINICAL DOCUMENTATION (OUTPATIENT)
Facility: HOSPITAL | Age: 68
End: 2023-11-10

## 2023-11-10 DIAGNOSIS — C61 PROSTATE CANCER (HCC): Primary | ICD-10-CM

## 2023-11-10 NOTE — PROGRESS NOTES
Spoke to patient on the phone. He reports ongoing pain in the right side of his mid back. He states that after his most recent radiation treatment the pain in the center of his back improved but this pain on the right side did not change much. He also notes occasional pain in the left side of his back. He is taking pain medication which helps sometimes. I recommended we get a diagnostic CT scan of the chest to better evaluate where his bony disease is. He agrees with this. Depending on what this shows we could consider 1 versus 5 fraction palliative radiotherapy to the bony sites that could be responsible for his pain.

## 2023-11-17 ENCOUNTER — HOSPITAL ENCOUNTER (OUTPATIENT)
Facility: HOSPITAL | Age: 68
End: 2023-11-17
Attending: RADIOLOGY
Payer: MEDICARE

## 2023-11-17 DIAGNOSIS — C61 PROSTATE CANCER (HCC): ICD-10-CM

## 2023-11-17 PROCEDURE — 71250 CT THORAX DX C-: CPT

## 2023-11-28 ENCOUNTER — TELEPHONE (OUTPATIENT)
Facility: HOSPITAL | Age: 68
End: 2023-11-28

## 2023-11-28 NOTE — TELEPHONE ENCOUNTER
Spoke with Mr. Inna Arnold, he continues to have back pain, seems to be centered at roughly 4 to 6 inches to the right of his mid/upper back. This was the same pain which was present prior to his most recent radiation therapy course to T7. It seems to be tolerable on a pain level. He is taking Tylenol as needed which helps. He is still completely functional and able to do everything he wants/needs to do. I reviewed his CT scan carefully. I am not convinced there is an obvious area that has not been treated that is responsible for his pain. It is possible that what we treated has just not responded to radiation and we discussed this as well. I offered to discuss with interventional radiology to see if there was an ablative technique that might be able to try and address T7. Alternatively, T6 has a lesion in the anterior aspect of the vertebral body which we could consider SBRT/ablation to. I recommended we hold off on further treatments given the vague nature of his discomfort, the uncertain etiology, and his functional status currently.

## 2024-01-24 ENCOUNTER — HOSPITAL ENCOUNTER (EMERGENCY)
Facility: HOSPITAL | Age: 69
Discharge: HOME | End: 2024-01-24
Attending: STUDENT IN AN ORGANIZED HEALTH CARE EDUCATION/TRAINING PROGRAM
Payer: MEDICARE

## 2024-01-24 ENCOUNTER — APPOINTMENT (OUTPATIENT)
Facility: HOSPITAL | Age: 69
End: 2024-01-24
Payer: MEDICARE

## 2024-01-24 VITALS
BODY MASS INDEX: 38.4 KG/M2 | OXYGEN SATURATION: 93 % | RESPIRATION RATE: 15 BRPM | HEIGHT: 72 IN | TEMPERATURE: 97.8 F | WEIGHT: 283.51 LBS | HEART RATE: 58 BPM | SYSTOLIC BLOOD PRESSURE: 149 MMHG | DIASTOLIC BLOOD PRESSURE: 92 MMHG

## 2024-01-24 DIAGNOSIS — R07.81 RIB PAIN ON RIGHT SIDE: ICD-10-CM

## 2024-01-24 DIAGNOSIS — R42 VERTIGO: Primary | ICD-10-CM

## 2024-01-24 LAB
ALBUMIN SERPL-MCNC: 3.6 G/DL (ref 3.5–5)
ALBUMIN/GLOB SERPL: 1 (ref 1.1–2.2)
ALP SERPL-CCNC: 72 U/L (ref 45–117)
ALT SERPL-CCNC: 34 U/L (ref 12–78)
ANION GAP SERPL CALC-SCNC: 6 MMOL/L (ref 5–15)
AST SERPL-CCNC: 46 U/L (ref 15–37)
BASOPHILS # BLD: 0 K/UL (ref 0–0.1)
BASOPHILS NFR BLD: 1 % (ref 0–1)
BILIRUB SERPL-MCNC: 0.3 MG/DL (ref 0.2–1)
BUN SERPL-MCNC: 13 MG/DL (ref 6–20)
BUN/CREAT SERPL: 13 (ref 12–20)
CALCIUM SERPL-MCNC: 9.5 MG/DL (ref 8.5–10.1)
CHLORIDE SERPL-SCNC: 104 MMOL/L (ref 97–108)
CO2 SERPL-SCNC: 30 MMOL/L (ref 21–32)
CREAT SERPL-MCNC: 0.98 MG/DL (ref 0.7–1.3)
DIFFERENTIAL METHOD BLD: NORMAL
EOSINOPHIL # BLD: 0.1 K/UL (ref 0–0.4)
EOSINOPHIL NFR BLD: 2 % (ref 0–7)
ERYTHROCYTE [DISTWIDTH] IN BLOOD BY AUTOMATED COUNT: 13.5 % (ref 11.5–14.5)
GLOBULIN SER CALC-MCNC: 3.5 G/DL (ref 2–4)
GLUCOSE SERPL-MCNC: 187 MG/DL (ref 65–100)
HCT VFR BLD AUTO: 42.6 % (ref 36.6–50.3)
HGB BLD-MCNC: 14.4 G/DL (ref 12.1–17)
IMM GRANULOCYTES # BLD AUTO: 0 K/UL (ref 0–0.04)
IMM GRANULOCYTES NFR BLD AUTO: 0 % (ref 0–0.5)
LYMPHOCYTES # BLD: 1.3 K/UL (ref 0.8–3.5)
LYMPHOCYTES NFR BLD: 26 % (ref 12–49)
MCH RBC QN AUTO: 31 PG (ref 26–34)
MCHC RBC AUTO-ENTMCNC: 33.8 G/DL (ref 30–36.5)
MCV RBC AUTO: 91.8 FL (ref 80–99)
MONOCYTES # BLD: 0.3 K/UL (ref 0–1)
MONOCYTES NFR BLD: 7 % (ref 5–13)
NEUTS SEG # BLD: 3.2 K/UL (ref 1.8–8)
NEUTS SEG NFR BLD: 64 % (ref 32–75)
NRBC # BLD: 0 K/UL (ref 0–0.01)
NRBC BLD-RTO: 0 PER 100 WBC
PLATELET # BLD AUTO: 172 K/UL (ref 150–400)
PMV BLD AUTO: 10.1 FL (ref 8.9–12.9)
POTASSIUM SERPL-SCNC: 4.4 MMOL/L (ref 3.5–5.1)
PROT SERPL-MCNC: 7.1 G/DL (ref 6.4–8.2)
RBC # BLD AUTO: 4.64 M/UL (ref 4.1–5.7)
SODIUM SERPL-SCNC: 140 MMOL/L (ref 136–145)
TROPONIN I SERPL HS-MCNC: 6 NG/L (ref 0–76)
WBC # BLD AUTO: 5 K/UL (ref 4.1–11.1)

## 2024-01-24 PROCEDURE — 36415 COLL VENOUS BLD VENIPUNCTURE: CPT

## 2024-01-24 PROCEDURE — 80053 COMPREHEN METABOLIC PANEL: CPT

## 2024-01-24 PROCEDURE — 93005 ELECTROCARDIOGRAM TRACING: CPT | Performed by: STUDENT IN AN ORGANIZED HEALTH CARE EDUCATION/TRAINING PROGRAM

## 2024-01-24 PROCEDURE — 70496 CT ANGIOGRAPHY HEAD: CPT

## 2024-01-24 PROCEDURE — 85025 COMPLETE CBC W/AUTO DIFF WBC: CPT

## 2024-01-24 PROCEDURE — 6360000004 HC RX CONTRAST MEDICATION: Performed by: STUDENT IN AN ORGANIZED HEALTH CARE EDUCATION/TRAINING PROGRAM

## 2024-01-24 PROCEDURE — 70450 CT HEAD/BRAIN W/O DYE: CPT

## 2024-01-24 PROCEDURE — 84484 ASSAY OF TROPONIN QUANT: CPT

## 2024-01-24 PROCEDURE — 70551 MRI BRAIN STEM W/O DYE: CPT

## 2024-01-24 PROCEDURE — 96374 THER/PROPH/DIAG INJ IV PUSH: CPT

## 2024-01-24 PROCEDURE — 99285 EMERGENCY DEPT VISIT HI MDM: CPT

## 2024-01-24 PROCEDURE — 6360000002 HC RX W HCPCS: Performed by: STUDENT IN AN ORGANIZED HEALTH CARE EDUCATION/TRAINING PROGRAM

## 2024-01-24 RX ORDER — DIAZEPAM 5 MG/ML
2.5 INJECTION, SOLUTION INTRAMUSCULAR; INTRAVENOUS ONCE
Status: COMPLETED | OUTPATIENT
Start: 2024-01-24 | End: 2024-01-24

## 2024-01-24 RX ADMIN — IOPAMIDOL 100 ML: 755 INJECTION, SOLUTION INTRAVENOUS at 17:06

## 2024-01-24 RX ADMIN — DIAZEPAM 2.5 MG: 5 INJECTION, SOLUTION INTRAMUSCULAR; INTRAVENOUS at 14:46

## 2024-01-24 ASSESSMENT — LIFESTYLE VARIABLES
HOW MANY STANDARD DRINKS CONTAINING ALCOHOL DO YOU HAVE ON A TYPICAL DAY: PATIENT DOES NOT DRINK
HOW OFTEN DO YOU HAVE A DRINK CONTAINING ALCOHOL: NEVER

## 2024-01-24 NOTE — PROGRESS NOTES
MRI PENDING    Completion of MRI Screening Sheet    Fax to [phone] when completed    Please call [phone]  When this is done    Thank You

## 2024-01-24 NOTE — ED NOTES
Pt's IV removed. Pt provided D/C instructions and states understanding of D/C teaching. Pt has all belongings. Pt escorted out via wheelchair out of ED to personal vehicle at this time.  Patient is A&Ox4, on RA, VSS, and is in NAD at the time of discharge.

## 2024-01-24 NOTE — DISCHARGE INSTRUCTIONS
Please make a followup with your primary care physician and otolaryngologist.  Please also discuss your heart rhythm with Dr. Dubose.  If you have any new or worsening concerning medical symptoms please return to the emergency department.

## 2024-01-25 LAB
EKG ATRIAL RATE: 58 BPM
EKG DIAGNOSIS: NORMAL
EKG P AXIS: 56 DEGREES
EKG P-R INTERVAL: 168 MS
EKG Q-T INTERVAL: 400 MS
EKG QRS DURATION: 92 MS
EKG QTC CALCULATION (BAZETT): 392 MS
EKG R AXIS: 71 DEGREES
EKG T AXIS: 91 DEGREES
EKG VENTRICULAR RATE: 58 BPM

## 2024-01-25 NOTE — ED PROVIDER NOTES
[facility] EMERGENCY DEPT  EMERGENCY DEPARTMENT ENCOUNTER       Pt Name: Delio Zavala  MRN: [id number]  Birthdate 1955  Date of evaluation: 1/24/2024  Provider: Anton Ocampo MD   PCP: Estrella Haywood MD  Note Started: 12:33 AM EST 1/25/24     CHIEF COMPLAINT       Chief Complaint   Patient presents with    Dizziness     Patient seen at primary care on Monday due to dizziness and difficulty walking.  Patient prescribed medications that he states are not working.  Patient felt like the room was spinning this morning.  Patient reports dizziness since Friday.      HISTORY OF PRESENT ILLNESS: 1 or more elements      History From: patient, History limited by: none     Delio Zavala is a 68 y.o. male with a past medical history of metastatic prostate cancer, paroxysmal Afib (not on blood thinners) and vertigo presents to the ED with dizziness that has been ongoing for the past 5 days.  He reports that when he turns his head to the right he feels like the room is spinning.  This happens multiple times per day.  He has associated nausea with many of the episodes which has been controlled with anti-emetics.  He saw his PCP earlier this week who reported that he had some fluid in his ears and prescribed meclizine.  This medication makes him somnolent and he does not feel like it worked.  When he had vertigo in the past the symptoms went away in a few days.  He called a ?rehab facility or ?ENT in the morning but was told that he would not be able to be seen until next week.    Please See [facility] for Additional Details of the HPI/PMH  Nursing Notes were all reviewed and agreed with or any disagreements were addressed in the HPI.     REVIEW OF SYSTEMS        Positives and Pertinent negatives as per HPI.    PAST HISTORY     Past Medical History:  Past Medical History:   Diagnosis Date    A-fib (HCC)     ablations x4    Arthritis     back    Chronic pain     back    Encounter for cardioversion procedure

## 2024-02-26 ENCOUNTER — HOSPITAL ENCOUNTER (EMERGENCY)
Facility: HOSPITAL | Age: 69
Discharge: HOME | End: 2024-02-26
Attending: EMERGENCY MEDICINE
Payer: MEDICARE

## 2024-02-26 ENCOUNTER — APPOINTMENT (OUTPATIENT)
Facility: HOSPITAL | Age: 69
End: 2024-02-26
Payer: MEDICARE

## 2024-02-26 VITALS
DIASTOLIC BLOOD PRESSURE: 97 MMHG | SYSTOLIC BLOOD PRESSURE: 159 MMHG | TEMPERATURE: 97.9 F | HEART RATE: 67 BPM | WEIGHT: 286.16 LBS | OXYGEN SATURATION: 94 % | RESPIRATION RATE: 18 BRPM | BODY MASS INDEX: 38.81 KG/M2

## 2024-02-26 DIAGNOSIS — S01.81XA CHIN LACERATION, INITIAL ENCOUNTER: Primary | ICD-10-CM

## 2024-02-26 PROCEDURE — 70486 CT MAXILLOFACIAL W/O DYE: CPT

## 2024-02-26 PROCEDURE — 99284 EMERGENCY DEPT VISIT MOD MDM: CPT

## 2024-02-26 PROCEDURE — 12011 RPR F/E/E/N/L/M 2.5 CM/<: CPT

## 2024-02-26 ASSESSMENT — PAIN SCALES - GENERAL: PAINLEVEL_OUTOF10: 4

## 2024-02-26 ASSESSMENT — LIFESTYLE VARIABLES
HOW OFTEN DO YOU HAVE A DRINK CONTAINING ALCOHOL: NEVER
HOW MANY STANDARD DRINKS CONTAINING ALCOHOL DO YOU HAVE ON A TYPICAL DAY: PATIENT DOES NOT DRINK

## 2024-02-27 NOTE — DISCHARGE INSTRUCTIONS
Stitches to be removed in 7 days.  You can come back here to the emergency department we can remove or your primary care physician can do so.    It is okay to shower just keep the wound clean and dry.

## 2024-02-27 NOTE — ED PROVIDER NOTES
[facility] EMERGENCY DEPT  EMERGENCY DEPARTMENT ENCOUNTER       Pt Name: Delio Zavala  MRN: [id number]  Birthdate 1955  Date of evaluation: 2/26/2024  Provider: Chapo Mccullough DO   PCP: Estrella Haywood MD  Note Started: 9:55 PM EST 2/26/24     CHIEF COMPLAINT       Chief Complaint   Patient presents with    Facial Laceration     Pt fell out of bed about 5am this morning (states he had a violent dream) and cut chin on nightstand - he dressed wound at home and seemed fine all day but when he changed dressing this evening it opened back up and won't stop bleeding - pt states he does not take blood thinners        HISTORY OF PRESENT ILLNESS: 1 or more elements      History From: Patient, History limited by: none     Delio Zavala is a 68 y.o. male presents to the emergency department by private vehicle for laceration to the right side of his chin.       Please See MDM for Additional Details of the HPI/PMH  Nursing Notes were all reviewed and agreed with or any disagreements were addressed in the HPI.     REVIEW OF SYSTEMS        Positives and Pertinent negatives as per HPI.    PAST HISTORY     Past Medical History:  Past Medical History:   Diagnosis Date    A-fib (HCC)     ablations x4    Arthritis     back    Chronic pain     back    Encounter for cardioversion procedure 2/7/2017    GERD (gastroesophageal reflux disease)     High cholesterol     Hypertension 02/07/2017    patient denies hx of HTN    Multiple thyroid nodules     biopsied and benign    Non-nicotine vapor product user     on occassion    PUD (peptic ulcer disease)     \"years ago\"    Sleep apnea     wife witnessed apnea -- PCP & Card both referred to sleep clinic pt refused       Past Surgical History:  Past Surgical History:   Procedure Laterality Date    ABLATION OF DYSRHYTHMIC FOCUS      CARDIAC CATH PROCEDURE      CARDIOVERSION EXTERNAL      CERVICAL DISCECTOMY  2015    pt unsure exactly what they did to his neck    CERVICAL FUSION

## 2024-05-01 ENCOUNTER — HOSPITAL ENCOUNTER (OUTPATIENT)
Facility: HOSPITAL | Age: 69
Discharge: HOME | End: 2024-05-04

## 2024-05-01 VITALS
WEIGHT: 284 LBS | BODY MASS INDEX: 38.47 KG/M2 | HEIGHT: 72 IN | DIASTOLIC BLOOD PRESSURE: 84 MMHG | HEART RATE: 63 BPM | SYSTOLIC BLOOD PRESSURE: 135 MMHG

## 2024-05-01 DIAGNOSIS — C79.51 SECONDARY MALIGNANT NEOPLASM OF BONE (HCC): ICD-10-CM

## 2024-05-01 DIAGNOSIS — C61 PROSTATE CANCER (HCC): Primary | ICD-10-CM

## 2024-05-01 RX ORDER — ONDANSETRON 4 MG/1
TABLET, FILM COATED ORAL
COMMUNITY

## 2024-05-01 RX ORDER — BENZONATATE 100 MG/1
CAPSULE ORAL
COMMUNITY
Start: 2024-04-22

## 2024-05-01 RX ORDER — CEFDINIR 300 MG/1
CAPSULE ORAL
COMMUNITY
Start: 2024-04-26

## 2024-05-01 RX ORDER — ORAL SEMAGLUTIDE 7 MG/1
TABLET ORAL
COMMUNITY
Start: 2024-04-22

## 2024-05-01 RX ORDER — ASPIRIN 81 MG/1
1 TABLET ORAL DAILY
COMMUNITY

## 2024-05-01 ASSESSMENT — PAIN SCALES - GENERAL: PAINLEVEL_OUTOF10: 0

## 2024-05-01 NOTE — PROGRESS NOTES
this but at the last minute canceled the procedure.  We discussed possibly pursuing UroLift prior to radiation and the patient is interested.  He wishes to speak with Dr. Ordoñez again prior.  We will arrange for this.    Tentative plan would be to treat the prostate to 5500 cGy/20 fractions per stampede with a potential SIB/micro boost to the PSMA + disease.    He would need an MRI at the time of CT simulation for delineation of the urethra.    We had a thorough discussion about the potential benefits and certainly discussing the potential risks which include but not limited to urinary toxicity, urinary obstruction with the potential for catheter requirement, rectal toxicity, and fatigue.    After the above discussion, the patient and his wife expressed understanding and would like to proceed.  He will discuss UroLift again with Dr. Ordoñez and we will either decide to proceed with radiation without UroLift or will wait for UroLift to be performed and then proceed with 20 fraction radiotherapy after this.    I encouraged the patient to reach out anytime in the interim with any questions or concerns.    Thank you very kindly for the opportunity to participate in the care of your patient, Mr. Zavala.    Plan:  - Pt wishes to proceed with prostate RT  - Will discuss Urolift with Dr. Ordoñez prior to RT  - Plan for 20fx with 5500cGy to prostate with microboost to PSMA+ disease ([id number])    Ashutosh Stevens MD  [facility]  [facility]  [address] [place] [address], [address], [place] [address]  P: [phone]  [facility] [address], [place] [address]  P: [phone]  [facility]  [id number] [place] [address]  P: [phone]      Time and Counseling: I spent a total of 61 minutes on the care of this patient on 5/1/24.    Carbon Copy:  Camilo Ordoñez MD    ICD Code:    ICD-10-CM    1. Prostate cancer (HCC)  C61

## 2024-07-29 ENCOUNTER — HOSPITAL ENCOUNTER (OUTPATIENT)
Facility: HOSPITAL | Age: 69
Discharge: HOME | End: 2024-08-01
Attending: RADIOLOGY

## 2024-08-19 ENCOUNTER — HOSPITAL ENCOUNTER (OUTPATIENT)
Facility: HOSPITAL | Age: 69
Discharge: HOME | End: 2024-08-22
Attending: SURGERY
Payer: MEDICARE

## 2024-08-19 ENCOUNTER — HOSPITAL ENCOUNTER (OUTPATIENT)
Facility: HOSPITAL | Age: 69
Discharge: HOME | End: 2024-08-22
Attending: RADIOLOGY

## 2024-08-19 DIAGNOSIS — E04.1 THYROID NODULE: ICD-10-CM

## 2024-08-19 LAB
RAD ONC ARIA COURSE FIRST TREATMENT DATE: NORMAL
RAD ONC ARIA COURSE ID: NORMAL
RAD ONC ARIA COURSE INTENT: NORMAL
RAD ONC ARIA COURSE LAST TREATMENT DATE: NORMAL
RAD ONC ARIA COURSE SESSION NUMBER: 1
RAD ONC ARIA COURSE START DATE: NORMAL
RAD ONC ARIA COURSE TREATMENT ELAPSED DAYS: 0
RAD ONC ARIA PLAN FRACTIONS TREATED TO DATE: 1
RAD ONC ARIA PLAN ID: NORMAL
RAD ONC ARIA PLAN PRESCRIBED DOSE PER FRACTION: 2.75 GY
RAD ONC ARIA PLAN PRIMARY REFERENCE POINT: NORMAL
RAD ONC ARIA PLAN TOTAL FRACTIONS PRESCRIBED: 20
RAD ONC ARIA PLAN TOTAL PRESCRIBED DOSE: 5500 CGY
RAD ONC ARIA REFERENCE POINT DOSAGE GIVEN TO DATE: 2.75 GY
RAD ONC ARIA REFERENCE POINT DOSAGE GIVEN TO DATE: 3 GY
RAD ONC ARIA REFERENCE POINT DOSAGE GIVEN TO DATE: 3.06 GY
RAD ONC ARIA REFERENCE POINT DOSAGE GIVEN TO DATE: 3.1 GY
RAD ONC ARIA REFERENCE POINT ID: NORMAL
RAD ONC ARIA REFERENCE POINT SESSION DOSAGE GIVEN: 2.75 GY
RAD ONC ARIA REFERENCE POINT SESSION DOSAGE GIVEN: 3 GY
RAD ONC ARIA REFERENCE POINT SESSION DOSAGE GIVEN: 3.06 GY
RAD ONC ARIA REFERENCE POINT SESSION DOSAGE GIVEN: 3.1 GY

## 2024-08-19 PROCEDURE — 76536 US EXAM OF HEAD AND NECK: CPT

## 2024-08-20 ENCOUNTER — HOSPITAL ENCOUNTER (OUTPATIENT)
Facility: HOSPITAL | Age: 69
Discharge: HOME | End: 2024-08-23
Attending: RADIOLOGY

## 2024-08-20 DIAGNOSIS — C61 PROSTATE CANCER (HCC): Primary | ICD-10-CM

## 2024-08-20 DIAGNOSIS — C79.51 SECONDARY MALIGNANT NEOPLASM OF BONE (HCC): ICD-10-CM

## 2024-08-20 LAB
RAD ONC ARIA COURSE FIRST TREATMENT DATE: NORMAL
RAD ONC ARIA COURSE ID: NORMAL
RAD ONC ARIA COURSE INTENT: NORMAL
RAD ONC ARIA COURSE LAST TREATMENT DATE: NORMAL
RAD ONC ARIA COURSE SESSION NUMBER: 2
RAD ONC ARIA COURSE START DATE: NORMAL
RAD ONC ARIA COURSE TREATMENT ELAPSED DAYS: 1
RAD ONC ARIA PLAN FRACTIONS TREATED TO DATE: 2
RAD ONC ARIA PLAN ID: NORMAL
RAD ONC ARIA PLAN PRESCRIBED DOSE PER FRACTION: 2.75 GY
RAD ONC ARIA PLAN PRIMARY REFERENCE POINT: NORMAL
RAD ONC ARIA PLAN TOTAL FRACTIONS PRESCRIBED: 20
RAD ONC ARIA PLAN TOTAL PRESCRIBED DOSE: 5500 CGY
RAD ONC ARIA REFERENCE POINT DOSAGE GIVEN TO DATE: 5.5 GY
RAD ONC ARIA REFERENCE POINT DOSAGE GIVEN TO DATE: 6 GY
RAD ONC ARIA REFERENCE POINT DOSAGE GIVEN TO DATE: 6.12 GY
RAD ONC ARIA REFERENCE POINT DOSAGE GIVEN TO DATE: 6.2 GY
RAD ONC ARIA REFERENCE POINT ID: NORMAL
RAD ONC ARIA REFERENCE POINT SESSION DOSAGE GIVEN: 2.75 GY
RAD ONC ARIA REFERENCE POINT SESSION DOSAGE GIVEN: 3 GY
RAD ONC ARIA REFERENCE POINT SESSION DOSAGE GIVEN: 3.06 GY
RAD ONC ARIA REFERENCE POINT SESSION DOSAGE GIVEN: 3.1 GY

## 2024-08-20 ASSESSMENT — PATIENT HEALTH QUESTIONNAIRE - PHQ9
SUM OF ALL RESPONSES TO PHQ QUESTIONS 1-9: 0
SUM OF ALL RESPONSES TO PHQ9 QUESTIONS 1 & 2: 0
2. FEELING DOWN, DEPRESSED OR HOPELESS: NOT AT ALL
SUM OF ALL RESPONSES TO PHQ QUESTIONS 1-9: 0
1. LITTLE INTEREST OR PLEASURE IN DOING THINGS: NOT AT ALL

## 2024-08-20 ASSESSMENT — PAIN SCALES - GENERAL: PAINLEVEL_OUTOF10: 0

## 2024-08-20 NOTE — PROGRESS NOTES
[facility] [place] [facility]  [facility]    Radiation Oncology Weekly Progress Note  Encounter Date: 08/20/24    Delio Zavala  [id number]  1955     Diagnosis   Stage IV, [place] 4+5=9 adenocarcinoma of the prostate, initial PSA 38, clinical T2cN0 M1 with high volume bone only metastatic disease, on Firmagon and Erleada; s/p palliative XRT to the L 5th and 6th ribs and R 5-9th lateral ribs to 2000cGy/5fx completed 6/19/2023 and T7 spine to 2000cGy/5fx completed 10/18/2023.     AJCC Staging has been reviewed.    Interval History   Mr. Zavala was seen today for his weekly on-treatment evaluation    8/20/24: at fraction 2, first OTV. Tolerating treatment well, only complaint today is nausea which began this morning, unlikely related to RT. Reviewed treatment expectations and logistics.    Treatment Details   Treatment Site: Prostate  Treatment Technique: IMRT/VMAT  Treatment Site Dose/Fx (cGy) #Fx Delivered Dose (cGy) Total Planned Dose (cGy) Start Date End Date   Prostate 275 2/20 550 5500 08/19/24 08/20/24     Concurrent Therapy: Concurrent ADT:  Response to treatment: N/A    Allergies and Medications     Allergies   Allergen Reactions    Oxycodone-Acetaminophen Anxiety     Patient takes Tylenol without problems.  Patient said he is not allergic to oxycodone    Guaifenesin Other (See Comments)     Other reaction(s): gi upset       Current Outpatient Medications   Medication Instructions    albuterol sulfate HFA (PROVENTIL;VENTOLIN;PROAIR) 108 (90 Base) MCG/ACT inhaler 2 puffs, Inhalation, EVERY 6 HOURS PRN    Apalutamide (ERLEADA) 60 MG TABS 4 tablets, Oral, DAILY    aspirin 81 MG EC tablet 1 tablet, Oral, DAILY    benzonatate (TESSALON) 100 MG capsule     cefdinir (OMNICEF) 300 MG capsule     cyanocobalamin 1,000 mcg, DAILY    denosumab (XGEVA) 120 MG/1.7ML SOLN SC injection 1.7 mLs, SubCUTAneous, ONCE, Every 4 weeks    diclofenac sodium (VOLTAREN) 2 g, Topical, 4

## 2024-08-21 ENCOUNTER — HOSPITAL ENCOUNTER (OUTPATIENT)
Facility: HOSPITAL | Age: 69
Discharge: HOME | End: 2024-08-24
Attending: RADIOLOGY

## 2024-08-21 LAB
RAD ONC ARIA COURSE FIRST TREATMENT DATE: NORMAL
RAD ONC ARIA COURSE ID: NORMAL
RAD ONC ARIA COURSE INTENT: NORMAL
RAD ONC ARIA COURSE LAST TREATMENT DATE: NORMAL
RAD ONC ARIA COURSE SESSION NUMBER: 3
RAD ONC ARIA COURSE START DATE: NORMAL
RAD ONC ARIA COURSE TREATMENT ELAPSED DAYS: 2
RAD ONC ARIA PLAN FRACTIONS TREATED TO DATE: 3
RAD ONC ARIA PLAN ID: NORMAL
RAD ONC ARIA PLAN PRESCRIBED DOSE PER FRACTION: 2.75 GY
RAD ONC ARIA PLAN PRIMARY REFERENCE POINT: NORMAL
RAD ONC ARIA PLAN TOTAL FRACTIONS PRESCRIBED: 20
RAD ONC ARIA PLAN TOTAL PRESCRIBED DOSE: 5500 CGY
RAD ONC ARIA REFERENCE POINT DOSAGE GIVEN TO DATE: 8.25 GY
RAD ONC ARIA REFERENCE POINT DOSAGE GIVEN TO DATE: 9 GY
RAD ONC ARIA REFERENCE POINT DOSAGE GIVEN TO DATE: 9.18 GY
RAD ONC ARIA REFERENCE POINT DOSAGE GIVEN TO DATE: 9.3 GY
RAD ONC ARIA REFERENCE POINT ID: NORMAL
RAD ONC ARIA REFERENCE POINT SESSION DOSAGE GIVEN: 2.75 GY
RAD ONC ARIA REFERENCE POINT SESSION DOSAGE GIVEN: 3 GY
RAD ONC ARIA REFERENCE POINT SESSION DOSAGE GIVEN: 3.06 GY
RAD ONC ARIA REFERENCE POINT SESSION DOSAGE GIVEN: 3.1 GY

## 2024-08-22 ENCOUNTER — HOSPITAL ENCOUNTER (OUTPATIENT)
Facility: HOSPITAL | Age: 69
Discharge: HOME | End: 2024-08-25
Attending: RADIOLOGY

## 2024-08-22 LAB
RAD ONC ARIA COURSE FIRST TREATMENT DATE: NORMAL
RAD ONC ARIA COURSE ID: NORMAL
RAD ONC ARIA COURSE INTENT: NORMAL
RAD ONC ARIA COURSE LAST TREATMENT DATE: NORMAL
RAD ONC ARIA COURSE SESSION NUMBER: 4
RAD ONC ARIA COURSE START DATE: NORMAL
RAD ONC ARIA COURSE TREATMENT ELAPSED DAYS: 3
RAD ONC ARIA PLAN FRACTIONS TREATED TO DATE: 4
RAD ONC ARIA PLAN ID: NORMAL
RAD ONC ARIA PLAN PRESCRIBED DOSE PER FRACTION: 2.75 GY
RAD ONC ARIA PLAN PRIMARY REFERENCE POINT: NORMAL
RAD ONC ARIA PLAN TOTAL FRACTIONS PRESCRIBED: 20
RAD ONC ARIA PLAN TOTAL PRESCRIBED DOSE: 5500 CGY
RAD ONC ARIA REFERENCE POINT DOSAGE GIVEN TO DATE: 11 GY
RAD ONC ARIA REFERENCE POINT DOSAGE GIVEN TO DATE: 12 GY
RAD ONC ARIA REFERENCE POINT DOSAGE GIVEN TO DATE: 12.24 GY
RAD ONC ARIA REFERENCE POINT DOSAGE GIVEN TO DATE: 12.4 GY
RAD ONC ARIA REFERENCE POINT ID: NORMAL
RAD ONC ARIA REFERENCE POINT SESSION DOSAGE GIVEN: 2.75 GY
RAD ONC ARIA REFERENCE POINT SESSION DOSAGE GIVEN: 3 GY
RAD ONC ARIA REFERENCE POINT SESSION DOSAGE GIVEN: 3.06 GY
RAD ONC ARIA REFERENCE POINT SESSION DOSAGE GIVEN: 3.1 GY

## 2024-08-23 ENCOUNTER — HOSPITAL ENCOUNTER (OUTPATIENT)
Facility: HOSPITAL | Age: 69
Discharge: HOME | End: 2024-08-26
Attending: RADIOLOGY

## 2024-08-23 LAB
RAD ONC ARIA COURSE FIRST TREATMENT DATE: NORMAL
RAD ONC ARIA COURSE ID: NORMAL
RAD ONC ARIA COURSE INTENT: NORMAL
RAD ONC ARIA COURSE LAST TREATMENT DATE: NORMAL
RAD ONC ARIA COURSE SESSION NUMBER: 5
RAD ONC ARIA COURSE START DATE: NORMAL
RAD ONC ARIA COURSE TREATMENT ELAPSED DAYS: 4
RAD ONC ARIA PLAN FRACTIONS TREATED TO DATE: 5
RAD ONC ARIA PLAN ID: NORMAL
RAD ONC ARIA PLAN PRESCRIBED DOSE PER FRACTION: 2.75 GY
RAD ONC ARIA PLAN PRIMARY REFERENCE POINT: NORMAL
RAD ONC ARIA PLAN TOTAL FRACTIONS PRESCRIBED: 20
RAD ONC ARIA PLAN TOTAL PRESCRIBED DOSE: 5500 CGY
RAD ONC ARIA REFERENCE POINT DOSAGE GIVEN TO DATE: 13.75 GY
RAD ONC ARIA REFERENCE POINT DOSAGE GIVEN TO DATE: 15 GY
RAD ONC ARIA REFERENCE POINT DOSAGE GIVEN TO DATE: 15.3 GY
RAD ONC ARIA REFERENCE POINT DOSAGE GIVEN TO DATE: 15.5 GY
RAD ONC ARIA REFERENCE POINT ID: NORMAL
RAD ONC ARIA REFERENCE POINT SESSION DOSAGE GIVEN: 2.75 GY
RAD ONC ARIA REFERENCE POINT SESSION DOSAGE GIVEN: 3 GY
RAD ONC ARIA REFERENCE POINT SESSION DOSAGE GIVEN: 3.06 GY
RAD ONC ARIA REFERENCE POINT SESSION DOSAGE GIVEN: 3.1 GY

## 2024-08-26 ENCOUNTER — HOSPITAL ENCOUNTER (OUTPATIENT)
Facility: HOSPITAL | Age: 69
Discharge: HOME | End: 2024-08-29
Attending: RADIOLOGY

## 2024-08-26 LAB
RAD ONC ARIA COURSE FIRST TREATMENT DATE: NORMAL
RAD ONC ARIA COURSE ID: NORMAL
RAD ONC ARIA COURSE INTENT: NORMAL
RAD ONC ARIA COURSE LAST TREATMENT DATE: NORMAL
RAD ONC ARIA COURSE SESSION NUMBER: 6
RAD ONC ARIA COURSE START DATE: NORMAL
RAD ONC ARIA COURSE TREATMENT ELAPSED DAYS: 7
RAD ONC ARIA PLAN FRACTIONS TREATED TO DATE: 6
RAD ONC ARIA PLAN ID: NORMAL
RAD ONC ARIA PLAN PRESCRIBED DOSE PER FRACTION: 2.75 GY
RAD ONC ARIA PLAN PRIMARY REFERENCE POINT: NORMAL
RAD ONC ARIA PLAN TOTAL FRACTIONS PRESCRIBED: 20
RAD ONC ARIA PLAN TOTAL PRESCRIBED DOSE: 5500 CGY
RAD ONC ARIA REFERENCE POINT DOSAGE GIVEN TO DATE: 16.5 GY
RAD ONC ARIA REFERENCE POINT DOSAGE GIVEN TO DATE: 18 GY
RAD ONC ARIA REFERENCE POINT DOSAGE GIVEN TO DATE: 18.36 GY
RAD ONC ARIA REFERENCE POINT DOSAGE GIVEN TO DATE: 18.6 GY
RAD ONC ARIA REFERENCE POINT ID: NORMAL
RAD ONC ARIA REFERENCE POINT SESSION DOSAGE GIVEN: 2.75 GY
RAD ONC ARIA REFERENCE POINT SESSION DOSAGE GIVEN: 3 GY
RAD ONC ARIA REFERENCE POINT SESSION DOSAGE GIVEN: 3.06 GY
RAD ONC ARIA REFERENCE POINT SESSION DOSAGE GIVEN: 3.1 GY

## 2024-08-27 ENCOUNTER — HOSPITAL ENCOUNTER (OUTPATIENT)
Facility: HOSPITAL | Age: 69
Discharge: HOME | End: 2024-08-30
Attending: RADIOLOGY

## 2024-08-27 LAB
RAD ONC ARIA COURSE FIRST TREATMENT DATE: NORMAL
RAD ONC ARIA COURSE ID: NORMAL
RAD ONC ARIA COURSE INTENT: NORMAL
RAD ONC ARIA COURSE LAST TREATMENT DATE: NORMAL
RAD ONC ARIA COURSE SESSION NUMBER: 7
RAD ONC ARIA COURSE START DATE: NORMAL
RAD ONC ARIA COURSE TREATMENT ELAPSED DAYS: 8
RAD ONC ARIA PLAN FRACTIONS TREATED TO DATE: 7
RAD ONC ARIA PLAN ID: NORMAL
RAD ONC ARIA PLAN PRESCRIBED DOSE PER FRACTION: 2.75 GY
RAD ONC ARIA PLAN PRIMARY REFERENCE POINT: NORMAL
RAD ONC ARIA PLAN TOTAL FRACTIONS PRESCRIBED: 20
RAD ONC ARIA PLAN TOTAL PRESCRIBED DOSE: 5500 CGY
RAD ONC ARIA REFERENCE POINT DOSAGE GIVEN TO DATE: 19.25 GY
RAD ONC ARIA REFERENCE POINT DOSAGE GIVEN TO DATE: 21 GY
RAD ONC ARIA REFERENCE POINT DOSAGE GIVEN TO DATE: 21.41 GY
RAD ONC ARIA REFERENCE POINT DOSAGE GIVEN TO DATE: 21.7 GY
RAD ONC ARIA REFERENCE POINT ID: NORMAL
RAD ONC ARIA REFERENCE POINT SESSION DOSAGE GIVEN: 2.75 GY
RAD ONC ARIA REFERENCE POINT SESSION DOSAGE GIVEN: 3 GY
RAD ONC ARIA REFERENCE POINT SESSION DOSAGE GIVEN: 3.06 GY
RAD ONC ARIA REFERENCE POINT SESSION DOSAGE GIVEN: 3.1 GY

## 2024-08-27 ASSESSMENT — PAIN SCALES - GENERAL: PAINLEVEL_OUTOF10: 0

## 2024-08-27 NOTE — PROGRESS NOTES
[facility] [place] [facility]  [facility]    Radiation Oncology Weekly Progress Note  Encounter Date: 08/27/24    Delio Zavala  [id number]  1955     Diagnosis   Stage IV, [place] 4+5=9 adenocarcinoma of the prostate, initial PSA 38, clinical T2cN0 M1 with high volume bone only metastatic disease, on Firmagon and Erleada; s/p palliative XRT to the L 5th and 6th ribs and R 5-9th lateral ribs to 2000cGy/5fx completed 6/19/2023 and T7 spine to 2000cGy/5fx completed 10/18/2023.     AJCC Staging has been reviewed.    Interval History   Mr. Zavala was seen today for his weekly on-treatment evaluation    8/20/24: at fraction 2, first OTV. Tolerating treatment well, only complaint today is nausea which began this morning, unlikely related to RT. Reviewed treatment expectations and logistics.  8/27/2024: Has not noticed any  or GI changes.  Has some ongoing fatigue.  Getting a tooth pulled on Thursday, we discussed there is no radiation tx near his mouth.    Treatment Details   Treatment Site: Prostate  Treatment Technique: IMRT/VMAT  Treatment Site Dose/Fx (cGy) #Fx Delivered Dose (cGy) Total Planned Dose (cGy) Start Date End Date   Prostate 275 7/20 1925 5500 08/19/24 08/27/24     Concurrent Therapy: Concurrent ADT:  Response to treatment: N/A    Allergies and Medications     Allergies   Allergen Reactions    Oxycodone-Acetaminophen Anxiety     Patient takes Tylenol without problems.  Patient said he is not allergic to oxycodone    Guaifenesin Other (See Comments)     Other reaction(s): gi upset       Current Outpatient Medications   Medication Instructions    albuterol sulfate HFA (PROVENTIL;VENTOLIN;PROAIR) 108 (90 Base) MCG/ACT inhaler 2 puffs, Inhalation, EVERY 6 HOURS PRN    Apalutamide (ERLEADA) 60 MG TABS 4 tablets, Oral, DAILY    aspirin 81 MG EC tablet 1 tablet, Oral, DAILY    benzonatate (TESSALON) 100 MG capsule     cefdinir (OMNICEF) 300 MG capsule

## 2024-08-28 ENCOUNTER — HOSPITAL ENCOUNTER (OUTPATIENT)
Facility: HOSPITAL | Age: 69
Discharge: HOME | End: 2024-08-31
Attending: RADIOLOGY

## 2024-08-28 LAB
RAD ONC ARIA COURSE FIRST TREATMENT DATE: NORMAL
RAD ONC ARIA COURSE ID: NORMAL
RAD ONC ARIA COURSE INTENT: NORMAL
RAD ONC ARIA COURSE LAST TREATMENT DATE: NORMAL
RAD ONC ARIA COURSE SESSION NUMBER: 8
RAD ONC ARIA COURSE START DATE: NORMAL
RAD ONC ARIA COURSE TREATMENT ELAPSED DAYS: 9
RAD ONC ARIA PLAN FRACTIONS TREATED TO DATE: 8
RAD ONC ARIA PLAN ID: NORMAL
RAD ONC ARIA PLAN PRESCRIBED DOSE PER FRACTION: 2.75 GY
RAD ONC ARIA PLAN PRIMARY REFERENCE POINT: NORMAL
RAD ONC ARIA PLAN TOTAL FRACTIONS PRESCRIBED: 20
RAD ONC ARIA PLAN TOTAL PRESCRIBED DOSE: 5500 CGY
RAD ONC ARIA REFERENCE POINT DOSAGE GIVEN TO DATE: 22 GY
RAD ONC ARIA REFERENCE POINT DOSAGE GIVEN TO DATE: 24 GY
RAD ONC ARIA REFERENCE POINT DOSAGE GIVEN TO DATE: 24.47 GY
RAD ONC ARIA REFERENCE POINT DOSAGE GIVEN TO DATE: 24.8 GY
RAD ONC ARIA REFERENCE POINT ID: NORMAL
RAD ONC ARIA REFERENCE POINT SESSION DOSAGE GIVEN: 2.75 GY
RAD ONC ARIA REFERENCE POINT SESSION DOSAGE GIVEN: 3 GY
RAD ONC ARIA REFERENCE POINT SESSION DOSAGE GIVEN: 3.06 GY
RAD ONC ARIA REFERENCE POINT SESSION DOSAGE GIVEN: 3.1 GY

## 2024-08-29 ENCOUNTER — HOSPITAL ENCOUNTER (OUTPATIENT)
Facility: HOSPITAL | Age: 69
Discharge: HOME | End: 2024-09-01
Attending: RADIOLOGY

## 2024-08-29 LAB
RAD ONC ARIA COURSE FIRST TREATMENT DATE: NORMAL
RAD ONC ARIA COURSE ID: NORMAL
RAD ONC ARIA COURSE INTENT: NORMAL
RAD ONC ARIA COURSE LAST TREATMENT DATE: NORMAL
RAD ONC ARIA COURSE SESSION NUMBER: 9
RAD ONC ARIA COURSE START DATE: NORMAL
RAD ONC ARIA COURSE TREATMENT ELAPSED DAYS: 10
RAD ONC ARIA PLAN FRACTIONS TREATED TO DATE: 9
RAD ONC ARIA PLAN ID: NORMAL
RAD ONC ARIA PLAN PRESCRIBED DOSE PER FRACTION: 2.75 GY
RAD ONC ARIA PLAN PRIMARY REFERENCE POINT: NORMAL
RAD ONC ARIA PLAN TOTAL FRACTIONS PRESCRIBED: 20
RAD ONC ARIA PLAN TOTAL PRESCRIBED DOSE: 5500 CGY
RAD ONC ARIA REFERENCE POINT DOSAGE GIVEN TO DATE: 24.75 GY
RAD ONC ARIA REFERENCE POINT DOSAGE GIVEN TO DATE: 27 GY
RAD ONC ARIA REFERENCE POINT DOSAGE GIVEN TO DATE: 27.53 GY
RAD ONC ARIA REFERENCE POINT DOSAGE GIVEN TO DATE: 27.9 GY
RAD ONC ARIA REFERENCE POINT ID: NORMAL
RAD ONC ARIA REFERENCE POINT SESSION DOSAGE GIVEN: 2.75 GY
RAD ONC ARIA REFERENCE POINT SESSION DOSAGE GIVEN: 3 GY
RAD ONC ARIA REFERENCE POINT SESSION DOSAGE GIVEN: 3.06 GY
RAD ONC ARIA REFERENCE POINT SESSION DOSAGE GIVEN: 3.1 GY

## 2024-08-30 ENCOUNTER — HOSPITAL ENCOUNTER (OUTPATIENT)
Facility: HOSPITAL | Age: 69
Discharge: HOME | End: 2024-09-02
Attending: RADIOLOGY

## 2024-08-30 LAB
RAD ONC ARIA COURSE FIRST TREATMENT DATE: NORMAL
RAD ONC ARIA COURSE ID: NORMAL
RAD ONC ARIA COURSE INTENT: NORMAL
RAD ONC ARIA COURSE LAST TREATMENT DATE: NORMAL
RAD ONC ARIA COURSE SESSION NUMBER: 10
RAD ONC ARIA COURSE START DATE: NORMAL
RAD ONC ARIA COURSE TREATMENT ELAPSED DAYS: 11
RAD ONC ARIA PLAN FRACTIONS TREATED TO DATE: 10
RAD ONC ARIA PLAN ID: NORMAL
RAD ONC ARIA PLAN PRESCRIBED DOSE PER FRACTION: 2.75 GY
RAD ONC ARIA PLAN PRIMARY REFERENCE POINT: NORMAL
RAD ONC ARIA PLAN TOTAL FRACTIONS PRESCRIBED: 20
RAD ONC ARIA PLAN TOTAL PRESCRIBED DOSE: 5500 CGY
RAD ONC ARIA REFERENCE POINT DOSAGE GIVEN TO DATE: 27.5 GY
RAD ONC ARIA REFERENCE POINT DOSAGE GIVEN TO DATE: 30 GY
RAD ONC ARIA REFERENCE POINT DOSAGE GIVEN TO DATE: 30.59 GY
RAD ONC ARIA REFERENCE POINT DOSAGE GIVEN TO DATE: 31 GY
RAD ONC ARIA REFERENCE POINT ID: NORMAL
RAD ONC ARIA REFERENCE POINT SESSION DOSAGE GIVEN: 2.75 GY
RAD ONC ARIA REFERENCE POINT SESSION DOSAGE GIVEN: 3 GY
RAD ONC ARIA REFERENCE POINT SESSION DOSAGE GIVEN: 3.06 GY
RAD ONC ARIA REFERENCE POINT SESSION DOSAGE GIVEN: 3.1 GY

## 2024-09-03 ENCOUNTER — HOSPITAL ENCOUNTER (OUTPATIENT)
Facility: HOSPITAL | Age: 69
Discharge: HOME | End: 2024-09-06
Attending: RADIOLOGY

## 2024-09-03 LAB
RAD ONC ARIA COURSE FIRST TREATMENT DATE: NORMAL
RAD ONC ARIA COURSE ID: NORMAL
RAD ONC ARIA COURSE INTENT: NORMAL
RAD ONC ARIA COURSE LAST TREATMENT DATE: NORMAL
RAD ONC ARIA COURSE SESSION NUMBER: 11
RAD ONC ARIA COURSE START DATE: NORMAL
RAD ONC ARIA COURSE TREATMENT ELAPSED DAYS: 15
RAD ONC ARIA PLAN FRACTIONS TREATED TO DATE: 11
RAD ONC ARIA PLAN ID: NORMAL
RAD ONC ARIA PLAN PRESCRIBED DOSE PER FRACTION: 2.75 GY
RAD ONC ARIA PLAN PRIMARY REFERENCE POINT: NORMAL
RAD ONC ARIA PLAN TOTAL FRACTIONS PRESCRIBED: 20
RAD ONC ARIA PLAN TOTAL PRESCRIBED DOSE: 5500 CGY
RAD ONC ARIA REFERENCE POINT DOSAGE GIVEN TO DATE: 30.25 GY
RAD ONC ARIA REFERENCE POINT DOSAGE GIVEN TO DATE: 33 GY
RAD ONC ARIA REFERENCE POINT DOSAGE GIVEN TO DATE: 33.65 GY
RAD ONC ARIA REFERENCE POINT DOSAGE GIVEN TO DATE: 34.1 GY
RAD ONC ARIA REFERENCE POINT ID: NORMAL
RAD ONC ARIA REFERENCE POINT SESSION DOSAGE GIVEN: 2.75 GY
RAD ONC ARIA REFERENCE POINT SESSION DOSAGE GIVEN: 3 GY
RAD ONC ARIA REFERENCE POINT SESSION DOSAGE GIVEN: 3.06 GY
RAD ONC ARIA REFERENCE POINT SESSION DOSAGE GIVEN: 3.1 GY

## 2024-09-03 ASSESSMENT — PAIN SCALES - GENERAL: PAINLEVEL_OUTOF10: 0

## 2024-09-03 NOTE — PROGRESS NOTES
[facility]  [facility]    Radiation Oncology Weekly Progress Note  Encounter Date: 09/03/24    Delio Zavala  [id number]  1955     Diagnosis   Stage IV, [place] 4+5=9 adenocarcinoma of the prostate, initial PSA 38, clinical T2cN0 M1 with high volume bone only metastatic disease, on Firmagon and Erleada; s/p palliative XRT to the L 5th and 6th ribs and R 5-9th lateral ribs to 2000cGy/5fx completed 6/19/2023 and T7 spine to 2000cGy/5fx completed 10/18/2023.     AJCC Staging has been reviewed.    Interval History   Mr. Zavala was seen today for his weekly on-treatment evaluation    8/20/24: at fraction 2, first OTV. Tolerating treatment well, only complaint today is nausea which began this morning, unlikely related to RT. Reviewed treatment expectations and logistics.  8/27/2024: Has not noticed any  or GI changes.  Has some ongoing fatigue.  Getting a tooth pulled on Thursday, we discussed there is no radiation tx near his mouth.  9/3/2024: Fraction 11 today. Overall doing ok. Fatigue due to RT and ADT. Denies significant  or GI complaints today. Provided anticipatory guidance about expected side effects of RT.     Treatment Details   Treatment Site: Prostate  Treatment Technique: IMRT/VMAT  Treatment Site Dose/Fx (cGy) #Fx Delivered Dose (cGy) Total Planned Dose (cGy) Start Date End Date   Prostate 275 11/20 3025 5500 08/19/24 09/03/24     Concurrent Therapy: Concurrent ADT:  Response to treatment: N/A    Allergies and Medications     Allergies   Allergen Reactions    Oxycodone-Acetaminophen Anxiety     Patient takes Tylenol without problems.  Patient said he is not allergic to oxycodone    Guaifenesin Other (See Comments)     Other reaction(s): gi upset       Current Outpatient Medications   Medication Instructions    albuterol sulfate HFA (PROVENTIL;VENTOLIN;PROAIR) 108 (90 Base) MCG/ACT inhaler 2 puffs, Inhalation, EVERY 6 HOURS PRN

## 2024-09-04 ENCOUNTER — HOSPITAL ENCOUNTER (OUTPATIENT)
Facility: HOSPITAL | Age: 69
Discharge: HOME | End: 2024-09-07
Attending: RADIOLOGY

## 2024-09-04 LAB
RAD ONC ARIA COURSE FIRST TREATMENT DATE: NORMAL
RAD ONC ARIA COURSE ID: NORMAL
RAD ONC ARIA COURSE INTENT: NORMAL
RAD ONC ARIA COURSE LAST TREATMENT DATE: NORMAL
RAD ONC ARIA COURSE SESSION NUMBER: 12
RAD ONC ARIA COURSE START DATE: NORMAL
RAD ONC ARIA COURSE TREATMENT ELAPSED DAYS: 16
RAD ONC ARIA PLAN FRACTIONS TREATED TO DATE: 12
RAD ONC ARIA PLAN ID: NORMAL
RAD ONC ARIA PLAN PRESCRIBED DOSE PER FRACTION: 2.75 GY
RAD ONC ARIA PLAN PRIMARY REFERENCE POINT: NORMAL
RAD ONC ARIA PLAN TOTAL FRACTIONS PRESCRIBED: 20
RAD ONC ARIA PLAN TOTAL PRESCRIBED DOSE: 5500 CGY
RAD ONC ARIA REFERENCE POINT DOSAGE GIVEN TO DATE: 33 GY
RAD ONC ARIA REFERENCE POINT DOSAGE GIVEN TO DATE: 36 GY
RAD ONC ARIA REFERENCE POINT DOSAGE GIVEN TO DATE: 36.71 GY
RAD ONC ARIA REFERENCE POINT DOSAGE GIVEN TO DATE: 37.2 GY
RAD ONC ARIA REFERENCE POINT ID: NORMAL
RAD ONC ARIA REFERENCE POINT SESSION DOSAGE GIVEN: 2.75 GY
RAD ONC ARIA REFERENCE POINT SESSION DOSAGE GIVEN: 3 GY
RAD ONC ARIA REFERENCE POINT SESSION DOSAGE GIVEN: 3.06 GY
RAD ONC ARIA REFERENCE POINT SESSION DOSAGE GIVEN: 3.1 GY

## 2024-09-05 ENCOUNTER — HOSPITAL ENCOUNTER (OUTPATIENT)
Facility: HOSPITAL | Age: 69
Discharge: HOME | End: 2024-09-08
Attending: RADIOLOGY

## 2024-09-05 LAB
RAD ONC ARIA COURSE FIRST TREATMENT DATE: NORMAL
RAD ONC ARIA COURSE ID: NORMAL
RAD ONC ARIA COURSE INTENT: NORMAL
RAD ONC ARIA COURSE LAST TREATMENT DATE: NORMAL
RAD ONC ARIA COURSE SESSION NUMBER: 13
RAD ONC ARIA COURSE START DATE: NORMAL
RAD ONC ARIA COURSE TREATMENT ELAPSED DAYS: 17
RAD ONC ARIA PLAN FRACTIONS TREATED TO DATE: 13
RAD ONC ARIA PLAN ID: NORMAL
RAD ONC ARIA PLAN PRESCRIBED DOSE PER FRACTION: 2.75 GY
RAD ONC ARIA PLAN PRIMARY REFERENCE POINT: NORMAL
RAD ONC ARIA PLAN TOTAL FRACTIONS PRESCRIBED: 20
RAD ONC ARIA PLAN TOTAL PRESCRIBED DOSE: 5500 CGY
RAD ONC ARIA REFERENCE POINT DOSAGE GIVEN TO DATE: 35.75 GY
RAD ONC ARIA REFERENCE POINT DOSAGE GIVEN TO DATE: 39 GY
RAD ONC ARIA REFERENCE POINT DOSAGE GIVEN TO DATE: 39.77 GY
RAD ONC ARIA REFERENCE POINT DOSAGE GIVEN TO DATE: 40.3 GY
RAD ONC ARIA REFERENCE POINT ID: NORMAL
RAD ONC ARIA REFERENCE POINT SESSION DOSAGE GIVEN: 2.75 GY
RAD ONC ARIA REFERENCE POINT SESSION DOSAGE GIVEN: 3 GY
RAD ONC ARIA REFERENCE POINT SESSION DOSAGE GIVEN: 3.06 GY
RAD ONC ARIA REFERENCE POINT SESSION DOSAGE GIVEN: 3.1 GY

## 2024-09-09 ENCOUNTER — HOSPITAL ENCOUNTER (OUTPATIENT)
Facility: HOSPITAL | Age: 69
Discharge: HOME | End: 2024-09-12
Attending: RADIOLOGY

## 2024-09-09 LAB
RAD ONC ARIA COURSE FIRST TREATMENT DATE: NORMAL
RAD ONC ARIA COURSE ID: NORMAL
RAD ONC ARIA COURSE INTENT: NORMAL
RAD ONC ARIA COURSE LAST TREATMENT DATE: NORMAL
RAD ONC ARIA COURSE SESSION NUMBER: 14
RAD ONC ARIA COURSE START DATE: NORMAL
RAD ONC ARIA COURSE TREATMENT ELAPSED DAYS: 21
RAD ONC ARIA PLAN FRACTIONS TREATED TO DATE: 14
RAD ONC ARIA PLAN ID: NORMAL
RAD ONC ARIA PLAN PRESCRIBED DOSE PER FRACTION: 2.75 GY
RAD ONC ARIA PLAN PRIMARY REFERENCE POINT: NORMAL
RAD ONC ARIA PLAN TOTAL FRACTIONS PRESCRIBED: 20
RAD ONC ARIA PLAN TOTAL PRESCRIBED DOSE: 5500 CGY
RAD ONC ARIA REFERENCE POINT DOSAGE GIVEN TO DATE: 38.5 GY
RAD ONC ARIA REFERENCE POINT DOSAGE GIVEN TO DATE: 42 GY
RAD ONC ARIA REFERENCE POINT DOSAGE GIVEN TO DATE: 42.83 GY
RAD ONC ARIA REFERENCE POINT DOSAGE GIVEN TO DATE: 43.4 GY
RAD ONC ARIA REFERENCE POINT ID: NORMAL
RAD ONC ARIA REFERENCE POINT SESSION DOSAGE GIVEN: 2.75 GY
RAD ONC ARIA REFERENCE POINT SESSION DOSAGE GIVEN: 3 GY
RAD ONC ARIA REFERENCE POINT SESSION DOSAGE GIVEN: 3.06 GY
RAD ONC ARIA REFERENCE POINT SESSION DOSAGE GIVEN: 3.1 GY

## 2024-09-10 ENCOUNTER — CLINICAL DOCUMENTATION (OUTPATIENT)
Facility: HOSPITAL | Age: 69
End: 2024-09-10

## 2024-09-10 ENCOUNTER — HOSPITAL ENCOUNTER (OUTPATIENT)
Facility: HOSPITAL | Age: 69
Discharge: HOME | End: 2024-09-13
Attending: RADIOLOGY

## 2024-09-10 LAB
RAD ONC ARIA COURSE FIRST TREATMENT DATE: NORMAL
RAD ONC ARIA COURSE ID: NORMAL
RAD ONC ARIA COURSE INTENT: NORMAL
RAD ONC ARIA COURSE LAST TREATMENT DATE: NORMAL
RAD ONC ARIA COURSE SESSION NUMBER: 15
RAD ONC ARIA COURSE START DATE: NORMAL
RAD ONC ARIA COURSE TREATMENT ELAPSED DAYS: 22
RAD ONC ARIA PLAN FRACTIONS TREATED TO DATE: 15
RAD ONC ARIA PLAN ID: NORMAL
RAD ONC ARIA PLAN PRESCRIBED DOSE PER FRACTION: 2.75 GY
RAD ONC ARIA PLAN PRIMARY REFERENCE POINT: NORMAL
RAD ONC ARIA PLAN TOTAL FRACTIONS PRESCRIBED: 20
RAD ONC ARIA PLAN TOTAL PRESCRIBED DOSE: 5500 CGY
RAD ONC ARIA REFERENCE POINT DOSAGE GIVEN TO DATE: 41.25 GY
RAD ONC ARIA REFERENCE POINT DOSAGE GIVEN TO DATE: 45 GY
RAD ONC ARIA REFERENCE POINT DOSAGE GIVEN TO DATE: 45.89 GY
RAD ONC ARIA REFERENCE POINT DOSAGE GIVEN TO DATE: 46.5 GY
RAD ONC ARIA REFERENCE POINT ID: NORMAL
RAD ONC ARIA REFERENCE POINT SESSION DOSAGE GIVEN: 2.75 GY
RAD ONC ARIA REFERENCE POINT SESSION DOSAGE GIVEN: 3 GY
RAD ONC ARIA REFERENCE POINT SESSION DOSAGE GIVEN: 3.06 GY
RAD ONC ARIA REFERENCE POINT SESSION DOSAGE GIVEN: 3.1 GY

## 2024-09-10 RX ORDER — TADALAFIL 10 MG/1
10 TABLET ORAL DAILY PRN
Qty: 30 TABLET | Refills: 5 | Status: SHIPPED | OUTPATIENT
Start: 2024-09-10

## 2024-09-11 ENCOUNTER — HOSPITAL ENCOUNTER (OUTPATIENT)
Facility: HOSPITAL | Age: 69
Discharge: HOME | End: 2024-09-14
Attending: RADIOLOGY

## 2024-09-11 LAB
RAD ONC ARIA COURSE FIRST TREATMENT DATE: NORMAL
RAD ONC ARIA COURSE ID: NORMAL
RAD ONC ARIA COURSE INTENT: NORMAL
RAD ONC ARIA COURSE LAST TREATMENT DATE: NORMAL
RAD ONC ARIA COURSE SESSION NUMBER: 16
RAD ONC ARIA COURSE START DATE: NORMAL
RAD ONC ARIA COURSE TREATMENT ELAPSED DAYS: 23
RAD ONC ARIA PLAN FRACTIONS TREATED TO DATE: 16
RAD ONC ARIA PLAN ID: NORMAL
RAD ONC ARIA PLAN PRESCRIBED DOSE PER FRACTION: 2.75 GY
RAD ONC ARIA PLAN PRIMARY REFERENCE POINT: NORMAL
RAD ONC ARIA PLAN TOTAL FRACTIONS PRESCRIBED: 20
RAD ONC ARIA PLAN TOTAL PRESCRIBED DOSE: 5500 CGY
RAD ONC ARIA REFERENCE POINT DOSAGE GIVEN TO DATE: 44 GY
RAD ONC ARIA REFERENCE POINT DOSAGE GIVEN TO DATE: 48 GY
RAD ONC ARIA REFERENCE POINT DOSAGE GIVEN TO DATE: 48.95 GY
RAD ONC ARIA REFERENCE POINT DOSAGE GIVEN TO DATE: 49.6 GY
RAD ONC ARIA REFERENCE POINT ID: NORMAL
RAD ONC ARIA REFERENCE POINT SESSION DOSAGE GIVEN: 2.75 GY
RAD ONC ARIA REFERENCE POINT SESSION DOSAGE GIVEN: 3 GY
RAD ONC ARIA REFERENCE POINT SESSION DOSAGE GIVEN: 3.06 GY
RAD ONC ARIA REFERENCE POINT SESSION DOSAGE GIVEN: 3.1 GY

## 2024-09-12 ENCOUNTER — HOSPITAL ENCOUNTER (OUTPATIENT)
Facility: HOSPITAL | Age: 69
Discharge: HOME | End: 2024-09-15
Attending: RADIOLOGY

## 2024-09-12 LAB
RAD ONC ARIA COURSE FIRST TREATMENT DATE: NORMAL
RAD ONC ARIA COURSE ID: NORMAL
RAD ONC ARIA COURSE INTENT: NORMAL
RAD ONC ARIA COURSE LAST TREATMENT DATE: NORMAL
RAD ONC ARIA COURSE SESSION NUMBER: 17
RAD ONC ARIA COURSE START DATE: NORMAL
RAD ONC ARIA COURSE TREATMENT ELAPSED DAYS: 24
RAD ONC ARIA PLAN FRACTIONS TREATED TO DATE: 17
RAD ONC ARIA PLAN ID: NORMAL
RAD ONC ARIA PLAN PRESCRIBED DOSE PER FRACTION: 2.75 GY
RAD ONC ARIA PLAN PRIMARY REFERENCE POINT: NORMAL
RAD ONC ARIA PLAN TOTAL FRACTIONS PRESCRIBED: 20
RAD ONC ARIA PLAN TOTAL PRESCRIBED DOSE: 5500 CGY
RAD ONC ARIA REFERENCE POINT DOSAGE GIVEN TO DATE: 46.75 GY
RAD ONC ARIA REFERENCE POINT DOSAGE GIVEN TO DATE: 51 GY
RAD ONC ARIA REFERENCE POINT DOSAGE GIVEN TO DATE: 52.01 GY
RAD ONC ARIA REFERENCE POINT DOSAGE GIVEN TO DATE: 52.7 GY
RAD ONC ARIA REFERENCE POINT ID: NORMAL
RAD ONC ARIA REFERENCE POINT SESSION DOSAGE GIVEN: 2.75 GY
RAD ONC ARIA REFERENCE POINT SESSION DOSAGE GIVEN: 3 GY
RAD ONC ARIA REFERENCE POINT SESSION DOSAGE GIVEN: 3.06 GY
RAD ONC ARIA REFERENCE POINT SESSION DOSAGE GIVEN: 3.1 GY

## 2024-09-13 ENCOUNTER — HOSPITAL ENCOUNTER (OUTPATIENT)
Facility: HOSPITAL | Age: 69
Discharge: HOME | End: 2024-09-16
Attending: RADIOLOGY

## 2024-09-13 LAB
RAD ONC ARIA COURSE FIRST TREATMENT DATE: NORMAL
RAD ONC ARIA COURSE ID: NORMAL
RAD ONC ARIA COURSE INTENT: NORMAL
RAD ONC ARIA COURSE LAST TREATMENT DATE: NORMAL
RAD ONC ARIA COURSE SESSION NUMBER: 18
RAD ONC ARIA COURSE START DATE: NORMAL
RAD ONC ARIA COURSE TREATMENT ELAPSED DAYS: 25
RAD ONC ARIA PLAN FRACTIONS TREATED TO DATE: 18
RAD ONC ARIA PLAN ID: NORMAL
RAD ONC ARIA PLAN PRESCRIBED DOSE PER FRACTION: 2.75 GY
RAD ONC ARIA PLAN PRIMARY REFERENCE POINT: NORMAL
RAD ONC ARIA PLAN TOTAL FRACTIONS PRESCRIBED: 20
RAD ONC ARIA PLAN TOTAL PRESCRIBED DOSE: 5500 CGY
RAD ONC ARIA REFERENCE POINT DOSAGE GIVEN TO DATE: 49.5 GY
RAD ONC ARIA REFERENCE POINT DOSAGE GIVEN TO DATE: 54 GY
RAD ONC ARIA REFERENCE POINT DOSAGE GIVEN TO DATE: 55.07 GY
RAD ONC ARIA REFERENCE POINT DOSAGE GIVEN TO DATE: 55.8 GY
RAD ONC ARIA REFERENCE POINT ID: NORMAL
RAD ONC ARIA REFERENCE POINT SESSION DOSAGE GIVEN: 2.75 GY
RAD ONC ARIA REFERENCE POINT SESSION DOSAGE GIVEN: 3 GY
RAD ONC ARIA REFERENCE POINT SESSION DOSAGE GIVEN: 3.06 GY
RAD ONC ARIA REFERENCE POINT SESSION DOSAGE GIVEN: 3.1 GY

## 2024-09-16 ENCOUNTER — HOSPITAL ENCOUNTER (OUTPATIENT)
Facility: HOSPITAL | Age: 69
Discharge: HOME | End: 2024-09-19
Attending: RADIOLOGY

## 2024-09-16 LAB
RAD ONC ARIA COURSE FIRST TREATMENT DATE: NORMAL
RAD ONC ARIA COURSE ID: NORMAL
RAD ONC ARIA COURSE INTENT: NORMAL
RAD ONC ARIA COURSE LAST TREATMENT DATE: NORMAL
RAD ONC ARIA COURSE SESSION NUMBER: 19
RAD ONC ARIA COURSE START DATE: NORMAL
RAD ONC ARIA COURSE TREATMENT ELAPSED DAYS: 28
RAD ONC ARIA PLAN FRACTIONS TREATED TO DATE: 19
RAD ONC ARIA PLAN ID: NORMAL
RAD ONC ARIA PLAN PRESCRIBED DOSE PER FRACTION: 2.75 GY
RAD ONC ARIA PLAN PRIMARY REFERENCE POINT: NORMAL
RAD ONC ARIA PLAN TOTAL FRACTIONS PRESCRIBED: 20
RAD ONC ARIA PLAN TOTAL PRESCRIBED DOSE: 5500 CGY
RAD ONC ARIA REFERENCE POINT DOSAGE GIVEN TO DATE: 52.25 GY
RAD ONC ARIA REFERENCE POINT DOSAGE GIVEN TO DATE: 57 GY
RAD ONC ARIA REFERENCE POINT DOSAGE GIVEN TO DATE: 58.13 GY
RAD ONC ARIA REFERENCE POINT DOSAGE GIVEN TO DATE: 58.9 GY
RAD ONC ARIA REFERENCE POINT ID: NORMAL
RAD ONC ARIA REFERENCE POINT SESSION DOSAGE GIVEN: 2.75 GY
RAD ONC ARIA REFERENCE POINT SESSION DOSAGE GIVEN: 3 GY
RAD ONC ARIA REFERENCE POINT SESSION DOSAGE GIVEN: 3.06 GY
RAD ONC ARIA REFERENCE POINT SESSION DOSAGE GIVEN: 3.1 GY

## 2024-09-17 ENCOUNTER — HOSPITAL ENCOUNTER (OUTPATIENT)
Facility: HOSPITAL | Age: 69
Discharge: HOME | End: 2024-09-20
Attending: RADIOLOGY

## 2024-09-17 DIAGNOSIS — C61 PROSTATE CANCER (HCC): Primary | ICD-10-CM

## 2024-09-17 DIAGNOSIS — C79.51 SECONDARY MALIGNANT NEOPLASM OF BONE (HCC): ICD-10-CM

## 2024-09-17 LAB
RAD ONC ARIA COURSE FIRST TREATMENT DATE: NORMAL
RAD ONC ARIA COURSE ID: NORMAL
RAD ONC ARIA COURSE INTENT: NORMAL
RAD ONC ARIA COURSE LAST TREATMENT DATE: NORMAL
RAD ONC ARIA COURSE SESSION NUMBER: 20
RAD ONC ARIA COURSE START DATE: NORMAL
RAD ONC ARIA COURSE TREATMENT ELAPSED DAYS: 29
RAD ONC ARIA PLAN FRACTIONS TREATED TO DATE: 20
RAD ONC ARIA PLAN ID: NORMAL
RAD ONC ARIA PLAN PRESCRIBED DOSE PER FRACTION: 2.75 GY
RAD ONC ARIA PLAN PRIMARY REFERENCE POINT: NORMAL
RAD ONC ARIA PLAN TOTAL FRACTIONS PRESCRIBED: 20
RAD ONC ARIA PLAN TOTAL PRESCRIBED DOSE: 5500 CGY
RAD ONC ARIA REFERENCE POINT DOSAGE GIVEN TO DATE: 55 GY
RAD ONC ARIA REFERENCE POINT DOSAGE GIVEN TO DATE: 60 GY
RAD ONC ARIA REFERENCE POINT DOSAGE GIVEN TO DATE: 61.19 GY
RAD ONC ARIA REFERENCE POINT DOSAGE GIVEN TO DATE: 62 GY
RAD ONC ARIA REFERENCE POINT ID: NORMAL
RAD ONC ARIA REFERENCE POINT SESSION DOSAGE GIVEN: 2.75 GY
RAD ONC ARIA REFERENCE POINT SESSION DOSAGE GIVEN: 3 GY
RAD ONC ARIA REFERENCE POINT SESSION DOSAGE GIVEN: 3.06 GY
RAD ONC ARIA REFERENCE POINT SESSION DOSAGE GIVEN: 3.1 GY

## 2024-09-17 ASSESSMENT — PAIN SCALES - GENERAL: PAINLEVEL_OUTOF10: 0

## 2024-09-23 DIAGNOSIS — C61 PROSTATE CANCER (HCC): Primary | ICD-10-CM

## 2024-09-23 RX ORDER — METHYLPREDNISOLONE 4 MG
TABLET, DOSE PACK ORAL
Qty: 1 KIT | Refills: 0 | Status: SHIPPED | OUTPATIENT
Start: 2024-09-23 | End: 2024-09-29

## 2024-09-29 ENCOUNTER — HOSPITAL ENCOUNTER (EMERGENCY)
Facility: HOSPITAL | Age: 69
Discharge: HOME | End: 2024-09-29
Attending: EMERGENCY MEDICINE
Payer: MEDICARE

## 2024-09-29 ENCOUNTER — APPOINTMENT (OUTPATIENT)
Facility: HOSPITAL | Age: 69
End: 2024-09-29
Payer: MEDICARE

## 2024-09-29 VITALS
SYSTOLIC BLOOD PRESSURE: 144 MMHG | TEMPERATURE: 97.5 F | DIASTOLIC BLOOD PRESSURE: 93 MMHG | WEIGHT: 280.87 LBS | OXYGEN SATURATION: 94 % | HEIGHT: 72 IN | RESPIRATION RATE: 17 BRPM | HEART RATE: 67 BPM | BODY MASS INDEX: 38.04 KG/M2

## 2024-09-29 DIAGNOSIS — R06.02 SHORTNESS OF BREATH: ICD-10-CM

## 2024-09-29 DIAGNOSIS — I49.9 IRREGULAR HEART RATE: Primary | ICD-10-CM

## 2024-09-29 LAB
ALBUMIN SERPL-MCNC: 3.5 G/DL (ref 3.5–5)
ALBUMIN/GLOB SERPL: 1 (ref 1.1–2.2)
ALP SERPL-CCNC: 74 U/L (ref 45–117)
ALT SERPL-CCNC: 24 U/L (ref 12–78)
ANION GAP SERPL CALC-SCNC: 7 MMOL/L (ref 2–12)
AST SERPL-CCNC: 26 U/L (ref 15–37)
BASOPHILS # BLD: 0 K/UL (ref 0–0.1)
BASOPHILS NFR BLD: 1 % (ref 0–1)
BILIRUB SERPL-MCNC: 0.6 MG/DL (ref 0.2–1)
BUN SERPL-MCNC: 14 MG/DL (ref 6–20)
BUN/CREAT SERPL: 16 (ref 12–20)
CALCIUM SERPL-MCNC: 8.9 MG/DL (ref 8.5–10.1)
CHLORIDE SERPL-SCNC: 104 MMOL/L (ref 97–108)
CO2 SERPL-SCNC: 25 MMOL/L (ref 21–32)
COMMENT:: NORMAL
CREAT SERPL-MCNC: 0.89 MG/DL (ref 0.7–1.3)
DIFFERENTIAL METHOD BLD: ABNORMAL
EKG ATRIAL RATE: 81 BPM
EKG DIAGNOSIS: NORMAL
EKG P AXIS: 57 DEGREES
EKG P-R INTERVAL: 160 MS
EKG Q-T INTERVAL: 394 MS
EKG QRS DURATION: 96 MS
EKG QTC CALCULATION (BAZETT): 457 MS
EKG R AXIS: 43 DEGREES
EKG T AXIS: 82 DEGREES
EKG VENTRICULAR RATE: 81 BPM
EOSINOPHIL # BLD: 0.1 K/UL (ref 0–0.4)
EOSINOPHIL NFR BLD: 2 % (ref 0–7)
ERYTHROCYTE [DISTWIDTH] IN BLOOD BY AUTOMATED COUNT: 13.5 % (ref 11.5–14.5)
GLOBULIN SER CALC-MCNC: 3.5 G/DL (ref 2–4)
GLUCOSE SERPL-MCNC: 312 MG/DL (ref 65–100)
HCT VFR BLD AUTO: 37.9 % (ref 36.6–50.3)
HGB BLD-MCNC: 13 G/DL (ref 12.1–17)
IMM GRANULOCYTES # BLD AUTO: 0 K/UL (ref 0–0.04)
IMM GRANULOCYTES NFR BLD AUTO: 1 % (ref 0–0.5)
LYMPHOCYTES # BLD: 1.1 K/UL (ref 0.8–3.5)
LYMPHOCYTES NFR BLD: 29 % (ref 12–49)
MAGNESIUM SERPL-MCNC: 1.8 MG/DL (ref 1.6–2.4)
MCH RBC QN AUTO: 31.4 PG (ref 26–34)
MCHC RBC AUTO-ENTMCNC: 34.3 G/DL (ref 30–36.5)
MCV RBC AUTO: 91.5 FL (ref 80–99)
MONOCYTES # BLD: 0.3 K/UL (ref 0–1)
MONOCYTES NFR BLD: 8 % (ref 5–13)
NEUTS SEG # BLD: 2.2 K/UL (ref 1.8–8)
NEUTS SEG NFR BLD: 59 % (ref 32–75)
NRBC # BLD: 0 K/UL (ref 0–0.01)
NRBC BLD-RTO: 0 PER 100 WBC
NT PRO BNP: 116 PG/ML
PLATELET # BLD AUTO: 149 K/UL (ref 150–400)
PMV BLD AUTO: 9.5 FL (ref 8.9–12.9)
POTASSIUM SERPL-SCNC: 4.2 MMOL/L (ref 3.5–5.1)
PROT SERPL-MCNC: 7 G/DL (ref 6.4–8.2)
RBC # BLD AUTO: 4.14 M/UL (ref 4.1–5.7)
SODIUM SERPL-SCNC: 136 MMOL/L (ref 136–145)
SPECIMEN HOLD: NORMAL
TROPONIN I SERPL HS-MCNC: 6 NG/L (ref 0–76)
TROPONIN I SERPL HS-MCNC: 6 NG/L (ref 0–76)
WBC # BLD AUTO: 3.7 K/UL (ref 4.1–11.1)

## 2024-09-29 PROCEDURE — 2580000003 HC RX 258: Performed by: EMERGENCY MEDICINE

## 2024-09-29 PROCEDURE — 93005 ELECTROCARDIOGRAM TRACING: CPT | Performed by: EMERGENCY MEDICINE

## 2024-09-29 PROCEDURE — 80053 COMPREHEN METABOLIC PANEL: CPT

## 2024-09-29 PROCEDURE — 83735 ASSAY OF MAGNESIUM: CPT

## 2024-09-29 PROCEDURE — 85025 COMPLETE CBC W/AUTO DIFF WBC: CPT

## 2024-09-29 PROCEDURE — 83880 ASSAY OF NATRIURETIC PEPTIDE: CPT

## 2024-09-29 PROCEDURE — 6360000004 HC RX CONTRAST MEDICATION: Performed by: EMERGENCY MEDICINE

## 2024-09-29 PROCEDURE — 71275 CT ANGIOGRAPHY CHEST: CPT

## 2024-09-29 PROCEDURE — 84484 ASSAY OF TROPONIN QUANT: CPT

## 2024-09-29 PROCEDURE — 36415 COLL VENOUS BLD VENIPUNCTURE: CPT

## 2024-09-29 PROCEDURE — 6370000000 HC RX 637 (ALT 250 FOR IP): Performed by: EMERGENCY MEDICINE

## 2024-09-29 PROCEDURE — 99285 EMERGENCY DEPT VISIT HI MDM: CPT

## 2024-09-29 RX ORDER — MAGNESIUM OXIDE 400 MG/1
400 TABLET ORAL DAILY
Qty: 30 TABLET | Refills: 0 | Status: SHIPPED | OUTPATIENT
Start: 2024-09-29

## 2024-09-29 RX ORDER — LANOLIN ALCOHOL/MO/W.PET/CERES
400 CREAM (GRAM) TOPICAL
Status: COMPLETED | OUTPATIENT
Start: 2024-09-29 | End: 2024-09-29

## 2024-09-29 RX ORDER — IOPAMIDOL 755 MG/ML
100 INJECTION, SOLUTION INTRAVASCULAR
Status: COMPLETED | OUTPATIENT
Start: 2024-09-29 | End: 2024-09-29

## 2024-09-29 RX ORDER — 0.9 % SODIUM CHLORIDE 0.9 %
500 INTRAVENOUS SOLUTION INTRAVENOUS ONCE
Status: COMPLETED | OUTPATIENT
Start: 2024-09-29 | End: 2024-09-29

## 2024-09-29 RX ADMIN — SODIUM CHLORIDE 500 ML: 9 INJECTION, SOLUTION INTRAVENOUS at 07:44

## 2024-09-29 RX ADMIN — Medication 400 MG: at 09:13

## 2024-09-29 RX ADMIN — IOPAMIDOL 100 ML: 755 INJECTION, SOLUTION INTRAVENOUS at 08:59

## 2024-09-29 ASSESSMENT — PAIN - FUNCTIONAL ASSESSMENT: PAIN_FUNCTIONAL_ASSESSMENT: NONE - DENIES PAIN

## 2024-09-29 NOTE — ED PROVIDER NOTES
tablet  Commonly known as: LASIX     glipiZIDE 2.5 MG extended release tablet  Commonly known as: GLUCOTROL XL     metFORMIN (MOD) 1000 MG extended release tablet  Commonly known as: GLUMETZA     methylPREDNISolone 4 MG tablet  Commonly known as: MEDROL DOSEPACK  Take by mouth.     montelukast 10 MG tablet  Commonly known as: SINGULAIR     Omega-3 1000 MG Caps     ondansetron 4 MG tablet  Commonly known as: ZOFRAN     Rybelsus 7 MG Tabs  Generic drug: Semaglutide     tadalafil 10 MG tablet  Commonly known as: CIALIS  Take 1 tablet by mouth daily as needed for Erectile Dysfunction     tamsulosin 0.4 MG capsule  Commonly known as: FLOMAX     Trelegy Ellipta 100-62.5-25 MCG/ACT Aepb inhaler  Generic drug: fluticasone-umeclidin-vilant     valACYclovir 500 MG tablet  Commonly known as: VALTREX     venlafaxine 37.5 MG extended release capsule  Commonly known as: EFFEXOR XR     Xgeva 120 MG/1.7ML Soln SC injection  Generic drug: denosumab               Where to Get Your Medications        These medications were sent to [facility] [place] [address] -  [phone] - [phone]  [address] [place] [address]      Phone: [phone]   magnesium oxide 400 MG tablet           DISCONTINUED MEDICATIONS:  Discharge Medication List as of 9/29/2024 10:21 AM          I am the Primary Clinician of Record.   Kai Nicole MD (electronically signed)    (Please note that parts of this dictation were completed with voice recognition software. Quite often unanticipated grammatical, syntax, homophones, and other interpretive errors are inadvertently transcribed by the computer software. Please disregards these errors. Please excuse any errors that have escaped final proofreading.)         Kai Nicole MD  09/29/24 2195

## 2024-09-29 NOTE — DISCHARGE INSTRUCTIONS
You were seen in the emergency department for your symptoms.  Please try the magnesium and see if this also palpitations.  Please follow-up with your urologist and pulmonary doctor regarding the enlarging lymph nodes and lung.  Please return for new or worsening symptoms anytime.

## 2024-09-29 NOTE — ED NOTES
PT. Ambulatory to bathroom with no difficulty.  PT. Placed back in position of comfort on monitor x3 with call bell in reach.  Family at bedside.

## 2024-10-28 ENCOUNTER — APPOINTMENT (OUTPATIENT)
Facility: HOSPITAL | Age: 69
DRG: 603 | End: 2024-10-28
Payer: MEDICARE

## 2024-10-28 ENCOUNTER — HOSPITAL ENCOUNTER (EMERGENCY)
Facility: HOSPITAL | Age: 69
Discharge: HOME | DRG: 603 | End: 2024-10-28
Payer: MEDICARE

## 2024-10-28 VITALS
RESPIRATION RATE: 18 BRPM | SYSTOLIC BLOOD PRESSURE: 136 MMHG | HEIGHT: 72 IN | BODY MASS INDEX: 36.91 KG/M2 | TEMPERATURE: 97.9 F | OXYGEN SATURATION: 94 % | DIASTOLIC BLOOD PRESSURE: 83 MMHG | HEART RATE: 85 BPM | WEIGHT: 272.49 LBS

## 2024-10-28 DIAGNOSIS — L03.221 CELLULITIS OF NECK: ICD-10-CM

## 2024-10-28 DIAGNOSIS — L02.91 PHLEGMONOUS CELLULITIS: Primary | ICD-10-CM

## 2024-10-28 LAB
ALBUMIN SERPL-MCNC: 3.8 G/DL (ref 3.5–5)
ALBUMIN/GLOB SERPL: 0.9 (ref 1.1–2.2)
ALP SERPL-CCNC: 99 U/L (ref 45–117)
ALT SERPL-CCNC: 28 U/L (ref 12–78)
ANION GAP SERPL CALC-SCNC: 4 MMOL/L (ref 2–12)
AST SERPL-CCNC: 29 U/L (ref 15–37)
BASOPHILS # BLD: 0 K/UL (ref 0–0.1)
BASOPHILS NFR BLD: 0 % (ref 0–1)
BILIRUB SERPL-MCNC: 0.9 MG/DL (ref 0.2–1)
BUN SERPL-MCNC: 15 MG/DL (ref 6–20)
BUN/CREAT SERPL: 16 (ref 12–20)
CALCIUM SERPL-MCNC: 9.9 MG/DL (ref 8.5–10.1)
CHLORIDE SERPL-SCNC: 100 MMOL/L (ref 97–108)
CO2 SERPL-SCNC: 29 MMOL/L (ref 21–32)
CREAT SERPL-MCNC: 0.92 MG/DL (ref 0.7–1.3)
DIFFERENTIAL METHOD BLD: ABNORMAL
EOSINOPHIL # BLD: 0 K/UL (ref 0–0.4)
EOSINOPHIL NFR BLD: 0 % (ref 0–7)
ERYTHROCYTE [DISTWIDTH] IN BLOOD BY AUTOMATED COUNT: 13.2 % (ref 11.5–14.5)
GLOBULIN SER CALC-MCNC: 4.3 G/DL (ref 2–4)
GLUCOSE BLD STRIP.AUTO-MCNC: 219 MG/DL (ref 65–117)
GLUCOSE SERPL-MCNC: 236 MG/DL (ref 65–100)
HCT VFR BLD AUTO: 42.4 % (ref 36.6–50.3)
HGB BLD-MCNC: 14.4 G/DL (ref 12.1–17)
IMM GRANULOCYTES # BLD AUTO: 0 K/UL (ref 0–0.04)
IMM GRANULOCYTES NFR BLD AUTO: 1 % (ref 0–0.5)
LYMPHOCYTES # BLD: 1.1 K/UL (ref 0.8–3.5)
LYMPHOCYTES NFR BLD: 13 % (ref 12–49)
MCH RBC QN AUTO: 30.7 PG (ref 26–34)
MCHC RBC AUTO-ENTMCNC: 34 G/DL (ref 30–36.5)
MCV RBC AUTO: 90.4 FL (ref 80–99)
MONOCYTES # BLD: 0.6 K/UL (ref 0–1)
MONOCYTES NFR BLD: 8 % (ref 5–13)
NEUTS SEG # BLD: 6.4 K/UL (ref 1.8–8)
NEUTS SEG NFR BLD: 78 % (ref 32–75)
NRBC # BLD: 0 K/UL (ref 0–0.01)
NRBC BLD-RTO: 0 PER 100 WBC
PLATELET # BLD AUTO: 180 K/UL (ref 150–400)
PMV BLD AUTO: 9.7 FL (ref 8.9–12.9)
POTASSIUM SERPL-SCNC: 4.4 MMOL/L (ref 3.5–5.1)
PROT SERPL-MCNC: 8.1 G/DL (ref 6.4–8.2)
RBC # BLD AUTO: 4.69 M/UL (ref 4.1–5.7)
SERVICE CMNT-IMP: ABNORMAL
SODIUM SERPL-SCNC: 133 MMOL/L (ref 136–145)
WBC # BLD AUTO: 8.2 K/UL (ref 4.1–11.1)

## 2024-10-28 PROCEDURE — 82962 GLUCOSE BLOOD TEST: CPT

## 2024-10-28 PROCEDURE — 36415 COLL VENOUS BLD VENIPUNCTURE: CPT

## 2024-10-28 PROCEDURE — 70491 CT SOFT TISSUE NECK W/DYE: CPT

## 2024-10-28 PROCEDURE — 6360000002 HC RX W HCPCS

## 2024-10-28 PROCEDURE — 96374 THER/PROPH/DIAG INJ IV PUSH: CPT

## 2024-10-28 PROCEDURE — 99285 EMERGENCY DEPT VISIT HI MDM: CPT

## 2024-10-28 PROCEDURE — 80053 COMPREHEN METABOLIC PANEL: CPT

## 2024-10-28 PROCEDURE — 6360000004 HC RX CONTRAST MEDICATION

## 2024-10-28 PROCEDURE — 6370000000 HC RX 637 (ALT 250 FOR IP)

## 2024-10-28 PROCEDURE — 85025 COMPLETE CBC W/AUTO DIFF WBC: CPT

## 2024-10-28 RX ORDER — CEPHALEXIN 500 MG/1
500 CAPSULE ORAL 4 TIMES DAILY
Qty: 28 CAPSULE | Refills: 0 | Status: ON HOLD | OUTPATIENT
Start: 2024-10-28 | End: 2024-11-04 | Stop reason: HOSPADM

## 2024-10-28 RX ORDER — DEXAMETHASONE 4 MG/1
10 TABLET ORAL ONCE
Status: COMPLETED | OUTPATIENT
Start: 2024-10-28 | End: 2024-10-28

## 2024-10-28 RX ORDER — DOXYCYCLINE HYCLATE 100 MG
100 TABLET ORAL 2 TIMES DAILY
Qty: 14 TABLET | Refills: 0 | Status: ON HOLD | OUTPATIENT
Start: 2024-10-28 | End: 2024-11-04 | Stop reason: HOSPADM

## 2024-10-28 RX ORDER — DOXYCYCLINE HYCLATE 100 MG
100 TABLET ORAL ONCE
Status: COMPLETED | OUTPATIENT
Start: 2024-10-28 | End: 2024-10-28

## 2024-10-28 RX ORDER — PREDNISONE 20 MG/1
40 TABLET ORAL DAILY
Qty: 6 TABLET | Refills: 0 | Status: ON HOLD | OUTPATIENT
Start: 2024-10-29 | End: 2024-11-04 | Stop reason: HOSPADM

## 2024-10-28 RX ORDER — TRAMADOL HYDROCHLORIDE 50 MG/1
50 TABLET ORAL EVERY 8 HOURS PRN
Qty: 9 TABLET | Refills: 0 | Status: ON HOLD | OUTPATIENT
Start: 2024-10-28 | End: 2024-11-04 | Stop reason: HOSPADM

## 2024-10-28 RX ORDER — IOPAMIDOL 755 MG/ML
100 INJECTION, SOLUTION INTRAVASCULAR
Status: COMPLETED | OUTPATIENT
Start: 2024-10-28 | End: 2024-10-28

## 2024-10-28 RX ORDER — HYDROCODONE BITARTRATE AND ACETAMINOPHEN 5; 325 MG/1; MG/1
1 TABLET ORAL
Status: COMPLETED | OUTPATIENT
Start: 2024-10-28 | End: 2024-10-28

## 2024-10-28 RX ORDER — ONDANSETRON 4 MG/1
4 TABLET, ORALLY DISINTEGRATING ORAL ONCE
Status: COMPLETED | OUTPATIENT
Start: 2024-10-28 | End: 2024-10-28

## 2024-10-28 RX ORDER — MORPHINE SULFATE 4 MG/ML
4 INJECTION, SOLUTION INTRAMUSCULAR; INTRAVENOUS
Status: COMPLETED | OUTPATIENT
Start: 2024-10-28 | End: 2024-10-28

## 2024-10-28 RX ADMIN — DOXYCYCLINE HYCLATE 100 MG: 100 TABLET, COATED ORAL at 20:17

## 2024-10-28 RX ADMIN — DEXAMETHASONE 10 MG: 4 TABLET ORAL at 20:07

## 2024-10-28 RX ADMIN — MORPHINE SULFATE 4 MG: 4 INJECTION, SOLUTION INTRAMUSCULAR; INTRAVENOUS at 20:08

## 2024-10-28 RX ADMIN — ONDANSETRON 4 MG: 4 TABLET, ORALLY DISINTEGRATING ORAL at 16:37

## 2024-10-28 RX ADMIN — CEPHALEXIN 500 MG: 250 CAPSULE ORAL at 20:17

## 2024-10-28 RX ADMIN — Medication 3 ML: at 20:07

## 2024-10-28 RX ADMIN — IOPAMIDOL 100 ML: 755 INJECTION, SOLUTION INTRAVENOUS at 18:21

## 2024-10-28 RX ADMIN — HYDROCODONE BITARTRATE AND ACETAMINOPHEN 1 TABLET: 5; 325 TABLET ORAL at 16:37

## 2024-10-28 ASSESSMENT — PAIN SCALES - GENERAL
PAINLEVEL_OUTOF10: 10
PAINLEVEL_OUTOF10: 8

## 2024-10-28 NOTE — DISCHARGE INSTRUCTIONS
INDICATION: assess posterior neck abscess Reason for exam:->assess posterior  neck abscess. Swelling and pain in posterior neck     COMPARISON: Images from CTA of the head and neck 1/24/2024.     TECHNIQUE:  Axial CT neck following administration of IV contrast. Sagittal and  coronal reformats were generated. Skin marker was placed posteriorly in the  right neck at the area of clinical concern.     CT dose reduction was achieved through use of a standardized protocol tailored  for this examination and automatic exposure control for dose modulation.     CONTRAST: 100 mL Isovue 370     FINDINGS:  POSTERIOR SOFT TISSUES: There is extensive fat stranding in the right paramedian  subcutaneous fat with areas of confluence consistent with subcutaneous  inflammatory changes and possibly phlegmon. A localized collection is not  suggested.  NASOPHARYNX: Normal.  SUPRAHYOID NECK: Normal oropharynx, oral cavity, parapharyngeal space, and  retropharyngeal space.  INFRAHYOID NECK: Normal larynx, hypopharynx and supraglottis.  PAROTID GLANDS: Normal.  SUBMANDIBULAR GLANDS: Normal.  THYROID GLAND: Multiple nodules dominating in the right lobe measuring up to 20  mm in size corresponding with ultrasound imaging findings of 8/19/2024..  LYMPH NODES: None enlarged by CT size criteria .  LUNG APICES: Clear.  OSSEOUS STRUCTURES: No destructive bone lesion. Cervical spondylosis. Anterior  cervical fixation with interbody fusion at C5-6.  ADDITIONAL COMMENTS: N/A.     IMPRESSION:  Right paramedian subcutaneous inflammatory changes in the neck at area of  clinical concern. No abscess demonstrated..     Electronically signed by Alex Mckeon MD

## 2024-10-28 NOTE — ED NOTES
Bedside shift change report given to Merline RN (oncoming nurse) by Jannet RN (offgoing nurse). Report included the following information Nurse Handoff Report, ED Encounter Summary, ED SBAR, and MAR.

## 2024-10-28 NOTE — ED PROVIDER NOTES
[facility] EMERGENCY DEPT  EMERGENCY DEPARTMENT ENCOUNTER       Pt Name: Delio Zavala  MRN: [id number]  Birthdate 1955  Date of evaluation: 10/28/2024  Provider: PAZ Parkinson - FAISAL   PCP: Estrella Haywood MD  Note Started:  4:19 PM EDT 10/28/24     CHIEF COMPLAINT       Chief Complaint   Patient presents with    Wound Check     Pt with about 6 cm red raised area to back of neck, seen at PCP this morning and PCP referred to ER for for drainage/potential surgical drainage due to location, pt denies prior abscess, very painful, pt unable to lay flat with it        HISTORY OF PRESENT ILLNESS: 1 or more elements      History From: Patient  HPI Limitations: None     Delio Zavala is a 69 y.o. male past medical history of arthritis, hyperlipidemia, hypertension, thyroid disease, prostate cancer with metastasis, recent radiation therapy, currently on hormonal oncologics, who presents evaluation of right posterior neck abscess that has gotten progressively bigger in the last 2 weeks.  Patient denies neck stiffness, headache, fever, chills, nausea, vomiting, diarrhea, chest pain or shortness of breath or myalgia.  Reports pain is worse with palpation of the abscess.  Patient went to his PCP this morning and was advised to come to the ED.  Patient rates pain 10 out of 10.  Patient states that spouse squeezed the area on Friday and was able to expel some purulent drainage.     Nursing Notes were all reviewed and agreed with or any disagreements were addressed in the HPI.     REVIEW OF SYSTEMS      Review of Systems     Positives and Pertinent negatives as per HPI.    PAST HISTORY     Past Medical History:  Past Medical History:   Diagnosis Date    A-fib (HCC)     ablations x4    Arthritis     back    Chronic pain     back    Encounter for cardioversion procedure 2/7/2017    GERD (gastroesophageal reflux disease)     High cholesterol     denies HTN    Hypertension 02/07/2017    patient denies hx of  HTN    Multiple thyroid nodules     biopsied and benign    Non-nicotine vapor product user     on occassion    Prostate cancer metastatic to multiple sites (HCC)     PUD (peptic ulcer disease)     \"years ago\"    Sleep apnea     wife witnessed apnea -- PCP & Card both referred to sleep clinic pt refuse/declined sleep apnea       Past Surgical History:  Past Surgical History:   Procedure Laterality Date    ABLATION OF DYSRHYTHMIC FOCUS      CARDIAC CATH PROCEDURE      CARDIOVERSION EXTERNAL      CERVICAL DISCECTOMY      pt unsure exactly what they did to his neck    CERVICAL FUSION      CT BIOPSY PERCUTANEOUS SUPERFICIAL BONE  10/04/2022    CT BIOPSY PERCUTANEOUS SUPERFICIAL BONE 10/4/2022 [facility] RAD CT    HEENT  13    THYROID BIOPSY    LUMBAR DISC SURGERY      ORTHOPEDIC SURGERY Right 1996    elbow surgery, RIGHT    ORTHOPEDIC SURGERY Left     wrist surgery, LEFT    ORTHOPEDIC SURGERY Left     shoulder surgery on left    [place] UNLISTED PROCEDURE CARDIAC SURGERY  ,     ABLATION x4 2016 & 2018    TONSILLECTOMY         Family History:  Family History   Problem Relation Age of Onset    Lung Disease Father     Heart Disease Father     Cancer Mother         BREAST       Social History:  Social History     Tobacco Use    Smoking status: Former     Current packs/day: 0.00     Average packs/day: 0.3 packs/day for 40.0 years (10.0 ttl pk-yrs)     Types: Cigarettes     Start date: 10/1/1972     Quit date: 10/1/2012     Years since quittin.0    Smokeless tobacco: Never   Vaping Use    Vaping status: Never Used   Substance Use Topics    Alcohol use: Yes     Comment: rarely    Drug use: No       Allergies:  Allergies   Allergen Reactions    Oxycodone-Acetaminophen Anxiety     Patient takes Tylenol without problems.  Patient said he is not allergic to oxycodone    Guaifenesin Other (See Comments)     Other reaction(s): gi upset       CURRENT MEDICATIONS      Previous Medications    ALBUTEROL SULFATE HFA

## 2024-10-29 NOTE — ED NOTES
Patient discharged from the ED by Jose Luis. Diagnosis, medications, precautions and follow-ups were reviewed with the patient/family. Questions were asked and answered prior to departure. Patient departed the ED via car and was accompanied by daughter.

## 2024-10-31 ENCOUNTER — HOSPITAL ENCOUNTER (INPATIENT)
Facility: HOSPITAL | Age: 69
LOS: 4 days | Discharge: HOME | DRG: 603 | End: 2024-11-04
Attending: STUDENT IN AN ORGANIZED HEALTH CARE EDUCATION/TRAINING PROGRAM | Admitting: STUDENT IN AN ORGANIZED HEALTH CARE EDUCATION/TRAINING PROGRAM
Payer: MEDICARE

## 2024-10-31 DIAGNOSIS — L03.221 CELLULITIS OF NECK: Primary | ICD-10-CM

## 2024-10-31 PROBLEM — L03.90 CELLULITIS: Status: ACTIVE | Noted: 2024-10-31

## 2024-10-31 LAB
ALBUMIN SERPL-MCNC: 3.3 G/DL (ref 3.5–5)
ALBUMIN/GLOB SERPL: 0.9 (ref 1.1–2.2)
ALP SERPL-CCNC: 91 U/L (ref 45–117)
ALT SERPL-CCNC: 21 U/L (ref 12–78)
ANION GAP SERPL CALC-SCNC: 6 MMOL/L (ref 2–12)
AST SERPL-CCNC: 22 U/L (ref 15–37)
BASOPHILS # BLD: 0 K/UL (ref 0–0.1)
BASOPHILS NFR BLD: 1 % (ref 0–1)
BILIRUB SERPL-MCNC: 0.4 MG/DL (ref 0.2–1)
BUN SERPL-MCNC: 18 MG/DL (ref 6–20)
BUN/CREAT SERPL: 21 (ref 12–20)
CALCIUM SERPL-MCNC: 8.5 MG/DL (ref 8.5–10.1)
CHLORIDE SERPL-SCNC: 101 MMOL/L (ref 97–108)
CO2 SERPL-SCNC: 29 MMOL/L (ref 21–32)
CREAT SERPL-MCNC: 0.87 MG/DL (ref 0.7–1.3)
DIFFERENTIAL METHOD BLD: ABNORMAL
EOSINOPHIL # BLD: 0 K/UL (ref 0–0.4)
EOSINOPHIL NFR BLD: 1 % (ref 0–7)
ERYTHROCYTE [DISTWIDTH] IN BLOOD BY AUTOMATED COUNT: 13.2 % (ref 11.5–14.5)
GLOBULIN SER CALC-MCNC: 3.5 G/DL (ref 2–4)
GLUCOSE BLD STRIP.AUTO-MCNC: 191 MG/DL (ref 65–117)
GLUCOSE SERPL-MCNC: 280 MG/DL (ref 65–100)
HCT VFR BLD AUTO: 38.4 % (ref 36.6–50.3)
HGB BLD-MCNC: 12.9 G/DL (ref 12.1–17)
IMM GRANULOCYTES # BLD AUTO: 0 K/UL (ref 0–0.04)
IMM GRANULOCYTES NFR BLD AUTO: 1 % (ref 0–0.5)
LYMPHOCYTES # BLD: 1.1 K/UL (ref 0.8–3.5)
LYMPHOCYTES NFR BLD: 15 % (ref 12–49)
MCH RBC QN AUTO: 31.1 PG (ref 26–34)
MCHC RBC AUTO-ENTMCNC: 33.6 G/DL (ref 30–36.5)
MCV RBC AUTO: 92.5 FL (ref 80–99)
MONOCYTES # BLD: 0.6 K/UL (ref 0–1)
MONOCYTES NFR BLD: 8 % (ref 5–13)
NEUTS SEG # BLD: 5.9 K/UL (ref 1.8–8)
NEUTS SEG NFR BLD: 74 % (ref 32–75)
NRBC # BLD: 0 K/UL (ref 0–0.01)
NRBC BLD-RTO: 0 PER 100 WBC
PLATELET # BLD AUTO: 192 K/UL (ref 150–400)
PMV BLD AUTO: 9.3 FL (ref 8.9–12.9)
POTASSIUM SERPL-SCNC: 4 MMOL/L (ref 3.5–5.1)
PROT SERPL-MCNC: 6.8 G/DL (ref 6.4–8.2)
RBC # BLD AUTO: 4.15 M/UL (ref 4.1–5.7)
SERVICE CMNT-IMP: ABNORMAL
SODIUM SERPL-SCNC: 136 MMOL/L (ref 136–145)
WBC # BLD AUTO: 7.8 K/UL (ref 4.1–11.1)

## 2024-10-31 PROCEDURE — 1100000000 HC RM PRIVATE

## 2024-10-31 PROCEDURE — 96365 THER/PROPH/DIAG IV INF INIT: CPT

## 2024-10-31 PROCEDURE — 2580000003 HC RX 258: Performed by: STUDENT IN AN ORGANIZED HEALTH CARE EDUCATION/TRAINING PROGRAM

## 2024-10-31 PROCEDURE — 6360000002 HC RX W HCPCS: Performed by: STUDENT IN AN ORGANIZED HEALTH CARE EDUCATION/TRAINING PROGRAM

## 2024-10-31 PROCEDURE — 85025 COMPLETE CBC W/AUTO DIFF WBC: CPT

## 2024-10-31 PROCEDURE — 80053 COMPREHEN METABOLIC PANEL: CPT

## 2024-10-31 PROCEDURE — 96375 TX/PRO/DX INJ NEW DRUG ADDON: CPT

## 2024-10-31 PROCEDURE — 99285 EMERGENCY DEPT VISIT HI MDM: CPT

## 2024-10-31 PROCEDURE — 36415 COLL VENOUS BLD VENIPUNCTURE: CPT

## 2024-10-31 PROCEDURE — 82962 GLUCOSE BLOOD TEST: CPT

## 2024-10-31 PROCEDURE — 87040 BLOOD CULTURE FOR BACTERIA: CPT

## 2024-10-31 RX ORDER — DEXTROSE MONOHYDRATE 100 MG/ML
INJECTION, SOLUTION INTRAVENOUS CONTINUOUS PRN
Status: DISCONTINUED | OUTPATIENT
Start: 2024-10-31 | End: 2024-11-04 | Stop reason: HOSPADM

## 2024-10-31 RX ORDER — ONDANSETRON 4 MG/1
4 TABLET, ORALLY DISINTEGRATING ORAL EVERY 8 HOURS PRN
Status: DISCONTINUED | OUTPATIENT
Start: 2024-10-31 | End: 2024-11-04 | Stop reason: HOSPADM

## 2024-10-31 RX ORDER — SODIUM CHLORIDE 9 MG/ML
INJECTION, SOLUTION INTRAVENOUS PRN
Status: DISCONTINUED | OUTPATIENT
Start: 2024-10-31 | End: 2024-11-04 | Stop reason: HOSPADM

## 2024-10-31 RX ORDER — ARFORMOTEROL TARTRATE 15 UG/2ML
15 SOLUTION RESPIRATORY (INHALATION)
Status: DISCONTINUED | OUTPATIENT
Start: 2024-10-31 | End: 2024-10-31

## 2024-10-31 RX ORDER — ACETAMINOPHEN 325 MG/1
650 TABLET ORAL EVERY 6 HOURS PRN
Status: DISCONTINUED | OUTPATIENT
Start: 2024-10-31 | End: 2024-11-04 | Stop reason: HOSPADM

## 2024-10-31 RX ORDER — MORPHINE SULFATE 4 MG/ML
4 INJECTION, SOLUTION INTRAMUSCULAR; INTRAVENOUS
Status: COMPLETED | OUTPATIENT
Start: 2024-10-31 | End: 2024-10-31

## 2024-10-31 RX ORDER — BUDESONIDE 0.25 MG/2ML
0.25 INHALANT ORAL
Status: DISCONTINUED | OUTPATIENT
Start: 2024-10-31 | End: 2024-10-31

## 2024-10-31 RX ORDER — VANCOMYCIN 1.5 G/300ML
1500 INJECTION, SOLUTION INTRAVENOUS EVERY 12 HOURS
Status: DISCONTINUED | OUTPATIENT
Start: 2024-11-01 | End: 2024-11-04 | Stop reason: HOSPADM

## 2024-10-31 RX ORDER — BUDESONIDE 0.25 MG/2ML
0.25 INHALANT ORAL
Status: DISCONTINUED | OUTPATIENT
Start: 2024-11-01 | End: 2024-11-01

## 2024-10-31 RX ORDER — MAGNESIUM SULFATE IN WATER 40 MG/ML
2000 INJECTION, SOLUTION INTRAVENOUS PRN
Status: DISCONTINUED | OUTPATIENT
Start: 2024-10-31 | End: 2024-11-04 | Stop reason: HOSPADM

## 2024-10-31 RX ORDER — MONTELUKAST SODIUM 10 MG/1
10 TABLET ORAL DAILY
Status: DISCONTINUED | OUTPATIENT
Start: 2024-11-01 | End: 2024-11-04 | Stop reason: HOSPADM

## 2024-10-31 RX ORDER — TAMSULOSIN HYDROCHLORIDE 0.4 MG/1
0.4 CAPSULE ORAL DAILY
Status: DISCONTINUED | OUTPATIENT
Start: 2024-11-01 | End: 2024-11-04 | Stop reason: HOSPADM

## 2024-10-31 RX ORDER — ASPIRIN 81 MG/1
81 TABLET ORAL DAILY
Status: DISCONTINUED | OUTPATIENT
Start: 2024-11-01 | End: 2024-11-04 | Stop reason: HOSPADM

## 2024-10-31 RX ORDER — INSULIN LISPRO 100 [IU]/ML
0-8 INJECTION, SOLUTION INTRAVENOUS; SUBCUTANEOUS
Status: DISCONTINUED | OUTPATIENT
Start: 2024-10-31 | End: 2024-11-02

## 2024-10-31 RX ORDER — VENLAFAXINE HYDROCHLORIDE 37.5 MG/1
37.5 CAPSULE, EXTENDED RELEASE ORAL
Status: DISCONTINUED | OUTPATIENT
Start: 2024-11-01 | End: 2024-11-04

## 2024-10-31 RX ORDER — ACETAMINOPHEN 650 MG/1
650 SUPPOSITORY RECTAL EVERY 6 HOURS PRN
Status: DISCONTINUED | OUTPATIENT
Start: 2024-10-31 | End: 2024-11-04 | Stop reason: HOSPADM

## 2024-10-31 RX ORDER — SODIUM CHLORIDE 0.9 % (FLUSH) 0.9 %
5-40 SYRINGE (ML) INJECTION EVERY 12 HOURS SCHEDULED
Status: DISCONTINUED | OUTPATIENT
Start: 2024-10-31 | End: 2024-11-04 | Stop reason: HOSPADM

## 2024-10-31 RX ORDER — MORPHINE SULFATE 2 MG/ML
2 INJECTION, SOLUTION INTRAMUSCULAR; INTRAVENOUS EVERY 4 HOURS PRN
Status: DISCONTINUED | OUTPATIENT
Start: 2024-10-31 | End: 2024-11-01

## 2024-10-31 RX ORDER — LANOLIN ALCOHOL/MO/W.PET/CERES
400 CREAM (GRAM) TOPICAL DAILY
Status: DISCONTINUED | OUTPATIENT
Start: 2024-11-01 | End: 2024-11-04 | Stop reason: HOSPADM

## 2024-10-31 RX ORDER — ALBUTEROL SULFATE 0.83 MG/ML
2.5 SOLUTION RESPIRATORY (INHALATION) EVERY 6 HOURS PRN
Status: DISCONTINUED | OUTPATIENT
Start: 2024-10-31 | End: 2024-11-04 | Stop reason: HOSPADM

## 2024-10-31 RX ORDER — ONDANSETRON 2 MG/ML
4 INJECTION INTRAMUSCULAR; INTRAVENOUS EVERY 6 HOURS PRN
Status: DISCONTINUED | OUTPATIENT
Start: 2024-10-31 | End: 2024-11-04 | Stop reason: HOSPADM

## 2024-10-31 RX ORDER — ARFORMOTEROL TARTRATE 15 UG/2ML
15 SOLUTION RESPIRATORY (INHALATION)
Status: DISCONTINUED | OUTPATIENT
Start: 2024-11-01 | End: 2024-11-01

## 2024-10-31 RX ORDER — SODIUM CHLORIDE 0.9 % (FLUSH) 0.9 %
5-40 SYRINGE (ML) INJECTION PRN
Status: DISCONTINUED | OUTPATIENT
Start: 2024-10-31 | End: 2024-11-04 | Stop reason: HOSPADM

## 2024-10-31 RX ORDER — POLYETHYLENE GLYCOL 3350 17 G/17G
17 POWDER, FOR SOLUTION ORAL DAILY PRN
Status: DISCONTINUED | OUTPATIENT
Start: 2024-10-31 | End: 2024-11-04 | Stop reason: HOSPADM

## 2024-10-31 RX ORDER — GLUCAGON 1 MG/ML
1 KIT INJECTION PRN
Status: DISCONTINUED | OUTPATIENT
Start: 2024-10-31 | End: 2024-11-04 | Stop reason: HOSPADM

## 2024-10-31 RX ORDER — ENOXAPARIN SODIUM 100 MG/ML
30 INJECTION SUBCUTANEOUS 2 TIMES DAILY
Status: DISCONTINUED | OUTPATIENT
Start: 2024-10-31 | End: 2024-11-04 | Stop reason: HOSPADM

## 2024-10-31 RX ADMIN — Medication 2500 MG: at 17:58

## 2024-10-31 RX ADMIN — MORPHINE SULFATE 2 MG: 2 INJECTION, SOLUTION INTRAMUSCULAR; INTRAVENOUS at 20:26

## 2024-10-31 RX ADMIN — MORPHINE SULFATE 4 MG: 4 INJECTION INTRAVENOUS at 17:58

## 2024-10-31 RX ADMIN — SODIUM CHLORIDE, PRESERVATIVE FREE 10 ML: 5 INJECTION INTRAVENOUS at 21:00

## 2024-10-31 RX ADMIN — ENOXAPARIN SODIUM 30 MG: 100 INJECTION SUBCUTANEOUS at 20:29

## 2024-10-31 RX ADMIN — PIPERACILLIN AND TAZOBACTAM 4500 MG: 4; .5 INJECTION, POWDER, LYOPHILIZED, FOR SOLUTION INTRAVENOUS; PARENTERAL at 20:54

## 2024-10-31 ASSESSMENT — PAIN DESCRIPTION - DESCRIPTORS
DESCRIPTORS: BURNING;SHARP
DESCRIPTORS: ACHING
DESCRIPTORS: BURNING;SHARP
DESCRIPTORS: ACHING

## 2024-10-31 ASSESSMENT — PAIN DESCRIPTION - PAIN TYPE: TYPE: ACUTE PAIN

## 2024-10-31 ASSESSMENT — PAIN - FUNCTIONAL ASSESSMENT
PAIN_FUNCTIONAL_ASSESSMENT: 0-10
PAIN_FUNCTIONAL_ASSESSMENT: PREVENTS OR INTERFERES SOME ACTIVE ACTIVITIES AND ADLS

## 2024-10-31 ASSESSMENT — PAIN DESCRIPTION - FREQUENCY: FREQUENCY: CONTINUOUS

## 2024-10-31 ASSESSMENT — PAIN DESCRIPTION - LOCATION
LOCATION: NECK

## 2024-10-31 ASSESSMENT — PAIN SCALES - GENERAL
PAINLEVEL_OUTOF10: 10
PAINLEVEL_OUTOF10: 6
PAINLEVEL_OUTOF10: 10

## 2024-10-31 ASSESSMENT — PAIN DESCRIPTION - ORIENTATION
ORIENTATION: POSTERIOR;LOWER
ORIENTATION: POSTERIOR
ORIENTATION: MID
ORIENTATION: LEFT

## 2024-10-31 ASSESSMENT — PAIN DESCRIPTION - ONSET: ONSET: ON-GOING

## 2024-10-31 NOTE — ED PROVIDER NOTES
Amount: 150 mg    VALACYCLOVIR (VALTREX) 500 MG TABLET    Take 1 tablet by mouth daily as needed    VENLAFAXINE (EFFEXOR XR) 37.5 MG EXTENDED RELEASE CAPSULE    Take 1 capsule by mouth daily         PHYSICAL EXAM      ED Triage Vitals [10/31/24 1545]   Encounter Vitals Group      BP (!) 147/72      Systolic BP Percentile       Diastolic BP Percentile       Pulse 67      Respirations 18      Temp 97.3 °F (36.3 °C)      Temp Source Oral      SpO2 97 %      Weight - Scale 124.6 kg (274 lb 11.1 oz)      Height 1.829 m (6')      Head Circumference       Peak Flow       Pain Score       Pain Loc       Pain Education       Exclude from Growth Chart               Physical Exam  Constitutional:       Appearance: Normal appearance.   HENT:      Head: Normocephalic and atraumatic.      Right Ear: External ear normal.      Left Ear: External ear normal.      Nose: Nose normal.      Mouth/Throat:      Mouth: Mucous membranes are moist.   Eyes:      Extraocular Movements: Extraocular movements intact.      Conjunctiva/sclera: Conjunctivae normal.   Neck:      Comments: 5 cm area of induration on right posterior neck soft tissues.  No obvious fluctuance.  Cardiovascular:      Rate and Rhythm: Normal rate and regular rhythm.      Heart sounds: No murmur heard.     No friction rub. No gallop.   Pulmonary:      Effort: Pulmonary effort is normal. No respiratory distress.      Breath sounds: No stridor. No wheezing, rhonchi or rales.   Abdominal:      General: Abdomen is flat. There is no distension.      Palpations: Abdomen is soft. There is no mass.      Tenderness: There is no abdominal tenderness. There is no guarding or rebound.   Musculoskeletal:      Cervical back: Neck supple.      Right lower leg: No edema.      Left lower leg: No edema.   Skin:     General: Skin is warm and dry.   Neurological:      General: No focal deficit present.      Mental Status: He is alert.   Psychiatric:         Mood and Affect: Mood normal.

## 2024-10-31 NOTE — ED NOTES
Report received from KEVIN Machado. Reviewed reason for patient arrival, vitals, labs, medications, orders, procedures, results, pending orders/results and current plan for disposition. Questions were asked and answered prior to departure.

## 2024-10-31 NOTE — ED NOTES
Bedside and Verbal shift change report given to Addie (oncoming nurse) by Jeannette (offgoing nurse). Report included the following information Nurse Handoff Report, ED SBAR, Adult Overview, MAR, Recent Results, and Neuro Assessment.

## 2024-11-01 LAB
ANION GAP SERPL CALC-SCNC: 5 MMOL/L (ref 2–12)
BASOPHILS # BLD: 0.1 K/UL (ref 0–0.1)
BASOPHILS NFR BLD: 1 % (ref 0–1)
BUN SERPL-MCNC: 12 MG/DL (ref 6–20)
BUN/CREAT SERPL: 15 (ref 12–20)
CALCIUM SERPL-MCNC: 8.6 MG/DL (ref 8.5–10.1)
CHLORIDE SERPL-SCNC: 100 MMOL/L (ref 97–108)
CO2 SERPL-SCNC: 28 MMOL/L (ref 21–32)
CREAT SERPL-MCNC: 0.8 MG/DL (ref 0.7–1.3)
DIFFERENTIAL METHOD BLD: ABNORMAL
EOSINOPHIL # BLD: 0 K/UL (ref 0–0.4)
EOSINOPHIL NFR BLD: 0 % (ref 0–7)
ERYTHROCYTE [DISTWIDTH] IN BLOOD BY AUTOMATED COUNT: 13.4 % (ref 11.5–14.5)
EST. AVERAGE GLUCOSE BLD GHB EST-MCNC: 200 MG/DL
GLUCOSE BLD STRIP.AUTO-MCNC: 222 MG/DL (ref 65–117)
GLUCOSE BLD STRIP.AUTO-MCNC: 235 MG/DL (ref 65–117)
GLUCOSE BLD STRIP.AUTO-MCNC: 277 MG/DL (ref 65–117)
GLUCOSE BLD STRIP.AUTO-MCNC: 296 MG/DL (ref 65–117)
GLUCOSE SERPL-MCNC: 275 MG/DL (ref 65–100)
HBA1C MFR BLD: 8.6 % (ref 4–5.6)
HCT VFR BLD AUTO: 38.2 % (ref 36.6–50.3)
HGB BLD-MCNC: 12.7 G/DL (ref 12.1–17)
IMM GRANULOCYTES # BLD AUTO: 0 K/UL (ref 0–0.04)
IMM GRANULOCYTES NFR BLD AUTO: 0 % (ref 0–0.5)
LYMPHOCYTES # BLD: 1 K/UL (ref 0.8–3.5)
LYMPHOCYTES NFR BLD: 12 % (ref 12–49)
MCH RBC QN AUTO: 30.9 PG (ref 26–34)
MCHC RBC AUTO-ENTMCNC: 33.2 G/DL (ref 30–36.5)
MCV RBC AUTO: 92.9 FL (ref 80–99)
MONOCYTES # BLD: 0.7 K/UL (ref 0–1)
MONOCYTES NFR BLD: 8 % (ref 5–13)
NEUTS SEG # BLD: 6.5 K/UL (ref 1.8–8)
NEUTS SEG NFR BLD: 79 % (ref 32–75)
NRBC # BLD: 0 K/UL (ref 0–0.01)
NRBC BLD-RTO: 0 PER 100 WBC
PLATELET # BLD AUTO: 191 K/UL (ref 150–400)
PMV BLD AUTO: 9.5 FL (ref 8.9–12.9)
POTASSIUM SERPL-SCNC: 3.9 MMOL/L (ref 3.5–5.1)
RBC # BLD AUTO: 4.11 M/UL (ref 4.1–5.7)
SERVICE CMNT-IMP: ABNORMAL
SODIUM SERPL-SCNC: 133 MMOL/L (ref 136–145)
WBC # BLD AUTO: 8.3 K/UL (ref 4.1–11.1)

## 2024-11-01 PROCEDURE — 2580000003 HC RX 258: Performed by: STUDENT IN AN ORGANIZED HEALTH CARE EDUCATION/TRAINING PROGRAM

## 2024-11-01 PROCEDURE — 83036 HEMOGLOBIN GLYCOSYLATED A1C: CPT

## 2024-11-01 PROCEDURE — 6370000000 HC RX 637 (ALT 250 FOR IP): Performed by: STUDENT IN AN ORGANIZED HEALTH CARE EDUCATION/TRAINING PROGRAM

## 2024-11-01 PROCEDURE — 99223 1ST HOSP IP/OBS HIGH 75: CPT | Performed by: INTERNAL MEDICINE

## 2024-11-01 PROCEDURE — 94640 AIRWAY INHALATION TREATMENT: CPT

## 2024-11-01 PROCEDURE — 85025 COMPLETE CBC W/AUTO DIFF WBC: CPT

## 2024-11-01 PROCEDURE — 1100000000 HC RM PRIVATE

## 2024-11-01 PROCEDURE — 2500000003 HC RX 250 WO HCPCS: Performed by: STUDENT IN AN ORGANIZED HEALTH CARE EDUCATION/TRAINING PROGRAM

## 2024-11-01 PROCEDURE — 80048 BASIC METABOLIC PNL TOTAL CA: CPT

## 2024-11-01 PROCEDURE — 82962 GLUCOSE BLOOD TEST: CPT

## 2024-11-01 PROCEDURE — 6360000002 HC RX W HCPCS: Performed by: STUDENT IN AN ORGANIZED HEALTH CARE EDUCATION/TRAINING PROGRAM

## 2024-11-01 PROCEDURE — 6370000000 HC RX 637 (ALT 250 FOR IP): Performed by: INTERNAL MEDICINE

## 2024-11-01 PROCEDURE — 99222 1ST HOSP IP/OBS MODERATE 55: CPT | Performed by: SURGERY

## 2024-11-01 PROCEDURE — 36415 COLL VENOUS BLD VENIPUNCTURE: CPT

## 2024-11-01 RX ORDER — OXYCODONE HYDROCHLORIDE 5 MG/1
5 TABLET ORAL EVERY 6 HOURS PRN
Status: COMPLETED | OUTPATIENT
Start: 2024-11-01 | End: 2024-11-01

## 2024-11-01 RX ORDER — LANOLIN ALCOHOL/MO/W.PET/CERES
3 CREAM (GRAM) TOPICAL NIGHTLY
Status: DISCONTINUED | OUTPATIENT
Start: 2024-11-01 | End: 2024-11-04 | Stop reason: HOSPADM

## 2024-11-01 RX ORDER — BUDESONIDE 0.25 MG/2ML
0.25 INHALANT ORAL
Status: DISCONTINUED | OUTPATIENT
Start: 2024-11-02 | End: 2024-11-04

## 2024-11-01 RX ORDER — HYDROMORPHONE HYDROCHLORIDE 1 MG/ML
0.25 INJECTION, SOLUTION INTRAMUSCULAR; INTRAVENOUS; SUBCUTANEOUS ONCE
Status: COMPLETED | OUTPATIENT
Start: 2024-11-01 | End: 2024-11-01

## 2024-11-01 RX ORDER — HYDROXYZINE HYDROCHLORIDE 10 MG/1
10 TABLET, FILM COATED ORAL 3 TIMES DAILY PRN
Status: DISCONTINUED | OUTPATIENT
Start: 2024-11-01 | End: 2024-11-04 | Stop reason: HOSPADM

## 2024-11-01 RX ORDER — INSULIN GLARGINE 100 [IU]/ML
10 INJECTION, SOLUTION SUBCUTANEOUS DAILY
Status: DISCONTINUED | OUTPATIENT
Start: 2024-11-01 | End: 2024-11-02

## 2024-11-01 RX ORDER — INSULIN LISPRO 100 [IU]/ML
0-4 INJECTION, SOLUTION INTRAVENOUS; SUBCUTANEOUS
Status: DISCONTINUED | OUTPATIENT
Start: 2024-11-01 | End: 2024-11-01

## 2024-11-01 RX ORDER — ARFORMOTEROL TARTRATE 15 UG/2ML
15 SOLUTION RESPIRATORY (INHALATION)
Status: DISCONTINUED | OUTPATIENT
Start: 2024-11-02 | End: 2024-11-04

## 2024-11-01 RX ADMIN — INSULIN LISPRO 4 UNITS: 100 INJECTION, SOLUTION INTRAVENOUS; SUBCUTANEOUS at 20:11

## 2024-11-01 RX ADMIN — ENOXAPARIN SODIUM 30 MG: 100 INJECTION SUBCUTANEOUS at 09:45

## 2024-11-01 RX ADMIN — BUDESONIDE 250 MCG: 0.25 INHALANT RESPIRATORY (INHALATION) at 20:43

## 2024-11-01 RX ADMIN — ONDANSETRON 4 MG: 2 INJECTION INTRAMUSCULAR; INTRAVENOUS at 15:01

## 2024-11-01 RX ADMIN — ARFORMOTEROL TARTRATE 15 MCG: 15 SOLUTION RESPIRATORY (INHALATION) at 20:43

## 2024-11-01 RX ADMIN — PIPERACILLIN AND TAZOBACTAM 3375 MG: 3; .375 INJECTION, POWDER, FOR SOLUTION INTRAVENOUS; PARENTERAL at 09:45

## 2024-11-01 RX ADMIN — VENLAFAXINE HYDROCHLORIDE 37.5 MG: 37.5 CAPSULE, EXTENDED RELEASE ORAL at 09:27

## 2024-11-01 RX ADMIN — Medication 3 MG: at 22:11

## 2024-11-01 RX ADMIN — TAMSULOSIN HYDROCHLORIDE 0.4 MG: 0.4 CAPSULE ORAL at 09:27

## 2024-11-01 RX ADMIN — SODIUM CHLORIDE, PRESERVATIVE FREE 10 ML: 5 INJECTION INTRAVENOUS at 09:47

## 2024-11-01 RX ADMIN — VANCOMYCIN 1500 MG: 1.5 INJECTION, SOLUTION INTRAVENOUS at 06:16

## 2024-11-01 RX ADMIN — PIPERACILLIN AND TAZOBACTAM 3375 MG: 3; .375 INJECTION, POWDER, FOR SOLUTION INTRAVENOUS; PARENTERAL at 18:27

## 2024-11-01 RX ADMIN — INSULIN LISPRO 2 UNITS: 100 INJECTION, SOLUTION INTRAVENOUS; SUBCUTANEOUS at 09:24

## 2024-11-01 RX ADMIN — OXYCODONE HYDROCHLORIDE 5 MG: 5 TABLET ORAL at 21:00

## 2024-11-01 RX ADMIN — BUDESONIDE 250 MCG: 0.25 INHALANT RESPIRATORY (INHALATION) at 07:39

## 2024-11-01 RX ADMIN — ARFORMOTEROL TARTRATE 15 MCG: 15 SOLUTION RESPIRATORY (INHALATION) at 07:39

## 2024-11-01 RX ADMIN — ONDANSETRON 4 MG: 2 INJECTION INTRAMUSCULAR; INTRAVENOUS at 01:47

## 2024-11-01 RX ADMIN — IPRATROPIUM BROMIDE 0.5 MG: 0.5 SOLUTION RESPIRATORY (INHALATION) at 20:38

## 2024-11-01 RX ADMIN — ACETAMINOPHEN 650 MG: 325 TABLET ORAL at 10:43

## 2024-11-01 RX ADMIN — PIPERACILLIN AND TAZOBACTAM 3375 MG: 3; .375 INJECTION, POWDER, FOR SOLUTION INTRAVENOUS; PARENTERAL at 01:42

## 2024-11-01 RX ADMIN — INSULIN LISPRO 2 UNITS: 100 INJECTION, SOLUTION INTRAVENOUS; SUBCUTANEOUS at 16:40

## 2024-11-01 RX ADMIN — HYDROXYZINE HYDROCHLORIDE 10 MG: 10 TABLET ORAL at 04:11

## 2024-11-01 RX ADMIN — IPRATROPIUM BROMIDE 0.5 MG: 0.5 SOLUTION RESPIRATORY (INHALATION) at 07:39

## 2024-11-01 RX ADMIN — IPRATROPIUM BROMIDE 0.5 MG: 0.5 SOLUTION RESPIRATORY (INHALATION) at 13:56

## 2024-11-01 RX ADMIN — Medication 400 MG: at 09:27

## 2024-11-01 RX ADMIN — ACETAMINOPHEN 650 MG: 325 TABLET ORAL at 16:39

## 2024-11-01 RX ADMIN — VANCOMYCIN 1500 MG: 1.5 INJECTION, SOLUTION INTRAVENOUS at 22:46

## 2024-11-01 RX ADMIN — ACETAMINOPHEN 650 MG: 325 TABLET ORAL at 05:29

## 2024-11-01 RX ADMIN — MORPHINE SULFATE 2 MG: 2 INJECTION, SOLUTION INTRAMUSCULAR; INTRAVENOUS at 00:26

## 2024-11-01 RX ADMIN — INSULIN GLARGINE 10 UNITS: 100 INJECTION, SOLUTION SUBCUTANEOUS at 13:41

## 2024-11-01 RX ADMIN — SODIUM CHLORIDE, PRESERVATIVE FREE 10 ML: 5 INJECTION INTRAVENOUS at 20:16

## 2024-11-01 RX ADMIN — MONTELUKAST 10 MG: 10 TABLET, FILM COATED ORAL at 09:27

## 2024-11-01 RX ADMIN — HYDROXYZINE HYDROCHLORIDE 10 MG: 10 TABLET ORAL at 22:40

## 2024-11-01 RX ADMIN — ASPIRIN 81 MG: 81 TABLET, COATED ORAL at 09:28

## 2024-11-01 RX ADMIN — HYDROMORPHONE HYDROCHLORIDE 0.25 MG: 1 INJECTION, SOLUTION INTRAMUSCULAR; INTRAVENOUS; SUBCUTANEOUS at 22:40

## 2024-11-01 RX ADMIN — ALBUTEROL SULFATE 2.5 MG: 2.5 SOLUTION RESPIRATORY (INHALATION) at 00:50

## 2024-11-01 RX ADMIN — ENOXAPARIN SODIUM 30 MG: 100 INJECTION SUBCUTANEOUS at 20:11

## 2024-11-01 ASSESSMENT — PAIN DESCRIPTION - LOCATION
LOCATION: ABDOMEN
LOCATION: HEAD
LOCATION: NECK

## 2024-11-01 ASSESSMENT — PAIN DESCRIPTION - DESCRIPTORS
DESCRIPTORS: ACHING
DESCRIPTORS: DISCOMFORT
DESCRIPTORS: ACHING
DESCRIPTORS: DULL;BURNING;SHARP
DESCRIPTORS: ACHING
DESCRIPTORS: ACHING
DESCRIPTORS: SHARP

## 2024-11-01 ASSESSMENT — PAIN - FUNCTIONAL ASSESSMENT: PAIN_FUNCTIONAL_ASSESSMENT: ACTIVITIES ARE NOT PREVENTED

## 2024-11-01 ASSESSMENT — PAIN SCALES - GENERAL
PAINLEVEL_OUTOF10: 7
PAINLEVEL_OUTOF10: 7
PAINLEVEL_OUTOF10: 10
PAINLEVEL_OUTOF10: 6
PAINLEVEL_OUTOF10: 6
PAINLEVEL_OUTOF10: 1
PAINLEVEL_OUTOF10: 9
PAINLEVEL_OUTOF10: 9
PAINLEVEL_OUTOF10: 10
PAINLEVEL_OUTOF10: 9
PAINLEVEL_OUTOF10: 3

## 2024-11-01 ASSESSMENT — PAIN DESCRIPTION - ORIENTATION
ORIENTATION: RIGHT;LEFT
ORIENTATION: POSTERIOR
ORIENTATION: RIGHT;LEFT

## 2024-11-01 NOTE — ED NOTES
TRANSFER - OUT REPORT:    Verbal report given to KEVIN Morejon on Delio Zavala  being transferred to [facility] for routine progression of patient care       Report consisted of patient's Situation, Background, Assessment and   Recommendations(SBAR).     Information from the following report(s) Nurse Handoff Report, ED Encounter Summary, ED SBAR, Adult Overview, Intake/Output, and MAR was reviewed with the receiving nurse.    Stanley Fall Assessment:    Presents to emergency department  because of falls (Syncope, seizure, or loss of consciousness): No  Age > 70: Yes  Altered Mental Status, Intoxication with alcohol or substance confusion (Disorientation, impaired judgment, poor safety awaremess, or inability to follow instructions): No  Impaired Mobility: Ambulates or transfers with assistive devices or assistance; Unable to ambulate or transer.: No  Nursing Judgement: No          Lines:   Peripheral IV 10/31/24 Distal;Left;Anterior Cephalic (Active)        Opportunity for questions and clarification was provided.      Patient transported with:  Tech

## 2024-11-02 ENCOUNTER — ANESTHESIA (OUTPATIENT)
Facility: HOSPITAL | Age: 69
End: 2024-11-02
Payer: MEDICARE

## 2024-11-02 ENCOUNTER — ANESTHESIA EVENT (OUTPATIENT)
Facility: HOSPITAL | Age: 69
End: 2024-11-02
Payer: MEDICARE

## 2024-11-02 PROBLEM — C61 PROSTATE CANCER METASTATIC TO BONE (HCC): Status: ACTIVE | Noted: 2024-05-01

## 2024-11-02 PROBLEM — F32.A DEPRESSION: Status: ACTIVE | Noted: 2024-11-02

## 2024-11-02 PROBLEM — E66.9 OBESITY (BMI 30-39.9): Status: ACTIVE | Noted: 2024-11-02

## 2024-11-02 PROBLEM — E11.65 POORLY CONTROLLED DIABETES MELLITUS (HCC): Status: ACTIVE | Noted: 2024-11-02

## 2024-11-02 PROBLEM — J44.9 CHRONIC OBSTRUCTIVE PULMONARY DISEASE (HCC): Status: ACTIVE | Noted: 2024-11-02

## 2024-11-02 LAB
GLUCOSE BLD STRIP.AUTO-MCNC: 190 MG/DL (ref 65–117)
GLUCOSE BLD STRIP.AUTO-MCNC: 193 MG/DL (ref 65–117)
GLUCOSE BLD STRIP.AUTO-MCNC: 219 MG/DL (ref 65–117)
GLUCOSE BLD STRIP.AUTO-MCNC: 226 MG/DL (ref 65–117)
GLUCOSE BLD STRIP.AUTO-MCNC: 235 MG/DL (ref 65–117)
SERVICE CMNT-IMP: ABNORMAL
VANCOMYCIN SERPL-MCNC: 13.7 UG/ML

## 2024-11-02 PROCEDURE — 6370000000 HC RX 637 (ALT 250 FOR IP): Performed by: STUDENT IN AN ORGANIZED HEALTH CARE EDUCATION/TRAINING PROGRAM

## 2024-11-02 PROCEDURE — 10060 I&D ABSCESS SIMPLE/SINGLE: CPT | Performed by: STUDENT IN AN ORGANIZED HEALTH CARE EDUCATION/TRAINING PROGRAM

## 2024-11-02 PROCEDURE — 0J940ZZ DRAINAGE OF RIGHT NECK SUBCUTANEOUS TISSUE AND FASCIA, OPEN APPROACH: ICD-10-PCS | Performed by: STUDENT IN AN ORGANIZED HEALTH CARE EDUCATION/TRAINING PROGRAM

## 2024-11-02 PROCEDURE — 87077 CULTURE AEROBIC IDENTIFY: CPT

## 2024-11-02 PROCEDURE — 2580000003 HC RX 258: Performed by: NURSE ANESTHETIST, CERTIFIED REGISTERED

## 2024-11-02 PROCEDURE — 2500000003 HC RX 250 WO HCPCS: Performed by: STUDENT IN AN ORGANIZED HEALTH CARE EDUCATION/TRAINING PROGRAM

## 2024-11-02 PROCEDURE — 3600000002 HC SURGERY LEVEL 2 BASE: Performed by: STUDENT IN AN ORGANIZED HEALTH CARE EDUCATION/TRAINING PROGRAM

## 2024-11-02 PROCEDURE — 2580000003 HC RX 258: Performed by: STUDENT IN AN ORGANIZED HEALTH CARE EDUCATION/TRAINING PROGRAM

## 2024-11-02 PROCEDURE — 87186 SC STD MICRODIL/AGAR DIL: CPT

## 2024-11-02 PROCEDURE — 7100000001 HC PACU RECOVERY - ADDTL 15 MIN: Performed by: STUDENT IN AN ORGANIZED HEALTH CARE EDUCATION/TRAINING PROGRAM

## 2024-11-02 PROCEDURE — 1100000000 HC RM PRIVATE

## 2024-11-02 PROCEDURE — 6360000002 HC RX W HCPCS: Performed by: STUDENT IN AN ORGANIZED HEALTH CARE EDUCATION/TRAINING PROGRAM

## 2024-11-02 PROCEDURE — 3700000001 HC ADD 15 MINUTES (ANESTHESIA): Performed by: STUDENT IN AN ORGANIZED HEALTH CARE EDUCATION/TRAINING PROGRAM

## 2024-11-02 PROCEDURE — 87147 CULTURE TYPE IMMUNOLOGIC: CPT

## 2024-11-02 PROCEDURE — 6360000002 HC RX W HCPCS: Performed by: NURSE ANESTHETIST, CERTIFIED REGISTERED

## 2024-11-02 PROCEDURE — 87205 SMEAR GRAM STAIN: CPT

## 2024-11-02 PROCEDURE — 2500000003 HC RX 250 WO HCPCS: Performed by: NURSE ANESTHETIST, CERTIFIED REGISTERED

## 2024-11-02 PROCEDURE — 87075 CULTR BACTERIA EXCEPT BLOOD: CPT

## 2024-11-02 PROCEDURE — 3700000000 HC ANESTHESIA ATTENDED CARE: Performed by: STUDENT IN AN ORGANIZED HEALTH CARE EDUCATION/TRAINING PROGRAM

## 2024-11-02 PROCEDURE — 82962 GLUCOSE BLOOD TEST: CPT

## 2024-11-02 PROCEDURE — 87070 CULTURE OTHR SPECIMN AEROBIC: CPT

## 2024-11-02 PROCEDURE — 36415 COLL VENOUS BLD VENIPUNCTURE: CPT

## 2024-11-02 PROCEDURE — 6360000002 HC RX W HCPCS: Performed by: ANESTHESIOLOGY

## 2024-11-02 PROCEDURE — 80202 ASSAY OF VANCOMYCIN: CPT

## 2024-11-02 PROCEDURE — 3600000012 HC SURGERY LEVEL 2 ADDTL 15MIN: Performed by: STUDENT IN AN ORGANIZED HEALTH CARE EDUCATION/TRAINING PROGRAM

## 2024-11-02 PROCEDURE — 7100000000 HC PACU RECOVERY - FIRST 15 MIN: Performed by: STUDENT IN AN ORGANIZED HEALTH CARE EDUCATION/TRAINING PROGRAM

## 2024-11-02 PROCEDURE — 2709999900 HC NON-CHARGEABLE SUPPLY: Performed by: STUDENT IN AN ORGANIZED HEALTH CARE EDUCATION/TRAINING PROGRAM

## 2024-11-02 RX ORDER — LIDOCAINE 4 G/G
1 PATCH TOPICAL DAILY
Status: DISCONTINUED | OUTPATIENT
Start: 2024-11-02 | End: 2024-11-04 | Stop reason: HOSPADM

## 2024-11-02 RX ORDER — LIDOCAINE HYDROCHLORIDE 20 MG/ML
INJECTION, SOLUTION EPIDURAL; INFILTRATION; INTRACAUDAL; PERINEURAL
Status: DISCONTINUED | OUTPATIENT
Start: 2024-11-02 | End: 2024-11-02 | Stop reason: SDUPTHER

## 2024-11-02 RX ORDER — SODIUM CHLORIDE 0.9 % (FLUSH) 0.9 %
5-40 SYRINGE (ML) INJECTION EVERY 12 HOURS SCHEDULED
Status: DISCONTINUED | OUTPATIENT
Start: 2024-11-02 | End: 2024-11-02 | Stop reason: HOSPADM

## 2024-11-02 RX ORDER — HYDROMORPHONE HYDROCHLORIDE 1 MG/ML
0.25 INJECTION, SOLUTION INTRAMUSCULAR; INTRAVENOUS; SUBCUTANEOUS ONCE
Status: COMPLETED | OUTPATIENT
Start: 2024-11-02 | End: 2024-11-02

## 2024-11-02 RX ORDER — SUCCINYLCHOLINE/SOD CL,ISO/PF 200MG/10ML
SYRINGE (ML) INTRAVENOUS
Status: DISCONTINUED | OUTPATIENT
Start: 2024-11-02 | End: 2024-11-02 | Stop reason: SDUPTHER

## 2024-11-02 RX ORDER — INSULIN LISPRO 100 [IU]/ML
0-8 INJECTION, SOLUTION INTRAVENOUS; SUBCUTANEOUS EVERY 6 HOURS SCHEDULED
Status: DISCONTINUED | OUTPATIENT
Start: 2024-11-02 | End: 2024-11-04

## 2024-11-02 RX ORDER — DEXAMETHASONE SODIUM PHOSPHATE 4 MG/ML
INJECTION, SOLUTION INTRA-ARTICULAR; INTRALESIONAL; INTRAMUSCULAR; INTRAVENOUS; SOFT TISSUE
Status: DISCONTINUED | OUTPATIENT
Start: 2024-11-02 | End: 2024-11-02 | Stop reason: SDUPTHER

## 2024-11-02 RX ORDER — NALOXONE HYDROCHLORIDE 0.4 MG/ML
INJECTION, SOLUTION INTRAMUSCULAR; INTRAVENOUS; SUBCUTANEOUS PRN
Status: DISCONTINUED | OUTPATIENT
Start: 2024-11-02 | End: 2024-11-02 | Stop reason: HOSPADM

## 2024-11-02 RX ORDER — ONDANSETRON 2 MG/ML
INJECTION INTRAMUSCULAR; INTRAVENOUS
Status: DISCONTINUED | OUTPATIENT
Start: 2024-11-02 | End: 2024-11-02 | Stop reason: SDUPTHER

## 2024-11-02 RX ORDER — FENTANYL CITRATE 50 UG/ML
INJECTION, SOLUTION INTRAMUSCULAR; INTRAVENOUS
Status: DISCONTINUED | OUTPATIENT
Start: 2024-11-02 | End: 2024-11-02 | Stop reason: SDUPTHER

## 2024-11-02 RX ORDER — SODIUM CHLORIDE 9 MG/ML
INJECTION, SOLUTION INTRAVENOUS PRN
Status: DISCONTINUED | OUTPATIENT
Start: 2024-11-02 | End: 2024-11-02 | Stop reason: HOSPADM

## 2024-11-02 RX ORDER — ONDANSETRON 2 MG/ML
4 INJECTION INTRAMUSCULAR; INTRAVENOUS
Status: DISCONTINUED | OUTPATIENT
Start: 2024-11-02 | End: 2024-11-02 | Stop reason: HOSPADM

## 2024-11-02 RX ORDER — INSULIN GLARGINE 100 [IU]/ML
16 INJECTION, SOLUTION SUBCUTANEOUS DAILY
Status: DISCONTINUED | OUTPATIENT
Start: 2024-11-03 | End: 2024-11-04 | Stop reason: HOSPADM

## 2024-11-02 RX ORDER — BUPIVACAINE HYDROCHLORIDE 5 MG/ML
INJECTION, SOLUTION PERINEURAL PRN
Status: DISCONTINUED | OUTPATIENT
Start: 2024-11-02 | End: 2024-11-02 | Stop reason: ALTCHOICE

## 2024-11-02 RX ORDER — PROCHLORPERAZINE EDISYLATE 5 MG/ML
5 INJECTION INTRAMUSCULAR; INTRAVENOUS
Status: COMPLETED | OUTPATIENT
Start: 2024-11-02 | End: 2024-11-02

## 2024-11-02 RX ORDER — SODIUM CHLORIDE 9 MG/ML
INJECTION, SOLUTION INTRAVENOUS
Status: DISCONTINUED | OUTPATIENT
Start: 2024-11-02 | End: 2024-11-02 | Stop reason: SDUPTHER

## 2024-11-02 RX ORDER — SODIUM CHLORIDE 0.9 % (FLUSH) 0.9 %
5-40 SYRINGE (ML) INJECTION PRN
Status: DISCONTINUED | OUTPATIENT
Start: 2024-11-02 | End: 2024-11-02 | Stop reason: HOSPADM

## 2024-11-02 RX ORDER — MEPERIDINE HYDROCHLORIDE 25 MG/ML
12.5 INJECTION INTRAMUSCULAR; INTRAVENOUS; SUBCUTANEOUS EVERY 5 MIN PRN
Status: DISCONTINUED | OUTPATIENT
Start: 2024-11-02 | End: 2024-11-02 | Stop reason: HOSPADM

## 2024-11-02 RX ORDER — PIPERACILLIN SODIUM, TAZOBACTAM SODIUM 3; .375 G/15ML; G/15ML
INJECTION, POWDER, LYOPHILIZED, FOR SOLUTION INTRAVENOUS
Status: DISCONTINUED | OUTPATIENT
Start: 2024-11-02 | End: 2024-11-02 | Stop reason: SDUPTHER

## 2024-11-02 RX ORDER — INSULIN LISPRO 100 [IU]/ML
0-16 INJECTION, SOLUTION INTRAVENOUS; SUBCUTANEOUS
Status: DISCONTINUED | OUTPATIENT
Start: 2024-11-02 | End: 2024-11-04 | Stop reason: HOSPADM

## 2024-11-02 RX ORDER — FENTANYL CITRATE 50 UG/ML
50 INJECTION, SOLUTION INTRAMUSCULAR; INTRAVENOUS EVERY 5 MIN PRN
Status: DISCONTINUED | OUTPATIENT
Start: 2024-11-02 | End: 2024-11-02 | Stop reason: HOSPADM

## 2024-11-02 RX ORDER — PROPOFOL 10 MG/ML
INJECTION, EMULSION INTRAVENOUS
Status: DISCONTINUED | OUTPATIENT
Start: 2024-11-02 | End: 2024-11-02 | Stop reason: SDUPTHER

## 2024-11-02 RX ORDER — HYDROMORPHONE HYDROCHLORIDE 1 MG/ML
0.25 INJECTION, SOLUTION INTRAMUSCULAR; INTRAVENOUS; SUBCUTANEOUS EVERY 6 HOURS PRN
Status: DISPENSED | OUTPATIENT
Start: 2024-11-02 | End: 2024-11-03

## 2024-11-02 RX ORDER — HYDROMORPHONE HYDROCHLORIDE 1 MG/ML
0.5 INJECTION, SOLUTION INTRAMUSCULAR; INTRAVENOUS; SUBCUTANEOUS EVERY 5 MIN PRN
Status: DISCONTINUED | OUTPATIENT
Start: 2024-11-02 | End: 2024-11-02 | Stop reason: HOSPADM

## 2024-11-02 RX ADMIN — PIPERACILLIN AND TAZOBACTAM 3375 MG: 3; .375 INJECTION, POWDER, FOR SOLUTION INTRAVENOUS; PARENTERAL at 10:09

## 2024-11-02 RX ADMIN — FENTANYL CITRATE 50 MCG: 50 INJECTION, SOLUTION INTRAMUSCULAR; INTRAVENOUS at 14:34

## 2024-11-02 RX ADMIN — TAMSULOSIN HYDROCHLORIDE 0.4 MG: 0.4 CAPSULE ORAL at 09:48

## 2024-11-02 RX ADMIN — ONDANSETRON HYDROCHLORIDE 4 MG: 2 INJECTION, SOLUTION INTRAMUSCULAR; INTRAVENOUS at 14:53

## 2024-11-02 RX ADMIN — LIDOCAINE HYDROCHLORIDE 100 MG: 20 INJECTION, SOLUTION EPIDURAL; INFILTRATION; INTRACAUDAL; PERINEURAL at 14:15

## 2024-11-02 RX ADMIN — PROPOFOL 120 MG: 10 INJECTION, EMULSION INTRAVENOUS at 14:18

## 2024-11-02 RX ADMIN — VENLAFAXINE HYDROCHLORIDE 37.5 MG: 37.5 CAPSULE, EXTENDED RELEASE ORAL at 09:53

## 2024-11-02 RX ADMIN — SODIUM CHLORIDE: 9 INJECTION, SOLUTION INTRAVENOUS at 14:14

## 2024-11-02 RX ADMIN — VANCOMYCIN 1500 MG: 1.5 INJECTION, SOLUTION INTRAVENOUS at 06:47

## 2024-11-02 RX ADMIN — PIPERACILLIN AND TAZOBACTAM 3.38 G: 3; .375 INJECTION, POWDER, LYOPHILIZED, FOR SOLUTION INTRAVENOUS at 14:37

## 2024-11-02 RX ADMIN — INSULIN GLARGINE 10 UNITS: 100 INJECTION, SOLUTION SUBCUTANEOUS at 09:50

## 2024-11-02 RX ADMIN — ENOXAPARIN SODIUM 30 MG: 100 INJECTION SUBCUTANEOUS at 20:46

## 2024-11-02 RX ADMIN — ONDANSETRON 4 MG: 2 INJECTION INTRAMUSCULAR; INTRAVENOUS at 10:01

## 2024-11-02 RX ADMIN — SODIUM CHLORIDE, PRESERVATIVE FREE 10 ML: 5 INJECTION INTRAVENOUS at 20:51

## 2024-11-02 RX ADMIN — INSULIN LISPRO 4 UNITS: 100 INJECTION, SOLUTION INTRAVENOUS; SUBCUTANEOUS at 17:24

## 2024-11-02 RX ADMIN — Medication 160 MG: at 14:18

## 2024-11-02 RX ADMIN — Medication 400 MG: at 09:50

## 2024-11-02 RX ADMIN — ACETAMINOPHEN 650 MG: 325 TABLET ORAL at 23:34

## 2024-11-02 RX ADMIN — FENTANYL CITRATE 50 MCG: 50 INJECTION, SOLUTION INTRAMUSCULAR; INTRAVENOUS at 14:16

## 2024-11-02 RX ADMIN — PIPERACILLIN AND TAZOBACTAM 3375 MG: 3; .375 INJECTION, POWDER, FOR SOLUTION INTRAVENOUS; PARENTERAL at 02:51

## 2024-11-02 RX ADMIN — DEXAMETHASONE SODIUM PHOSPHATE 4 MG: 4 INJECTION, SOLUTION INTRAMUSCULAR; INTRAVENOUS at 14:26

## 2024-11-02 RX ADMIN — HYDROMORPHONE HYDROCHLORIDE 0.25 MG: 1 INJECTION, SOLUTION INTRAMUSCULAR; INTRAVENOUS; SUBCUTANEOUS at 20:46

## 2024-11-02 RX ADMIN — VANCOMYCIN 1500 MG: 1.5 INJECTION, SOLUTION INTRAVENOUS at 21:55

## 2024-11-02 RX ADMIN — PROCHLORPERAZINE EDISYLATE 5 MG: 5 INJECTION INTRAMUSCULAR; INTRAVENOUS at 15:29

## 2024-11-02 RX ADMIN — FENTANYL CITRATE 50 MCG: 50 INJECTION INTRAMUSCULAR; INTRAVENOUS at 15:13

## 2024-11-02 RX ADMIN — SODIUM CHLORIDE, PRESERVATIVE FREE 10 ML: 5 INJECTION INTRAVENOUS at 09:55

## 2024-11-02 RX ADMIN — FENTANYL CITRATE 50 MCG: 50 INJECTION INTRAMUSCULAR; INTRAVENOUS at 15:33

## 2024-11-02 RX ADMIN — HYDROMORPHONE HYDROCHLORIDE 0.25 MG: 1 INJECTION, SOLUTION INTRAMUSCULAR; INTRAVENOUS; SUBCUTANEOUS at 03:15

## 2024-11-02 RX ADMIN — PIPERACILLIN AND TAZOBACTAM 3375 MG: 3; .375 INJECTION, POWDER, FOR SOLUTION INTRAVENOUS; PARENTERAL at 17:22

## 2024-11-02 RX ADMIN — INSULIN LISPRO 4 UNITS: 100 INJECTION, SOLUTION INTRAVENOUS; SUBCUTANEOUS at 20:47

## 2024-11-02 RX ADMIN — Medication 3 MG: at 20:46

## 2024-11-02 ASSESSMENT — PAIN DESCRIPTION - DESCRIPTORS
DESCRIPTORS: ACHING
DESCRIPTORS: BURNING
DESCRIPTORS: BURNING;SORE
DESCRIPTORS: ACHING
DESCRIPTORS: BURNING

## 2024-11-02 ASSESSMENT — PAIN SCALES - GENERAL
PAINLEVEL_OUTOF10: 4
PAINLEVEL_OUTOF10: 10
PAINLEVEL_OUTOF10: 5
PAINLEVEL_OUTOF10: 7
PAINLEVEL_OUTOF10: 8
PAINLEVEL_OUTOF10: 8
PAINLEVEL_OUTOF10: 10
PAINLEVEL_OUTOF10: 6

## 2024-11-02 ASSESSMENT — PAIN DESCRIPTION - ORIENTATION
ORIENTATION: POSTERIOR
ORIENTATION: POSTERIOR
ORIENTATION: RIGHT;LEFT

## 2024-11-02 ASSESSMENT — PAIN DESCRIPTION - LOCATION
LOCATION: NECK

## 2024-11-02 ASSESSMENT — ENCOUNTER SYMPTOMS: SHORTNESS OF BREATH: 1

## 2024-11-02 NOTE — ANESTHESIA POSTPROCEDURE EVALUATION
Department of Anesthesiology  Postprocedure Note    Patient: Delio Zavala  MRN: [id number]  YOB: 1955  Date of evaluation: 11/2/2024    Procedure Summary       Date: 11/02/24 Room / Location: [facility] MAIN OR  / [facility] MAIN OR    Anesthesia Start: 1414 Anesthesia Stop: 1503    Procedure: INCISION AND DRAINAGE OF POSTERIOR NECK ABSCESS (Neck) Diagnosis:       Abscess      (Abscess [L02.91])    Providers: Jeannette Machado MD Responsible Provider: Kai Ordoñez MD    Anesthesia Type: general ASA Status: 3            Anesthesia Type: No value filed.    Terrence Phase I: Terrence Score: 8    Terrence Phase II:      Anesthesia Post Evaluation    Patient location during evaluation: PACU  Patient participation: complete - patient participated  Level of consciousness: sleepy but conscious and responsive to verbal stimuli  Airway patency: patent  Nausea & Vomiting: no vomiting and no nausea  Cardiovascular status: blood pressure returned to baseline and hemodynamically stable  Respiratory status: acceptable  Hydration status: stable    No notable events documented.

## 2024-11-02 NOTE — ANESTHESIA PRE PROCEDURE
to multiple sites (HCC)    • PUD (peptic ulcer disease)     \"years ago\"   • Sleep apnea     wife witnessed apnea -- PCP & Card both referred to sleep clinic pt refuse/declined sleep apnea       Past Surgical History:        Procedure Laterality Date   • ABLATION OF DYSRHYTHMIC FOCUS     • CARDIAC CATH PROCEDURE     • CARDIOVERSION EXTERNAL     • CERVICAL DISCECTOMY      pt unsure exactly what they did to his neck   • CERVICAL FUSION     • CT BIOPSY PERCUTANEOUS SUPERFICIAL BONE  10/04/2022    CT BIOPSY PERCUTANEOUS SUPERFICIAL BONE 10/4/2022 [facility] RAD CT   • HEENT  13    THYROID BIOPSY   • LUMBAR DISC SURGERY     • ORTHOPEDIC SURGERY Right     elbow surgery, RIGHT   • ORTHOPEDIC SURGERY Left     wrist surgery, LEFT   • ORTHOPEDIC SURGERY Left     shoulder surgery on left   • [place] UNLISTED PROCEDURE CARDIAC SURGERY  ,     ABLATION x4  &    • TONSILLECTOMY         Social History:    Social History     Tobacco Use   • Smoking status: Former     Current packs/day: 0.00     Average packs/day: 0.3 packs/day for 40.0 years (10.0 ttl pk-yrs)     Types: Cigarettes     Start date: 10/1/1972     Quit date: 10/1/2012     Years since quittin.0   • Smokeless tobacco: Never   Substance Use Topics   • Alcohol use: Yes     Comment: rarely                                Counseling given: Not Answered      Vital Signs (Current):   Vitals:    24 2057 24 2100 24 0236 24 0815   BP:  (!) 128/58 133/69 129/71   Pulse: 77 88 67 52   Resp: 20 18 18 16   Temp:  98 °F (36.7 °C) 97.7 °F (36.5 °C) 97.2 °F (36.2 °C)   TempSrc:  Oral Oral Oral   SpO2: 97%  95% 92%   Weight:       Height:                                                  BP Readings from Last 3 Encounters:   24 129/71   10/28/24 136/83   24 (!) 144/93       NPO Status:                                                                                 BMI:   Wt Readings from Last 3 Encounters:   10/31/24 124.6

## 2024-11-02 NOTE — FLOWSHEET NOTE
Pt states that he would like for his breathing treatments to be discontinued because he feels they are causing him to feel anxious, have a dry mouth, and cough. Will inform dayshift RN to convey this message to the Doctor

## 2024-11-02 NOTE — OP NOTE
Operative Note      Patient: Delio Zavala  YOB: 1955  MRN: [id number]    Date of Procedure: 11/2/2024    Pre-Op Diagnosis Codes:      * Abscess [L02.91]    Post-Op Diagnosis: Same       Procedure(s):  POSTERIOR NECK LESION BIOPSY EXCISION    Surgeon(s):  Jeannette Machado MD    Assistant:   First Assistant: Padma Fox RN    Anesthesia: General    Estimated Blood Loss (mL): 8 mL    Complications: None    Specimens:   ID Type Source Tests Collected by Time Destination   A : Posterior Neck Culture Swab Neck CULTURE, ANAEROBIC, CULTURE, WOUND Jeannette Machado MD 11/2/2024 1440        Implants:  * No implants in log *      Drains: * No LDAs found *    Findings:  Infection Present At Time Of Surgery (PATOS) (choose all levels that have infection present):  - Superficial Infection (skin/subcutaneous) present as evidenced by pus  Other Findings: 4x6 cm post neck abscess  This procedure was not performed to treat primary cutaneous melanoma through wide local excision    Detailed Description of Procedure:   The patient was brought back to the operating room.  General endotracheal anesthesia was induced.  The patient was placed in a prone position and all padding verified.  Preoperative antibiotics were administered.  The neck was prepped with Betadine and draped in the usual sterile fashion.  After timeout, the abscess cavity was more thoroughly examined and noted to be about 6 cm in a lateral orientation by 4 cm in a craniocaudal orientation.  Centrally a small amount of pus could be seen draining spontaneously.  This was incised with a 15 blade and the incision extended.  A thin rim of tissue was resected using a 15 blade to allow for better drainage.  A Brigitte clamp was used to break up loculations.  The pus was cultured.  Local was infiltrated.  The abscess cavity was copiously irrigated and packed with quarter inch packing, followed by gauze, followed by an ABD.  He tolerated the

## 2024-11-03 LAB
ANION GAP SERPL CALC-SCNC: 2 MMOL/L (ref 2–12)
BUN SERPL-MCNC: 9 MG/DL (ref 6–20)
BUN/CREAT SERPL: 10 (ref 12–20)
CALCIUM SERPL-MCNC: 8.3 MG/DL (ref 8.5–10.1)
CHLORIDE SERPL-SCNC: 105 MMOL/L (ref 97–108)
CO2 SERPL-SCNC: 31 MMOL/L (ref 21–32)
CREAT SERPL-MCNC: 0.86 MG/DL (ref 0.7–1.3)
GLUCOSE BLD STRIP.AUTO-MCNC: 169 MG/DL (ref 65–117)
GLUCOSE BLD STRIP.AUTO-MCNC: 196 MG/DL (ref 65–117)
GLUCOSE BLD STRIP.AUTO-MCNC: 212 MG/DL (ref 65–117)
GLUCOSE BLD STRIP.AUTO-MCNC: 245 MG/DL (ref 65–117)
GLUCOSE BLD STRIP.AUTO-MCNC: 247 MG/DL (ref 65–117)
GLUCOSE SERPL-MCNC: 161 MG/DL (ref 65–100)
POTASSIUM SERPL-SCNC: 3.8 MMOL/L (ref 3.5–5.1)
SERVICE CMNT-IMP: ABNORMAL
SODIUM SERPL-SCNC: 138 MMOL/L (ref 136–145)

## 2024-11-03 PROCEDURE — 2500000003 HC RX 250 WO HCPCS: Performed by: STUDENT IN AN ORGANIZED HEALTH CARE EDUCATION/TRAINING PROGRAM

## 2024-11-03 PROCEDURE — 82962 GLUCOSE BLOOD TEST: CPT

## 2024-11-03 PROCEDURE — 2500000003 HC RX 250 WO HCPCS

## 2024-11-03 PROCEDURE — 6360000002 HC RX W HCPCS: Performed by: STUDENT IN AN ORGANIZED HEALTH CARE EDUCATION/TRAINING PROGRAM

## 2024-11-03 PROCEDURE — 6370000000 HC RX 637 (ALT 250 FOR IP): Performed by: STUDENT IN AN ORGANIZED HEALTH CARE EDUCATION/TRAINING PROGRAM

## 2024-11-03 PROCEDURE — 80048 BASIC METABOLIC PNL TOTAL CA: CPT

## 2024-11-03 PROCEDURE — 36415 COLL VENOUS BLD VENIPUNCTURE: CPT

## 2024-11-03 PROCEDURE — 1100000000 HC RM PRIVATE

## 2024-11-03 PROCEDURE — 2580000003 HC RX 258: Performed by: STUDENT IN AN ORGANIZED HEALTH CARE EDUCATION/TRAINING PROGRAM

## 2024-11-03 PROCEDURE — 6370000000 HC RX 637 (ALT 250 FOR IP): Performed by: INTERNAL MEDICINE

## 2024-11-03 RX ORDER — HYDROMORPHONE HYDROCHLORIDE 1 MG/ML
0.25 INJECTION, SOLUTION INTRAMUSCULAR; INTRAVENOUS; SUBCUTANEOUS EVERY 6 HOURS PRN
Status: DISCONTINUED | OUTPATIENT
Start: 2024-11-03 | End: 2024-11-04 | Stop reason: HOSPADM

## 2024-11-03 RX ADMIN — VANCOMYCIN 1500 MG: 1.5 INJECTION, SOLUTION INTRAVENOUS at 21:38

## 2024-11-03 RX ADMIN — HYDROMORPHONE HYDROCHLORIDE 0.25 MG: 1 INJECTION, SOLUTION INTRAMUSCULAR; INTRAVENOUS; SUBCUTANEOUS at 03:36

## 2024-11-03 RX ADMIN — INSULIN GLARGINE 16 UNITS: 100 INJECTION, SOLUTION SUBCUTANEOUS at 09:41

## 2024-11-03 RX ADMIN — ACETAMINOPHEN 650 MG: 325 TABLET ORAL at 15:21

## 2024-11-03 RX ADMIN — INSULIN LISPRO 4 UNITS: 100 INJECTION, SOLUTION INTRAVENOUS; SUBCUTANEOUS at 20:28

## 2024-11-03 RX ADMIN — Medication 400 MG: at 09:40

## 2024-11-03 RX ADMIN — VANCOMYCIN 1500 MG: 1.5 INJECTION, SOLUTION INTRAVENOUS at 08:22

## 2024-11-03 RX ADMIN — HYDROMORPHONE HYDROCHLORIDE 0.25 MG: 1 INJECTION, SOLUTION INTRAMUSCULAR; INTRAVENOUS; SUBCUTANEOUS at 20:29

## 2024-11-03 RX ADMIN — ASPIRIN 81 MG: 81 TABLET, COATED ORAL at 09:40

## 2024-11-03 RX ADMIN — INSULIN LISPRO 4 UNITS: 100 INJECTION, SOLUTION INTRAVENOUS; SUBCUTANEOUS at 11:52

## 2024-11-03 RX ADMIN — HYDROMORPHONE HYDROCHLORIDE 0.25 MG: 1 INJECTION, SOLUTION INTRAMUSCULAR; INTRAVENOUS; SUBCUTANEOUS at 09:37

## 2024-11-03 RX ADMIN — PIPERACILLIN AND TAZOBACTAM 3375 MG: 3; .375 INJECTION, POWDER, FOR SOLUTION INTRAVENOUS; PARENTERAL at 01:40

## 2024-11-03 RX ADMIN — PIPERACILLIN AND TAZOBACTAM 3375 MG: 3; .375 INJECTION, POWDER, FOR SOLUTION INTRAVENOUS; PARENTERAL at 17:16

## 2024-11-03 RX ADMIN — Medication 3 MG: at 20:28

## 2024-11-03 RX ADMIN — VENLAFAXINE HYDROCHLORIDE 37.5 MG: 37.5 CAPSULE, EXTENDED RELEASE ORAL at 09:39

## 2024-11-03 RX ADMIN — INSULIN LISPRO 4 UNITS: 100 INJECTION, SOLUTION INTRAVENOUS; SUBCUTANEOUS at 17:30

## 2024-11-03 RX ADMIN — SODIUM CHLORIDE, PRESERVATIVE FREE 10 ML: 5 INJECTION INTRAVENOUS at 20:29

## 2024-11-03 RX ADMIN — PIPERACILLIN AND TAZOBACTAM 3375 MG: 3; .375 INJECTION, POWDER, FOR SOLUTION INTRAVENOUS; PARENTERAL at 10:11

## 2024-11-03 RX ADMIN — ENOXAPARIN SODIUM 30 MG: 100 INJECTION SUBCUTANEOUS at 20:28

## 2024-11-03 RX ADMIN — MONTELUKAST 10 MG: 10 TABLET, FILM COATED ORAL at 09:40

## 2024-11-03 RX ADMIN — ENOXAPARIN SODIUM 30 MG: 100 INJECTION SUBCUTANEOUS at 09:40

## 2024-11-03 RX ADMIN — SODIUM CHLORIDE, PRESERVATIVE FREE 10 ML: 5 INJECTION INTRAVENOUS at 09:50

## 2024-11-03 RX ADMIN — ACETAMINOPHEN 650 MG: 325 TABLET ORAL at 06:42

## 2024-11-03 RX ADMIN — HYDROXYZINE HYDROCHLORIDE 10 MG: 10 TABLET ORAL at 15:21

## 2024-11-03 RX ADMIN — TAMSULOSIN HYDROCHLORIDE 0.4 MG: 0.4 CAPSULE ORAL at 09:40

## 2024-11-03 ASSESSMENT — PAIN DESCRIPTION - DESCRIPTORS
DESCRIPTORS: ACHING;DULL
DESCRIPTORS: SHARP
DESCRIPTORS: ACHING

## 2024-11-03 ASSESSMENT — PAIN DESCRIPTION - LOCATION
LOCATION: NECK

## 2024-11-03 ASSESSMENT — PAIN - FUNCTIONAL ASSESSMENT: PAIN_FUNCTIONAL_ASSESSMENT: ACTIVITIES ARE NOT PREVENTED

## 2024-11-03 ASSESSMENT — PAIN SCALES - GENERAL
PAINLEVEL_OUTOF10: 5
PAINLEVEL_OUTOF10: 8
PAINLEVEL_OUTOF10: 4
PAINLEVEL_OUTOF10: 7
PAINLEVEL_OUTOF10: 10
PAINLEVEL_OUTOF10: 4

## 2024-11-03 ASSESSMENT — PAIN DESCRIPTION - ORIENTATION
ORIENTATION: POSTERIOR

## 2024-11-04 VITALS
WEIGHT: 274.69 LBS | HEART RATE: 56 BPM | BODY MASS INDEX: 37.21 KG/M2 | SYSTOLIC BLOOD PRESSURE: 139 MMHG | HEIGHT: 72 IN | TEMPERATURE: 97.5 F | RESPIRATION RATE: 17 BRPM | OXYGEN SATURATION: 96 % | DIASTOLIC BLOOD PRESSURE: 88 MMHG

## 2024-11-04 PROBLEM — F32.A DEPRESSION: Status: RESOLVED | Noted: 2024-11-02 | Resolved: 2024-11-04

## 2024-11-04 LAB
BACTERIA SPEC CULT: ABNORMAL
BACTERIA SPEC CULT: NORMAL
GLUCOSE BLD STRIP.AUTO-MCNC: 162 MG/DL (ref 65–117)
GLUCOSE BLD STRIP.AUTO-MCNC: 177 MG/DL (ref 65–117)
GLUCOSE BLD STRIP.AUTO-MCNC: 226 MG/DL (ref 65–117)
GRAM STN SPEC: ABNORMAL
GRAM STN SPEC: ABNORMAL
SERVICE CMNT-IMP: ABNORMAL
SERVICE CMNT-IMP: NORMAL

## 2024-11-04 PROCEDURE — 6360000002 HC RX W HCPCS: Performed by: STUDENT IN AN ORGANIZED HEALTH CARE EDUCATION/TRAINING PROGRAM

## 2024-11-04 PROCEDURE — 6370000000 HC RX 637 (ALT 250 FOR IP): Performed by: STUDENT IN AN ORGANIZED HEALTH CARE EDUCATION/TRAINING PROGRAM

## 2024-11-04 PROCEDURE — 82962 GLUCOSE BLOOD TEST: CPT

## 2024-11-04 PROCEDURE — 2580000003 HC RX 258: Performed by: STUDENT IN AN ORGANIZED HEALTH CARE EDUCATION/TRAINING PROGRAM

## 2024-11-04 PROCEDURE — 99233 SBSQ HOSP IP/OBS HIGH 50: CPT | Performed by: INTERNAL MEDICINE

## 2024-11-04 RX ORDER — LINEZOLID 600 MG/1
600 TABLET, FILM COATED ORAL EVERY 12 HOURS SCHEDULED
Qty: 16 TABLET | Refills: 0 | Status: SHIPPED | OUTPATIENT
Start: 2024-11-04 | End: 2024-11-12

## 2024-11-04 RX ORDER — MUPIROCIN 20 MG/G
OINTMENT TOPICAL
Qty: 1 EACH | Refills: 0 | Status: SHIPPED | OUTPATIENT
Start: 2024-11-04

## 2024-11-04 RX ORDER — LINEZOLID 600 MG/1
600 TABLET, FILM COATED ORAL EVERY 12 HOURS SCHEDULED
Status: DISCONTINUED | OUTPATIENT
Start: 2024-11-04 | End: 2024-11-04 | Stop reason: HOSPADM

## 2024-11-04 RX ORDER — ARFORMOTEROL TARTRATE 15 UG/2ML
15 SOLUTION RESPIRATORY (INHALATION) 2 TIMES DAILY PRN
Status: DISCONTINUED | OUTPATIENT
Start: 2024-11-04 | End: 2024-11-04 | Stop reason: HOSPADM

## 2024-11-04 RX ORDER — LINEZOLID 600 MG/1
600 TABLET, FILM COATED ORAL EVERY 12 HOURS SCHEDULED
Qty: 28 TABLET | Refills: 0 | Status: SHIPPED | OUTPATIENT
Start: 2024-11-04 | End: 2024-11-04

## 2024-11-04 RX ORDER — BUDESONIDE 0.25 MG/2ML
0.25 INHALANT ORAL 2 TIMES DAILY PRN
Status: DISCONTINUED | OUTPATIENT
Start: 2024-11-04 | End: 2024-11-04 | Stop reason: HOSPADM

## 2024-11-04 RX ORDER — MUPIROCIN 20 MG/G
OINTMENT TOPICAL 2 TIMES DAILY
Status: DISCONTINUED | OUTPATIENT
Start: 2024-11-04 | End: 2024-11-04 | Stop reason: HOSPADM

## 2024-11-04 RX ORDER — LACTOBACILLUS RHAMNOSUS GG 10B CELL
1 CAPSULE ORAL
Status: DISCONTINUED | OUTPATIENT
Start: 2024-11-04 | End: 2024-11-04 | Stop reason: HOSPADM

## 2024-11-04 RX ORDER — LACTOBACILLUS RHAMNOSUS GG 10B CELL
1 CAPSULE ORAL
Qty: 30 CAPSULE | Refills: 0 | Status: SHIPPED | OUTPATIENT
Start: 2024-11-05 | End: 2024-12-05

## 2024-11-04 RX ADMIN — VANCOMYCIN 1500 MG: 1.5 INJECTION, SOLUTION INTRAVENOUS at 05:50

## 2024-11-04 RX ADMIN — ENOXAPARIN SODIUM 30 MG: 100 INJECTION SUBCUTANEOUS at 08:53

## 2024-11-04 RX ADMIN — ASPIRIN 81 MG: 81 TABLET, COATED ORAL at 08:54

## 2024-11-04 RX ADMIN — Medication 400 MG: at 08:53

## 2024-11-04 RX ADMIN — PIPERACILLIN AND TAZOBACTAM 3375 MG: 3; .375 INJECTION, POWDER, FOR SOLUTION INTRAVENOUS; PARENTERAL at 09:11

## 2024-11-04 RX ADMIN — INSULIN LISPRO 4 UNITS: 100 INJECTION, SOLUTION INTRAVENOUS; SUBCUTANEOUS at 13:23

## 2024-11-04 RX ADMIN — VENLAFAXINE HYDROCHLORIDE 37.5 MG: 37.5 CAPSULE, EXTENDED RELEASE ORAL at 10:31

## 2024-11-04 RX ADMIN — Medication 1 CAPSULE: at 09:00

## 2024-11-04 RX ADMIN — ACETAMINOPHEN 650 MG: 325 TABLET ORAL at 01:36

## 2024-11-04 RX ADMIN — INSULIN GLARGINE 16 UNITS: 100 INJECTION, SOLUTION SUBCUTANEOUS at 08:53

## 2024-11-04 RX ADMIN — PIPERACILLIN AND TAZOBACTAM 3375 MG: 3; .375 INJECTION, POWDER, FOR SOLUTION INTRAVENOUS; PARENTERAL at 01:33

## 2024-11-04 RX ADMIN — MONTELUKAST 10 MG: 10 TABLET, FILM COATED ORAL at 08:54

## 2024-11-04 RX ADMIN — SODIUM CHLORIDE, PRESERVATIVE FREE 10 ML: 5 INJECTION INTRAVENOUS at 08:54

## 2024-11-04 RX ADMIN — TAMSULOSIN HYDROCHLORIDE 0.4 MG: 0.4 CAPSULE ORAL at 08:54

## 2024-11-04 ASSESSMENT — PAIN DESCRIPTION - LOCATION: LOCATION: NECK

## 2024-11-04 ASSESSMENT — PAIN SCALES - GENERAL: PAINLEVEL_OUTOF10: 4

## 2024-11-04 ASSESSMENT — PAIN DESCRIPTION - ORIENTATION: ORIENTATION: POSTERIOR

## 2024-11-04 ASSESSMENT — PAIN DESCRIPTION - DESCRIPTORS: DESCRIPTORS: ACHING;DULL

## 2024-11-04 NOTE — CARE COORDINATION
Care Management Initial Assessment       RUR: 17% Moderate Risk  Readmission? No  1st IM letter given? Yes   1st  letter given: N/A    Initial Assessment: Chart reviewed. CM met with pt at the bedside to introduce self and role. Verified contact information and demographics. Pt resides spouse in a one level home with 3 CARYN. Pt PCP is Dr. Haywood with last visit being last week. Preferred pharmacy is [facility]. Pt has no hx needing HH/IPR/SNF services. Pt states he is independent with ADL's and is an active . ?Pts spouse to transport for discharge.?He?states there are no concerns for him to return home. CM will continue to follow.    Plan A: Home with follow up appts.  Plan B: Home with HH  Full assessment below:          11/04/24 5593   Service Assessment   Patient Orientation Alert and Oriented;Person;Place;Situation;Self   Cognition Alert   History Provided By Patient   Primary Caregiver Self   Support Systems Spouse/Significant Other   Patient's Healthcare Decision Maker is: Legal Next of Kin   PCP Verified by CM Yes   Last Visit to PCP Within last 3 months   Prior Functional Level Independent in ADLs/IADLs;Mobility;Bathing;Dressing;Toileting;Feeding;Cooking;Housework;Shopping   Current Functional Level Independent in ADLs/IADLs;Mobility;Shopping;Housework;Cooking;Feeding;Toileting;Dressing;Bathing   Can patient return to prior living arrangement Yes   Ability to make needs known: Good   Family able to assist with home care needs: Yes   Would you like for me to discuss the discharge plan with any other family members/significant others, and if so, who? Yes  (Tamera Zavala (Spouse)  [phone] ([place]))   Financial Resources Medicare   Social/Functional History   Lives With Spouse   Type of Home House   Home Layout One level   Home Equipment None   Active  Yes   Discharge Planning   Type of Residence House   Current Services Prior To Admission None   Patient expects to be discharged

## 2024-11-04 NOTE — PLAN OF CARE
Problem: Discharge Planning  Goal: Discharge to home or other facility with appropriate resources  11/4/2024 1604 by Elif Barrera RN  Outcome: Adequate for Discharge  11/4/2024 1036 by Elif Barrera RN  Outcome: Progressing     Problem: Pain  Goal: Verbalizes/displays adequate comfort level or baseline comfort level  11/4/2024 1604 by Elif Barrera RN  Outcome: Adequate for Discharge  11/4/2024 1036 by Elif Barrera RN  Outcome: Progressing     Problem: Respiratory - Adult  Goal: Achieves optimal ventilation and oxygenation  11/4/2024 1604 by Elif Barrera RN  Outcome: Adequate for Discharge  11/4/2024 1036 by Elif Barrera RN  Outcome: Progressing  11/4/2024 0611 by Hamzah Wolf RCP  Outcome: Progressing     Problem: Safety - Adult  Goal: Free from fall injury  11/4/2024 1604 by Elif Barrera RN  Outcome: Adequate for Discharge  11/4/2024 1036 by Elif Barrera RN  Outcome: Progressing     Problem: Chronic Conditions and Co-morbidities  Goal: Patient's chronic conditions and co-morbidity symptoms are monitored and maintained or improved  11/4/2024 1604 by Elif Barrera RN  Outcome: Adequate for Discharge  11/4/2024 1036 by Elif Barrera RN  Outcome: Progressing

## 2024-11-04 NOTE — PROGRESS NOTES
Hospitalist Progress Note    NAME:   Delio Zavala   : 1955   MRN: [id number]     Date/Time: 11/3/2024 4:23 PM  Patient PCP: Estrella Haywood MD    Estimated discharge date:  11/3  Barriers: Cultures, ID recs, clinical improvement      Assessment / Plan:    Abscess 2/2 right posterior neck cellulitis  Cellulitis of right posterior neck with failed outpatient therapy  -patient had a pimple that was poked at home by family subsequently developed swelling.  -CT neck with contrast on 10/20/2024 revealed right paramedian subcutaneous inflammatory changes in the neck at the area.  No abscess demonstrated.  -Patient was discharged on Keflex, doxycycline and prednisone, which patient has been taking as prescribed without any improvement  -Blood cultures obtained  -Continue IV vancomycin, but add IV Zosyn  -c/w  tylenol , hydromorphone,  lidocaine as needed for pain  -c/w vanc zosyn, status post I&D per surgery 24, follow clx, f/u surg recs       Prostate cancer with metastasis to the bone, finished 4 weeks of radiation on 2024 and currently undergoing hormone therapy  -Followed by Dr. Ordoñez with [facility]  -Continue apalutamide and Flomax  -Continue routine outpatient follow-up     Type II DM  -Hold home medications  -Insulin sliding scale with Accu-Chek  -8.0  A1c,   --lantus 16, and sliding scale.     History of COPD not in exacerbation  -Continue Trelegy or equivalent and Singulair  -Albuterol as needed     Depression  -Continue Effexor      Medical Decision Making:   I personally reviewed labs: CBC BMP  I personally reviewed imaging:      Discussed case with: Patient        Code Status: Full  DVT Prophylaxis: Lovenox    Subjective:      Seen and eval pt at bedside, patient was eating lunch with family at bedside, feeling better status post incision and drainage of neck abscess, slept overnight with pain control , denies any fever or chills      Objective:     VITALS:   Last 
      Hospitalist Progress Note    NAME:   Delio Zavala   : 1955   MRN: [id number]     Date/Time: 2024 10:30 AM  Patient PCP: Estrella Haywood MD    Estimated discharge date:    Barriers: anbox, clinical improvement      Assessment / Plan:    Concerns for abcess  Cellulitis of right posterior neck with failed outpatient therapy  -patient had a pimple that was poked at home by family subsequently developed swelling.  -CT neck with contrast on 10/20/2024 revealed right paramedian subcutaneous inflammatory changes in the neck at the area.  No abscess demonstrated.  -Patient was discharged on Keflex, doxycycline and prednisone, which patient has been taking as prescribed without any improvement  -Blood cultures obtained  -Continue IV vancomycin, but add IV Zosyn  -c/w  tylenol , hydromorphone,  lidocaine as needed for pain  -c/w vanc zosyn, I&D per surgery 24, follow clx, f/u surg recs       Prostate cancer with metastasis to the bone, finished 4 weeks of radiation on 2024 and currently undergoing hormone therapy  -Followed by Dr. Ordoñez with [facility]  -Continue apalutamide and Flomax  -Continue routine outpatient follow-up     Type II DM  -Hold home medications  -Insulin sliding scale with Accu-Chek  -8.0  A1c,   --lantus 16, and sliding scale.     History of COPD not in exacerbation  -Continue Trelegy or equivalent and Singulair  -Albuterol as needed     Depression  -Continue Effexor      Medical Decision Making:   I personally reviewed labs: CBC BMP  I personally reviewed imaging:      Discussed case with: Patient        Code Status: Full  DVT Prophylaxis: Lovenox    Subjective:      Seen and eval pt at bedside, patient was eating lunch with family at bedside, feeling better, slept overnight with pain control , denies any fever or chills      Objective:     VITALS:   Last 24hrs VS reviewed since prior progress note. Most recent are:  Patient Vitals for the past 24 hrs:   
ADULT PROTOCOL: JET AEROSOL ASSESSMENT    Patient  Delio Zavala     69 y.o.   male     11/1/2024  8:57 PM    Breath Sounds Pre Procedure: Breath Sounds Pre-Tx MITALI: Diminished, Rhonchi                                  Breath Sounds Pre-Tx LLL: Diminished, Rhonchi        Breath Sounds Pre-Tx RUL: Diminished, Rhonchi        Breath Sounds Pre-Tx RML: Diminished, Rhonchi        Breath Sounds Pre-Tx RLL: Diminished, Rhonchi  Breath Sounds Post Procedure: Breath Sounds Post-Tx MITALI: Diminished, Clear          Breath Sounds Post-Tx LLL: Diminished, Clear          Breath Sounds Post-Tx RUL: Diminished, Clear          Breath Sounds Post-Tx RML: Diminished, Clear          Breath Sounds Post-Tx RLL: Diminished, Clear                                     Heart Rate: Pre procedure Pre-Tx Pulse: 93           Post procedure Post-Tx Pulse: 77    Resp Rate: Pre procedure Pre-Tx Resps: 20           Post procedure Post-Tx Resps: 20        Oxygen: O2 Therapy: Room air        Changed: No    SpO2:  SpO2: 97 %   without Oxygen                Nebulizer Therapy: Current medications Medications: Arformoterol, Budesonide, Ipratropium Bromide      Changed: Yes  Ipratropium Bromide changed from Q6 While Awake to BID.   Smoking History: Former Smoker    Problem List:   Patient Active Problem List   Diagnosis    Pleural effusion, bilateral    Typical atrial flutter (HCC)    Sinus bradycardia    S/P ablation of atrial fibrillation    Mixed hyperlipidemia    Severe obesity (BMI 35.0-39.9) with comorbidity    Hypertension    Spinal stenosis of lumbar region with neurogenic claudication    Dyslipidemia    Long term (current) use of anticoagulants    Irregular heart beat    Paroxysmal atrial fibrillation (HCC)    Non morbid obesity due to excess calories    Atrial fibrillation and flutter (HCC)    SOB (shortness of breath)    Atrial fibrillation (HCC)    Prostate cancer (HCC)    Secondary malignant neoplasm of bone (HCC)    Cellulitis 
Acute Care Surgery Progress Note    Admit Date: 10/31/2024        Subjective/24 hour events:    Pt resting in bed. More drainage from neck.     Objective:    VSS Afebrile.    Vitals:    11/01/24 2057 11/01/24 2100 11/02/24 0236 11/02/24 0815   BP:  (!) 128/58 133/69 129/71   Pulse: 77 88 67 52   Resp: 20 18 18 16   Temp:  98 °F (36.7 °C) 97.7 °F (36.5 °C) 97.2 °F (36.2 °C)   TempSrc:  Oral Oral Oral   SpO2: 97%  95% 92%   Weight:       Height:            4 cm abscess on posterior neck, draining       Labs:    CBC:   Lab Results   Component Value Date/Time    WBC 8.3 11/01/2024 05:20 AM    RBC 4.11 11/01/2024 05:20 AM    HGB 12.7 11/01/2024 05:20 AM    HCT 38.2 11/01/2024 05:20 AM    MCV 92.9 11/01/2024 05:20 AM    MCH 30.9 11/01/2024 05:20 AM    MCHC 33.2 11/01/2024 05:20 AM    RDW 13.4 11/01/2024 05:20 AM     11/01/2024 05:20 AM    MPV 9.5 11/01/2024 05:20 AM     BMP:    Lab Results   Component Value Date/Time     11/01/2024 05:20 AM    K 3.9 11/01/2024 05:20 AM     11/01/2024 05:20 AM    CO2 28 11/01/2024 05:20 AM    BUN 12 11/01/2024 05:20 AM    CREATININE 0.80 11/01/2024 05:20 AM    CALCIUM 8.6 11/01/2024 05:20 AM    GFRAA >60 03/28/2022 11:21 AM    LABGLOM >90 11/01/2024 05:20 AM    LABGLOM >60 01/24/2024 12:00 PM    GLUCOSE 275 11/01/2024 05:20 AM       Body mass index is 37.26 kg/m².       Assessment/Plan:    70 yo with neck abscess for 2 weeks. OR today for I&D.     OR today   Continue abx     Moustafa Moustafa, MD  General Surgery    
Acute Care Surgery Progress Note    Admit Date: 10/31/2024    POD 1 Day Post-Op    Procedure: Procedure(s):  INCISION AND DRAINAGE OF POSTERIOR NECK ABSCESS    Subjective/24 hour events:    Pt resting in bed. No complaints.  Pain improved.    Objective:    VSS Afebrile.    Vitals:    11/02/24 1545 11/02/24 1615 11/02/24 1945 11/03/24 0745   BP: 127/70 (!) 143/71 (!) 140/78 139/84   Pulse: 66 64 73 64   Resp: 16 16 16 16   Temp: 98.1 °F (36.7 °C) 97.6 °F (36.4 °C)  98.2 °F (36.8 °C)   TempSrc: Oral Oral  Oral   SpO2: 97% 95% 93% 94%   Weight:       Height:            General: Well-appearing, no acute distress  HEENT: Extraocular muscles intact, trachea midline, normal range of motion  Lungs: Normal work of breathing, nontachypneic  Heart: Regular rate and rhythm  Abdomen: Soft, nondistended  Extremities: Warm, well-perfused  Neuro: Grossly intact  Psych: Appropriate       Labs:    CBC:   Lab Results   Component Value Date/Time    WBC 8.3 11/01/2024 05:20 AM    RBC 4.11 11/01/2024 05:20 AM    HGB 12.7 11/01/2024 05:20 AM    HCT 38.2 11/01/2024 05:20 AM    MCV 92.9 11/01/2024 05:20 AM    MCH 30.9 11/01/2024 05:20 AM    MCHC 33.2 11/01/2024 05:20 AM    RDW 13.4 11/01/2024 05:20 AM     11/01/2024 05:20 AM    MPV 9.5 11/01/2024 05:20 AM     BMP:    Lab Results   Component Value Date/Time     11/03/2024 06:14 AM    K 3.8 11/03/2024 06:14 AM     11/03/2024 06:14 AM    CO2 31 11/03/2024 06:14 AM    BUN 9 11/03/2024 06:14 AM    CREATININE 0.86 11/03/2024 06:14 AM    CALCIUM 8.3 11/03/2024 06:14 AM    GFRAA >60 03/28/2022 11:21 AM    LABGLOM >90 11/03/2024 06:14 AM    LABGLOM >60 01/24/2024 12:00 PM    GLUCOSE 161 11/03/2024 06:14 AM       Body mass index is 37.26 kg/m².       Assessment/Plan:    Doing well. Wound packing removed. Can dress superficially moving forward once to twice per day.     OK for d/c from a surgical standpoint  Patient wishes to stay one  more day  Can follow up in 2 weeks for a wound 
End of Shift Note    Bedside shift change report given to KEVIN Morejon (oncoming nurse) by Gina Bui LPN (offgoing nurse).  Report included the following information SBAR, Intake/Output, and MAR    Shift worked:  7A to 7P     Shift summary and any significant changes:     Complaints of neck pain. Tylenol given x2, Oxy ordered for more severe pain. Melatonin ordered for sleep trouble. BM had. Patient NPO at midnight for incision and draining on neck abscess on 11/2/24. IV antibiotics continued. Insulin orders changed to better help stabilize blood glucose.     Concerns for physician to address:  None     Zone phone for oncoming shift:   [phone]       Activity:     Number times ambulated in hallways past shift: 0  Number of times OOB to chair past shift: 2    Cardiac:   Cardiac Monitoring: No           Access:  Current line(s): PIV     Genitourinary:   Urinary status: voiding    Respiratory:      Chronic home O2 use?: NO  Incentive spirometer at bedside: NO       GI:     Current diet:  ADULT DIET; Regular; 4 carb choices (60 gm/meal); Low Fat/Low Chol/High Fiber/FRANCIA  Diet NPO  Passing flatus: YES  Tolerating current diet: YES       Pain Management:   Patient states pain is manageable on current regimen: YES    Skin:     Interventions: float heels, increase time out of bed, and limit briefs    Patient Safety:  Fall Score:    Interventions: bed/chair alarm and gripper socks       Length of Stay:  Expected LOS: 3  Actual LOS: 1      Gina Bui LPN                           
Infectious Disease Progress      Impression    Cellulitis & induration of back of neck  Failed outpatient Keflex and doxycycline therapy  CT soft tissue neck+ for right paramedian subcutaneous inflammatory changes   in neck, no abscess.S/p I&D 11/2. 4x 6 cm abscess    Prostate CA with bone metastasis  S/p radiation therapy, on hormonal therapy    Diabetes type 2  Poorly controlled  A1c 8.6    COPD  Not in exacerbation    Depression   On Effexor      Obesity  BMI 37.26    Plan  Pt declines IV antibiotics  May DC on Zyvox 600 mg po  bid end date 11/12  DC Effexor-D/w  pt & spouse  MRSA decolonization - nasal mupirocin, hibiclens skin wipes/   antibacterial soap.  Adequate fluids, daily probiotic  Contact precautions-frequent handwashing with soap and water, change towels and wash rags frequently  Used separate restroom, launder clothes separately until MRSA treatment is completed  D/w pt and spouse.  Please stop antibiotic if persistent side effects occur & call ID at [phone] if tolerated    Possible adverse effects of long term antibiotics are inclusive of but not limited to following  BM suppression, neutropenia , cytopenias , aplastic anemia hemorrhage liver & renal dysfunction/ liver , renal failure  , GI dysfunction- N, V  Diarrhea,C.difficile disease, rash , allergy , anaphylaxis.toxic epidermal necrolysis  Neuro toxicity , seizure disorder  Side effects tend to be more pronounced in the elderly.    May DC from ID standpoint.      Abx  Vancomycin, Zosyn-10/31    Extensive review of chart notes, labs, imaging, cultures done  Additionally review of done: Recent reports-Labs, cultures, imaging  D/w -General Surgery, hospitalist, RN    Delio Zavala is seen for cellulitis and abscess of neck, failed outpatient antibiotic therapy.  Delio Zavala is a 69 y.o. male who presents to the emergency department with right posterior neck pain which has been present for the past 4 days.  Patient was 
PCP hospital follow-up transitional care appointment has been scheduled with Dr. Estrella Haywood on 11/5/24 0800. [facility] placed [facility] information AVS for patient resource.   Pending patient discharge.  Yasmine Pinon, Care Management Assistant   
Pharmacy Antimicrobial Kinetic Dosing    Indication for Antimicrobials: cellulitis of right posterior neck  Failed outpatient therapy with Keflex      Current Regimen of Each Antimicrobial:  Zosyn 3.375 gm IV q8h; Start Date 10/31; Day # 2  Vancomycin  pharmacy to dose Start Date 10/31; Day # 2    Goal Level: Vancomycin -600    Date Dose & Interval Measured (mcg/mL) Predicted AUC 24-48 Predicted AUC 24,ss                          Significant Cultures:   10/31 blood - ngtd, pending    Labs:  Recent Labs     Units 24  0520 10/31/24  1718   CREATININE MG/DL 0.80 0.87   BUN MG/DL 12 18   WBC K/uL 8.3 7.8     Temp (24hrs), Av.1 °F (36.7 °C), Min:97.3 °F (36.3 °C), Max:98.6 °F (37 °C)    Conditions for Dosing Consideration: none    Creatinine Clearance (mL/min): Estimated Creatinine Clearance: 115 mL/min (based on SCr of 0.8 mg/dL).     Impression/Plan:   Vancomycin 2500 mg loading dose, then 1500 mg IV q12h  Predicted JSE28-37 = 533, Predicted AUC24,ss = 580  Will check a level tomorrow before the 0600 dose.   Antimicrobial stop date 7 days     Pharmacy will follow daily and adjust medications as appropriate for renal function and/or serum levels.    Thank you,  Genesis Meyer, [facility]      
Pharmacy Antimicrobial Kinetic Dosing    Indication for Antimicrobials: cellulitis of right posterior neck  Failed outpatient therapy with Keflex      Current Regimen of Each Antimicrobial:  Zosyn 3.375 gm IV q8h; Start Date 10/31; Day # 3  Vancomycin  pharmacy to dose Start Date 10/31; Day # 3    Previous Antimicrobial Therapy:    Goal Level: Vancomycin -600    Date Dose & Interval Measured (mcg/mL) Predicted AUC 24-48 Predicted AUC 24,ss    1500mg q12h 13.7 483 497                   Significant Cultures:   10/31 blood - ngtd1    Labs:  Recent Labs     Units 24  0520 10/31/24  1718   CREATININE MG/DL 0.80 0.87   BUN MG/DL 12 18   WBC K/uL 8.3 7.8     Temp (24hrs), Av.6 °F (36.4 °C), Min:97.2 °F (36.2 °C), Max:98 °F (36.7 °C)    Conditions for Dosing Consideration: none    Creatinine Clearance (mL/min): Estimated Creatinine Clearance: 115 mL/min (based on SCr of 0.8 mg/dL).     Impression/Plan:   Continue vancomycin 1500mg q12h  Predicted EYK40-50 = 483, Predicted AUC24,ss = 497  BMP ordered every other day per protocol while on vancomycin  Antimicrobial stop date 7 days     Pharmacy will follow daily and adjust medications as appropriate for renal function and/or serum levels.    Thank you,  Clayton Scott [facility]        
Pharmacy Antimicrobial Kinetic Dosing    Indication for Antimicrobials: cellulitis of right posterior neck  Failed outpatient therapy with Keflex      Current Regimen of Each Antimicrobial:  Zosyn 3.375 gm IV q8h; Start Date 10/31; Day # 4  Vancomycin  pharmacy to dose Start Date 10/31; Day # 4    Previous Antimicrobial Therapy:    Goal Level: Vancomycin -600    Date Dose & Interval Measured (mcg/mL) Predicted AUC 24-48 Predicted AUC 24,ss    1500mg q12h 13.7 483 497                   Significant Cultures:   10/31 blood - ngtd2   wound cx: GPC in clusters on gram stain, cx pending    Labs:  Recent Labs     Units 24  0614 24  0520 10/31/24  1718   CREATININE MG/DL 0.86 0.80 0.87   BUN MG/DL 9 12 18   WBC K/uL  --  8.3 7.8     Temp (24hrs), Av.1 °F (36.7 °C), Min:97.6 °F (36.4 °C), Max:98.5 °F (36.9 °C)    Conditions for Dosing Consideration: none    Creatinine Clearance (mL/min): Estimated Creatinine Clearance: 107 mL/min (based on SCr of 0.86 mg/dL).     Impression/Plan:   Continue vancomycin 1500mg q12h  Predicted YOW08-73 = 483, Predicted AUC24,ss = 497  BMP ordered every other day per protocol while on vancomycin  Antimicrobial stop date 7 days     Pharmacy will follow daily and adjust medications as appropriate for renal function and/or serum levels.    Thank you,  Clayton Scott [facility]          
Pt discharged home with all belongings. Pt transported home by wife.   
Pt medication has been returned back to patientEnedina Ayon.   
electronic medical records for all procedures/Xrays and details which were not copied into this note but were reviewed prior to creation of Plan.      LABS:  I reviewed today's most current labs and imaging studies.  Pertinent labs include:  Recent Labs     10/31/24  1718 11/01/24  0520   WBC 7.8 8.3   HGB 12.9 12.7   HCT 38.4 38.2    191     Recent Labs     10/31/24  1718 11/01/24  0520    133*   K 4.0 3.9    100   CO2 29 28   GLUCOSE 280* 275*   BUN 18 12   CREATININE 0.87 0.80   CALCIUM 8.5 8.6   BILITOT 0.4  --    AST 22  --    ALT 21  --        Signed: Donis Almazan MD

## 2024-11-04 NOTE — PLAN OF CARE
Problem: Discharge Planning  Goal: Discharge to home or other facility with appropriate resources  Outcome: Progressing     Problem: Pain  Goal: Verbalizes/displays adequate comfort level or baseline comfort level  Outcome: Progressing     Problem: Respiratory - Adult  Goal: Achieves optimal ventilation and oxygenation  11/4/2024 1036 by Elif Barrera RN  Outcome: Progressing  11/4/2024 0611 by Hamzah Wolf RCP  Outcome: Progressing     Problem: Safety - Adult  Goal: Free from fall injury  Outcome: Progressing     Problem: Chronic Conditions and Co-morbidities  Goal: Patient's chronic conditions and co-morbidity symptoms are monitored and maintained or improved  Outcome: Progressing

## 2024-11-04 NOTE — DISCHARGE INSTRUCTIONS
Please follow-up with PCP within 1 week of hospital discharge    Start taking linezolid 600 mg twice daily for 14 days    Start taking probiotic lactobacillus capsules once daily to prevent diarrhea from antibiotics    Continue taking home medications as prescribed

## 2024-11-05 LAB
BACTERIA SPEC CULT: NORMAL
SERVICE CMNT-IMP: NORMAL

## 2024-11-07 NOTE — DISCHARGE SUMMARY
Discharge Summary    Name: Delio Zavala  [id number]  YOB: 1955 (Age: 69 y.o.)   Date of Admission: 10/31/2024  Date of Discharge: 11/6/2024  Attending Physician: Elizabeth att. providers found    Discharge Diagnosis:     Patient Active Problem List   Diagnosis    Pleural effusion, bilateral    Typical atrial flutter (HCC)    Sinus bradycardia    S/P ablation of atrial fibrillation    Mixed hyperlipidemia    Severe obesity (BMI 35.0-39.9) with comorbidity    Hypertension    Spinal stenosis of lumbar region with neurogenic claudication    Dyslipidemia    Long term (current) use of anticoagulants    Irregular heart beat    Paroxysmal atrial fibrillation (HCC)    Non morbid obesity due to excess calories    Atrial fibrillation and flutter (HCC)    SOB (shortness of breath)    Atrial fibrillation (HCC)    Prostate cancer (HCC)    Prostate cancer metastatic to bone (HCC)    Cellulitis    Poorly controlled diabetes mellitus (HCC)    Chronic obstructive pulmonary disease (HCC)    Obesity (BMI 30-39.9)   Posterior neck cellulitis  Posterior neck cellulitis complicated by an abscess  Posterior neck abscess 2/2 cellulitis status post I&D      Consultations:  IP CONSULT TO INFECTIOUS DISEASES  IP CONSULT TO PHARMACY  IP CONSULT TO GENERAL SURGERY      Brief Admission History/Reason for Admission Per Tera Polk MD:  Sally is a 69 y.o.  male with PMHx significant for prostate cancer with metastasis currently undergoing therapy, A-fib s/p 4 ablations, chronic back pain, GERD, hypertension, hyperlipidemia and ANGELICA who presented to the Emergency Department with worsening cellulitis of right posterior neck. Patient developed posterior neck cellulitis after a pimple on his neck was popped by his wife.  Patient was recently seen on 10/28/2024 for similar cellulitis and discharged on Keflex, Doxy and prednisone without any improvement.   Patient was admitted with

## 2024-11-11 ENCOUNTER — HOSPITAL ENCOUNTER (OUTPATIENT)
Facility: HOSPITAL | Age: 69
Discharge: HOME | End: 2024-11-14
Attending: INTERNAL MEDICINE
Payer: MEDICARE

## 2024-11-11 ENCOUNTER — HOSPITAL ENCOUNTER (OUTPATIENT)
Facility: HOSPITAL | Age: 69
End: 2024-11-11
Attending: INTERNAL MEDICINE
Payer: MEDICARE

## 2024-11-11 DIAGNOSIS — R91.1 PULMONARY NODULE: ICD-10-CM

## 2024-11-11 LAB
GLUCOSE BLD STRIP.AUTO-MCNC: 215 MG/DL (ref 65–117)
SERVICE CMNT-IMP: ABNORMAL

## 2024-11-11 PROCEDURE — 82962 GLUCOSE BLOOD TEST: CPT

## 2024-11-20 ENCOUNTER — OFFICE VISIT (OUTPATIENT)
Age: 69
End: 2024-11-20

## 2024-11-20 VITALS
SYSTOLIC BLOOD PRESSURE: 139 MMHG | BODY MASS INDEX: 37.49 KG/M2 | HEIGHT: 72 IN | DIASTOLIC BLOOD PRESSURE: 79 MMHG | OXYGEN SATURATION: 94 % | TEMPERATURE: 97.9 F | HEART RATE: 73 BPM | WEIGHT: 276.8 LBS

## 2024-11-20 DIAGNOSIS — Z09 POSTOPERATIVE EXAMINATION: Primary | ICD-10-CM

## 2024-11-20 PROCEDURE — 99024 POSTOP FOLLOW-UP VISIT: CPT | Performed by: SURGERY

## 2024-11-20 ASSESSMENT — PATIENT HEALTH QUESTIONNAIRE - PHQ9
SUM OF ALL RESPONSES TO PHQ QUESTIONS 1-9: 0
SUM OF ALL RESPONSES TO PHQ QUESTIONS 1-9: 0
SUM OF ALL RESPONSES TO PHQ9 QUESTIONS 1 & 2: 0
SUM OF ALL RESPONSES TO PHQ QUESTIONS 1-9: 0
SUM OF ALL RESPONSES TO PHQ QUESTIONS 1-9: 0
1. LITTLE INTEREST OR PLEASURE IN DOING THINGS: NOT AT ALL
2. FEELING DOWN, DEPRESSED OR HOPELESS: NOT AT ALL

## 2024-11-20 NOTE — PROGRESS NOTES
Identified pt with two pt identifiers (name and ). Reviewed chart in preparation for visit and have obtained necessary documentation.    Delio Zavala is a 69 y.o. male  Chief Complaint   Patient presents with    Post-Op Check     Excision posterior neck lesion     /79 (Site: Right Upper Arm, Position: Sitting, Cuff Size: Large Adult)   Pulse 73   Temp 97.9 °F (36.6 °C) (Temporal)   Ht 1.829 m (6')   Wt 125.6 kg (276 lb 12.8 oz)   SpO2 94%   BMI 37.54 kg/m²     1. Have you been to the ER, urgent care clinic since your last visit?  Hospitalized since your last visit?no    2. Have you seen or consulted any other health care providers outside of the [facility] since your last visit?  Include any pap smears or colon screening. no

## 2024-11-20 NOTE — PROGRESS NOTES
Postop Progress Note    Subjective    Delio Zavala presents to the office for postop follow up.  He is s/p I&D of posterior neck abscess by Dr. Machado 18 days ago.    Objective    Vitals:    11/20/24 1402   BP: 139/79   Pulse: 73   Temp: 97.9 °F (36.6 °C)   SpO2: 94%     General: alert, cooperative and no distress  wound: healing well, nearly close with no drainage and nothing to pack at this point    Assessment  Doing well postoperatively.    Plan  1. Continue any current medications  2. Wound care discussed  3. Pt is to increase activities as tolerated  4. Usual diet  5. Follow up: as needed    Electronically signed by Arnaud Starks MD on 11/20/2024 at 2:05 PM

## 2024-11-22 ENCOUNTER — HOSPITAL ENCOUNTER (OUTPATIENT)
Facility: HOSPITAL | Age: 69
Discharge: HOME | End: 2024-11-22
Payer: MEDICARE

## 2024-11-22 ENCOUNTER — HOSPITAL ENCOUNTER (OUTPATIENT)
Facility: HOSPITAL | Age: 69
End: 2024-11-22
Payer: MEDICARE

## 2024-11-22 LAB
GLUCOSE BLD STRIP.AUTO-MCNC: 235 MG/DL (ref 65–117)
SERVICE CMNT-IMP: ABNORMAL

## 2024-11-22 PROCEDURE — 82962 GLUCOSE BLOOD TEST: CPT

## 2024-12-10 DIAGNOSIS — C61 PROSTATE CANCER (HCC): Primary | ICD-10-CM

## 2024-12-30 ENCOUNTER — HOSPITAL ENCOUNTER (OUTPATIENT)
Facility: HOSPITAL | Age: 69
Discharge: HOME | End: 2025-01-02
Payer: MEDICARE

## 2024-12-30 VITALS — BODY MASS INDEX: 36.62 KG/M2 | WEIGHT: 270 LBS

## 2024-12-30 LAB
GLUCOSE BLD STRIP.AUTO-MCNC: 172 MG/DL (ref 65–117)
SERVICE CMNT-IMP: ABNORMAL

## 2024-12-30 PROCEDURE — 82962 GLUCOSE BLOOD TEST: CPT

## 2024-12-30 PROCEDURE — 78815 PET IMAGE W/CT SKULL-THIGH: CPT

## 2024-12-30 PROCEDURE — 3430000000 HC RX DIAGNOSTIC RADIOPHARMACEUTICAL: Performed by: INTERNAL MEDICINE

## 2024-12-30 PROCEDURE — A9609 HC RX DIAGNOSTIC RADIOPHARMACEUTICAL: HCPCS | Performed by: INTERNAL MEDICINE

## 2024-12-30 RX ORDER — FLUDEOXYGLUCOSE F-18 500 MCI/ML
10 INJECTION INTRAVENOUS
Status: COMPLETED | OUTPATIENT
Start: 2024-12-30 | End: 2024-12-30

## 2024-12-30 RX ADMIN — FLUDEOXYGLUCOSE F-18 10 MILLICURIE: 500 INJECTION INTRAVENOUS at 10:33

## 2025-01-02 DIAGNOSIS — C61 PROSTATE CANCER (HCC): ICD-10-CM

## 2025-01-04 LAB
PSA SERPL DL<=0.01 NG/ML-MCNC: 0.02 NG/ML (ref 0–4)
TESTOST SERPL-MCNC: <3 NG/DL (ref 264–916)

## 2025-01-10 ENCOUNTER — HOSPITAL ENCOUNTER (OUTPATIENT)
Facility: HOSPITAL | Age: 70
Discharge: HOME | End: 2025-01-13
Attending: RADIOLOGY

## 2025-01-10 VITALS
DIASTOLIC BLOOD PRESSURE: 79 MMHG | WEIGHT: 272 LBS | HEIGHT: 72 IN | HEART RATE: 63 BPM | BODY MASS INDEX: 36.84 KG/M2 | SYSTOLIC BLOOD PRESSURE: 138 MMHG

## 2025-01-10 DIAGNOSIS — C61 PROSTATE CANCER (HCC): Primary | ICD-10-CM

## 2025-01-10 DIAGNOSIS — C79.51 SECONDARY MALIGNANT NEOPLASM OF BONE (HCC): ICD-10-CM

## 2025-01-10 ASSESSMENT — PAIN SCALES - GENERAL: PAINLEVEL_OUTOF10: 0

## 2025-01-10 NOTE — PROGRESS NOTES
(ZOFRAN) 4 MG tablet Take 1 tablet as needed by oral route for 30 days.    RYBELSUS 7 MG TABS     tadalafil (CIALIS) 10 mg, Oral, DAILY PRN    tamsulosin (FLOMAX) 0.4 mg, Oral, DAILY    valACYclovir (VALTREX) 500 mg, DAILY PRN    venlafaxine (EFFEXOR XR) 37.5 mg, 2 TIMES DAILY      PQRS Medication Reconciliation: Medications documented and reviewed    Physical Exam:   Vitals: Blood pressure 138/79, pulse 63, height 1.829 m (6'), weight 123.4 kg (272 lb).   Mr. Zavaal has non-elevated blood pressure today. No recommendations are needed given BP is within the normal range.   Performance Status: ECOG 1, No physically strenuous activity, but ambulatory and able to carry out light or sedentary work (eg office work, light house work)  Constitutional: Pleasant, sitting comfortably in no acute distress.   HEENT: Moist mucous membranes.  Cardiac: Good peripheral perfusion, no upper or lower extremity edema bilaterally.  Pulmonary: No increased work of breathing. Symmetric chest rise  Skin: Warm, dry, no rashes noted.  Neurologic: Alert and oriented.  Psychiatric: Cooperative to commands and responds to questions appropriately.    Assessment & Plan   Mr. Zavala is a 69 y.o. male with a history of a Stage IV, Hillister 4+5=9 adenocarcinoma of the prostate, initial PSA 38, clinical T2cN0 M1 with high volume bone only metastatic disease, on Firmagon and Erleada; s/p palliative XRT to the L 5th and 6th ribs and R 5-9th lateral ribs to 2000cGy/5fx completed 6/19/2023 and T7 spine to 2000cGy/5fx completed 10/18/2023. Still on ADT+Erleada. With reisudal activity in prostate only he completed 5500cGy/20fx to the prostate gland on 9/17/24. He is here for routine follow up.     We reviewed his PSA and imaging findings.  I believe his PSMA PET/CT performed on 10/15/2024 was simply too soon after radiotherapy to demonstrate any significant response in the gland itself.  We reviewed no new foci of abnormal tracer uptake.    His

## 2025-07-15 ENCOUNTER — HOSPITAL ENCOUNTER (OUTPATIENT)
Facility: HOSPITAL | Age: 70
Discharge: HOME | End: 2025-07-18

## 2025-07-31 ENCOUNTER — ANESTHESIA EVENT (OUTPATIENT)
Facility: HOSPITAL | Age: 70
End: 2025-07-31
Payer: MEDICARE

## 2025-07-31 RX ORDER — SODIUM CHLORIDE 0.9 % (FLUSH) 0.9 %
5-40 SYRINGE (ML) INJECTION EVERY 12 HOURS SCHEDULED
Status: CANCELLED | OUTPATIENT
Start: 2025-07-31

## 2025-07-31 RX ORDER — PROCHLORPERAZINE EDISYLATE 5 MG/ML
5 INJECTION INTRAMUSCULAR; INTRAVENOUS
Status: CANCELLED | OUTPATIENT
Start: 2025-07-31

## 2025-07-31 RX ORDER — SODIUM CHLORIDE 0.9 % (FLUSH) 0.9 %
5-40 SYRINGE (ML) INJECTION PRN
Status: CANCELLED | OUTPATIENT
Start: 2025-07-31

## 2025-07-31 RX ORDER — HYDROMORPHONE HYDROCHLORIDE 1 MG/ML
0.25 INJECTION, SOLUTION INTRAMUSCULAR; INTRAVENOUS; SUBCUTANEOUS EVERY 5 MIN PRN
Refills: 0 | Status: CANCELLED | OUTPATIENT
Start: 2025-07-31

## 2025-07-31 RX ORDER — FENTANYL CITRATE 50 UG/ML
50 INJECTION, SOLUTION INTRAMUSCULAR; INTRAVENOUS EVERY 5 MIN PRN
Refills: 0 | Status: CANCELLED | OUTPATIENT
Start: 2025-07-31

## 2025-07-31 RX ORDER — SODIUM CHLORIDE 9 MG/ML
INJECTION, SOLUTION INTRAVENOUS PRN
Status: CANCELLED | OUTPATIENT
Start: 2025-07-31

## 2025-07-31 ASSESSMENT — ENCOUNTER SYMPTOMS: SHORTNESS OF BREATH: 1

## 2025-07-31 NOTE — ANESTHESIA PRE PROCEDURE
mouth daily     • valACYclovir (VALTREX) 500 MG tablet Take 1 tablet by mouth daily as needed (Patient not taking: Reported on 10/31/2024)         Allergies:    Allergies   Allergen Reactions   • Oxycodone-Acetaminophen Anxiety     Patient takes Tylenol without problems.  Patient said he is not allergic to oxycodone   • Guaifenesin Other (See Comments)     Other reaction(s): gi upset       Problem List:    Patient Active Problem List   Diagnosis Code   • Pleural effusion, bilateral J90   • Typical atrial flutter (HCC) I48.3   • Sinus bradycardia R00.1   • S/P ablation of atrial fibrillation Z98.890, Z86.79   • Mixed hyperlipidemia E78.2   • Severe obesity (BMI 35.0-39.9) with comorbidity (HCC) E66.01   • Hypertension I10   • Spinal stenosis of lumbar region with neurogenic claudication M48.062   • Dyslipidemia E78.5   • Long term (current) use of anticoagulants Z79.01   • Irregular heart beat I49.9   • Paroxysmal atrial fibrillation (HCC) I48.0   • Non morbid obesity due to excess calories E66.09   • Atrial fibrillation and flutter (HCC) I48.91, I48.92   • SOB (shortness of breath) R06.02   • Atrial fibrillation (HCC) I48.91   • Prostate cancer (HCC) C61   • Prostate cancer metastatic to bone (HCC) C61, C79.51   • Cellulitis L03.90   • Poorly controlled diabetes mellitus (HCC) E11.65   • Chronic obstructive pulmonary disease (HCC) J44.9   • Obesity (BMI 30-39.9) E66.9       Past Medical History:        Diagnosis Date   • A-fib (HCC)     ablations x4   • Arthritis     back   • Chronic pain     back   • Encounter for cardioversion procedure 2/7/2017   • GERD (gastroesophageal reflux disease)    • High cholesterol     denies HTN   • Hypertension 02/07/2017    patient denies hx of HTN   • Multiple thyroid nodules     biopsied and benign   • Non-nicotine vapor product user     on occassion   • Prostate cancer metastatic to multiple sites (HCC)    • PUD (peptic ulcer disease)     \"years ago\"   • Sleep apnea     wife

## 2025-08-01 ENCOUNTER — HOSPITAL ENCOUNTER (OUTPATIENT)
Facility: HOSPITAL | Age: 70
Discharge: HOME | End: 2025-08-03
Attending: INTERNAL MEDICINE

## 2025-08-01 ENCOUNTER — HOSPITAL ENCOUNTER (OUTPATIENT)
Facility: HOSPITAL | Age: 70
Setting detail: OBSERVATION
Discharge: HOME | End: 2025-08-01
Attending: INTERNAL MEDICINE | Admitting: INTERNAL MEDICINE
Payer: MEDICARE

## 2025-08-01 ENCOUNTER — ANESTHESIA (OUTPATIENT)
Facility: HOSPITAL | Age: 70
End: 2025-08-01
Payer: MEDICARE

## 2025-08-01 VITALS
HEART RATE: 74 BPM | SYSTOLIC BLOOD PRESSURE: 133 MMHG | RESPIRATION RATE: 14 BRPM | WEIGHT: 274 LBS | HEIGHT: 72 IN | OXYGEN SATURATION: 90 % | BODY MASS INDEX: 37.11 KG/M2 | DIASTOLIC BLOOD PRESSURE: 91 MMHG | TEMPERATURE: 97.9 F

## 2025-08-01 DIAGNOSIS — I48.91 A-FIB (HCC): ICD-10-CM

## 2025-08-01 DIAGNOSIS — I48.11 LONGSTANDING PERSISTENT ATRIAL FIBRILLATION (HCC): Primary | ICD-10-CM

## 2025-08-01 LAB
ACT BLD: 342 SECS (ref 79–138)
ECHO BSA: 2.51 M2
GLUCOSE BLD STRIP.AUTO-MCNC: 236 MG/DL (ref 65–117)
SERVICE CMNT-IMP: ABNORMAL

## 2025-08-01 PROCEDURE — C1769 GUIDE WIRE: HCPCS | Performed by: INTERNAL MEDICINE

## 2025-08-01 PROCEDURE — G0378 HOSPITAL OBSERVATION PER HR: HCPCS

## 2025-08-01 PROCEDURE — C1733 CATH, EP, OTHR THAN COOL-TIP: HCPCS | Performed by: INTERNAL MEDICINE

## 2025-08-01 PROCEDURE — 2500000003 HC RX 250 WO HCPCS: Performed by: INTERNAL MEDICINE

## 2025-08-01 PROCEDURE — C1731 CATH, EP, 20 OR MORE ELEC: HCPCS | Performed by: INTERNAL MEDICINE

## 2025-08-01 PROCEDURE — 6370000000 HC RX 637 (ALT 250 FOR IP): Performed by: INTERNAL MEDICINE

## 2025-08-01 PROCEDURE — 3700000000 HC ANESTHESIA ATTENDED CARE: Performed by: INTERNAL MEDICINE

## 2025-08-01 PROCEDURE — 82962 GLUCOSE BLOOD TEST: CPT

## 2025-08-01 PROCEDURE — C1894 INTRO/SHEATH, NON-LASER: HCPCS | Performed by: INTERNAL MEDICINE

## 2025-08-01 PROCEDURE — 85347 COAGULATION TIME ACTIVATED: CPT

## 2025-08-01 PROCEDURE — 2709999900 HC NON-CHARGEABLE SUPPLY: Performed by: INTERNAL MEDICINE

## 2025-08-01 PROCEDURE — 93656 COMPRE EP EVAL ABLTJ ATR FIB: CPT | Performed by: INTERNAL MEDICINE

## 2025-08-01 PROCEDURE — C1730 CATH, EP, 19 OR FEW ELECT: HCPCS | Performed by: INTERNAL MEDICINE

## 2025-08-01 PROCEDURE — 3700000001 HC ADD 15 MINUTES (ANESTHESIA): Performed by: INTERNAL MEDICINE

## 2025-08-01 PROCEDURE — 6360000002 HC RX W HCPCS: Performed by: INTERNAL MEDICINE

## 2025-08-01 PROCEDURE — C1760 CLOSURE DEV, VASC: HCPCS | Performed by: INTERNAL MEDICINE

## 2025-08-01 PROCEDURE — 2580000003 HC RX 258: Performed by: INTERNAL MEDICINE

## 2025-08-01 PROCEDURE — 76937 US GUIDE VASCULAR ACCESS: CPT | Performed by: INTERNAL MEDICINE

## 2025-08-01 PROCEDURE — C1759 CATH, INTRA ECHOCARDIOGRAPHY: HCPCS | Performed by: INTERNAL MEDICINE

## 2025-08-01 PROCEDURE — C1766 INTRO/SHEATH,STRBLE,NON-PEEL: HCPCS | Performed by: INTERNAL MEDICINE

## 2025-08-01 PROCEDURE — 2580000003 HC RX 258

## 2025-08-01 PROCEDURE — 6360000002 HC RX W HCPCS

## 2025-08-01 RX ORDER — SODIUM CHLORIDE 0.9 % (FLUSH) 0.9 %
5-40 SYRINGE (ML) INJECTION EVERY 12 HOURS SCHEDULED
Status: DISCONTINUED | OUTPATIENT
Start: 2025-08-01 | End: 2025-08-02 | Stop reason: HOSPADM

## 2025-08-01 RX ORDER — HEPARIN SODIUM 10000 [USP'U]/ML
INJECTION, SOLUTION INTRAVENOUS; SUBCUTANEOUS PRN
Status: DISCONTINUED | OUTPATIENT
Start: 2025-08-01 | End: 2025-08-01 | Stop reason: HOSPADM

## 2025-08-01 RX ORDER — TAMSULOSIN HYDROCHLORIDE 0.4 MG/1
0.4 CAPSULE ORAL DAILY
Status: DISCONTINUED | OUTPATIENT
Start: 2025-08-01 | End: 2025-08-02 | Stop reason: HOSPADM

## 2025-08-01 RX ORDER — SODIUM CHLORIDE 9 MG/ML
INJECTION, SOLUTION INTRAVENOUS PRN
Status: DISCONTINUED | OUTPATIENT
Start: 2025-08-01 | End: 2025-08-02 | Stop reason: HOSPADM

## 2025-08-01 RX ORDER — SODIUM CHLORIDE 0.9 % (FLUSH) 0.9 %
5-40 SYRINGE (ML) INJECTION PRN
Status: DISCONTINUED | OUTPATIENT
Start: 2025-08-01 | End: 2025-08-02 | Stop reason: HOSPADM

## 2025-08-01 RX ORDER — FENTANYL CITRATE 50 UG/ML
INJECTION, SOLUTION INTRAMUSCULAR; INTRAVENOUS
Status: DISCONTINUED | OUTPATIENT
Start: 2025-08-01 | End: 2025-08-01 | Stop reason: SDUPTHER

## 2025-08-01 RX ORDER — ACETAMINOPHEN 325 MG/1
650 TABLET ORAL EVERY 4 HOURS PRN
Status: DISCONTINUED | OUTPATIENT
Start: 2025-08-01 | End: 2025-08-02 | Stop reason: HOSPADM

## 2025-08-01 RX ORDER — MONTELUKAST SODIUM 10 MG/1
10 TABLET ORAL DAILY
Status: DISCONTINUED | OUTPATIENT
Start: 2025-08-01 | End: 2025-08-02 | Stop reason: HOSPADM

## 2025-08-01 RX ORDER — ONDANSETRON 2 MG/ML
INJECTION INTRAMUSCULAR; INTRAVENOUS
Status: DISCONTINUED | OUTPATIENT
Start: 2025-08-01 | End: 2025-08-01 | Stop reason: SDUPTHER

## 2025-08-01 RX ORDER — GLYCOPYRROLATE 0.2 MG/ML
INJECTION INTRAMUSCULAR; INTRAVENOUS
Status: DISCONTINUED | OUTPATIENT
Start: 2025-08-01 | End: 2025-08-01 | Stop reason: SDUPTHER

## 2025-08-01 RX ORDER — PROTAMINE SULFATE 10 MG/ML
INJECTION, SOLUTION INTRAVENOUS
Status: DISCONTINUED | OUTPATIENT
Start: 2025-08-01 | End: 2025-08-01 | Stop reason: SDUPTHER

## 2025-08-01 RX ORDER — LIDOCAINE HYDROCHLORIDE 10 MG/ML
INJECTION, SOLUTION INFILTRATION; PERINEURAL PRN
Status: DISCONTINUED | OUTPATIENT
Start: 2025-08-01 | End: 2025-08-01 | Stop reason: HOSPADM

## 2025-08-01 RX ORDER — HEPARIN SODIUM 1000 [USP'U]/ML
INJECTION, SOLUTION INTRAVENOUS; SUBCUTANEOUS
Status: DISCONTINUED | OUTPATIENT
Start: 2025-08-01 | End: 2025-08-01 | Stop reason: SDUPTHER

## 2025-08-01 RX ORDER — LIDOCAINE HYDROCHLORIDE 20 MG/ML
INJECTION, SOLUTION EPIDURAL; INFILTRATION; INTRACAUDAL; PERINEURAL
Status: DISCONTINUED | OUTPATIENT
Start: 2025-08-01 | End: 2025-08-01 | Stop reason: SDUPTHER

## 2025-08-01 RX ORDER — SODIUM CHLORIDE, SODIUM LACTATE, POTASSIUM CHLORIDE, CALCIUM CHLORIDE 600; 310; 30; 20 MG/100ML; MG/100ML; MG/100ML; MG/100ML
INJECTION, SOLUTION INTRAVENOUS
Status: DISCONTINUED | OUTPATIENT
Start: 2025-08-01 | End: 2025-08-01 | Stop reason: SDUPTHER

## 2025-08-01 RX ADMIN — PHENYLEPHRINE HYDROCHLORIDE 80 MCG: 10 INJECTION INTRAVENOUS at 09:27

## 2025-08-01 RX ADMIN — SODIUM CHLORIDE, POTASSIUM CHLORIDE, SODIUM LACTATE AND CALCIUM CHLORIDE: 600; 310; 30; 20 INJECTION, SOLUTION INTRAVENOUS at 08:42

## 2025-08-01 RX ADMIN — PROPOFOL 50 MG: 10 INJECTION, EMULSION INTRAVENOUS at 08:48

## 2025-08-01 RX ADMIN — PROTAMINE SULFATE 100 MG: 10 INJECTION, SOLUTION INTRAVENOUS at 09:30

## 2025-08-01 RX ADMIN — HEPARIN SODIUM 20000 UNITS: 1000 INJECTION, SOLUTION INTRAVENOUS; SUBCUTANEOUS at 09:08

## 2025-08-01 RX ADMIN — SODIUM CHLORIDE, PRESERVATIVE FREE 10 ML: 5 INJECTION INTRAVENOUS at 20:42

## 2025-08-01 RX ADMIN — ACETAMINOPHEN 650 MG: 325 TABLET ORAL at 10:54

## 2025-08-01 RX ADMIN — PROPOFOL 30 MG: 10 INJECTION, EMULSION INTRAVENOUS at 09:00

## 2025-08-01 RX ADMIN — PHENYLEPHRINE HYDROCHLORIDE 40 MCG/MIN: 10 INJECTION INTRAVENOUS at 08:53

## 2025-08-01 RX ADMIN — PHENYLEPHRINE HYDROCHLORIDE 80 MCG: 10 INJECTION INTRAVENOUS at 09:09

## 2025-08-01 RX ADMIN — FENTANYL CITRATE 25 MCG: 50 INJECTION, SOLUTION INTRAMUSCULAR; INTRAVENOUS at 09:39

## 2025-08-01 RX ADMIN — GLYCOPYRROLATE 0.4 MG: 0.2 INJECTION, SOLUTION INTRAMUSCULAR; INTRAVENOUS at 09:00

## 2025-08-01 RX ADMIN — ACETAMINOPHEN 650 MG: 325 TABLET ORAL at 20:42

## 2025-08-01 RX ADMIN — PROPOFOL 125 MCG/KG/MIN: 10 INJECTION, EMULSION INTRAVENOUS at 08:49

## 2025-08-01 RX ADMIN — LIDOCAINE HYDROCHLORIDE 50 MG: 20 INJECTION, SOLUTION EPIDURAL; INFILTRATION; INTRACAUDAL; PERINEURAL at 08:48

## 2025-08-01 RX ADMIN — APIXABAN 5 MG: 5 TABLET, FILM COATED ORAL at 20:41

## 2025-08-01 RX ADMIN — FENTANYL CITRATE 25 MCG: 50 INJECTION, SOLUTION INTRAMUSCULAR; INTRAVENOUS at 09:00

## 2025-08-01 RX ADMIN — PHENYLEPHRINE HYDROCHLORIDE 80 MCG: 10 INJECTION INTRAVENOUS at 09:33

## 2025-08-01 RX ADMIN — ONDANSETRON HYDROCHLORIDE 4 MG: 2 INJECTION, SOLUTION INTRAMUSCULAR; INTRAVENOUS at 09:38

## 2025-08-01 RX ADMIN — PHENYLEPHRINE HYDROCHLORIDE 80 MCG: 10 INJECTION INTRAVENOUS at 09:13

## 2025-08-01 ASSESSMENT — PAIN SCALES - GENERAL: PAINLEVEL_OUTOF10: 4

## 2025-08-01 ASSESSMENT — PAIN DESCRIPTION - LOCATION: LOCATION: GROIN

## 2025-08-01 ASSESSMENT — PAIN DESCRIPTION - ORIENTATION: ORIENTATION: LEFT;RIGHT

## 2025-08-01 ASSESSMENT — PAIN - FUNCTIONAL ASSESSMENT
PAIN_FUNCTIONAL_ASSESSMENT: NONE - DENIES PAIN
PAIN_FUNCTIONAL_ASSESSMENT: NONE - DENIES PAIN
PAIN_FUNCTIONAL_ASSESSMENT: 0-10
PAIN_FUNCTIONAL_ASSESSMENT: 0-10
PAIN_FUNCTIONAL_ASSESSMENT: NONE - DENIES PAIN
PAIN_FUNCTIONAL_ASSESSMENT: 0-10

## 2025-08-01 ASSESSMENT — PAIN DESCRIPTION - DESCRIPTORS: DESCRIPTORS: SORE

## 2025-08-01 NOTE — PROGRESS NOTES
TRANSFER - IN REPORT:    Verbal report received from [place] on [facility]  being received from Recovery for routine progression of care. Report consisted of patient’s Situation, Background, Assessment and Recommendations(SBAR).  Opportunity for questions and clarification was provided. Assessment completed upon patient’s arrival to IVCU and care assumed.       PROCEDURE SITE CHECK:    Procedure site: Right and Left groin. Patient currently has no c/o pain/discomfort reported at procedure site.No s/s of bruising, bleeding or hematoma.

## 2025-08-01 NOTE — PROGRESS NOTES
Cardiac Cath Lab Recovery Arrival Note:      Delio Zavala arrived to Cardiac Cath Lab, Recovery Area. Staff introduced to patient. Patient identifiers verified with NAME and DATE OF BIRTH. Procedure verified with patient. Consent forms reviewed and signed by patient or authorized representative and verified. Allergies verified.     Patient and family oriented to department. Patient and family informed of procedure and plan of care.     Questions answered with review. Patient prepped for procedure, per orders from physician, prior to arrival.    Patient on cardiac monitor, non-invasive blood pressure, SPO2 monitor. On room air. Patient is A&Ox 4. Patient reports anxiety, discussed with anesthesia.     Patient in stretcher, in low position, with side rails up, call bell within reach, patient instructed to call if assistance as needed.    Patient prep in: [facility], [address].     Family in: waiting room.   Prep by: KEVIN Estrada, and KEVIN Barriga

## 2025-08-01 NOTE — DISCHARGE INSTRUCTIONS
[facility]  [address] [place] [address]  [phone]  [email or web address]       ATRIAL FIBRILLATION ABLATION DISCHARGE INSTRUCTIONS    Patient ID:  Delio Zavala  [id number]  70 y.o.  1955    Admit Date: 8/1/2025    Discharge Date: 8/1/2025     Admitting Physician: [unfilled]     Discharge Physician: [unfilled]    Admission Diagnoses:   A-fib (HCC) [I48.91]    Discharge Diagnoses:   [unfilled]    Discharge Condition: Good    Cardiology Procedures this Admission:  Atrial fibrillation.     Disposition: home    Reference discharge instructions provided by nursing for diet and activity.    Follow-up with Dr Bhardwaj or his Nurse Practitioners in 1-2 weeks.  Call [phone] to make an appointment.    Signed:  Alan Bhardwaj MD  8/1/2025  10:55 AM    S/P ATRIAL FIBRILLATION ABLATION DISCHARGE INSTRUCTIONS    It is normal to feel tired the first couple days.  Take it easy and follow the physician’s instructions.      CHECK THE CATHETER INSERTION SITE DAILY:  You may shower 24 hours after the procedure, remove the bandage during showering.  Wash with soap and water and pat dry.  Gentle cleaning of the site with soap and water is sufficient, cover with a dry clean dressing or bandage.  Do not apply creams or powders to the area.  Do not sit in a bathtub or pool of water for 7 days or until wound has completely healed.  Temporary bruising and discomfort is normal and may last a few weeks.  You may have a  formation of a small lump at the site which may last up to 6 weeks.    CALL THE PHYSICIAN:  If the site becomes red, swollen or feels warm to the touch  If there is bleeding or drainage or if there is unusual pain at the groin or down the leg.  If there is any bleeding, lie down, apply pressure or have someone apply pressure with a clean cloth until the bleeding stops.  If the bleeding continues, call [phone] to be transported to the hospital.  DO NOT DRIVE

## 2025-08-01 NOTE — ANESTHESIA POSTPROCEDURE EVALUATION
Department of Anesthesiology  Postprocedure Note    Patient: Delio Zavala  MRN: [id number]  YOB: 1955  Date of evaluation: 8/1/2025    Procedure Summary       Date: 08/01/25 Room / Location: [facility] EP LAB / [facility] CARDIAC CATH LAB    Anesthesia Start: 0842 Anesthesia Stop: 0956    Procedures:       Ablation A-fib w complete ep study      Ultrasound guided vascular access      Ablation following A-fib addl Diagnosis:       Longstanding persistent atrial fibrillation (HCC)      (A-fib (HCC) [I48.91])    Providers: Alan Bhardwaj MD Responsible Provider: Rahul Bland MD    Anesthesia Type: MAC ASA Status: 3            Anesthesia Type: MAC    Terrence Phase I: Terrence Score: 8    Terrence Phase II: Terrence Score: 8    Anesthesia Post Evaluation    Patient location during evaluation: PACU  Patient participation: complete - patient participated  Level of consciousness: sleepy but conscious and responsive to verbal stimuli  Airway patency: patent  Nausea & Vomiting: no vomiting and no nausea  Cardiovascular status: blood pressure returned to baseline and hemodynamically stable  Respiratory status: acceptable  Hydration status: stable  Pain management: adequate    There were no known notable events for this encounter.

## 2025-08-02 NOTE — PLAN OF CARE
Predictive Model Details          25 (Normal)  Factor Value    Calculated 8/1/2025 21:56 49% Age 70 years old    Deterioration Index Model 17% Cardiac rhythm Sinus rhythm with PAC     16% Respiratory rate 14     14% Pulse oximetry 90 %     5% Systolic 133     0% Temperature 97.9 °F (36.6 °C)     0% Pulse 74       2245 Patient decided he wanted to go home and not spend the night.  Bilateral groin sites remain CDI, No hematoma, swelling or bruising noted, PPP, and vital signs stable.   Discharge instructions discussed with patient. All questions answered. Patient verbalized understanding. Cath site CDI, no hematoma. Care instructions and restrictions reviewed. Patient instructed to make follow up appts per discharge instructions. Belongings gathered and accounted for. Telemetry monitor and IV removed. Tolerating ambulation in room and hallway. Denies CP, SOB, or dizziness.     Escorted out via w/c, discharged home with family in a private vehicle.         Problem: Pain  Goal: Verbalizes/displays adequate comfort level or baseline comfort level  Outcome: Progressing     Problem: Chronic Conditions and Co-morbidities  Goal: Patient's chronic conditions and co-morbidity symptoms are monitored and maintained or improved  Outcome: Progressing  Flowsheets (Taken 8/1/2025 2006)  Care Plan - Patient's Chronic Conditions and Co-Morbidity Symptoms are Monitored and Maintained or Improved: Monitor and assess patient's chronic conditions and comorbid symptoms for stability, deterioration, or improvement     Problem: Discharge Planning  Goal: Discharge to home or other facility with appropriate resources  Outcome: Progressing  Flowsheets (Taken 8/1/2025 2006)  Discharge to home or other facility with appropriate resources:   Identify barriers to discharge with patient and caregiver   Identify discharge learning needs (meds, wound care, etc)   Arrange for needed discharge resources and transportation as appropriate     Problem:

## 2025-08-14 ENCOUNTER — HOSPITAL ENCOUNTER (EMERGENCY)
Facility: HOSPITAL | Age: 70
Discharge: HOME | End: 2025-08-14
Attending: EMERGENCY MEDICINE
Payer: MEDICARE

## 2025-08-14 ENCOUNTER — APPOINTMENT (OUTPATIENT)
Facility: HOSPITAL | Age: 70
End: 2025-08-14
Payer: MEDICARE

## 2025-08-14 VITALS
TEMPERATURE: 97.3 F | HEART RATE: 66 BPM | DIASTOLIC BLOOD PRESSURE: 87 MMHG | RESPIRATION RATE: 13 BRPM | OXYGEN SATURATION: 93 % | SYSTOLIC BLOOD PRESSURE: 153 MMHG

## 2025-08-14 DIAGNOSIS — I48.0 PAROXYSMAL ATRIAL FIBRILLATION (HCC): Primary | ICD-10-CM

## 2025-08-14 LAB
ALBUMIN SERPL-MCNC: 3.6 G/DL (ref 3.5–5.2)
ALBUMIN/GLOB SERPL: 1 (ref 1.1–2.2)
ALP SERPL-CCNC: 120 U/L (ref 40–129)
ALT SERPL-CCNC: 19 U/L (ref 10–50)
ANION GAP SERPL CALC-SCNC: 13 MMOL/L (ref 2–14)
AST SERPL-CCNC: 29 U/L (ref 10–50)
BASOPHILS # BLD: 0.02 K/UL (ref 0–0.1)
BASOPHILS NFR BLD: 0.4 % (ref 0–1)
BILIRUB SERPL-MCNC: 0.3 MG/DL (ref 0–1.2)
BUN SERPL-MCNC: 19 MG/DL (ref 8–23)
BUN/CREAT SERPL: 22 (ref 12–20)
CALCIUM SERPL-MCNC: 9.6 MG/DL (ref 8.8–10.2)
CHLORIDE SERPL-SCNC: 100 MMOL/L (ref 98–107)
CO2 SERPL-SCNC: 23 MMOL/L (ref 20–29)
CREAT SERPL-MCNC: 0.86 MG/DL (ref 0.7–1.2)
DIFFERENTIAL METHOD BLD: ABNORMAL
EKG ATRIAL RATE: 69 BPM
EKG DIAGNOSIS: NORMAL
EKG P AXIS: 47 DEGREES
EKG P-R INTERVAL: 168 MS
EKG Q-T INTERVAL: 386 MS
EKG QRS DURATION: 98 MS
EKG QTC CALCULATION (BAZETT): 413 MS
EKG R AXIS: 101 DEGREES
EKG T AXIS: 63 DEGREES
EKG VENTRICULAR RATE: 69 BPM
EOSINOPHIL # BLD: 0.07 K/UL (ref 0–0.4)
EOSINOPHIL NFR BLD: 1.4 % (ref 0–7)
ERYTHROCYTE [DISTWIDTH] IN BLOOD BY AUTOMATED COUNT: 13.4 % (ref 11.5–14.5)
GLOBULIN SER CALC-MCNC: 3.7 G/DL (ref 2–4)
GLUCOSE SERPL-MCNC: 316 MG/DL (ref 65–100)
HCT VFR BLD AUTO: 40.1 % (ref 36.6–50.3)
HGB BLD-MCNC: 13.7 G/DL (ref 12.1–17)
IMM GRANULOCYTES # BLD AUTO: 0.03 K/UL (ref 0–0.04)
IMM GRANULOCYTES NFR BLD AUTO: 0.6 % (ref 0–0.5)
LYMPHOCYTES # BLD: 1.54 K/UL (ref 0.8–3.5)
LYMPHOCYTES NFR BLD: 31.6 % (ref 12–49)
MAGNESIUM SERPL-MCNC: 1.8 MG/DL (ref 1.6–2.4)
MCH RBC QN AUTO: 30.9 PG (ref 26–34)
MCHC RBC AUTO-ENTMCNC: 34.2 G/DL (ref 30–36.5)
MCV RBC AUTO: 90.5 FL (ref 80–99)
MONOCYTES # BLD: 0.32 K/UL (ref 0–1)
MONOCYTES NFR BLD: 6.6 % (ref 5–13)
NEUTS SEG # BLD: 2.89 K/UL (ref 1.8–8)
NEUTS SEG NFR BLD: 59.4 % (ref 32–75)
NRBC # BLD: 0 K/UL (ref 0–0.01)
NRBC BLD-RTO: 0 PER 100 WBC
PLATELET # BLD AUTO: 162 K/UL (ref 150–400)
PMV BLD AUTO: 9.3 FL (ref 8.9–12.9)
POTASSIUM SERPL-SCNC: 4.3 MMOL/L (ref 3.5–5.1)
PROT SERPL-MCNC: 7.4 G/DL (ref 6.4–8.3)
RBC # BLD AUTO: 4.43 M/UL (ref 4.1–5.7)
SODIUM SERPL-SCNC: 135 MMOL/L (ref 136–145)
TROPONIN T SERPL HS-MCNC: 19.4 NG/L (ref 0–22)
WBC # BLD AUTO: 4.9 K/UL (ref 4.1–11.1)

## 2025-08-14 PROCEDURE — 85025 COMPLETE CBC W/AUTO DIFF WBC: CPT

## 2025-08-14 PROCEDURE — 6370000000 HC RX 637 (ALT 250 FOR IP): Performed by: EMERGENCY MEDICINE

## 2025-08-14 PROCEDURE — 71045 X-RAY EXAM CHEST 1 VIEW: CPT

## 2025-08-14 PROCEDURE — 93005 ELECTROCARDIOGRAM TRACING: CPT | Performed by: EMERGENCY MEDICINE

## 2025-08-14 PROCEDURE — 80053 COMPREHEN METABOLIC PANEL: CPT

## 2025-08-14 PROCEDURE — 83735 ASSAY OF MAGNESIUM: CPT

## 2025-08-14 PROCEDURE — 36415 COLL VENOUS BLD VENIPUNCTURE: CPT

## 2025-08-14 PROCEDURE — 99285 EMERGENCY DEPT VISIT HI MDM: CPT

## 2025-08-14 PROCEDURE — 84484 ASSAY OF TROPONIN QUANT: CPT

## 2025-08-14 RX ORDER — LOSARTAN POTASSIUM 25 MG/1
25 TABLET ORAL DAILY
Qty: 90 TABLET | Refills: 1 | Status: SHIPPED | OUTPATIENT
Start: 2025-08-14

## 2025-08-14 RX ORDER — FLECAINIDE ACETATE 100 MG/1
100 TABLET ORAL 2 TIMES DAILY
Qty: 60 TABLET | Refills: 0 | Status: SHIPPED | OUTPATIENT
Start: 2025-08-14

## 2025-08-14 RX ORDER — FLECAINIDE ACETATE 100 MG/1
100 TABLET ORAL
Status: COMPLETED | OUTPATIENT
Start: 2025-08-14 | End: 2025-08-14

## 2025-08-14 RX ORDER — AMLODIPINE BESYLATE 5 MG/1
5 TABLET ORAL DAILY
Qty: 90 TABLET | Refills: 0 | Status: SHIPPED | OUTPATIENT
Start: 2025-08-14 | End: 2025-08-14

## 2025-08-14 RX ORDER — LOSARTAN POTASSIUM 25 MG/1
25 TABLET ORAL
Status: COMPLETED | OUTPATIENT
Start: 2025-08-14 | End: 2025-08-14

## 2025-08-14 RX ORDER — AMLODIPINE BESYLATE 5 MG/1
5 TABLET ORAL
Status: DISCONTINUED | OUTPATIENT
Start: 2025-08-14 | End: 2025-08-14

## 2025-08-14 RX ADMIN — LOSARTAN POTASSIUM 25 MG: 25 TABLET, FILM COATED ORAL at 16:54

## 2025-08-14 RX ADMIN — FLECAINIDE ACETATE 100 MG: 100 TABLET ORAL at 16:54

## 2025-08-21 ENCOUNTER — TRANSCRIBE ORDERS (OUTPATIENT)
Facility: HOSPITAL | Age: 70
End: 2025-08-21

## 2025-08-21 DIAGNOSIS — Z79.818 PROPHYLACTIC USE OF AGENTS AFFECTING ESTROGEN RECEPTORS OR LEVELS: Primary | ICD-10-CM

## (undated) DEVICE — 1200 GUARD II KIT W/5MM TUBE W/O VAC TUBE: Brand: GUARDIAN

## (undated) DEVICE — SPONGE GZ W4XL4IN COT 12 PLY TYP VII WVN C FLD DSGN

## (undated) DEVICE — SUTURE VCRL SZ 1 L18IN ABSRB VLT CT-1 L36MM 1/2 CIR J741D

## (undated) DEVICE — BONE WAX WHITE: Brand: BONE WAX WHITE

## (undated) DEVICE — FLOSEAL MATRIX IS INDICATED IN SURGICAL PROCEDURES (OTHER THAN IN OPHTHALMIC) AS AN ADJUNCT TO HEMOSTASIS WHEN CONTROL OF BLEEDING BY LIGATURE OR CONVENTIONALPROCEDURES IS INEFFECTIVE OR IMPRACTICAL.: Brand: FLOSEAL HEMOSTATIC MATRIX

## (undated) DEVICE — INFECTION CONTROL KIT SYS

## (undated) DEVICE — MAJOR LAPAROTOMY-MRMC: Brand: MEDLINE INDUSTRIES, INC.

## (undated) DEVICE — SUTURE MCRYL SZ 3-0 L27IN ABSRB UD L24MM PS-1 3/8 CIR PRIM Y936H

## (undated) DEVICE — SOLUTION IRRIG 1000ML 0.9% SOD CHL USP POUR PLAS BTL

## (undated) DEVICE — STEERABLE SHEATH CLEAR: Brand: FARADRIVE™

## (undated) DEVICE — GARMENT,MEDLINE,DVT,INT,CALF,MED, GEN2: Brand: MEDLINE

## (undated) DEVICE — GOWN,SIRUS,NONRNF,SETINSLV,XL,20/CS: Brand: MEDLINE

## (undated) DEVICE — 450 ML BOTTLE OF 0.05% CHLORHEXIDINE GLUCONATE IN 99.95% STERILE WATER FOR IRRIGATION, USP AND APPLICATOR.: Brand: IRRISEPT ANTIMICROBIAL WOUND LAVAGE

## (undated) DEVICE — SET IV AD L100IN GRAV PRI MACBOR CLV Y SITE MICRODRIP

## (undated) DEVICE — STERILE POLYISOPRENE POWDER-FREE SURGICAL GLOVES: Brand: PROTEXIS

## (undated) DEVICE — STERILE (15.2 TAPERED TO 7.6 X 183CM) POLYETHYLENE ACCORDION-FOLDED COVER FOR USE WITH SIEMENS ACUNAV ULTRASOUND CATHETER FAMILY CONNECTOR: Brand: SWIFTLINK TRANSDUCER COVER

## (undated) DEVICE — GUIDEWIRE VASCULAR L145CM 0.035IN J TIP L3MM PTFE FIX COR NAMIC

## (undated) DEVICE — SYSTEM CLOSURE 6-12 FR VEN VASC VASCADE MVP

## (undated) DEVICE — CATHETER CONNECTION CABLE: Brand: FARASTAR™

## (undated) DEVICE — AMD ANTIMICROBIAL I.V. DRAIN SPONGES 6 PLY, 0.2% POLYHEXAMETHYLENE BIGUANIDE HCI (PHMB): Brand: EXCILON

## (undated) DEVICE — HANDLE LT SNAP ON ULT DURABLE LENS FOR TRUMPF ALC DISPOSABLE

## (undated) DEVICE — KIT ELECTRD SURF FOR DISPLAYING THE 3D POS OF EP CATH

## (undated) DEVICE — PINNACLE INTRODUCER SHEATH: Brand: PINNACLE

## (undated) DEVICE — ELECTRODE PT RET AD L9FT HI MOIST COND ADH HYDRGEL CORDED

## (undated) DEVICE — SOLUTION IRRIG 1000ML 0.9% SOD CHL CONT

## (undated) DEVICE — SOLUTION IV 1000ML PH 7.4 INJ NRMSOL R

## (undated) DEVICE — REM POLYHESIVE ADULT PATIENT RETURN ELECTRODE: Brand: VALLEYLAB

## (undated) DEVICE — PACK,UNIVERSAL,NO GOWNS: Brand: MEDLINE

## (undated) DEVICE — STERILE POLYISOPRENE POWDER-FREE SURGICAL GLOVES WITH EMOLLIENT COATING: Brand: PROTEXIS

## (undated) DEVICE — Device

## (undated) DEVICE — LABEL MED MRMC ORTH STRL

## (undated) DEVICE — DRAIN SURG 19FR SIL RND HUBLESS W/ 0.25IN BEND TRCR BLAK

## (undated) DEVICE — PREP SKN CHLRAPRP APL 26ML STR --

## (undated) DEVICE — ROCKER SWITCH PENCIL BLADE ELECTRODE, HOLSTER: Brand: EDGE

## (undated) DEVICE — DEVICE COAG 3CM GUID 6130 FOR EPICARD ABLAT EPI-SENSE

## (undated) DEVICE — CABLE REPROC EP DIAG EXT RED

## (undated) DEVICE — STRIP WND PK W0.25INXL5YD IODO GZ TIGHTLY WVN CURAD

## (undated) DEVICE — 3M™ TEGADERM™ TRANSPARENT FILM DRESSING FRAME STYLE, 1626W, 4 IN X 4-3/4 IN (10 CM X 12 CM), 50/CT 4CT/CASE: Brand: 3M™ TEGADERM™

## (undated) DEVICE — ANTI-FOG SOLUTION WITH FOAM PAD: Brand: DEVON

## (undated) DEVICE — SYR 50ML LR LCK 1ML GRAD NSAF --

## (undated) DEVICE — GLOVE ORANGE PI 7 1/2   MSG9075

## (undated) DEVICE — DEVON™ KNEE AND BODY STRAP 60" X 3" (1.5 M X 7.6 CM): Brand: DEVON

## (undated) DEVICE — PENCIL SMK EVAC L10FT DIA95MM TBNG NONSTICK W ADPT TO 22MM

## (undated) DEVICE — SUTURE SZ 0 27IN 5/8 CIR UR-6  TAPER PT VIOLET ABSRB VICRYL J603H

## (undated) DEVICE — CABLE REPROC CATH CONN 150 CM EXTN EP DIAG BLK

## (undated) DEVICE — BLADE ELECTRODE: Brand: EDGE

## (undated) DEVICE — CABLE REPROC EP DIAG EXT SUPRM GRY

## (undated) DEVICE — COVER,MAYO STAND,STERILE: Brand: MEDLINE

## (undated) DEVICE — PAD,ABDOMINAL,5"X9",ST,LF,25/BX: Brand: MEDLINE INDUSTRIES, INC.

## (undated) DEVICE — SUT ETHBND 0 18IN MO6 MP GRN --

## (undated) DEVICE — 4-PORT MANIFOLD: Brand: NEPTUNE 2

## (undated) DEVICE — TRAY CATHETER 16FR F INCLUDE LUB URIN M TEMP SENS 2 STATLOK STBL

## (undated) DEVICE — TUBING, SUCTION, 1/4" X 10', STRAIGHT: Brand: MEDLINE

## (undated) DEVICE — SYRINGE MED 10ML LUERLOCK TIP W/O SFTY DISP

## (undated) DEVICE — PULSED FIELD ABLATION CATHETER: Brand: FARAWAVE™

## (undated) DEVICE — SPONGE GZ W4XL4IN COT 12 PLY TYP VII WVN C FLD DSGN STERILE

## (undated) DEVICE — GUIDEWIRE VASC L180CM 0035IN 15MM J PTFE ROSEN TIP CRV FIX

## (undated) DEVICE — SOLUTION IRRIG 1000ML H2O STRL BLT

## (undated) DEVICE — AORTIC PERFUSION CANNULA: Brand: EDWARDS LIFESCIENCES AORTIC PERFUSION CANNULA

## (undated) DEVICE — 1 X VERSACROSS CONNECT TRANSSEPTAL DILATOR;  1 X VERSACROSS RF WIRE (INCLUDING 1 X CONNECTOR CABLE (SINGLE USE)): Brand: VERSACROSS CONNECT ACCESS SOLUTION FOR FARADRIVE

## (undated) DEVICE — KIT JACK TBL PT CARE

## (undated) DEVICE — DRAPE,LAPAROTOMY,PCH,STERILE: Brand: MEDLINE

## (undated) DEVICE — SOLUTION IV 1000ML 0.9% SOD CHL

## (undated) DEVICE — CATHETER REPROC ULTRASOUND 10 FR ACUSON ACUNAV

## (undated) DEVICE — Z CONVERTED USE 2107985 COVER FLROSCP W36XL28IN 4 SIDE ADH

## (undated) DEVICE — DEVICE TRNSF SPIK STL 2008S] MICROTEK MEDICAL INC]

## (undated) DEVICE — SOLUTION IV 500ML 0.9% SOD CHL FLX CONT

## (undated) DEVICE — DRAIN SURG L3/8-1/2IN DIA3/16IN SIL CARD CONN 1:1 BLAK

## (undated) DEVICE — SKIN PREP TRAY 4 COMPARTM TRAY: Brand: MEDLINE INDUSTRIES, INC.

## (undated) DEVICE — DRAPE,LAPAROTOMY,PED,STERILE: Brand: MEDLINE

## (undated) DEVICE — LARGE BORE STOPCOCK WITH ROTATING MALE LUER LOCK

## (undated) DEVICE — SURGICAL PROCEDURE PACK BASIN MAJ SET CUST NO CAUT

## (undated) DEVICE — SUTURE VCRL 2-0 L27IN ABSRB UD CP-2 L26MM 1/2 CIR REV CUT J869H

## (undated) DEVICE — 3.0MM PRECISION NEURO (MATCH HEAD)

## (undated) DEVICE — CATHETER EP LG 2-8-2 MM 7 FRX95 CM LIVEWIRE

## (undated) DEVICE — NDL SPNE QNCKE 18GX3.5IN LF --

## (undated) DEVICE — (D)PREP SKN CHLRAPRP APPL 26ML -- CONVERT TO ITEM 371833

## (undated) DEVICE — PRESSURE MONITORING SET: Brand: TRUWAVE

## (undated) DEVICE — TRANSFER SET 3": Brand: MEDLINE INDUSTRIES, INC.

## (undated) DEVICE — GLOVE ORTHO 7 1/2   MSG9475

## (undated) DEVICE — DERMABOND SKIN ADH 0.7ML -- DERMABOND ADVANCED 12/BX

## (undated) DEVICE — MARKER,SKIN,WI/RULER AND LABELS: Brand: MEDLINE

## (undated) DEVICE — PROVE COVER: Brand: UNBRANDED

## (undated) DEVICE — SUTURE PDS II SZ 0 L27IN ABSRB VLT L26MM CT-2 1/2 CIR Z334H

## (undated) DEVICE — SUTURE V-LOC 180 SZ 0 L12IN ABSRB GRN L37MM GS-21 1/2 CIR VLOCL0316

## (undated) DEVICE — 3M™ STERI-DRAPE™ INSTRUMENT POUCH 1018: Brand: STERI-DRAPE™

## (undated) DEVICE — 3M™ MEDIPORE™ H SOFT CLOTH SURGICAL TAPE 2864, 4 INCH X 10 YARD (10CM X 9,14M), 12 ROLLS/CASE: Brand: 3M™ MEDIPORE™

## (undated) DEVICE — HYPODERMIC SAFETY NEEDLE: Brand: MONOJECT

## (undated) DEVICE — SUTURE STRATAFIX SPRL MCRYL + SZ 2-0 L18IN ABSRB UD CT-1 SXMP1B413

## (undated) DEVICE — MEDI-TRACE CADENCE ADULT, DEFIBRILLATION ELECTRODE -RTS  (10 PR/PK) - PHILIPS: Brand: MEDI-TRACE CADENCE

## (undated) DEVICE — (D)SYR 10ML 1/5ML GRAD NSAF -- PKGING CHANGE USE ITEM 338027

## (undated) DEVICE — MEDI-TRACE CADENCE ADULT, DEFIBRILLATION ELECTRODE -RTS  (10 PR/PK) - PHYSIO-CONTROL: Brand: MEDI-TRACE CADENCE

## (undated) DEVICE — SYSTEM SKIN CLSR 22CM DERMBND PRINEO

## (undated) DEVICE — 72" ARTERIAL PRESSURE TUBING: Brand: ICU MEDICAL

## (undated) DEVICE — DRESSING FOAM W7 3 10XL95IN SIL POLYUR HEEL SELF ADH W BORD

## (undated) DEVICE — LAMINECTOMY RICHMOND-LF: Brand: MEDLINE INDUSTRIES, INC.

## (undated) DEVICE — BIPOLAR FORCEPS CORD: Brand: VALLEYLAB

## (undated) DEVICE — CATHETER REPROC 6FR QUADRIPOL JSN 120CM SUPREME

## (undated) DEVICE — VISUALIZATION SYSTEM: Brand: CLEARIFY

## (undated) DEVICE — BLANKET WRM W25XL64IN NONWOVEN SFT LTWT PLIABLE HYPR

## (undated) DEVICE — ELECTRODE BLDE L4IN NONINSULATED EDGE

## (undated) DEVICE — PICO 7 10CM X 20CM: Brand: PICO™ 7

## (undated) DEVICE — DEVICE VES SEAL OD 15 FR ID 10-12 FR PERC FEM VEN ACCS SITE

## (undated) DEVICE — GOWN,SIRUS,NONRNF,SETINSLV,2XL,18/CS: Brand: MEDLINE

## (undated) DEVICE — SUTURE VCRL SZ 2-0 L18IN ABSRB UD CT-1 L36MM 1/2 CIR J839D